# Patient Record
Sex: MALE | Race: BLACK OR AFRICAN AMERICAN | HISPANIC OR LATINO | Employment: OTHER | URBAN - METROPOLITAN AREA
[De-identification: names, ages, dates, MRNs, and addresses within clinical notes are randomized per-mention and may not be internally consistent; named-entity substitution may affect disease eponyms.]

---

## 2017-01-04 ENCOUNTER — ALLSCRIPTS OFFICE VISIT (OUTPATIENT)
Dept: OTHER | Facility: OTHER | Age: 57
End: 2017-01-04

## 2017-01-18 ENCOUNTER — ALLSCRIPTS OFFICE VISIT (OUTPATIENT)
Dept: OTHER | Facility: OTHER | Age: 57
End: 2017-01-18

## 2017-01-18 DIAGNOSIS — E11.9 TYPE 2 DIABETES MELLITUS WITHOUT COMPLICATIONS (HCC): ICD-10-CM

## 2017-01-23 ENCOUNTER — TRANSCRIBE ORDERS (OUTPATIENT)
Dept: ADMINISTRATIVE | Facility: HOSPITAL | Age: 57
End: 2017-01-23

## 2017-01-23 DIAGNOSIS — E11.69 TYPE 2 DIABETES MELLITUS WITH OTHER SPECIFIED COMPLICATION (HCC): Primary | ICD-10-CM

## 2017-02-15 ENCOUNTER — ANESTHESIA EVENT (OUTPATIENT)
Dept: GASTROENTEROLOGY | Facility: HOSPITAL | Age: 57
End: 2017-02-15
Payer: COMMERCIAL

## 2017-02-15 ENCOUNTER — HOSPITAL ENCOUNTER (OUTPATIENT)
Dept: NUCLEAR MEDICINE | Facility: HOSPITAL | Age: 57
Discharge: HOME | End: 2017-02-15
Attending: INTERNAL MEDICINE
Payer: COMMERCIAL

## 2017-02-15 DIAGNOSIS — E11.69 TYPE 2 DIABETES MELLITUS WITH OTHER SPECIFIED COMPLICATION (HCC): ICD-10-CM

## 2017-02-15 PROCEDURE — 78264 GASTRIC EMPTYING IMG STUDY: CPT

## 2017-02-15 PROCEDURE — A9541 TC99M SULFUR COLLOID: HCPCS

## 2017-02-16 ENCOUNTER — ANESTHESIA (OUTPATIENT)
Dept: GASTROENTEROLOGY | Facility: HOSPITAL | Age: 57
End: 2017-02-16
Payer: COMMERCIAL

## 2017-02-16 ENCOUNTER — HOSPITAL ENCOUNTER (OUTPATIENT)
Facility: HOSPITAL | Age: 57
Setting detail: OUTPATIENT SURGERY
Discharge: HOME | End: 2017-02-16
Attending: INTERNAL MEDICINE | Admitting: INTERNAL MEDICINE
Payer: COMMERCIAL

## 2017-02-16 ENCOUNTER — GENERIC CONVERSION - ENCOUNTER (OUTPATIENT)
Dept: OTHER | Facility: OTHER | Age: 57
End: 2017-02-16

## 2017-02-16 VITALS
SYSTOLIC BLOOD PRESSURE: 132 MMHG | TEMPERATURE: 97.1 F | RESPIRATION RATE: 18 BRPM | DIASTOLIC BLOOD PRESSURE: 62 MMHG | HEART RATE: 48 BPM | HEIGHT: 66 IN | OXYGEN SATURATION: 98 % | WEIGHT: 144 LBS | BODY MASS INDEX: 23.14 KG/M2

## 2017-02-16 DIAGNOSIS — E11.9 TYPE 2 DIABETES MELLITUS WITHOUT COMPLICATIONS (HCC): ICD-10-CM

## 2017-02-16 DIAGNOSIS — R11.2 NAUSEA WITH VOMITING: ICD-10-CM

## 2017-02-16 LAB — GLUCOSE SERPL-MCNC: 186 MG/DL (ref 65–140)

## 2017-02-16 PROCEDURE — 88305 TISSUE EXAM BY PATHOLOGIST: CPT | Performed by: INTERNAL MEDICINE

## 2017-02-16 PROCEDURE — 88313 SPECIAL STAINS GROUP 2: CPT | Performed by: INTERNAL MEDICINE

## 2017-02-16 PROCEDURE — 88341 IMHCHEM/IMCYTCHM EA ADD ANTB: CPT | Performed by: INTERNAL MEDICINE

## 2017-02-16 PROCEDURE — 82948 REAGENT STRIP/BLOOD GLUCOSE: CPT

## 2017-02-16 PROCEDURE — 88342 IMHCHEM/IMCYTCHM 1ST ANTB: CPT | Performed by: INTERNAL MEDICINE

## 2017-02-16 RX ORDER — SODIUM CHLORIDE 9 MG/ML
125 INJECTION, SOLUTION INTRAVENOUS CONTINUOUS
Status: DISCONTINUED | OUTPATIENT
Start: 2017-02-16 | End: 2017-02-16 | Stop reason: HOSPADM

## 2017-02-16 RX ORDER — ARIPIPRAZOLE 2 MG/1
2 TABLET ORAL DAILY
Status: ON HOLD | COMMUNITY
End: 2017-06-02 | Stop reason: ALTCHOICE

## 2017-02-16 RX ORDER — METOCLOPRAMIDE 5 MG/1
5 TABLET ORAL AS NEEDED
COMMUNITY
End: 2018-12-19

## 2017-02-16 RX ORDER — PROPOFOL 10 MG/ML
INJECTION, EMULSION INTRAVENOUS AS NEEDED
Status: DISCONTINUED | OUTPATIENT
Start: 2017-02-16 | End: 2017-02-16 | Stop reason: SURG

## 2017-02-16 RX ORDER — PARICALCITOL 1 UG/1
2 CAPSULE, LIQUID FILLED ORAL DAILY
COMMUNITY
End: 2020-01-01 | Stop reason: ALTCHOICE

## 2017-02-16 RX ORDER — FERROUS SULFATE 325(65) MG
325 TABLET ORAL
COMMUNITY
End: 2018-12-19

## 2017-02-16 RX ORDER — DOXAZOSIN MESYLATE 1 MG/1
4 TABLET ORAL 2 TIMES DAILY
COMMUNITY
End: 2020-01-01 | Stop reason: HOSPADM

## 2017-02-16 RX ORDER — AMLODIPINE BESYLATE 10 MG/1
10 TABLET ORAL DAILY
COMMUNITY
End: 2019-12-24 | Stop reason: SDUPTHER

## 2017-02-16 RX ORDER — FUROSEMIDE 40 MG/1
TABLET ORAL DAILY
COMMUNITY
End: 2020-01-01 | Stop reason: HOSPADM

## 2017-02-16 RX ORDER — METOPROLOL SUCCINATE 25 MG/1
25 TABLET, EXTENDED RELEASE ORAL DAILY
COMMUNITY
End: 2019-10-28 | Stop reason: SDUPTHER

## 2017-02-16 RX ORDER — LANSOPRAZOLE 30 MG/1
30 CAPSULE, DELAYED RELEASE ORAL DAILY
Status: ON HOLD | COMMUNITY
End: 2017-06-02

## 2017-02-16 RX ORDER — CHOLECALCIFEROL (VITAMIN D3) 25 MCG
CAPSULE ORAL
COMMUNITY
End: 2018-12-19

## 2017-02-16 RX ORDER — BUPROPION HYDROCHLORIDE 100 MG/1
100 TABLET, EXTENDED RELEASE ORAL 2 TIMES DAILY
Status: ON HOLD | COMMUNITY
End: 2017-06-02 | Stop reason: SDUPTHER

## 2017-02-16 RX ADMIN — LIDOCAINE HYDROCHLORIDE 60 MG: 20 INJECTION, SOLUTION INTRAVENOUS at 12:54

## 2017-02-16 RX ADMIN — PROPOFOL 120 MG: 10 INJECTION, EMULSION INTRAVENOUS at 12:58

## 2017-02-16 RX ADMIN — PROPOFOL 50 MG: 10 INJECTION, EMULSION INTRAVENOUS at 13:02

## 2017-02-16 RX ADMIN — PROPOFOL 30 MG: 10 INJECTION, EMULSION INTRAVENOUS at 13:03

## 2017-02-16 RX ADMIN — SODIUM CHLORIDE 125 ML/HR: 0.9 INJECTION, SOLUTION INTRAVENOUS at 12:40

## 2017-02-16 RX ADMIN — PROPOFOL 40 MG: 10 INJECTION, EMULSION INTRAVENOUS at 13:05

## 2017-02-27 ENCOUNTER — GENERIC CONVERSION - ENCOUNTER (OUTPATIENT)
Dept: OTHER | Facility: OTHER | Age: 57
End: 2017-02-27

## 2017-02-28 ENCOUNTER — TRANSCRIBE ORDERS (OUTPATIENT)
Dept: ADMINISTRATIVE | Facility: HOSPITAL | Age: 57
End: 2017-02-28

## 2017-02-28 ENCOUNTER — APPOINTMENT (OUTPATIENT)
Dept: LAB | Facility: HOSPITAL | Age: 57
End: 2017-02-28
Payer: COMMERCIAL

## 2017-02-28 DIAGNOSIS — E11.8 UNCONTROLLED TYPE 2 DIABETES MELLITUS WITH COMPLICATION, UNSPECIFIED LONG TERM INSULIN USE STATUS: ICD-10-CM

## 2017-02-28 DIAGNOSIS — Z79.4 ENCOUNTER FOR LONG-TERM (CURRENT) USE OF INSULIN (HCC): ICD-10-CM

## 2017-02-28 DIAGNOSIS — I12.9 PARENCHYMAL RENAL HYPERTENSION, STAGE 1-4 OR UNSPECIFIED CHRONIC KIDNEY DISEASE: ICD-10-CM

## 2017-02-28 DIAGNOSIS — E11.65 UNCONTROLLED TYPE 2 DIABETES MELLITUS WITH COMPLICATION, UNSPECIFIED LONG TERM INSULIN USE STATUS: ICD-10-CM

## 2017-02-28 DIAGNOSIS — E78.00 PURE HYPERCHOLESTEROLEMIA: ICD-10-CM

## 2017-02-28 DIAGNOSIS — E78.49 FAMILIAL COMBINED HYPERLIPIDEMIA: ICD-10-CM

## 2017-02-28 DIAGNOSIS — E21.1 SECONDARY HYPERPARATHYROIDISM, NON-RENAL (HCC): ICD-10-CM

## 2017-02-28 DIAGNOSIS — N18.6 TYPE 2 DIABETES MELLITUS WITH ESRD (END-STAGE RENAL DISEASE) (HCC): Primary | ICD-10-CM

## 2017-02-28 DIAGNOSIS — D63.1 ANEMIA OF CHRONIC RENAL FAILURE, UNSPECIFIED STAGE: ICD-10-CM

## 2017-02-28 DIAGNOSIS — Z86.79 PERSONAL HISTORY OF UNSPECIFIED CIRCULATORY DISEASE: ICD-10-CM

## 2017-02-28 DIAGNOSIS — N18.9 ANEMIA OF CHRONIC RENAL FAILURE, UNSPECIFIED STAGE: ICD-10-CM

## 2017-02-28 DIAGNOSIS — N18.4 CHRONIC KIDNEY DISEASE, STAGE IV (SEVERE) (HCC): ICD-10-CM

## 2017-02-28 DIAGNOSIS — E11.21 DIABETIC GLOMERULOPATHY (HCC): ICD-10-CM

## 2017-02-28 DIAGNOSIS — E87.5 HYPERPOTASSEMIA: ICD-10-CM

## 2017-02-28 DIAGNOSIS — N18.6 TYPE 2 DIABETES MELLITUS WITH ESRD (END-STAGE RENAL DISEASE) (HCC): ICD-10-CM

## 2017-02-28 DIAGNOSIS — E21.1 HYPERPARATHYROIDISM DUE TO 1,25(0H)2D3 (HCC): ICD-10-CM

## 2017-02-28 DIAGNOSIS — E11.22 TYPE 2 DIABETES MELLITUS WITH ESRD (END-STAGE RENAL DISEASE) (HCC): Primary | ICD-10-CM

## 2017-02-28 DIAGNOSIS — E55.9 UNSPECIFIED VITAMIN D DEFICIENCY: ICD-10-CM

## 2017-02-28 DIAGNOSIS — E13.43 DIABETIC AUTONOMIC NEUROPATHY ASSOCIATED WITH OTHER SPECIFIED DIABETES MELLITUS (HCC): ICD-10-CM

## 2017-02-28 DIAGNOSIS — E11.22 TYPE 2 DIABETES MELLITUS WITH ESRD (END-STAGE RENAL DISEASE) (HCC): ICD-10-CM

## 2017-02-28 DIAGNOSIS — D50.9 IRON DEFICIENCY ANEMIA, UNSPECIFIED: ICD-10-CM

## 2017-02-28 DIAGNOSIS — I10 ESSENTIAL HYPERTENSION, MALIGNANT: ICD-10-CM

## 2017-02-28 LAB
ALBUMIN SERPL BCP-MCNC: 3.2 G/DL (ref 3.5–5)
ALP SERPL-CCNC: 179 U/L (ref 46–116)
ALT SERPL W P-5'-P-CCNC: 18 U/L (ref 12–78)
ANION GAP SERPL CALCULATED.3IONS-SCNC: 8 MMOL/L (ref 4–13)
AST SERPL W P-5'-P-CCNC: 13 U/L (ref 5–45)
BASOPHILS # BLD AUTO: 0.04 THOUSANDS/ΜL (ref 0–0.1)
BASOPHILS NFR BLD AUTO: 1 % (ref 0–1)
BILIRUB SERPL-MCNC: 0.38 MG/DL (ref 0.2–1)
BUN SERPL-MCNC: 41 MG/DL (ref 5–25)
CALCIUM SERPL-MCNC: 8.4 MG/DL (ref 8.3–10.1)
CHLORIDE SERPL-SCNC: 104 MMOL/L (ref 100–108)
CO2 SERPL-SCNC: 25 MMOL/L (ref 21–32)
CREAT SERPL-MCNC: 3.23 MG/DL (ref 0.6–1.3)
EOSINOPHIL # BLD AUTO: 0.21 THOUSAND/ΜL (ref 0–0.61)
EOSINOPHIL NFR BLD AUTO: 3 % (ref 0–6)
ERYTHROCYTE [DISTWIDTH] IN BLOOD BY AUTOMATED COUNT: 12.4 % (ref 11.6–15.1)
EST. AVERAGE GLUCOSE BLD GHB EST-MCNC: 169 MG/DL
FERRITIN SERPL-MCNC: 84 NG/ML (ref 8–388)
GFR SERPL CREATININE-BSD FRML MDRD: 20.1 ML/MIN/1.73SQ M
GLUCOSE SERPL-MCNC: 240 MG/DL (ref 65–140)
HBA1C MFR BLD: 7.5 % (ref 4.2–6.3)
HCT VFR BLD AUTO: 38.4 % (ref 36.5–49.3)
HGB BLD-MCNC: 12.8 G/DL (ref 12–17)
IRON SERPL-MCNC: 48 UG/DL (ref 65–175)
LYMPHOCYTES # BLD AUTO: 1.11 THOUSANDS/ΜL (ref 0.6–4.47)
LYMPHOCYTES NFR BLD AUTO: 16 % (ref 14–44)
MCH RBC QN AUTO: 29.8 PG (ref 26.8–34.3)
MCHC RBC AUTO-ENTMCNC: 33.3 G/DL (ref 31.4–37.4)
MCV RBC AUTO: 90 FL (ref 82–98)
MONOCYTES # BLD AUTO: 0.69 THOUSAND/ΜL (ref 0.17–1.22)
MONOCYTES NFR BLD AUTO: 10 % (ref 4–12)
NEUTROPHILS # BLD AUTO: 4.9 THOUSANDS/ΜL (ref 1.85–7.62)
NEUTS SEG NFR BLD AUTO: 70 % (ref 43–75)
PHOSPHATE SERPL-MCNC: 3.1 MG/DL (ref 2.7–4.5)
PLATELET # BLD AUTO: 194 THOUSANDS/UL (ref 149–390)
PMV BLD AUTO: 12.1 FL (ref 8.9–12.7)
POTASSIUM SERPL-SCNC: 5.3 MMOL/L (ref 3.5–5.3)
PROT SERPL-MCNC: 6.7 G/DL (ref 6.4–8.2)
PTH-INTACT SERPL-MCNC: 234.9 PG/ML (ref 14–72)
RBC # BLD AUTO: 4.29 MILLION/UL (ref 3.88–5.62)
SODIUM SERPL-SCNC: 137 MMOL/L (ref 136–145)
TSH SERPL DL<=0.05 MIU/L-ACNC: 0.68 UIU/ML (ref 0.36–3.74)
WBC # BLD AUTO: 6.95 THOUSAND/UL (ref 4.31–10.16)

## 2017-02-28 PROCEDURE — 85025 COMPLETE CBC W/AUTO DIFF WBC: CPT

## 2017-02-28 PROCEDURE — 82728 ASSAY OF FERRITIN: CPT

## 2017-02-28 PROCEDURE — 84443 ASSAY THYROID STIM HORMONE: CPT

## 2017-02-28 PROCEDURE — 80053 COMPREHEN METABOLIC PANEL: CPT

## 2017-02-28 PROCEDURE — 83540 ASSAY OF IRON: CPT

## 2017-02-28 PROCEDURE — 36415 COLL VENOUS BLD VENIPUNCTURE: CPT

## 2017-02-28 PROCEDURE — 83970 ASSAY OF PARATHORMONE: CPT

## 2017-02-28 PROCEDURE — 84100 ASSAY OF PHOSPHORUS: CPT

## 2017-02-28 PROCEDURE — 83036 HEMOGLOBIN GLYCOSYLATED A1C: CPT

## 2017-03-01 ENCOUNTER — GENERIC CONVERSION - ENCOUNTER (OUTPATIENT)
Dept: OTHER | Facility: OTHER | Age: 57
End: 2017-03-01

## 2017-03-22 ENCOUNTER — ALLSCRIPTS OFFICE VISIT (OUTPATIENT)
Dept: OTHER | Facility: OTHER | Age: 57
End: 2017-03-22

## 2017-03-27 ENCOUNTER — GENERIC CONVERSION - ENCOUNTER (OUTPATIENT)
Dept: OTHER | Facility: OTHER | Age: 57
End: 2017-03-27

## 2017-05-10 ENCOUNTER — ALLSCRIPTS OFFICE VISIT (OUTPATIENT)
Dept: OTHER | Facility: OTHER | Age: 57
End: 2017-05-10

## 2017-06-01 ENCOUNTER — ANESTHESIA EVENT (OUTPATIENT)
Dept: GASTROENTEROLOGY | Facility: HOSPITAL | Age: 57
End: 2017-06-01
Payer: COMMERCIAL

## 2017-06-01 RX ORDER — SODIUM CHLORIDE 9 MG/ML
125 INJECTION, SOLUTION INTRAVENOUS CONTINUOUS
Status: CANCELLED | OUTPATIENT
Start: 2017-06-01

## 2017-06-02 ENCOUNTER — ANESTHESIA (OUTPATIENT)
Dept: GASTROENTEROLOGY | Facility: HOSPITAL | Age: 57
End: 2017-06-02
Payer: COMMERCIAL

## 2017-06-02 ENCOUNTER — GENERIC CONVERSION - ENCOUNTER (OUTPATIENT)
Dept: OTHER | Facility: OTHER | Age: 57
End: 2017-06-02

## 2017-06-02 ENCOUNTER — HOSPITAL ENCOUNTER (OUTPATIENT)
Facility: HOSPITAL | Age: 57
Setting detail: OUTPATIENT SURGERY
Discharge: HOME | End: 2017-06-02
Attending: INTERNAL MEDICINE | Admitting: INTERNAL MEDICINE
Payer: COMMERCIAL

## 2017-06-02 VITALS
HEART RATE: 47 BPM | BODY MASS INDEX: 22.6 KG/M2 | DIASTOLIC BLOOD PRESSURE: 73 MMHG | OXYGEN SATURATION: 94 % | WEIGHT: 144 LBS | TEMPERATURE: 96 F | HEIGHT: 67 IN | RESPIRATION RATE: 24 BRPM | SYSTOLIC BLOOD PRESSURE: 131 MMHG

## 2017-06-02 DIAGNOSIS — K27.9 PEPTIC ULCER WITHOUT HEMORRHAGE OR PERFORATION: ICD-10-CM

## 2017-06-02 LAB — GLUCOSE SERPL-MCNC: 216 MG/DL (ref 65–140)

## 2017-06-02 PROCEDURE — 88305 TISSUE EXAM BY PATHOLOGIST: CPT | Performed by: INTERNAL MEDICINE

## 2017-06-02 PROCEDURE — 82948 REAGENT STRIP/BLOOD GLUCOSE: CPT

## 2017-06-02 RX ORDER — SODIUM CHLORIDE 9 MG/ML
125 INJECTION, SOLUTION INTRAVENOUS CONTINUOUS
Status: DISCONTINUED | OUTPATIENT
Start: 2017-06-02 | End: 2017-06-02 | Stop reason: HOSPADM

## 2017-06-02 RX ORDER — PROPOFOL 10 MG/ML
INJECTION, EMULSION INTRAVENOUS AS NEEDED
Status: DISCONTINUED | OUTPATIENT
Start: 2017-06-02 | End: 2017-06-02 | Stop reason: SURG

## 2017-06-02 RX ORDER — LANSOPRAZOLE 30 MG/1
30 CAPSULE, DELAYED RELEASE ORAL 2 TIMES DAILY
Qty: 60 CAPSULE | Refills: 4 | Status: SHIPPED | OUTPATIENT
Start: 2017-06-02 | End: 2018-06-06 | Stop reason: SDUPTHER

## 2017-06-02 RX ADMIN — PROPOFOL 50 MG: 10 INJECTION, EMULSION INTRAVENOUS at 09:36

## 2017-06-02 RX ADMIN — SODIUM CHLORIDE 125 ML/HR: 0.9 INJECTION, SOLUTION INTRAVENOUS at 08:59

## 2017-06-02 RX ADMIN — PROPOFOL 150 MG: 10 INJECTION, EMULSION INTRAVENOUS at 09:32

## 2017-06-07 ENCOUNTER — GENERIC CONVERSION - ENCOUNTER (OUTPATIENT)
Dept: OTHER | Facility: OTHER | Age: 57
End: 2017-06-07

## 2017-06-27 ENCOUNTER — TRANSCRIBE ORDERS (OUTPATIENT)
Dept: ADMINISTRATIVE | Facility: HOSPITAL | Age: 57
End: 2017-06-27

## 2017-06-27 ENCOUNTER — APPOINTMENT (OUTPATIENT)
Dept: LAB | Facility: HOSPITAL | Age: 57
End: 2017-06-27
Payer: COMMERCIAL

## 2017-06-27 DIAGNOSIS — E55.9 UNSPECIFIED VITAMIN D DEFICIENCY: ICD-10-CM

## 2017-06-27 DIAGNOSIS — N18.4 CHRONIC KIDNEY DISEASE, STAGE IV (SEVERE) (HCC): Primary | ICD-10-CM

## 2017-06-27 DIAGNOSIS — E87.5 HYPERPOTASSEMIA: ICD-10-CM

## 2017-06-27 DIAGNOSIS — N18.9 ANEMIA OF CHRONIC RENAL FAILURE, UNSPECIFIED STAGE: ICD-10-CM

## 2017-06-27 DIAGNOSIS — N18.6 TYPE 2 DIABETES MELLITUS WITH ESRD (END-STAGE RENAL DISEASE) (HCC): Primary | ICD-10-CM

## 2017-06-27 DIAGNOSIS — E21.1 HYPERPARATHYROIDISM DUE TO 1,25(0H)2D3 (HCC): ICD-10-CM

## 2017-06-27 DIAGNOSIS — I10 ESSENTIAL HYPERTENSION, MALIGNANT: ICD-10-CM

## 2017-06-27 DIAGNOSIS — D50.9 IRON DEFICIENCY ANEMIA, UNSPECIFIED: ICD-10-CM

## 2017-06-27 DIAGNOSIS — Z86.79 PERSONAL HISTORY OF CARDIOVASCULAR DISORDER: ICD-10-CM

## 2017-06-27 DIAGNOSIS — E78.49 FAMILIAL COMBINED HYPERLIPIDEMIA: ICD-10-CM

## 2017-06-27 DIAGNOSIS — E78.00 PURE HYPERCHOLESTEROLEMIA: ICD-10-CM

## 2017-06-27 DIAGNOSIS — I12.9 PARENCHYMAL RENAL HYPERTENSION, STAGE 1-4 OR UNSPECIFIED CHRONIC KIDNEY DISEASE: ICD-10-CM

## 2017-06-27 DIAGNOSIS — E11.8 UNCONTROLLED TYPE 2 DIABETES MELLITUS WITH COMPLICATION, UNSPECIFIED LONG TERM INSULIN USE STATUS: ICD-10-CM

## 2017-06-27 DIAGNOSIS — E11.65 UNCONTROLLED TYPE 2 DIABETES MELLITUS WITH COMPLICATION, UNSPECIFIED LONG TERM INSULIN USE STATUS: ICD-10-CM

## 2017-06-27 DIAGNOSIS — E13.43 DIABETIC AUTONOMIC NEUROPATHY ASSOCIATED WITH OTHER SPECIFIED DIABETES MELLITUS (HCC): ICD-10-CM

## 2017-06-27 DIAGNOSIS — E11.21 DIABETIC GLOMERULOPATHY (HCC): ICD-10-CM

## 2017-06-27 DIAGNOSIS — E11.22 TYPE 2 DIABETES MELLITUS WITH ESRD (END-STAGE RENAL DISEASE) (HCC): ICD-10-CM

## 2017-06-27 DIAGNOSIS — N18.4 CHRONIC KIDNEY DISEASE, STAGE IV (SEVERE) (HCC): ICD-10-CM

## 2017-06-27 DIAGNOSIS — D63.1 ANEMIA OF CHRONIC RENAL FAILURE, UNSPECIFIED STAGE: ICD-10-CM

## 2017-06-27 DIAGNOSIS — N18.6 TYPE 2 DIABETES MELLITUS WITH ESRD (END-STAGE RENAL DISEASE) (HCC): ICD-10-CM

## 2017-06-27 DIAGNOSIS — E11.22 TYPE 2 DIABETES MELLITUS WITH ESRD (END-STAGE RENAL DISEASE) (HCC): Primary | ICD-10-CM

## 2017-06-27 LAB
ALBUMIN SERPL BCP-MCNC: 3.1 G/DL (ref 3.5–5)
ALP SERPL-CCNC: 160 U/L (ref 46–116)
ALT SERPL W P-5'-P-CCNC: 16 U/L (ref 12–78)
ANION GAP SERPL CALCULATED.3IONS-SCNC: 10 MMOL/L (ref 4–13)
AST SERPL W P-5'-P-CCNC: 13 U/L (ref 5–45)
BASOPHILS # BLD AUTO: 0.07 THOUSANDS/ΜL (ref 0–0.1)
BASOPHILS NFR BLD AUTO: 1 % (ref 0–1)
BILIRUB SERPL-MCNC: 0.38 MG/DL (ref 0.2–1)
BUN SERPL-MCNC: 36 MG/DL (ref 5–25)
CALCIUM SERPL-MCNC: 8.9 MG/DL (ref 8.3–10.1)
CHLORIDE SERPL-SCNC: 105 MMOL/L (ref 100–108)
CO2 SERPL-SCNC: 24 MMOL/L (ref 21–32)
CREAT SERPL-MCNC: 3.08 MG/DL (ref 0.6–1.3)
CREAT UR-MCNC: 191 MG/DL
EOSINOPHIL # BLD AUTO: 0.2 THOUSAND/ΜL (ref 0–0.61)
EOSINOPHIL NFR BLD AUTO: 3 % (ref 0–6)
ERYTHROCYTE [DISTWIDTH] IN BLOOD BY AUTOMATED COUNT: 12.7 % (ref 11.6–15.1)
EST. AVERAGE GLUCOSE BLD GHB EST-MCNC: 189 MG/DL
FERRITIN SERPL-MCNC: 154 NG/ML (ref 8–388)
GFR SERPL CREATININE-BSD FRML MDRD: 21.3 ML/MIN/1.73SQ M
GLUCOSE SERPL-MCNC: 193 MG/DL (ref 65–140)
HBA1C MFR BLD: 8.2 % (ref 4.2–6.3)
HCT VFR BLD AUTO: 39.7 % (ref 36.5–49.3)
HGB BLD-MCNC: 14 G/DL (ref 12–17)
IRON SATN MFR SERPL: 25 %
IRON SERPL-MCNC: 71 UG/DL (ref 65–175)
LYMPHOCYTES # BLD AUTO: 1.5 THOUSANDS/ΜL (ref 0.6–4.47)
LYMPHOCYTES NFR BLD AUTO: 24 % (ref 14–44)
MAGNESIUM SERPL-MCNC: 1.8 MG/DL (ref 1.6–2.6)
MCH RBC QN AUTO: 30.8 PG (ref 26.8–34.3)
MCHC RBC AUTO-ENTMCNC: 35.3 G/DL (ref 31.4–37.4)
MCV RBC AUTO: 87 FL (ref 82–98)
MICROALBUMIN UR-MCNC: 3250 MG/L (ref 0–20)
MICROALBUMIN/CREAT 24H UR: 1702 MG/G CREATININE (ref 0–30)
MONOCYTES # BLD AUTO: 0.54 THOUSAND/ΜL (ref 0.17–1.22)
MONOCYTES NFR BLD AUTO: 9 % (ref 4–12)
NEUTROPHILS # BLD AUTO: 3.94 THOUSANDS/ΜL (ref 1.85–7.62)
NEUTS SEG NFR BLD AUTO: 63 % (ref 43–75)
NRBC BLD AUTO-RTO: 0 /100 WBCS
PHOSPHATE SERPL-MCNC: 3.6 MG/DL (ref 2.7–4.5)
PLATELET # BLD AUTO: 190 THOUSANDS/UL (ref 149–390)
PMV BLD AUTO: 12.2 FL (ref 8.9–12.7)
POTASSIUM SERPL-SCNC: 4.9 MMOL/L (ref 3.5–5.3)
PROT SERPL-MCNC: 6.7 G/DL (ref 6.4–8.2)
PTH-INTACT SERPL-MCNC: 164 PG/ML (ref 14–72)
RBC # BLD AUTO: 4.55 MILLION/UL (ref 3.88–5.62)
SODIUM SERPL-SCNC: 139 MMOL/L (ref 136–145)
TIBC SERPL-MCNC: 286 UG/DL (ref 250–450)
WBC # BLD AUTO: 6.25 THOUSAND/UL (ref 4.31–10.16)

## 2017-06-27 PROCEDURE — 83036 HEMOGLOBIN GLYCOSYLATED A1C: CPT

## 2017-06-27 PROCEDURE — 83735 ASSAY OF MAGNESIUM: CPT

## 2017-06-27 PROCEDURE — 82570 ASSAY OF URINE CREATININE: CPT | Performed by: INTERNAL MEDICINE

## 2017-06-27 PROCEDURE — 85025 COMPLETE CBC W/AUTO DIFF WBC: CPT

## 2017-06-27 PROCEDURE — 83970 ASSAY OF PARATHORMONE: CPT

## 2017-06-27 PROCEDURE — 80053 COMPREHEN METABOLIC PANEL: CPT

## 2017-06-27 PROCEDURE — 84100 ASSAY OF PHOSPHORUS: CPT

## 2017-06-27 PROCEDURE — 36415 COLL VENOUS BLD VENIPUNCTURE: CPT

## 2017-06-27 PROCEDURE — 82728 ASSAY OF FERRITIN: CPT

## 2017-06-27 PROCEDURE — 83550 IRON BINDING TEST: CPT

## 2017-06-27 PROCEDURE — 83540 ASSAY OF IRON: CPT

## 2017-06-27 PROCEDURE — 82043 UR ALBUMIN QUANTITATIVE: CPT | Performed by: INTERNAL MEDICINE

## 2017-07-03 ENCOUNTER — GENERIC CONVERSION - ENCOUNTER (OUTPATIENT)
Dept: OTHER | Facility: OTHER | Age: 57
End: 2017-07-03

## 2017-07-13 ENCOUNTER — GENERIC CONVERSION - ENCOUNTER (OUTPATIENT)
Dept: OTHER | Facility: OTHER | Age: 57
End: 2017-07-13

## 2017-08-09 ENCOUNTER — ALLSCRIPTS OFFICE VISIT (OUTPATIENT)
Dept: OTHER | Facility: OTHER | Age: 57
End: 2017-08-09

## 2017-08-11 ENCOUNTER — GENERIC CONVERSION - ENCOUNTER (OUTPATIENT)
Dept: OTHER | Facility: OTHER | Age: 57
End: 2017-08-11

## 2017-09-27 ENCOUNTER — GENERIC CONVERSION - ENCOUNTER (OUTPATIENT)
Dept: OTHER | Facility: OTHER | Age: 57
End: 2017-09-27

## 2017-10-05 ENCOUNTER — GENERIC CONVERSION - ENCOUNTER (OUTPATIENT)
Dept: OTHER | Facility: OTHER | Age: 57
End: 2017-10-05

## 2017-12-06 ENCOUNTER — GENERIC CONVERSION - ENCOUNTER (OUTPATIENT)
Dept: FAMILY MEDICINE CLINIC | Facility: CLINIC | Age: 57
End: 2017-12-06

## 2018-01-09 NOTE — MISCELLANEOUS
Provider Comments  Provider Comments:   Pt didn't show to his appointment        Signatures   Electronically signed by : RICHELLE Hernandez ; Jul 21 2016  4:00PM EST                       (Author)

## 2018-01-10 NOTE — RESULT NOTES
Discussion/Summary   Esophagus biopsies just showed inflammation, nothing worrisome, recommend continued acid blocking medication     Verified Results  (1) TISSUE EXAM 02Jun2017 09:36AM Rachael Wright     Test Name Result Flag Reference   LAB AP CASE REPORT (Report)     Surgical Pathology Report             Case: [id number]                   Authorizing Provider: Nely Jarrell DO    Collected:      06/02/2017 0431        Ordering Location:   [facility]    Received:      06/02/2017 [facility]                              Pathologist:      Alex Lewis MD                              Specimen:  Esophagus, Bx ge junction/esophagitis   LAB AP FINAL DIAGNOSIS (Report)     A  Esophagus, GE junction, biopsy:        - Focal acute esophagitis with rare neutrophils in the squamous   epithelium         - Fewer than 2 eosinophils per high power field are identified         - No intestinal metaplasia, dysplasia or malignancy is   identified  Interpretation performed at , [address]   Electronically signed by Alex Lewis MD on 6/6/2017 at 1:42 PM   LAB AP SURGICAL ADDITIONAL INFORMATION (Report)     These tests were developed and their performance characteristics   determined by [facility] or [employer]  They may not be cleared or approved by the [place]  Food and   Drug Administration  The FDA has determined that such clearance or   approval is not necessary  These tests are used for clinical purposes  They should not be regarded as investigational or for research  This   laboratory has been approved by CLIA 88, designated as a high-complexity   laboratory and is qualified to perform these tests  LAB AP GROSS DESCRIPTION (Report)     A  The specimen is received in formalin, labeled with the patient's name   and hospital number, and is designated GE junction biopsy   The specimen   consists of 2 tan white soft tissue fragment measuring 0 2 and 0 3 cm in   greatest dimension  Entire submitted  One cassette  Note: The estimated total fixation time based upon information provided by   the submitting clinician and standard processing schedule is 17 00 hours      AEK

## 2018-01-11 NOTE — RESULT NOTES
Verified Results  (1) COMPREHENSIVE METABOLIC PANEL [id number] 44:67EI Amy Mess     Test Name Result Flag Reference   GLUCOSE,RANDM 64 mg/dL L    If the patient is fasting, the ADA then defines impaired fasting glucose as > 100 mg/dL and diabetes as > or equal to 123 mg/dL  SODIUM 142 mmol/L  136-145   POTASSIUM 4 6 mmol/L  3 5-5 3   CHLORIDE 106 mmol/L  100-108   CARBON DIOXIDE 28 mmol/L  21-32   ANION GAP (CALC) 8 mmol/L  4-13   BLOOD UREA NITROGEN 36 mg/dL H 5-25   CREATININE 2 83 mg/dL H 0 60-1 30   Standardized to IDMS reference method   CALCIUM 8 5 mg/dL  8 3-10 1   BILI, TOTAL 0 22 mg/dL  0 20-1 00   ALK PHOSPHATAS 128 U/L H    ALT (SGPT) 18 U/L  12-78   AST(SGOT) 18 U/L  5-45   ALBUMIN 3 1 g/dL L 3 5-5 0   TOTAL PROTEIN 6 8 g/dL  6 4-8 2   eGFR Non-African American 23 4 ml/min/1 73sq [facility] Disease Education Program recommendations are as follows:  GFR calculation is accurate only with a steady state creatinine  Chronic Kidney disease less than 60 ml/min/1 73 sq  meters  Kidney failure less than 15 ml/min/1 73 sq  meters  Discussion/Summary   let pt know his BW shows his creatinine (kidney function) is slightly better than last time, his kidneys are back to HIS baseline, still slow like it has been over the last year, but not worse       Signatures   Electronically signed by : RICHELLE Hall ; Nov 29 2016  1:59PM EST                       (Author)

## 2018-01-12 VITALS
RESPIRATION RATE: 18 BRPM | DIASTOLIC BLOOD PRESSURE: 68 MMHG | SYSTOLIC BLOOD PRESSURE: 130 MMHG | TEMPERATURE: 96 F | OXYGEN SATURATION: 98 % | HEART RATE: 61 BPM | WEIGHT: 141 LBS | BODY MASS INDEX: 23.49 KG/M2 | HEIGHT: 65 IN

## 2018-01-12 NOTE — RESULT NOTES
Message   repeat egd in 8 weeks     Verified Results  (1) TISSUE EXAM [id number] 01:03PM Christi Leiva     Test Name Result Flag Reference   LAB AP CASE REPORT (Report)     Surgical Pathology Report             Case: [id number]                   Authorizing Provider: Valentino Husain DO    Collected:      02/16/2017 1303        Ordering Location:   [facility]    Received:      02/16/2017 [facility]                              Pathologist:      John Rocha MD                              Specimens:  A) - Stomach, Bx gastric antrum ulcer                                 B) - Esophagogastric junction, Bx GE junction   LAB AP FINAL DIAGNOSIS (Report)     A  Stomach, antrum, ulcer, biopsy:    - Chronic inactive antral gastritis with intestinal metaplasia (goblet   cells present and confirmed with Alcian blue/PAS     stain     - Immunostain for H  pylori (with appropriate positive control) is   negative  - No dysplasia or neoplasia (pankeratin AE1/AE3 immunostain)   identified  B  Gastroesophageal junction, 35 cm, biopsy:    - Squamous mucosa with intramucosal eosinophils and basal cell   hyperplasia, consistent with reflux esophagitis  - No dysplasia or neoplasia identified  Electronically signed by John Rocha MD on 2/20/2017 at 4:40 PM   LAB AP SURGICAL ADDITIONAL INFORMATION (Report)     These tests were developed and their performance characteristics   determined by [facility] or [employer]  They may not be cleared or approved by the [place]  Food and   Drug Administration  The FDA has determined that such clearance or   approval is not necessary  These tests are used for clinical purposes  They should not be regarded as investigational or for research  This   laboratory has been approved by CLIA 88, designated as a high-complexity   laboratory and is qualified to perform these tests     LAB AP JAYLEN DESCRIPTION (Report)     A  The specimen is received in formalin, labeled with the patient's name   and hospital number, and is designated biopsy gastric antrum ulcer  The   specimen consists of 3 tan red soft tissue fragments each measuring 0 3   cm  Entirely submitted  One cassette  B  The specimen is received in formalin, labeled with the patient's name   and hospital number, and is designated biopsy GE junction at 35 cm  The   specimen consists of a single tan soft tissue fragment measuring 0 3 cm  Entirely submitted  One cassette  Note: The estimated total formalin fixation time based upon information   provided by the submitting clinician and the standard processing schedule   is 15 5 hours      MAC

## 2018-01-13 VITALS
HEART RATE: 92 BPM | BODY MASS INDEX: 21.9 KG/M2 | DIASTOLIC BLOOD PRESSURE: 80 MMHG | HEIGHT: 66 IN | OXYGEN SATURATION: 97 % | TEMPERATURE: 95.1 F | SYSTOLIC BLOOD PRESSURE: 116 MMHG | WEIGHT: 136.25 LBS | RESPIRATION RATE: 18 BRPM

## 2018-01-13 VITALS
SYSTOLIC BLOOD PRESSURE: 128 MMHG | OXYGEN SATURATION: 97 % | HEART RATE: 61 BPM | DIASTOLIC BLOOD PRESSURE: 72 MMHG | BODY MASS INDEX: 23.32 KG/M2 | RESPIRATION RATE: 18 BRPM | HEIGHT: 65 IN | TEMPERATURE: 95.7 F | WEIGHT: 140 LBS

## 2018-01-14 VITALS
BODY MASS INDEX: 23.95 KG/M2 | SYSTOLIC BLOOD PRESSURE: 124 MMHG | DIASTOLIC BLOOD PRESSURE: 60 MMHG | TEMPERATURE: 95.6 F | HEIGHT: 66 IN | HEART RATE: 47 BPM | WEIGHT: 149 LBS | RESPIRATION RATE: 18 BRPM | OXYGEN SATURATION: 99 %

## 2018-01-14 VITALS
HEART RATE: 50 BPM | DIASTOLIC BLOOD PRESSURE: 70 MMHG | BODY MASS INDEX: 23.16 KG/M2 | OXYGEN SATURATION: 99 % | SYSTOLIC BLOOD PRESSURE: 120 MMHG | TEMPERATURE: 96 F | HEIGHT: 66 IN | WEIGHT: 144.13 LBS

## 2018-01-16 NOTE — RESULT NOTES
Discussion/Summary   pls let pt know his kidney level is stable, however make sure he has follow up with Nephrology, his BG has increased his [address] is now 8, make sure he follows with endo  Rest of BW is OK     Verified Results  (1) CBC/PLT/DIFF [id number] 05:47PM Tia Sheets     Test Name Result Flag Reference   WBC COUNT 6 25 Thousand/uL  4 31-10 16   RBC COUNT 4 55 Million/uL  3 88-5 62   HEMOGLOBIN 14 0 g/dL  12 0-17 0   HEMATOCRIT 39 7 %  36 5-49 3   MCV 87 fL  82-98   MCH 30 8 pg  26 8-34 3   MCHC 35 3 g/dL  31 4-37 4   RDW 12 7 %  11 6-15 1   MPV 12 2 fL  8 9-12 7   PLATELET COUNT 281 Thousands/uL  149-390   nRBC AUTOMATED 0 /100 WBCs     NEUTROPHILS RELATIVE PERCENT 63 %  43-75   LYMPHOCYTES RELATIVE PERCENT 24 %  14-44   MONOCYTES RELATIVE PERCENT 9 %  4-12   EOSINOPHILS RELATIVE PERCENT 3 %  0-6   BASOPHILS RELATIVE PERCENT 1 %  0-1   NEUTROPHILS ABSOLUTE COUNT 3 94 Thousands/? ??L  1 85-7 62   LYMPHOCYTES ABSOLUTE COUNT 1 50 Thousands/? ??L  0 60-4 47   MONOCYTES ABSOLUTE COUNT 0 54 Thousand/? ??L  0 17-1 22   EOSINOPHILS ABSOLUTE COUNT 0 20 Thousand/? ??L  0 00-0 61   BASOPHILS ABSOLUTE COUNT 0 07 Thousands/? ??L  0 00-0 10   This bloodwork is non-fasting  Please drink two glasses of water morning of  bloodwork  (1) HEMOGLOBIN A1C [id number] 05:47PM Tia Sheets     Test Name Result Flag Reference   HEMOGLOBIN A1C 8 2 % H 4 2-6 3   EST  AVG  GLUCOSE 189 mg/dl       (1) COMPREHENSIVE METABOLIC PANEL [id number] 55:57GQ Tia Sheets     Test Name Result Flag Reference   GLUCOSE,RANDM 193 mg/dL H    If the patient is fasting, the ADA then defines impaired fasting glucose as > 100 mg/dL and diabetes as > or equal to 123 mg/dL     SODIUM 139 mmol/L  136-145   POTASSIUM 4 9 mmol/L  3 5-5 3   CHLORIDE 105 mmol/L  100-108   CARBON DIOXIDE 24 mmol/L  21-32   ANION GAP (CALC) 10 mmol/L  4-13   BLOOD UREA NITROGEN 36 mg/dL H 5-25   CREATININE 3 08 mg/dL H 0 60-1 30   Standardized to IDMS reference method   CALCIUM 8 9 mg/dL  8 3-10 1   BILI, TOTAL 0 38 mg/dL  0 20-1 00   ALK PHOSPHATAS 160 U/L H    ALT (SGPT) 16 U/L  12-78   AST(SGOT) 13 U/L  5-45   ALBUMIN 3 1 g/dL L 3 5-5 0   TOTAL PROTEIN 6 7 g/dL  6 4-8 2   eGFR Non-African American 21 3 ml/min/1 73sq m     [facility] Disease Education Program recommendations are as follows:  GFR calculation is accurate only with a steady state creatinine  Chronic Kidney disease less than 60 ml/min/1 73 sq  meters  Kidney failure less than 15 ml/min/1 73 sq  meters         Signatures   Electronically signed by : RICHELLE Naqvi ; Jul  3 2017 11:54AM EST                       (Author)

## 2018-01-17 NOTE — RESULT NOTES
Verified Results  NM GASTRIC EMPTYING [id number] 07:54AM Vasquez Chester     Test Name Result Flag Reference   NM GASTRIC EMPTYING (Report)     GASTRIC EMPTYING STUDY     INDICATION: Abdominal pain , nausea, vomiting, diabetes     COMPARISON: None available     TECHNIQUE:  The study was performed following the oral administration of 0 97 mCi Tc-99m sulfur colloid combined with scrambled eggs, as part of a standard meal  Following the meal, one minute anterior and posterior images were obtained immediately and   at 0 25 hours, 0 5 hour, 1 hour, 1 5 hour, 2 hour, 3 hour and 4 hour intervals from the time of ingestion  Geometric mean analyses were then performed  As of March 1, 2016, this gastric emptying protocol has been modified and updated  The gastric    emptying times and the normal values reported below reflect the change in protocol  FINDINGS:     Gastric emptying at 0 5 hours = 12 (N < 30%)    Gastric emptying at 1 hour = 24 % (N = 10 - 70%)   Gastric emptying at 2 hours = 40 % (N = > 40%)   Gastric emptying at 3 hours = 72 % (N = > 70%)   Gastric emptying at 4 hours = 96 % (N = >90%)     Linear T-1/2 = 126 minutes         IMPRESSION:     Normal rate of gastric emptying         Workstation performed: [id number]     Signed by:   Shannan Burrell MD   2/15/17

## 2018-01-18 NOTE — PROGRESS NOTES
Assessment    1  Encounter for preventive health examination (V70 0) (Z00 00)   2  Cerebral infarction, unspecified (434 91) (I63 9)   3  Type 2 diabetes mellitus with diabetic chronic kidney disease (250 40,585 9) (E11 22)   4  Stage 4 chronic kidney disease (585 4) (N18 4)    Plan  Depression screening    · *VB-Depression Screening; Status:Complete;   Done: 85BRM1038 09:16AM  Diabetes mellitus    · Lovastatin 20 MG Oral Tablet; Take one tablet daily  Esophageal reflux    · Lansoprazole 30 MG Oral Capsule Delayed Release; TAKE 1 CAPSULE EVERY  MORNING DAILY  Type 2 diabetes mellitus with renal manifestations, controlled    · Furosemide 40 MG Oral Tablet; TAKE 1 TABLET DAILY  Unlinked    · AmLODIPine Besylate 10 MG Oral Tablet; 1 tablet at bedtime    Discussion/Summary  Impression: Subsequent Annual Wellness Visit  Cardiovascular screening and counseling: the risks and benefits of screening were discussed, screening is current and screening not indicated  Diabetes screening and counseling: the risks and benefits of screening were discussed and screening not indicated  Colorectal cancer screening and counseling: the risks and benefits of screening were discussed and screening not indicated  Prostate cancer screening and counseling: screening not indicated  Glaucoma screening and counseling: screening is current  HIV screening and counseling: the risks and benefits of screening were discussed and the patient declines screening  Patient Discussion: follow-up visit needed in one year  Possible side effects of new medications were reviewed with the patient/guardian today  The treatment plan was reviewed with the patient/guardian  The patient/guardian understands and agrees with the treatment plan      History of Present Illness  HPI: 48 yo  male with well controlled DM, stage IV chronic kidney disease, s/p CVA, here today for annual wellness visit  No new complains     Welcome to [facility] and Wellness Visits: The patient is being seen for the subsequent annual wellness visit  Medicare Screening and Risk Factors   Hospitalizations: no previous hospitalizations and he has been hospitalized 0 times  Medicare Screening Tests Risk Questions   Abdominal aortic aneurysm risk assessment: none indicated  Osteoporosis risk assessment: none indicated  HIV risk assessment: none indicated  Drug and Alcohol Use: The patient has never smoked cigarettes  The patient reports never drinking alcohol  He has previously used illicit drugs  He reports using marijuana  Diet and Physical Activity: Current diet includes well balanced meals, low fat food choices, low carbohydrate food choices, low salt food choices, 1 servings of fruit per day, 1 servings of vegetables per day, 1 servings of meat per day, 2 servings of whole grains per day, 2 servings of simple carbohydrates per day, 1 servings of dairy products per day, 2 cups of coffee per day, 0 cups of tea per day and 1 cans of regular soda per day  He exercises 2 times per week  Exercise: walking 1 hours per week  Mood Disorder and Cognitive Impairment Screening:   Depression screening  negative for symptoms  He denies feeling down, depressed, or hopeless over the past two weeks  He denies feeling little interest or pleasure in doing things over the past two weeks  Cognitive impairment screening: denies difficulty learning/retaining new information, denies difficulty handling complex tasks, denies difficulty with reasoning, denies difficulty with spatial ability and orientation, denies difficulty with language and denies difficulty with behavior  Functional Ability/Level of Safety: Hearing is normal bilaterally, normal in the right ear, normal in the left ear and a hearing aid is not used  The patient is currently able to do activities of daily living with limitations, able to participate in social activities with limitations and unable to drive  Activities of daily living details: transportation help needed, needs help shopping, meal preparation help needed, needs help doing housework, needs help doing laundry, needs help managing medications and needs help managing money, but does not need help using the phone  Fall risk factors: The patient fell 0 times in the past 12 months  Home safety risk factors:  no unfamiliar surroundings, no loose rugs, no poor household lighting, no uneven floors, no household clutter, grab bars in the bathroom and handrails on the stairs  Advance Directives: Advance directives: durable power of  for health care directives, but no living will and no advance directives  Co-Managers and Medical Equipment/Suppliers: See Patient Care Team      Patient Care Team    Care Team Member Role Specialty Office Number   [id number] Sosa  Cardiology [phone]   Phyllis Mejia MD Specialist General Surgery [phone]   Halima SOLORIO  Family Medicine [phone]   [facility] Specialist Gastroenterology Adult [phone]     Review of Systems    Over the past 2 weeks, how often have you been bothered by the following problems? 1 ) Little interest or pleasure in doing things? Not at all    2 ) Feeling down, depressed or hopeless? Not at all    3 ) Trouble falling asleep or sleeping too much? Not at all    4 ) Feeling tired or having little energy? Not at all    5 ) Poor appetite or overeating? Not at all    6 ) Feeling bad about yourself, or that you are a failure, or have let yourself or your family down? Not at all    7 ) Trouble concentrating on things, such as reading a newspaper or watching television? Not at all    8 ) Moving or speaking so slowly that other people could have noticed, or the opposite, moving or speaking faster than usual? Not at all  How difficult have these problems made it for you to do your work, take care of things at home, or get along with people? Not at all     Score 0 Active Problems    1  Abdominal discomfort (789 00) (R10 9)   2  Ambulatory dysfunction (719 7) (R26 2)   3  Anxiety (300 00) (F41 9)   4  Cerebral infarction, unspecified (434 91) (I63 9)   5  Constipation (564 00) (K59 00)   6  Decrease in appetite (783 0) (R63 0)   7  Depression (311) (F32 9)   8  Depression screening (V79 0) (Z13 89)   9  Diabetes mellitus (250 00) (E11 9)   10  Difficulty walking (719 7) (R26 2)   11  Esophageal reflux (530 81) (K21 9)   12  Gait disturbance (781 2) (R26 9)   13  Gastroparesis (536 3) (K31 84)   14  Inguinal hernia (550 90) (K40 90)   15  Insomnia (780 52) (G47 00)   16  Nausea with vomiting (787 01) (R11 2)   17  Need for 23-polyvalent pneumococcal polysaccharide vaccine (V03 82) (Z23)   18  Need for influenza vaccination (V04 81) (Z23)   19  Need for pneumococcal vaccination (V03 82) (Z23)   20  PUD (peptic ulcer disease) (533 90) (K27 9)   21  Restless legs syndrome (333 94) (G25 81)   22  S/P right inguinal hernia repair (V45 89) (Z98 890,Z87 19)   23  Stage 4 chronic kidney disease (585 4) (N18 4)   24  Staggering gait (781 2) (R26 0)   25  Subclinical hyperthyroidism (242 90) (E05 90)   26  Type 2 diabetes mellitus with diabetic chronic kidney disease (250 40,585 9) (E11 22)   27  Type 2 diabetes mellitus with renal manifestations, controlled (250 40) (E11 29)   28   Viral gastroenteritis (008 8) (A08 4)    Past Medical History    · History of Accelerated essential hypertension (401 0) (I10)   · History of Acute contact otitis externa of left ear (380 22) (H60 532)   · History of Acute nonsuppurative otitis media of left ear (381 00) (H65 192)   · History of Chronic kidney disease, stage IV (severe) (585 4) (N18 4)   · History of Coronary Artery Disease (V12 59)   · History of Cough (786 2) (R05)   · History of Diabetic eye exam (V72 0,250 00) (E11 9,Z01 00)   · History of Encounter for diabetic foot exam (250 00) (E11 9)   · History of Failure To Gain Weight (783 41) · History of concussion (V15 52) (M96 624)   · History of hyperkalemia (V12 29) (Z86 39)   · History of hyperlipidemia (V12 29) (Z86 39)   · History of secondary hyperparathyroidism (V12 29) (Z86 39)   · History of Laceration (879 8)   · History of Late effects of cerebrovascular disease (438 9) (I69 90)   · History of Limb pain (729 5) (M79 609)   · History of Low Back Pain Unchanged When Going From Sitting To Standing   · History of Overflow incontinence (788 38) (N39 490)   · History of Type 2 diabetes mellitus (250 00) (E11 9)   · History of Visit for suture removal (V58 32) (Z48 02)    Surgical History    · History of Hernia Repair    Family History  Mother    · Family history of diabetes mellitus (V18 0) (Z83 3)   · Denied: Family history of substance abuse   · No family history of mental disorder  Father    · Denied: Family history of substance abuse   · No family history of mental disorder  Family History    · Family history of Cancer   · Family history of Essential Hypertension   · Family history of Hyperlipidemia    Social History    · Advance directive declined by patient (V49 89) (Z78 9)   · Being A Social Drinker   · Caffeine Use   · Marijuana   · Smokes once in a while for pain  Described as 1x per week  · Never a smoker   · No preference on Mandaen beliefs   · Social history reviewed, unchanged    Current Meds   1  AmLODIPine Besylate 10 MG Oral Tablet; 1 tablet at bedtime; Therapy: 38BFM5601 to Recorded   2  ARIPiprazole 10 MG Oral Tablet; TAKE 1 TABLET AT BEDTIME; Therapy: 75Pcm4976 to (Evaluate:27Jun2017)  Requested for: 65Dkd9004; Last   Rx:24Oou1788 Ordered   3  Aspirin EC 81 MG Oral Tablet Delayed Release; TAKE ONE TABLET BY MOUTH ONCE   DAILY; Therapy: 35YLY9343 to (Daniel Purcell)  Requested for: 04EMO0942; Last   Rx:77Grn6040 Ordered   4  Doxazosin Mesylate 1 MG Oral Tablet; TAKE TABLET Twice daily; Therapy: 65BHL8947 to (Evaluate:05Apr2016) Recorded   5   Ferrous Sulfate 325 (65 Fe) MG Oral Tablet; TAKE 1 TABLET BY MOUTH TWICE A DAY   AT 8AM & 5PM (SUPPLEMENT); Therapy: 08OMH6359 to (Evaluate:01Apr2017)  Requested for: 91Bcg8877; Last   Rx:37Htm2739 Ordered   6  Furosemide 40 MG Oral Tablet; TAKE 1 TABLET DAILY; Therapy: (Levonne Mehta) to Recorded   7  Lansoprazole 30 MG Oral Capsule Delayed Release; TAKE 1 CAPSULE EVERY   MORNING DAILY; Therapy: 25TQD5107 to (Evaluate:23Gox9194)  Requested for: 67WXP0106; Last   Rx:03Mar2017 Ordered   8  Lovastatin 20 MG Oral Tablet; Take one tablet daily; Therapy: 55LMA6611 to (Evaluate:28Apr2017)  Requested for: 15Yya8861; Last   Rx:60Uif9508 Ordered   9  Metoclopramide HCl - 5 MG Oral Tablet; TAKE 1 TABLET BY MOUTH EVERY 6 HOURS; Therapy: 07PAE8868 to (Evaluate:18Jan2017)  Requested for: 14Eyh3127; Last   Rx:03Lba7262 Ordered   10  Metoprolol Succinate ER 25 MG Oral Tablet Extended Release 24 Hour; as needed; Therapy: 79MOS6866 to Recorded   11  OxyCODONE HCl - 15 MG Oral Tablet; TAKE 1 TABLET Every 8 hours; Therapy: 48QWE8494 to (Evaluate:07Apr2017); Last Rx:10Mar2017 Ordered   12  Polyethylene Glycol 3350 Oral Powder; Mix 1/2 capful in 8 oz of water or juice once a day    as needed for constipation; Therapy: 38OIR5817 to (Last Rx:06Jan2017)  Requested for: 04PKB1154 Ordered   13  Sure Comfort Pen Needles 32G X 4 MM Miscellaneous; use four times a day; Therapy: 38HQT0433 to (Evaluate:16Mla3350)  Requested for: 95Kll9641; Last    Rx:02Hhk4403 Ordered   14  Underpads Extra Large Miscellaneous; change twice daily; Therapy: 97PNF0407 to (Last Rx:18Nov2015)  Requested for: 17KAJ9516 Ordered   15  Ventolin  (90 Base) MCG/ACT Inhalation Aerosol Solution; as directed; Therapy: 91JOR2661 to Recorded   16  Vitamin D3 2000 UNIT Oral Capsule; 1 capsule every other day; Therapy: (Recorded:58Plr4899) to Recorded   17  Zolpidem Tartrate 10 MG Oral Tablet; take 1 tablet by mouth at bedtime if needed;     Therapy: 85NVE7444 to (Evaluate:09Ahj9568)  Requested for: 81RJM9568; Last    Rx:22Mar2017 Ordered    Allergies    1  No Known Drug Allergies    Immunizations   1    Influenza  04-Jan-2017    PCV  08-Oct-2015    PPSV  04-Jan-2017     Vitals  Signs    Temperature: 96 F, Tympanic  Heart Rate: 61  Respiration: 18  Systolic: 773  Diastolic: 68  Height: 5 ft 5 in  Weight: 141 lb   BMI Calculated: 23 46  BSA Calculated: 1 71  O2 Saturation: 98    Physical Exam    Constitutional   General appearance: No acute distress, well appearing and well nourished  Ears, Nose, Mouth, and Throat   Otoscopic examination: Tympanic membranes translucent with normal light reflex  Canals patent without erythema  Hearing: Normal     Oropharynx: Normal with no erythema, edema, exudate or lesions  Pulmonary   Auscultation of lungs: Clear to auscultation  Cardiovascular   Auscultation of heart: Normal rate and rhythm, normal S1 and S2, no murmurs  Abdomen   Abdomen: Non-tender, no masses  Liver and spleen: No hepatomegaly or splenomegaly  Lymphatic   Palpation of lymph nodes in neck: No lymphadenopathy  Palpation of lymph nodes in axillae: No lymphadenopathy  Musculoskeletal   Gait and station: Abnormal     Inspection/palpation of digits and nails: Normal without clubbing or cyanosis  Inspection/palpation of joints, bones, and muscles: Abnormal     Range of motion: Abnormal     Stability: Abnormal     Skin   Skin and subcutaneous tissue: Normal without rashes or lesions  Palpation of skin and subcutaneous tissue: Normal turgor  Diabetic Foot Exam: The toes were normal  The sensory exam showed normal vibratory sensation at the level of the toes and normal position sense at the level of the toes  The toes were normal  The sensory exam showed normal vibratory sensation at the level of the toes and normal position sense at the level of the toes     Monofilament Testing: diminished tactile sensation with monofilament testing throughout both feet  Vascular: Pulses: 1+ in the posterior tibialis and 1+ in the dorsalis pedis  Pulses: 1+ in the posterior tibialis and 1+ in the dorsalis pedis  Psychiatric   Judgment and insight: Normal     Orientation to person, place and time: Normal        Results/Data  *VB-Depression Screening 49HRA1776 09:16AM Evelyn Dy     Test Name Result Flag Reference   Depression Scale Result      Depression Screen - Negative For Symptoms       Health Management  History of Diabetic eye exam   *VB - Eye Exam; every 1 year; Last 28IMV8267; Next Due: 01ERQ7189;  Active    Signatures   Electronically signed by : RICHELLE Kinney ; May 10 2017 12:39PM EST                       (Author)

## 2018-01-22 ENCOUNTER — APPOINTMENT (OUTPATIENT)
Dept: LAB | Facility: HOSPITAL | Age: 58
End: 2018-01-22
Payer: COMMERCIAL

## 2018-01-22 ENCOUNTER — ALLSCRIPTS OFFICE VISIT (OUTPATIENT)
Dept: OTHER | Facility: OTHER | Age: 58
End: 2018-01-22

## 2018-01-22 DIAGNOSIS — R30.0 DYSURIA: ICD-10-CM

## 2018-01-22 LAB
BACTERIA UR QL AUTO: ABNORMAL /HPF
BILIRUB UR QL STRIP: ABNORMAL
BILIRUB UR QL STRIP: NORMAL
CLARITY UR: CLEAR
CLARITY UR: NORMAL
COLOR UR: CLEAR
COLOR UR: YELLOW
GLUCOSE (HISTORICAL): NORMAL
GLUCOSE UR STRIP-MCNC: NEGATIVE MG/DL
HGB UR QL STRIP.AUTO: NEGATIVE
HGB UR QL STRIP.AUTO: NORMAL
HYALINE CASTS #/AREA URNS LPF: ABNORMAL /LPF
KETONES UR STRIP-MCNC: NEGATIVE MG/DL
KETONES UR STRIP-MCNC: NORMAL MG/DL
LEUKOCYTE ESTERASE UR QL STRIP: NEGATIVE
LEUKOCYTE ESTERASE UR QL STRIP: NORMAL
NITRITE UR QL STRIP: NEGATIVE
NITRITE UR QL STRIP: NORMAL
NON-SQ EPI CELLS URNS QL MICRO: ABNORMAL /HPF
PH UR STRIP.AUTO: 5.5 [PH] (ref 4.5–8)
PH UR STRIP.AUTO: 6 [PH]
PROT UR STRIP-MCNC: 200 MG/DL
PROT UR STRIP-MCNC: ABNORMAL MG/DL
RBC #/AREA URNS AUTO: ABNORMAL /HPF
SP GR UR STRIP.AUTO: 1.02
SP GR UR STRIP.AUTO: 1.02 (ref 1–1.03)
UROBILINOGEN UR QL STRIP.AUTO: 0.2
UROBILINOGEN UR QL STRIP.AUTO: 0.2 E.U./DL
WBC #/AREA URNS AUTO: ABNORMAL /HPF

## 2018-01-22 PROCEDURE — 81001 URINALYSIS AUTO W/SCOPE: CPT

## 2018-01-29 ENCOUNTER — TRANSCRIBE ORDERS (OUTPATIENT)
Dept: ADMINISTRATIVE | Facility: HOSPITAL | Age: 58
End: 2018-01-29

## 2018-01-29 ENCOUNTER — LAB (OUTPATIENT)
Dept: LAB | Facility: HOSPITAL | Age: 58
End: 2018-01-29
Payer: COMMERCIAL

## 2018-01-29 DIAGNOSIS — N18.9 ANEMIA OF CHRONIC RENAL FAILURE, UNSPECIFIED CKD STAGE: ICD-10-CM

## 2018-01-29 DIAGNOSIS — I10 ESSENTIAL HYPERTENSION, MALIGNANT: ICD-10-CM

## 2018-01-29 DIAGNOSIS — I12.9 PARENCHYMAL RENAL HYPERTENSION, STAGE 1 THROUGH STAGE 4 OR UNSPECIFIED CHRONIC KIDNEY DISEASE: ICD-10-CM

## 2018-01-29 DIAGNOSIS — E78.49 FAMILIAL COMBINED HYPERLIPIDEMIA: ICD-10-CM

## 2018-01-29 DIAGNOSIS — D63.1 ANEMIA OF CHRONIC RENAL FAILURE, UNSPECIFIED CKD STAGE: ICD-10-CM

## 2018-01-29 DIAGNOSIS — E13.43 DIABETIC AUTONOMIC NEUROPATHY ASSOCIATED WITH OTHER SPECIFIED DIABETES MELLITUS (HCC): ICD-10-CM

## 2018-01-29 DIAGNOSIS — E87.5 HYPERPOTASSEMIA: ICD-10-CM

## 2018-01-29 DIAGNOSIS — N18.6 TYPE 2 DIABETES MELLITUS WITH ESRD (END-STAGE RENAL DISEASE) (HCC): ICD-10-CM

## 2018-01-29 DIAGNOSIS — E11.22 TYPE 2 DIABETES MELLITUS WITH ESRD (END-STAGE RENAL DISEASE) (HCC): ICD-10-CM

## 2018-01-29 DIAGNOSIS — Z79.4 ENCOUNTER FOR LONG-TERM (CURRENT) USE OF INSULIN (HCC): ICD-10-CM

## 2018-01-29 DIAGNOSIS — E78.00 PURE HYPERCHOLESTEROLEMIA: ICD-10-CM

## 2018-01-29 DIAGNOSIS — E11.22 TYPE 2 DIABETES MELLITUS WITH ESRD (END-STAGE RENAL DISEASE) (HCC): Primary | ICD-10-CM

## 2018-01-29 DIAGNOSIS — N18.4 CHRONIC KIDNEY DISEASE, STAGE IV (SEVERE) (HCC): ICD-10-CM

## 2018-01-29 DIAGNOSIS — Z79.899 HIGH RISK MEDICATION USE: ICD-10-CM

## 2018-01-29 DIAGNOSIS — E21.1 HYPERPARATHYROIDISM DUE TO 1,25(0H)2D3 (HCC): ICD-10-CM

## 2018-01-29 DIAGNOSIS — Z86.79 PERSONAL HISTORY OF CARDIOVASCULAR DISORDER: ICD-10-CM

## 2018-01-29 DIAGNOSIS — E55.9 VITAMIN D DEFICIENCY DISEASE: ICD-10-CM

## 2018-01-29 DIAGNOSIS — E11.21 DIABETIC GLOMERULOPATHY (HCC): ICD-10-CM

## 2018-01-29 DIAGNOSIS — E21.1 SECONDARY HYPERPARATHYROIDISM, NON-RENAL (HCC): ICD-10-CM

## 2018-01-29 DIAGNOSIS — N18.6 TYPE 2 DIABETES MELLITUS WITH ESRD (END-STAGE RENAL DISEASE) (HCC): Primary | ICD-10-CM

## 2018-01-29 DIAGNOSIS — D50.9 NORMOCYTIC HYPOCHROMIC ANEMIA: ICD-10-CM

## 2018-01-29 DIAGNOSIS — N18.4 CHRONIC KIDNEY DISEASE, STAGE IV (SEVERE) (HCC): Primary | ICD-10-CM

## 2018-01-29 LAB
ALBUMIN SERPL BCP-MCNC: 3 G/DL (ref 3.5–5)
ALP SERPL-CCNC: 132 U/L (ref 46–116)
ALT SERPL W P-5'-P-CCNC: 13 U/L (ref 12–78)
ANION GAP SERPL CALCULATED.3IONS-SCNC: 5 MMOL/L (ref 4–13)
AST SERPL W P-5'-P-CCNC: 15 U/L (ref 5–45)
BASOPHILS # BLD AUTO: 0.06 THOUSANDS/ΜL (ref 0–0.1)
BASOPHILS NFR BLD AUTO: 1 % (ref 0–1)
BILIRUB SERPL-MCNC: 0.5 MG/DL (ref 0.2–1)
BUN SERPL-MCNC: 39 MG/DL (ref 5–25)
CALCIUM SERPL-MCNC: 8.4 MG/DL (ref 8.3–10.1)
CHLORIDE SERPL-SCNC: 107 MMOL/L (ref 100–108)
CHOLEST SERPL-MCNC: 151 MG/DL (ref 50–200)
CO2 SERPL-SCNC: 26 MMOL/L (ref 21–32)
CREAT SERPL-MCNC: 3.79 MG/DL (ref 0.6–1.3)
CREAT UR-MCNC: 133 MG/DL
EOSINOPHIL # BLD AUTO: 0.22 THOUSAND/ΜL (ref 0–0.61)
EOSINOPHIL NFR BLD AUTO: 3 % (ref 0–6)
ERYTHROCYTE [DISTWIDTH] IN BLOOD BY AUTOMATED COUNT: 12.7 % (ref 11.6–15.1)
EST. AVERAGE GLUCOSE BLD GHB EST-MCNC: 197 MG/DL
GFR SERPL CREATININE-BSD FRML MDRD: 17 ML/MIN/1.73SQ M
GLUCOSE P FAST SERPL-MCNC: 231 MG/DL (ref 65–99)
HBA1C MFR BLD: 8.5 % (ref 4.2–6.3)
HCT VFR BLD AUTO: 36.4 % (ref 36.5–49.3)
HDLC SERPL-MCNC: 52 MG/DL (ref 40–60)
HGB BLD-MCNC: 11.9 G/DL (ref 12–17)
LDLC SERPL CALC-MCNC: 79 MG/DL (ref 0–100)
LYMPHOCYTES # BLD AUTO: 1.33 THOUSANDS/ΜL (ref 0.6–4.47)
LYMPHOCYTES NFR BLD AUTO: 21 % (ref 14–44)
MAGNESIUM SERPL-MCNC: 1.7 MG/DL (ref 1.6–2.6)
MCH RBC QN AUTO: 29.5 PG (ref 26.8–34.3)
MCHC RBC AUTO-ENTMCNC: 32.7 G/DL (ref 31.4–37.4)
MCV RBC AUTO: 90 FL (ref 82–98)
MONOCYTES # BLD AUTO: 0.82 THOUSAND/ΜL (ref 0.17–1.22)
MONOCYTES NFR BLD AUTO: 13 % (ref 4–12)
NEUTROPHILS # BLD AUTO: 4.02 THOUSANDS/ΜL (ref 1.85–7.62)
NEUTS SEG NFR BLD AUTO: 62 % (ref 43–75)
NRBC BLD AUTO-RTO: 0 /100 WBCS
PHOSPHATE SERPL-MCNC: 3.2 MG/DL (ref 2.7–4.5)
PLATELET # BLD AUTO: 237 THOUSANDS/UL (ref 149–390)
PMV BLD AUTO: 11.8 FL (ref 8.9–12.7)
POTASSIUM SERPL-SCNC: 5.7 MMOL/L (ref 3.5–5.3)
PROT SERPL-MCNC: 6.7 G/DL (ref 6.4–8.2)
PROT UR-MCNC: 248 MG/DL
PROT/CREAT UR: 1.86 MG/G{CREAT} (ref 0–0.1)
PTH-INTACT SERPL-MCNC: 256.4 PG/ML (ref 14–72)
RBC # BLD AUTO: 4.03 MILLION/UL (ref 3.88–5.62)
SODIUM SERPL-SCNC: 138 MMOL/L (ref 136–145)
TRIGL SERPL-MCNC: 101 MG/DL
WBC # BLD AUTO: 6.45 THOUSAND/UL (ref 4.31–10.16)

## 2018-01-29 PROCEDURE — 82570 ASSAY OF URINE CREATININE: CPT | Performed by: INTERNAL MEDICINE

## 2018-01-29 PROCEDURE — 84100 ASSAY OF PHOSPHORUS: CPT

## 2018-01-29 PROCEDURE — 84156 ASSAY OF PROTEIN URINE: CPT | Performed by: INTERNAL MEDICINE

## 2018-01-29 PROCEDURE — 83036 HEMOGLOBIN GLYCOSYLATED A1C: CPT

## 2018-01-29 PROCEDURE — 85025 COMPLETE CBC W/AUTO DIFF WBC: CPT

## 2018-01-29 PROCEDURE — 36415 COLL VENOUS BLD VENIPUNCTURE: CPT

## 2018-01-29 PROCEDURE — 80061 LIPID PANEL: CPT

## 2018-01-29 PROCEDURE — 80053 COMPREHEN METABOLIC PANEL: CPT

## 2018-01-29 PROCEDURE — 83735 ASSAY OF MAGNESIUM: CPT

## 2018-01-29 PROCEDURE — 83970 ASSAY OF PARATHORMONE: CPT

## 2018-01-30 ENCOUNTER — TELEPHONE (OUTPATIENT)
Dept: FAMILY MEDICINE CLINIC | Facility: CLINIC | Age: 58
End: 2018-01-30

## 2018-02-05 ENCOUNTER — APPOINTMENT (OUTPATIENT)
Dept: LAB | Facility: HOSPITAL | Age: 58
End: 2018-02-05
Payer: COMMERCIAL

## 2018-02-05 DIAGNOSIS — Z86.79 PERSONAL HISTORY OF CARDIOVASCULAR DISORDER: ICD-10-CM

## 2018-02-05 DIAGNOSIS — E87.5 HYPERPOTASSEMIA: ICD-10-CM

## 2018-02-05 DIAGNOSIS — N18.4 CHRONIC KIDNEY DISEASE, STAGE IV (SEVERE) (HCC): ICD-10-CM

## 2018-02-05 DIAGNOSIS — E21.1 HYPERPARATHYROIDISM DUE TO 1,25(0H)2D3 (HCC): ICD-10-CM

## 2018-02-05 DIAGNOSIS — I12.9 PARENCHYMAL RENAL HYPERTENSION, STAGE 1 THROUGH STAGE 4 OR UNSPECIFIED CHRONIC KIDNEY DISEASE: ICD-10-CM

## 2018-02-05 DIAGNOSIS — D63.1 ANEMIA OF CHRONIC RENAL FAILURE, UNSPECIFIED CKD STAGE: ICD-10-CM

## 2018-02-05 DIAGNOSIS — E11.21 DIABETIC GLOMERULOPATHY (HCC): ICD-10-CM

## 2018-02-05 DIAGNOSIS — E13.43 DIABETIC AUTONOMIC NEUROPATHY ASSOCIATED WITH OTHER SPECIFIED DIABETES MELLITUS (HCC): ICD-10-CM

## 2018-02-05 DIAGNOSIS — E78.49 FAMILIAL COMBINED HYPERLIPIDEMIA: ICD-10-CM

## 2018-02-05 DIAGNOSIS — E55.9 VITAMIN D DEFICIENCY DISEASE: ICD-10-CM

## 2018-02-05 DIAGNOSIS — D50.9 NORMOCYTIC HYPOCHROMIC ANEMIA: ICD-10-CM

## 2018-02-05 DIAGNOSIS — N18.9 ANEMIA OF CHRONIC RENAL FAILURE, UNSPECIFIED CKD STAGE: ICD-10-CM

## 2018-02-05 LAB
ANION GAP SERPL CALCULATED.3IONS-SCNC: 9 MMOL/L (ref 4–13)
BUN SERPL-MCNC: 34 MG/DL (ref 5–25)
CALCIUM SERPL-MCNC: 8.3 MG/DL (ref 8.3–10.1)
CHLORIDE SERPL-SCNC: 106 MMOL/L (ref 100–108)
CO2 SERPL-SCNC: 24 MMOL/L (ref 21–32)
CREAT SERPL-MCNC: 3.46 MG/DL (ref 0.6–1.3)
GFR SERPL CREATININE-BSD FRML MDRD: 19 ML/MIN/1.73SQ M
GLUCOSE P FAST SERPL-MCNC: 181 MG/DL (ref 65–99)
POTASSIUM SERPL-SCNC: 5.1 MMOL/L (ref 3.5–5.3)
SODIUM SERPL-SCNC: 139 MMOL/L (ref 136–145)

## 2018-02-05 PROCEDURE — 36415 COLL VENOUS BLD VENIPUNCTURE: CPT

## 2018-02-05 PROCEDURE — 80048 BASIC METABOLIC PNL TOTAL CA: CPT

## 2018-02-28 ENCOUNTER — TELEPHONE (OUTPATIENT)
Dept: FAMILY MEDICINE CLINIC | Facility: CLINIC | Age: 58
End: 2018-02-28

## 2018-02-28 NOTE — TELEPHONE ENCOUNTER
Marcus Moses informed and she will discuss with him and then call back if he would like an apt to discuss further

## 2018-02-28 NOTE — TELEPHONE ENCOUNTER
Please inform [place] fungus and STD have nothing to do with each other  STD is only transmitted by close sexual contact, not by touching someone  Same goes for fungus  It is not easily transmitted  What type of fungus, what type of close contact does he have with his brother  What type of symptoms does he have, in order to fully discuss this and treat properly he to come in and be evaluated for this   BW without proper discussion is not useful

## 2018-03-26 DIAGNOSIS — F32.A DEPRESSION, UNSPECIFIED DEPRESSION TYPE: Primary | ICD-10-CM

## 2018-03-26 RX ORDER — BUPROPION HYDROCHLORIDE 100 MG/1
100 TABLET ORAL 2 TIMES DAILY
Qty: 180 TABLET | Refills: 1 | Status: SHIPPED | OUTPATIENT
Start: 2018-03-26 | End: 2018-12-11 | Stop reason: SDUPTHER

## 2018-05-29 ENCOUNTER — TRANSCRIBE ORDERS (OUTPATIENT)
Dept: ADMINISTRATIVE | Facility: HOSPITAL | Age: 58
End: 2018-05-29

## 2018-05-29 ENCOUNTER — APPOINTMENT (OUTPATIENT)
Dept: LAB | Facility: HOSPITAL | Age: 58
End: 2018-05-29
Payer: COMMERCIAL

## 2018-05-29 DIAGNOSIS — I10 ESSENTIAL HYPERTENSION, MALIGNANT: ICD-10-CM

## 2018-05-29 DIAGNOSIS — E78.00 PURE HYPERCHOLESTEROLEMIA: ICD-10-CM

## 2018-05-29 DIAGNOSIS — E11.22 TYPE 2 DIABETES MELLITUS WITH ESRD (END-STAGE RENAL DISEASE) (HCC): ICD-10-CM

## 2018-05-29 DIAGNOSIS — Z79.4 ENCOUNTER FOR LONG-TERM (CURRENT) USE OF INSULIN (HCC): ICD-10-CM

## 2018-05-29 DIAGNOSIS — N18.6 TYPE 2 DIABETES MELLITUS WITH ESRD (END-STAGE RENAL DISEASE) (HCC): Primary | ICD-10-CM

## 2018-05-29 DIAGNOSIS — N18.6 TYPE 2 DIABETES MELLITUS WITH ESRD (END-STAGE RENAL DISEASE) (HCC): ICD-10-CM

## 2018-05-29 DIAGNOSIS — Z79.899 NEED FOR PROPHYLACTIC CHEMOTHERAPY: ICD-10-CM

## 2018-05-29 DIAGNOSIS — E11.22 TYPE 2 DIABETES MELLITUS WITH ESRD (END-STAGE RENAL DISEASE) (HCC): Primary | ICD-10-CM

## 2018-05-29 LAB
25(OH)D3 SERPL-MCNC: 9.2 NG/ML (ref 30–100)
ALBUMIN SERPL BCP-MCNC: 3.2 G/DL (ref 3.5–5)
ALP SERPL-CCNC: 162 U/L (ref 46–116)
ALT SERPL W P-5'-P-CCNC: 15 U/L (ref 12–78)
ANION GAP SERPL CALCULATED.3IONS-SCNC: 8 MMOL/L (ref 4–13)
AST SERPL W P-5'-P-CCNC: 16 U/L (ref 5–45)
BILIRUB SERPL-MCNC: 0.45 MG/DL (ref 0.2–1)
BUN SERPL-MCNC: 37 MG/DL (ref 5–25)
CALCIUM SERPL-MCNC: 8.8 MG/DL (ref 8.3–10.1)
CHLORIDE SERPL-SCNC: 108 MMOL/L (ref 100–108)
CO2 SERPL-SCNC: 23 MMOL/L (ref 21–32)
CREAT SERPL-MCNC: 3.3 MG/DL (ref 0.6–1.3)
EST. AVERAGE GLUCOSE BLD GHB EST-MCNC: 177 MG/DL
GFR SERPL CREATININE-BSD FRML MDRD: 20 ML/MIN/1.73SQ M
GLUCOSE SERPL-MCNC: 194 MG/DL (ref 65–140)
HBA1C MFR BLD: 7.8 % (ref 4.2–6.3)
POTASSIUM SERPL-SCNC: 5.3 MMOL/L (ref 3.5–5.3)
PROT SERPL-MCNC: 6.7 G/DL (ref 6.4–8.2)
PTH-INTACT SERPL-MCNC: 229.2 PG/ML (ref 18.4–80.1)
SODIUM SERPL-SCNC: 139 MMOL/L (ref 136–145)

## 2018-05-29 PROCEDURE — 83970 ASSAY OF PARATHORMONE: CPT

## 2018-05-29 PROCEDURE — 80053 COMPREHEN METABOLIC PANEL: CPT

## 2018-05-29 PROCEDURE — 36415 COLL VENOUS BLD VENIPUNCTURE: CPT

## 2018-05-29 PROCEDURE — 82306 VITAMIN D 25 HYDROXY: CPT

## 2018-05-29 PROCEDURE — 83036 HEMOGLOBIN GLYCOSYLATED A1C: CPT

## 2018-06-06 DIAGNOSIS — K21.9 GASTROESOPHAGEAL REFLUX DISEASE WITHOUT ESOPHAGITIS: Primary | ICD-10-CM

## 2018-06-06 RX ORDER — LANSOPRAZOLE 30 MG/1
30 CAPSULE, DELAYED RELEASE ORAL 2 TIMES DAILY
Qty: 60 CAPSULE | Refills: 5 | Status: ON HOLD | OUTPATIENT
Start: 2018-06-06 | End: 2018-12-18 | Stop reason: SDUPTHER

## 2018-06-18 DIAGNOSIS — I63.9 CEREBRAL INFARCTION, UNSPECIFIED MECHANISM (HCC): ICD-10-CM

## 2018-06-18 DIAGNOSIS — F32.A DEPRESSION, UNSPECIFIED DEPRESSION TYPE: Primary | ICD-10-CM

## 2018-06-18 RX ORDER — ARIPIPRAZOLE 10 MG/1
1 TABLET ORAL
COMMUNITY
Start: 2011-04-29 | End: 2018-06-18 | Stop reason: SDUPTHER

## 2018-06-19 RX ORDER — ARIPIPRAZOLE 10 MG/1
10 TABLET ORAL DAILY
Qty: 90 TABLET | Refills: 0 | Status: SHIPPED | OUTPATIENT
Start: 2018-06-19 | End: 2018-09-17 | Stop reason: SDUPTHER

## 2018-09-14 ENCOUNTER — TELEPHONE (OUTPATIENT)
Dept: FAMILY MEDICINE CLINIC | Facility: CLINIC | Age: 58
End: 2018-09-14

## 2018-09-17 DIAGNOSIS — F32.A DEPRESSION, UNSPECIFIED DEPRESSION TYPE: ICD-10-CM

## 2018-09-18 RX ORDER — ARIPIPRAZOLE 10 MG/1
TABLET ORAL
Qty: 90 TABLET | Refills: 0 | Status: SHIPPED | OUTPATIENT
Start: 2018-09-18 | End: 2018-12-18 | Stop reason: SDUPTHER

## 2018-09-28 ENCOUNTER — OFFICE VISIT (OUTPATIENT)
Dept: FAMILY MEDICINE CLINIC | Facility: CLINIC | Age: 58
End: 2018-09-28
Payer: COMMERCIAL

## 2018-09-28 ENCOUNTER — CLINICAL SUPPORT (OUTPATIENT)
Dept: FAMILY MEDICINE CLINIC | Facility: CLINIC | Age: 58
End: 2018-09-28
Payer: COMMERCIAL

## 2018-09-28 VITALS
SYSTOLIC BLOOD PRESSURE: 144 MMHG | HEART RATE: 84 BPM | BODY MASS INDEX: 21.86 KG/M2 | WEIGHT: 136 LBS | HEIGHT: 66 IN | OXYGEN SATURATION: 97 % | RESPIRATION RATE: 18 BRPM | DIASTOLIC BLOOD PRESSURE: 80 MMHG | TEMPERATURE: 96 F

## 2018-09-28 DIAGNOSIS — K31.84 GASTROPARESIS: ICD-10-CM

## 2018-09-28 DIAGNOSIS — R26.2 AMBULATORY DYSFUNCTION: ICD-10-CM

## 2018-09-28 DIAGNOSIS — I10 ESSENTIAL HYPERTENSION: ICD-10-CM

## 2018-09-28 DIAGNOSIS — Z79.4 TYPE 2 DIABETES MELLITUS WITH CHRONIC KIDNEY DISEASE, WITH LONG-TERM CURRENT USE OF INSULIN, UNSPECIFIED CKD STAGE (HCC): ICD-10-CM

## 2018-09-28 DIAGNOSIS — E11.9 DIABETIC EYE EXAM (HCC): Primary | ICD-10-CM

## 2018-09-28 DIAGNOSIS — K59.01 SLOW TRANSIT CONSTIPATION: ICD-10-CM

## 2018-09-28 DIAGNOSIS — Z79.891 OPIATE ANALGESIC CONTRACT EXISTS: ICD-10-CM

## 2018-09-28 DIAGNOSIS — Z23 NEED FOR INFLUENZA VACCINATION: ICD-10-CM

## 2018-09-28 DIAGNOSIS — M54.50 CHRONIC BILATERAL LOW BACK PAIN WITHOUT SCIATICA: Primary | ICD-10-CM

## 2018-09-28 DIAGNOSIS — Z01.00 DIABETIC EYE EXAM (HCC): Primary | ICD-10-CM

## 2018-09-28 DIAGNOSIS — F11.90 CHRONIC NARCOTIC USE: ICD-10-CM

## 2018-09-28 DIAGNOSIS — E11.22 TYPE 2 DIABETES MELLITUS WITH CHRONIC KIDNEY DISEASE, WITH LONG-TERM CURRENT USE OF INSULIN, UNSPECIFIED CKD STAGE (HCC): ICD-10-CM

## 2018-09-28 DIAGNOSIS — Z12.11 SCREENING FOR COLON CANCER: ICD-10-CM

## 2018-09-28 DIAGNOSIS — N18.9 CHRONIC KIDNEY DISEASE, UNSPECIFIED CKD STAGE: ICD-10-CM

## 2018-09-28 DIAGNOSIS — G89.29 CHRONIC BILATERAL LOW BACK PAIN WITHOUT SCIATICA: Primary | ICD-10-CM

## 2018-09-28 LAB
LEFT EYE DIABETIC RETINOPATHY: NORMAL
LEFT EYE IMAGE QUALITY: NORMAL
RIGHT EYE DIABETIC RETINOPATHY: NORMAL
RIGHT EYE IMAGE QUALITY: NORMAL
SEVERITY (EYE EXAM): NORMAL

## 2018-09-28 PROCEDURE — 1036F TOBACCO NON-USER: CPT | Performed by: FAMILY MEDICINE

## 2018-09-28 PROCEDURE — 99214 OFFICE O/P EST MOD 30 MIN: CPT | Performed by: FAMILY MEDICINE

## 2018-09-28 PROCEDURE — 92250 FUNDUS PHOTOGRAPHY W/I&R: CPT

## 2018-09-28 PROCEDURE — 90682 RIV4 VACC RECOMBINANT DNA IM: CPT

## 2018-09-28 PROCEDURE — 3072F LOW RISK FOR RETINOPATHY: CPT | Performed by: FAMILY MEDICINE

## 2018-09-28 PROCEDURE — 3008F BODY MASS INDEX DOCD: CPT | Performed by: FAMILY MEDICINE

## 2018-09-28 PROCEDURE — 90471 IMMUNIZATION ADMIN: CPT

## 2018-09-28 RX ORDER — OXYCODONE HYDROCHLORIDE 10 MG/1
10 TABLET ORAL 3 TIMES DAILY PRN
Qty: 90 TABLET | Refills: 0 | Status: SHIPPED | OUTPATIENT
Start: 2018-09-28 | End: 2018-12-19

## 2018-09-28 RX ORDER — OXYCODONE HYDROCHLORIDE AND ACETAMINOPHEN 5; 325 MG/1; MG/1
TABLET ORAL
COMMUNITY
End: 2018-12-19

## 2018-09-28 NOTE — PROGRESS NOTES
Assessment/Plan:    Chronic narcotic use  PMED reviewed prior to witting script  UDS done today  Narcotic contract reviewed and dicussed with patient          Problem List Items Addressed This Visit        Digestive    Gastroparesis       Endocrine    Diabetes mellitus ([facility] [address] )       Cardiovascular and Mediastinum    Essential hypertension       Genitourinary    CKD (chronic kidney disease)       Other    Ambulatory dysfunction    Constipation    Chronic narcotic use     PMED reviewed prior to witting script  UDS done today  Narcotic contract reviewed and dicussed with patient          Relevant Orders    Toxicology screen, urine    Opiate analgesic contract exists      Other Visit Diagnoses     Screening for colon cancer        Relevant Orders    Ambulatory referral to Gastroenterology    Need for influenza vaccination        Relevant Orders    influenza vaccine, [id number], quadrivalent, recombinant, PF, 0 5 mL, for patients 18 yr+ (FLUBLOK) (Completed)            Subjective:      Patient ID: Reny Drew is a 56 y o  male  55 yo  male with Diabetes Mellitus, followed by Endocrine  CKD and HTN followed by Nephrology  Chronic LBP for which he takes occasional Oxycodone, last refilled in January of 2018, here today for follow up  Patient requesting paperwork be filled out for [employer] so he can get gift cards  He would like the flu vaccine as well  He complains of occasional nausea and vomiting about 1 to 2 times a month, usually when he has constipation  Diabetes   He presents for his follow-up diabetic visit  He has type 2 diabetes mellitus  No MedicAlert identification noted  His disease course has been improving  Hypoglycemia symptoms include mood changes  There are no diabetic associated symptoms  There are no hypoglycemic complications  Diabetic complications include a CVA, nephropathy and peripheral neuropathy   (Gastroparesis) Risk factors for coronary artery disease include dyslipidemia, hypertension, male sex and sedentary lifestyle  Current diabetic treatment includes oral agent (dual therapy) and intensive insulin program  He is compliant with treatment most of the time  His weight is stable  He is following a generally healthy diet  Meal planning includes avoidance of concentrated sweets and carbohydrate counting  He has had a previous visit with a dietitian  He rarely participates in exercise  His home blood glucose trend is decreasing rapidly  He sees a podiatrist Eye exam is current  The following portions of the patient's history were reviewed and updated as appropriate:   He  has a past medical history of Anxiety; CAD (coronary artery disease); Chronic kidney disease, stage IV (severe) ([facility] ); Concussion; Depression; Diabetes mellitus ([facility] ); Failure to gain weight in adult; Gastroparesis; GERD (gastroesophageal reflux disease); Hyperkalemia; Hyperlipidemia; Hypertension; Insomnia; Late effects of cerebrovascular disease; Overflow incontinence; Renal disorder; Secondary hyperparathyroidism ([place] [address] ); and Stroke ([place] [address] )  He   Patient Active Problem List    Diagnosis Date Noted    Chronic narcotic use 09/28/2018    Opiate analgesic contract exists 09/28/2018    Essential hypertension 09/17/2018    Ambulatory dysfunction 02/22/2016    Anxiety 11/05/2014    CKD (chronic kidney disease) 11/05/2014    Cerebral infarction ([place] [address] ) 10/09/2014    Diabetes mellitus ([place] [address] ) 07/24/2014    Difficulty walking 04/03/2014    Gait disturbance 11/27/2013    Gastroparesis 04/10/2013    Constipation 01/23/2013     He  has a past surgical history that includes Tympanostomy tube placement; Hernia repair; pr esophagogastroduodenoscopy transoral diagnostic (N/A, 2/16/2017); Colonoscopy; and pr esophagogastroduodenoscopy transoral diagnostic (N/A, 6/2/2017)  His family history includes Cancer in his family; Diabetes in his mother; Hyperlipidemia in his family; Hypertension in his family;  No Known Problems in his father  He  reports that he has never smoked  He has never used smokeless tobacco  He reports that he uses drugs, including Marijuana  He reports that he does not drink alcohol  Current Outpatient Prescriptions   Medication Sig Dispense Refill    amLODIPine (NORVASC) 10 mg tablet Take 10 mg by mouth daily      ARIPiprazole (ABILIFY) 10 mg tablet TAKE 1 TABLET BY MOUTH EVERY DAY 90 tablet 0    aspirin 81 MG tablet Take 1 tablet (81 mg total) by mouth daily 90 tablet 0    buPROPion (WELLBUTRIN) 100 mg tablet Take 1 tablet (100 mg total) by mouth 2 (two) times a day 180 tablet 1    Cholecalciferol (VITAMIN D-3) 1000 UNITS CAPS Take by mouth      dicyclomine (BENTYL) 20 mg tablet Take 1 tablet by mouth every 6 (six) hours 20 tablet 0    doxazosin (CARDURA) 1 mg tablet Take 1 mg by mouth daily at bedtime      ferrous sulfate 325 (65 Fe) mg tablet Take 325 mg by mouth daily with breakfast      furosemide (LASIX) 40 mg tablet Take by mouth daily      Insulin Glargine (TOUJEO SOLOSTAR) 300 UNIT/ML SOPN Inject under the skin      lansoprazole (PREVACID) 30 mg capsule Take 1 capsule (30 mg total) by mouth 2 (two) times a day 60 capsule 5    Liraglutide (VICTOZA) 18 MG/3ML SOPN Inject under the skin      lubiprostone (AMITIZA) 24 mcg capsule Take 24 mcg by mouth 2 (two) times a day with meals      metoclopramide (REGLAN) 5 mg tablet Take 5 mg by mouth as needed        metoprolol succinate (TOPROL-XL) 25 mg 24 hr tablet Take 25 mg by mouth daily      oxyCODONE-acetaminophen (PERCOCET) 5-325 mg per tablet oxycodone-acetaminophen 5 mg-325 mg tablet      paricalcitol (ZEMPLAR) 1 mcg capsule Take 1 mcg by mouth daily      promethazine (PHENERGAN) 12 5 MG tablet Take 1 tablet by mouth every 6 (six) hours as needed for nausea or vomiting 20 tablet 0     No current facility-administered medications for this visit        Current Outpatient Prescriptions on File Prior to Visit   Medication Sig  amLODIPine (NORVASC) 10 mg tablet Take 10 mg by mouth daily    ARIPiprazole (ABILIFY) 10 mg tablet TAKE 1 TABLET BY MOUTH EVERY DAY    aspirin 81 MG tablet Take 1 tablet (81 mg total) by mouth daily    buPROPion (WELLBUTRIN) 100 mg tablet Take 1 tablet (100 mg total) by mouth 2 (two) times a day    Cholecalciferol (VITAMIN D-3) 1000 UNITS CAPS Take by mouth    dicyclomine (BENTYL) 20 mg tablet Take 1 tablet by mouth every 6 (six) hours    doxazosin (CARDURA) 1 mg tablet Take 1 mg by mouth daily at bedtime    ferrous sulfate 325 (65 Fe) mg tablet Take 325 mg by mouth daily with breakfast    furosemide (LASIX) 40 mg tablet Take by mouth daily    Insulin Glargine (TOUJEO SOLOSTAR) 300 UNIT/ML SOPN Inject under the skin    lansoprazole (PREVACID) 30 mg capsule Take 1 capsule (30 mg total) by mouth 2 (two) times a day    Liraglutide (VICTOZA) 18 MG/3ML SOPN Inject under the skin    lubiprostone (AMITIZA) 24 mcg capsule Take 24 mcg by mouth 2 (two) times a day with meals    metoclopramide (REGLAN) 5 mg tablet Take 5 mg by mouth as needed      metoprolol succinate (TOPROL-XL) 25 mg 24 hr tablet Take 25 mg by mouth daily    paricalcitol (ZEMPLAR) 1 mcg capsule Take 1 mcg by mouth daily    promethazine (PHENERGAN) 12 5 MG tablet Take 1 tablet by mouth every 6 (six) hours as needed for nausea or vomiting     No current facility-administered medications on file prior to visit       Review of Systems   Gastrointestinal: Positive for abdominal distention, constipation, nausea and vomiting  Musculoskeletal: Positive for back pain  All other systems reviewed and are negative  Objective:      /80 (BP Location: Right arm, Patient Position: Sitting, Cuff Size: Standard)   Pulse 84   Temp (!) 96 °F (35 6 °C) (Tympanic)   Resp 18   Ht 5' 6" (1 676 m)   Wt 61 7 kg (136 lb)   SpO2 97%   BMI 21 95 kg/m²          Physical Exam   Constitutional: He is oriented to person, place, and time   He appears well-developed  HENT:   Head: Normocephalic  Right Ear: External ear normal    Left Ear: External ear normal    Nose: Nose normal    Mouth/Throat: Oropharynx is clear and moist    Eyes: Pupils are equal, round, and reactive to light  Conjunctivae and EOM are normal    Neck: Normal range of motion  Neck supple  No thyromegaly present  Cardiovascular: Normal rate, regular rhythm and normal heart sounds  Pulmonary/Chest: Effort normal and breath sounds normal    Abdominal: Soft  There is no tenderness  There is no rebound and no guarding  Musculoskeletal: Normal range of motion  Neurological: He is alert and oriented to person, place, and time  Skin: Skin is dry  Psychiatric: He has a normal mood and affect  Nursing note and vitals reviewed

## 2018-09-28 NOTE — ASSESSMENT & PLAN NOTE
PMED reviewed prior to witting script  UDS done today  Narcotic contract reviewed and dicussed with patient

## 2018-10-01 ENCOUNTER — TELEPHONE (OUTPATIENT)
Dept: FAMILY MEDICINE CLINIC | Facility: CLINIC | Age: 58
End: 2018-10-01

## 2018-10-01 NOTE — TELEPHONE ENCOUNTER
[facility] stated the Urine Tox Screening -Split and broke in transit  The Pt MUST REPEAT THE URINE Toxicology Screen- please inform patient

## 2018-10-02 ENCOUNTER — TELEPHONE (OUTPATIENT)
Dept: FAMILY MEDICINE CLINIC | Facility: CLINIC | Age: 58
End: 2018-10-02

## 2018-10-03 ENCOUNTER — TELEPHONE (OUTPATIENT)
Dept: FAMILY MEDICINE CLINIC | Facility: CLINIC | Age: 58
End: 2018-10-03

## 2018-10-10 ENCOUNTER — TELEPHONE (OUTPATIENT)
Dept: FAMILY MEDICINE CLINIC | Facility: CLINIC | Age: 58
End: 2018-10-10

## 2018-12-07 ENCOUNTER — APPOINTMENT (OUTPATIENT)
Dept: LAB | Facility: HOSPITAL | Age: 58
End: 2018-12-07
Payer: COMMERCIAL

## 2018-12-07 ENCOUNTER — TRANSCRIBE ORDERS (OUTPATIENT)
Dept: ADMINISTRATIVE | Facility: HOSPITAL | Age: 58
End: 2018-12-07

## 2018-12-07 ENCOUNTER — CLINICAL SUPPORT (OUTPATIENT)
Dept: FAMILY MEDICINE CLINIC | Facility: CLINIC | Age: 58
End: 2018-12-07

## 2018-12-07 DIAGNOSIS — E78.49 OTHER HYPERLIPIDEMIA: ICD-10-CM

## 2018-12-07 DIAGNOSIS — N25.81 SECONDARY HYPERPARATHYROIDISM OF RENAL ORIGIN (HCC): ICD-10-CM

## 2018-12-07 DIAGNOSIS — D50.9 IRON DEFICIENCY ANEMIA, UNSPECIFIED IRON DEFICIENCY ANEMIA TYPE: ICD-10-CM

## 2018-12-07 DIAGNOSIS — D63.1 ANEMIA IN CHRONIC KIDNEY DISEASE, UNSPECIFIED CKD STAGE: ICD-10-CM

## 2018-12-07 DIAGNOSIS — I69.30 CHRONIC CEREBROVASCULAR ACCIDENT: ICD-10-CM

## 2018-12-07 DIAGNOSIS — E87.5 HYPERPOTASSEMIA: ICD-10-CM

## 2018-12-07 DIAGNOSIS — N18.9 ANEMIA IN CHRONIC KIDNEY DISEASE, UNSPECIFIED CKD STAGE: ICD-10-CM

## 2018-12-07 DIAGNOSIS — N18.4 CHRONIC KIDNEY DISEASE, STAGE IV (SEVERE) (HCC): ICD-10-CM

## 2018-12-07 DIAGNOSIS — N18.4 CHRONIC KIDNEY DISEASE, STAGE IV (SEVERE) (HCC): Primary | ICD-10-CM

## 2018-12-07 DIAGNOSIS — E11.649 UNCONTROLLED TYPE 2 DIABETES MELLITUS WITH HYPOGLYCEMIA, UNSPECIFIED HYPOGLYCEMIA COMA STATUS (HCC): ICD-10-CM

## 2018-12-07 DIAGNOSIS — IMO0002 TYPE II DIABETES MELLITUS WITH RENAL MANIFESTATIONS, UNCONTROLLED: ICD-10-CM

## 2018-12-07 DIAGNOSIS — N18.4 BENIGN HYPERTENSION WITH CKD (CHRONIC KIDNEY DISEASE) STAGE IV (HCC): ICD-10-CM

## 2018-12-07 DIAGNOSIS — F11.90 CHRONIC NARCOTIC USE: Primary | ICD-10-CM

## 2018-12-07 DIAGNOSIS — E55.9 VITAMIN D DEFICIENCY, UNSPECIFIED: ICD-10-CM

## 2018-12-07 DIAGNOSIS — E11.43 DIABETIC GASTROPARESIS (HCC): ICD-10-CM

## 2018-12-07 DIAGNOSIS — K31.84 DIABETIC GASTROPARESIS (HCC): ICD-10-CM

## 2018-12-07 DIAGNOSIS — I12.9 BENIGN HYPERTENSION WITH CKD (CHRONIC KIDNEY DISEASE) STAGE IV (HCC): ICD-10-CM

## 2018-12-07 LAB
ALBUMIN SERPL BCP-MCNC: 3.5 G/DL (ref 3.5–5)
ALP SERPL-CCNC: 138 U/L (ref 46–116)
ALT SERPL W P-5'-P-CCNC: 12 U/L (ref 12–78)
ANION GAP SERPL CALCULATED.3IONS-SCNC: 8 MMOL/L (ref 4–13)
AST SERPL W P-5'-P-CCNC: 15 U/L (ref 5–45)
BASOPHILS # BLD AUTO: 0.07 THOUSANDS/ΜL (ref 0–0.1)
BASOPHILS NFR BLD AUTO: 1 % (ref 0–1)
BILIRUB SERPL-MCNC: 0.61 MG/DL (ref 0.2–1)
BUN SERPL-MCNC: 36 MG/DL (ref 5–25)
CALCIUM SERPL-MCNC: 9.3 MG/DL (ref 8.3–10.1)
CHLORIDE SERPL-SCNC: 105 MMOL/L (ref 100–108)
CO2 SERPL-SCNC: 28 MMOL/L (ref 21–32)
CREAT SERPL-MCNC: 3.95 MG/DL (ref 0.6–1.3)
EOSINOPHIL # BLD AUTO: 0.17 THOUSAND/ΜL (ref 0–0.61)
EOSINOPHIL NFR BLD AUTO: 3 % (ref 0–6)
ERYTHROCYTE [DISTWIDTH] IN BLOOD BY AUTOMATED COUNT: 12 % (ref 11.6–15.1)
GFR SERPL CREATININE-BSD FRML MDRD: 16 ML/MIN/1.73SQ M
GLUCOSE SERPL-MCNC: 199 MG/DL (ref 65–140)
HCT VFR BLD AUTO: 41.9 % (ref 36.5–49.3)
HGB BLD-MCNC: 13.6 G/DL (ref 12–17)
IMM GRANULOCYTES # BLD AUTO: 0.02 THOUSAND/UL (ref 0–0.2)
IMM GRANULOCYTES NFR BLD AUTO: 0 % (ref 0–2)
LYMPHOCYTES # BLD AUTO: 1.39 THOUSANDS/ΜL (ref 0.6–4.47)
LYMPHOCYTES NFR BLD AUTO: 24 % (ref 14–44)
MCH RBC QN AUTO: 30 PG (ref 26.8–34.3)
MCHC RBC AUTO-ENTMCNC: 32.5 G/DL (ref 31.4–37.4)
MCV RBC AUTO: 93 FL (ref 82–98)
MONOCYTES # BLD AUTO: 0.48 THOUSAND/ΜL (ref 0.17–1.22)
MONOCYTES NFR BLD AUTO: 8 % (ref 4–12)
NEUTROPHILS # BLD AUTO: 3.76 THOUSANDS/ΜL (ref 1.85–7.62)
NEUTS SEG NFR BLD AUTO: 64 % (ref 43–75)
NRBC BLD AUTO-RTO: 0 /100 WBCS
PHOSPHATE SERPL-MCNC: 3.6 MG/DL (ref 2.7–4.5)
PLATELET # BLD AUTO: 202 THOUSANDS/UL (ref 149–390)
PMV BLD AUTO: 12.5 FL (ref 8.9–12.7)
POTASSIUM SERPL-SCNC: 5.3 MMOL/L (ref 3.5–5.3)
PROT SERPL-MCNC: 7.2 G/DL (ref 6.4–8.2)
PTH-INTACT SERPL-MCNC: 234.1 PG/ML (ref 18.4–80.1)
RBC # BLD AUTO: 4.53 MILLION/UL (ref 3.88–5.62)
SODIUM SERPL-SCNC: 141 MMOL/L (ref 136–145)
URATE SERPL-MCNC: 6.4 MG/DL (ref 4.2–8)
WBC # BLD AUTO: 5.89 THOUSAND/UL (ref 4.31–10.16)

## 2018-12-07 PROCEDURE — 85025 COMPLETE CBC W/AUTO DIFF WBC: CPT

## 2018-12-07 PROCEDURE — 80053 COMPREHEN METABOLIC PANEL: CPT

## 2018-12-07 PROCEDURE — 83970 ASSAY OF PARATHORMONE: CPT

## 2018-12-07 PROCEDURE — 36415 COLL VENOUS BLD VENIPUNCTURE: CPT

## 2018-12-07 PROCEDURE — 84100 ASSAY OF PHOSPHORUS: CPT

## 2018-12-07 PROCEDURE — 80307 DRUG TEST PRSMV CHEM ANLYZR: CPT | Performed by: FAMILY MEDICINE

## 2018-12-07 PROCEDURE — 84550 ASSAY OF BLOOD/URIC ACID: CPT

## 2018-12-07 NOTE — PROGRESS NOTES
Pt here today for a repeat UDS since the sample he previously did 09/28/18 container broke during transit   SR

## 2018-12-11 DIAGNOSIS — F32.A DEPRESSION, UNSPECIFIED DEPRESSION TYPE: ICD-10-CM

## 2018-12-11 RX ORDER — BUPROPION HYDROCHLORIDE 100 MG/1
100 TABLET ORAL 2 TIMES DAILY
Qty: 180 TABLET | Refills: 0 | Status: SHIPPED | OUTPATIENT
Start: 2018-12-11 | End: 2019-10-28 | Stop reason: SDUPTHER

## 2018-12-13 LAB
AMPHETAMINES UR QL SCN: NEGATIVE NG/ML
BARBITURATES UR QL SCN: NEGATIVE NG/ML
BENZODIAZ UR QL SCN: NEGATIVE NG/ML
BZE UR QL: NEGATIVE NG/ML
CANNABINOIDS UR QL SCN: POSITIVE
METHADONE UR QL SCN: NEGATIVE NG/ML
OPIATES UR QL: NEGATIVE
PCP UR QL: NEGATIVE NG/ML
PROPOXYPH UR QL: NEGATIVE NG/ML

## 2018-12-14 DIAGNOSIS — Z91.14 VIOLATION OF CONTROLLED SUBSTANCE AGREEMENT: Primary | ICD-10-CM

## 2018-12-14 NOTE — PROGRESS NOTES
Pls let pt know that his UDS shows there is Marijuana in his system, this is a violation of the narcotic contract   We will therefor not be able to continue to prescribe narcotic medications for him

## 2018-12-14 NOTE — PROGRESS NOTES
Patient's UDS was positive for Cannabinoid    This is a violation of the narcotic contract and we will therefor no longer prescribe him any controlled substances

## 2018-12-17 ENCOUNTER — OFFICE VISIT (OUTPATIENT)
Dept: FAMILY MEDICINE CLINIC | Facility: CLINIC | Age: 58
End: 2018-12-17
Payer: COMMERCIAL

## 2018-12-17 VITALS
HEART RATE: 78 BPM | WEIGHT: 132.56 LBS | TEMPERATURE: 95 F | RESPIRATION RATE: 18 BRPM | HEIGHT: 66 IN | SYSTOLIC BLOOD PRESSURE: 160 MMHG | OXYGEN SATURATION: 95 % | BODY MASS INDEX: 21.3 KG/M2 | DIASTOLIC BLOOD PRESSURE: 82 MMHG

## 2018-12-17 DIAGNOSIS — Z11.59 ENCOUNTER FOR HEPATITIS C SCREENING TEST FOR LOW RISK PATIENT: Primary | ICD-10-CM

## 2018-12-17 DIAGNOSIS — Z91.14 VIOLATION OF CONTROLLED SUBSTANCE AGREEMENT: ICD-10-CM

## 2018-12-17 DIAGNOSIS — Z23 NEED FOR INFLUENZA VACCINATION: ICD-10-CM

## 2018-12-17 DIAGNOSIS — I63.9 CEREBRAL INFARCTION, UNSPECIFIED MECHANISM (HCC): ICD-10-CM

## 2018-12-17 PROCEDURE — 3008F BODY MASS INDEX DOCD: CPT | Performed by: FAMILY MEDICINE

## 2018-12-17 PROCEDURE — G0439 PPPS, SUBSEQ VISIT: HCPCS | Performed by: FAMILY MEDICINE

## 2018-12-17 PROCEDURE — 90471 IMMUNIZATION ADMIN: CPT

## 2018-12-17 PROCEDURE — 90682 RIV4 VACC RECOMBINANT DNA IM: CPT

## 2018-12-17 NOTE — PROGRESS NOTES
Assessment and Plan:  Problem List Items Addressed This Visit        Nervous and Auditory    Cerebral infarction [facility])    Relevant Orders    Wheelchair    Ambulatory referral to Physical Therapy       Other    Violation of controlled substance agreement     I have discussed at length today with both patient and his significant other [facility], that his last UDS was positive for Canabinoids  I explained that at this time Marijuana is an illegal substance and is only considered legal if the patient has a medical marijuana card and is receiving the marijuana from a medical dispensary, which is not the patient's case  [facility] stated that patient often runs out of pain medication before time, as he often takes more than prescribed and that is why he needs something else to help him with the pain so she gets him the [address]  [facility] states I should have known he was taking [address] as that is common knowledge  I discussed with both [facility], as well as the patient that this is an illegal substance, furthermore he should not be taking more Oxycodone than prescribed  [facility] states she would like a copy of the narcotic contract and that if it is not clearly stated in the contract that he can not smoke marijuana and take oxycodone I must continue to prescribe it for him  I have explained again that weather or not it is explicitly written in the contract, Rodgers Leventhal does not have a medical marijuana card at this time, so it is illegal for him to have, furthermore taking more Oxycodone than prescribed is also contra indicated and detrimental to his health   Given all the above reasons I no longer fill comfortable prescribing narcotics for Rodgers Leventhal   As I explained to both العراقي Leventhal and [facility], this is a violation not only of the narcotic policy but also of the patient physician relationship, as he does not feel comfortable letting me know that he is smoking marijuana regularly, until he was confronted with the UDS results and he is taking more Oxycodone than prescribed  I have suggested he see pain management but both patient and David Ortega refuse  I will write a letter to support his endeavor of getting a medical marijuana card as requested by both patient and David Ortega              Other Visit Diagnoses     Encounter for hepatitis C screening test for low risk patient    -  Primary    Relevant Orders    Hepatitis C antibody    Need for influenza vaccination        Relevant Orders    PREFERRED: influenza vaccine, 2005-0431, quadrivalent, recombinant, PF, 0 5 mL, for patients 18 yr+ (FLUBLOK) (Completed)        Health Maintenance Due   Topic Date Due    Hepatitis C Screening  1960    Medicare Annual Wellness Visit (AWV)  1960    DTaP,Tdap,and Td Vaccines (1 - Tdap) 09/30/1983    URINE MICROALBUMIN  06/27/2018         HPI:  Patient Active Problem List   Diagnosis    Ambulatory dysfunction    Anxiety    Cerebral infarction ([facility]    CKD (chronic kidney disease)    Constipation    Diabetes mellitus ([facility]    Difficulty walking    Essential hypertension    Gait disturbance    Gastroparesis    Chronic narcotic use    Violation of controlled substance agreement    Low back pain     Past Medical History:   Diagnosis Date    Anxiety     CAD (coronary artery disease)     last assessed 4/10/13    Chronic kidney disease, stage IV (severe) ([facility] )     Concussion     last assessed 9/3/14    Depression     Diabetes mellitus ([facility] )     type 2    Failure to gain weight in adult     last assessed 2/24/14    Gastroparesis     GERD (gastroesophageal reflux disease)     Hyperkalemia     last assessed 11/9/15    Hyperlipidemia     Hypertension     accelerated essential last assessed 10/7/16    Insomnia     Late effects of cerebrovascular disease     Overflow incontinence     last assessed 7/24/14    Renal disorder     Secondary hyperparathyroidism ([facility] )     Stroke [facility])      Past Surgical History:   Procedure Laterality Date    COLONOSCOPY      HERNIA REPAIR      MS ESOPHAGOGASTRODUODENOSCOPY TRANSORAL DIAGNOSTIC N/A 2/16/2017    Procedure: ESOPHAGOGASTRODUODENOSCOPY (EGD) with biopsy;  Surgeon: Nellie Rivero DO;  Location: [facility]; Service: Gastroenterology    MS ESOPHAGOGASTRODUODENOSCOPY TRANSORAL DIAGNOSTIC N/A 6/2/2017    Procedure: ESOPHAGOGASTRODUODENOSCOPY (EGD) with bx;  Surgeon: Nellie Rivero DO;  Location: [facility];   Service: Gastroenterology    TYMPANOSTOMY TUBE PLACEMENT       Family History   Problem Relation Age of Onset    Cancer Family         pt and friend denies this hx    Hypertension Family         essential    Hyperlipidemia Family     Diabetes Mother     No Known Problems Father      History   Smoking Status    Never Smoker   Smokeless Tobacco    Never Used     History   Alcohol Use No     Comment: social      History   Drug Use    Types: Marijuana     Comment: smokes once keven while for pain 1 xper  week         Current Outpatient Prescriptions   Medication Sig Dispense Refill    amLODIPine (NORVASC) 10 mg tablet Take 10 mg by mouth daily      ARIPiprazole (ABILIFY) 10 mg tablet Take 1 tablet (10 mg total) by mouth daily 90 tablet 0    aspirin 81 MG tablet Take 1 tablet (81 mg total) by mouth daily 90 tablet 0    buPROPion (WELLBUTRIN) 100 mg tablet Take 1 tablet (100 mg total) by mouth 2 (two) times a day 180 tablet 0    Cholecalciferol (VITAMIN D-3) 1000 UNITS CAPS Take by mouth      dicyclomine (BENTYL) 20 mg tablet Take 1 tablet by mouth every 6 (six) hours 20 tablet 0    doxazosin (CARDURA) 1 mg tablet Take 1 mg by mouth daily at bedtime      ferrous sulfate 325 (65 Fe) mg tablet Take 325 mg by mouth daily with breakfast      furosemide (LASIX) 40 mg tablet Take by mouth daily      Insulin Glargine (TOUJEO SOLOSTAR) 300 UNIT/ML SOPN Inject under the skin      lansoprazole (PREVACID) 30 mg capsule Take 1 capsule (30 mg total) by mouth 2 (two) times a day 60 capsule 5    Liraglutide (VICTOZA) 18 MG/3ML SOPN Inject under the skin      lubiprostone (AMITIZA) 24 mcg capsule Take 24 mcg by mouth 2 (two) times a day with meals      metoclopramide (REGLAN) 5 mg tablet Take 5 mg by mouth as needed        metoprolol succinate (TOPROL-XL) 25 mg 24 hr tablet Take 25 mg by mouth daily      oxyCODONE (ROXICODONE) 10 MG TABS Take 1 tablet (10 mg total) by mouth 3 (three) times a day as needed for moderate pain Max Daily Amount: 30 mg 90 tablet 0    oxyCODONE-acetaminophen (PERCOCET) 5-325 mg per tablet oxycodone-acetaminophen 5 mg-325 mg tablet      paricalcitol (ZEMPLAR) 1 mcg capsule Take 1 mcg by mouth daily      promethazine (PHENERGAN) 12 5 MG tablet Take 1 tablet by mouth every 6 (six) hours as needed for nausea or vomiting 20 tablet 0     No current facility-administered medications for this visit  No Known Allergies  Immunization History   Administered Date(s) Administered    Influenza TIV (IM) 01/04/2017    Influenza, recombinant, quadrivalent,injectable, preservative free 09/28/2018, 12/17/2018    Pneumococcal Conjugate 13-Valent 10/08/2015    Pneumococcal Polysaccharide PPV23 01/04/2017       Patient Care Team:  Harini Cooper MD as PCP - General  Brigitte Galloway, DO Verle Epley, MD Germaine Pluck, MD Marcie Plunk,     Medicare Screening Tests and Risk Assessments:  Rodgers Leventhal is here for his Subsequent Wellness visit  Last Medicare Wellness visit information reviewed, patient interviewed, no change since last AWV  Health Risk Assessment:  Patient rates overall health as fair  Patient feels that their physical health rating is Same  Eyesight was rated as Same  Hearing was rated as Same  Patient feels that their emotional and mental health rating is Same  Pain experienced by patient in the last 7 days has been Some  Patient's pain rating has been 6/10  Patient states that he has experienced weight loss or gain in last 6 months       Emotional/Mental Health:  Patient has been feeling nervous/anxious  PHQ-9 Depression Screening:    Frequency of the following problems over the past two weeks:      1  Little interest or pleasure in doing things: 0 - not at all      2  Feeling down, depressed, or hopeless: 0 - not at all  PHQ-2 Score: 0          Broken Bones/Falls: Fall Risk Assessment:    In the past year, patient has experienced: No history of falling in past year          Bladder/Bowel:  Patient has not leaked urine accidently in the last six months  Patient reports no loss of bowel control  Immunizations:  Patient has not had a flu vaccination within the last year  Patient has received a pneumonia shot  Patient has not received a shingles shot  Patient has not received tetanus/diphtheria shot  Home Safety:  Patient has trouble with stairs inside or outside of their home  Patient currently reports that there are no safety hazards present in home, working smoke alarms, working carbon monoxide detectors  Preventative Screenings:   prostate cancer screen performed, 4/17/2017  colon cancer screen completed, 9/15/2016  no cholesterol screen completed, glaucoma eye exam completed, 12/12/2018      Nutrition:  Current diet: Regular, No Added Salt, Low Saturated Fat and Limited junk food with servings of the following:    Medications:  Patient is currently taking over-the-counter supplements  List of OTC medications includes: vitamins  Patient is not able to manage medications  Lifestyle Choices:  Patient reports no tobacco use  Patient has not smoked or used tobacco in the past   Patient reports no alcohol use  Patient does not drive a vehicle  Patient wears seat belt  Current level of exercise of physical activity described by patient as: none          Activities of Daily Living:  Unable to get out of bed by his or her self, unable to dress self, unable to make own meals, unable to do own shopping, unable to bathe self, unable to do laundry/housekeeping, unable to manage own money and other related tasks    Previous Hospitalizations:  No hospitalization or ED visit in past 12 months        Advanced Directives:  Patient has not decided on power of   Patient has not completed advanced directive  Preventative Screening/Counseling:      Cardiovascular:      General: Patient Declines      Counseling: Improve Exercise Tolerance          Diabetes:      Counseling: Healthy Diet, Healthy Weight and Improve Physical Activity      Due for labs: Blood Glucose          Colorectal Cancer:      General: Risks and Benefits Discussed and Screening Current      Counseling: high fiber diet          Prostate Cancer:      General: Risks and Benefits Discussed and Patient Declines          Osteoporosis:      General: Screening Not Indicated          Glaucoma:      General: Risks and Benefits Discussed      Referrals: Ophthalmology          HIV:      General: Risks and Benefits Discussed          Hepatitis C:      General: Risks and Benefits Discussed      Counseling: has received general HCV counseling        Advanced Directives:   Patient has no living will for healthcare, does not have durable POA for healthcare, patient does not have an advanced directive  Information on ACP and/or AD provided  5 wishes given  Immunizations:      Influenza: Influenza UTD This Year      Other Preventative Counseling (Non-Medicare):  Alcohol Use, Fall Prevention, Increase physical activity and Nutrition Counseling          No exam data present    Physical Exam:  Review of Systems   Constitutional: Positive for appetite change and fatigue  Gastrointestinal: Negative for bowel incontinence  Musculoskeletal: Positive for arthralgias and back pain  Psychiatric/Behavioral: The patient is nervous/anxious  All other systems reviewed and are negative        Vitals:    12/17/18 1006   BP: 160/82   BP Location: Right arm   Patient Position: Sitting   Cuff Size: Standard   Pulse: 78   Resp: 18   Temp: (!) 95 °F (35 °C)   TempSrc: Tympanic   SpO2: 95%   Weight: 60 1 kg (132 lb 9 oz)   Height: 5' 6" (1 676 m)   Body mass index is 21 4 kg/m²  Physical Exam   Constitutional: He is oriented to person, place, and time  HENT:   Head: Normocephalic and atraumatic  Right Ear: External ear normal    Left Ear: External ear normal    Nose: Nose normal    Mouth/Throat: Oropharynx is clear and moist    Eyes: Pupils are equal, round, and reactive to light  Conjunctivae and EOM are normal    Neck: Normal range of motion  Cardiovascular: Normal rate, regular rhythm and normal heart sounds  Pulmonary/Chest: Effort normal and breath sounds normal    Abdominal: Soft  Bowel sounds are normal    Musculoskeletal: He exhibits tenderness and deformity  Neurological: He is alert and oriented to person, place, and time  Skin: Skin is warm  Psychiatric: His behavior is normal  Judgment and thought content normal  His affect is blunt  His speech is delayed  Cognition and memory are impaired  He is inattentive  Nursing note and vitals reviewed

## 2018-12-18 DIAGNOSIS — I63.9 CEREBRAL INFARCTION, UNSPECIFIED MECHANISM (HCC): ICD-10-CM

## 2018-12-18 DIAGNOSIS — K21.9 GASTROESOPHAGEAL REFLUX DISEASE WITHOUT ESOPHAGITIS: ICD-10-CM

## 2018-12-18 DIAGNOSIS — F32.A DEPRESSION, UNSPECIFIED DEPRESSION TYPE: ICD-10-CM

## 2018-12-18 RX ORDER — ARIPIPRAZOLE 10 MG/1
10 TABLET ORAL DAILY
Qty: 90 TABLET | Refills: 0 | Status: SHIPPED | OUTPATIENT
Start: 2018-12-18 | End: 2018-12-19

## 2018-12-18 NOTE — ASSESSMENT & PLAN NOTE
I have discussed at length today with both patient and his significant other Shania Winkler, that his last UDS was positive for Canabinoids  I explained that at this time Marijuana is an illegal substance and is only considered legal if the patient has a medical marijuana card and is receiving the marijuana from a medical dispensary, which is not the patient's case  Shania Winkler stated that patient often runs out of pain medication before time, as he often takes more than prescribed and that is why he needs something else to help him with the pain so she gets him the [address]  Shania Winkler states I should have known he was taking [address] as that is common knowledge  I discussed with both Shania Winkler, as well as the patient that this is an illegal substance, furthermore he should not be taking more Oxycodone than prescribed  Shania Winkler states she would like a copy of the narcotic contract and that if it is not clearly stated in the contract that he can not smoke marijuana and take oxycodone I must continue to prescribe it for him  I have explained again that weather or not it is explicitly written in the contract, Laurine Romberg does not have a medical marijuana card at this time, so it is illegal for him to have, furthermore taking more Oxycodone than prescribed is also contra indicated and detrimental to his health   Given all the above reasons I no longer fill comfortable prescribing narcotics for Laurine Romberg  As I explained to both Laurine Romberg and Shania Winkler, this is a violation not only of the narcotic policy but also of the patient physician relationship, as he does not feel comfortable letting me know that he is smoking marijuana regularly, until he was confronted with the UDS results and he is taking more Oxycodone than prescribed  I have suggested he see pain management but both patient and Shania Winkler refuse  I will write a letter to support his endeavor of getting a medical marijuana card as requested by both patient and Shania Winkler

## 2018-12-19 ENCOUNTER — HOSPITAL ENCOUNTER (INPATIENT)
Facility: HOSPITAL | Age: 58
LOS: 2 days | Discharge: HOME | DRG: 199 | End: 2018-12-23
Attending: SURGERY | Admitting: SURGERY
Payer: COMMERCIAL

## 2018-12-19 ENCOUNTER — HOSPITAL ENCOUNTER (EMERGENCY)
Facility: HOSPITAL | Age: 58
DRG: 199 | End: 2018-12-19
Attending: EMERGENCY MEDICINE | Admitting: EMERGENCY MEDICINE
Payer: COMMERCIAL

## 2018-12-19 ENCOUNTER — APPOINTMENT (EMERGENCY)
Dept: CT IMAGING | Facility: HOSPITAL | Age: 58
DRG: 199 | End: 2018-12-19
Payer: COMMERCIAL

## 2018-12-19 VITALS
RESPIRATION RATE: 16 BRPM | BODY MASS INDEX: 21.63 KG/M2 | HEART RATE: 54 BPM | OXYGEN SATURATION: 96 % | TEMPERATURE: 98.2 F | WEIGHT: 134 LBS | DIASTOLIC BLOOD PRESSURE: 102 MMHG | SYSTOLIC BLOOD PRESSURE: 217 MMHG

## 2018-12-19 DIAGNOSIS — S22.42XA CLOSED FRACTURE OF MULTIPLE RIBS OF LEFT SIDE, INITIAL ENCOUNTER: ICD-10-CM

## 2018-12-19 DIAGNOSIS — N18.9 CHRONIC KIDNEY DISEASE, UNSPECIFIED CKD STAGE: Primary | ICD-10-CM

## 2018-12-19 DIAGNOSIS — S00.01XA ABRASION OF SCALP, INITIAL ENCOUNTER: ICD-10-CM
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
## 2018-12-19 DIAGNOSIS — N17.9 ACUTE KIDNEY INJURY (HCC): ICD-10-CM

## 2018-12-19 PROBLEM — S22.49XA RIB FRACTURES: Status: ACTIVE | Noted: 2018-12-19

## 2018-12-19 LAB
ANION GAP SERPL CALCULATED.3IONS-SCNC: 8 MMOL/L (ref 4–13)
APTT PPP: 30 SECONDS (ref 26–38)
BASOPHILS # BLD AUTO: 0.08 THOUSANDS/ΜL (ref 0–0.1)
BASOPHILS NFR BLD AUTO: 1 % (ref 0–1)
BUN SERPL-MCNC: 41 MG/DL (ref 5–25)
CALCIUM SERPL-MCNC: 9.1 MG/DL (ref 8.3–10.1)
CHLORIDE SERPL-SCNC: 99 MMOL/L (ref 100–108)
CO2 SERPL-SCNC: 32 MMOL/L (ref 21–32)
CREAT SERPL-MCNC: 4.48 MG/DL (ref 0.6–1.3)
EOSINOPHIL # BLD AUTO: 0.08 THOUSAND/ΜL (ref 0–0.61)
EOSINOPHIL NFR BLD AUTO: 1 % (ref 0–6)
ERYTHROCYTE [DISTWIDTH] IN BLOOD BY AUTOMATED COUNT: 11.7 % (ref 11.6–15.1)
GFR SERPL CREATININE-BSD FRML MDRD: 14 ML/MIN/1.73SQ M
GLUCOSE SERPL-MCNC: 287 MG/DL (ref 65–140)
GLUCOSE SERPL-MCNC: 329 MG/DL (ref 65–140)
GLUCOSE SERPL-MCNC: 359 MG/DL (ref 65–140)
HCT VFR BLD AUTO: 39.2 % (ref 36.5–49.3)
HGB BLD-MCNC: 13 G/DL (ref 12–17)
IMM GRANULOCYTES # BLD AUTO: 0.1 THOUSAND/UL (ref 0–0.2)
IMM GRANULOCYTES NFR BLD AUTO: 1 % (ref 0–2)
INR PPP: 1.06 (ref 0.86–1.17)
LYMPHOCYTES # BLD AUTO: 1.03 THOUSANDS/ΜL (ref 0.6–4.47)
LYMPHOCYTES NFR BLD AUTO: 7 % (ref 14–44)
MCH RBC QN AUTO: 30.6 PG (ref 26.8–34.3)
MCHC RBC AUTO-ENTMCNC: 33.2 G/DL (ref 31.4–37.4)
MCV RBC AUTO: 92 FL (ref 82–98)
MONOCYTES # BLD AUTO: 1.05 THOUSAND/ΜL (ref 0.17–1.22)
MONOCYTES NFR BLD AUTO: 7 % (ref 4–12)
NEUTROPHILS # BLD AUTO: 13.44 THOUSANDS/ΜL (ref 1.85–7.62)
NEUTS SEG NFR BLD AUTO: 83 % (ref 43–75)
NRBC BLD AUTO-RTO: 0 /100 WBCS
PLATELET # BLD AUTO: 198 THOUSANDS/UL (ref 149–390)
PMV BLD AUTO: 12.1 FL (ref 8.9–12.7)
POTASSIUM SERPL-SCNC: 5.1 MMOL/L (ref 3.5–5.3)
PROTHROMBIN TIME: 13.9 SECONDS (ref 11.8–14.2)
RBC # BLD AUTO: 4.25 MILLION/UL (ref 3.88–5.62)
SODIUM SERPL-SCNC: 139 MMOL/L (ref 136–145)
WBC # BLD AUTO: 15.78 THOUSAND/UL (ref 4.31–10.16)

## 2018-12-19 PROCEDURE — 99285 EMERGENCY DEPT VISIT HI MDM: CPT

## 2018-12-19 PROCEDURE — 85610 PROTHROMBIN TIME: CPT | Performed by: EMERGENCY MEDICINE

## 2018-12-19 PROCEDURE — 85730 THROMBOPLASTIN TIME PARTIAL: CPT | Performed by: EMERGENCY MEDICINE

## 2018-12-19 PROCEDURE — 80048 BASIC METABOLIC PNL TOTAL CA: CPT | Performed by: EMERGENCY MEDICINE

## 2018-12-19 PROCEDURE — 94760 N-INVAS EAR/PLS OXIMETRY 1: CPT

## 2018-12-19 PROCEDURE — 71250 CT THORAX DX C-: CPT

## 2018-12-19 PROCEDURE — 99219 PR INITIAL OBSERVATION CARE/DAY 50 MINUTES: CPT | Performed by: SURGERY

## 2018-12-19 PROCEDURE — 82948 REAGENT STRIP/BLOOD GLUCOSE: CPT

## 2018-12-19 PROCEDURE — 72125 CT NECK SPINE W/O DYE: CPT

## 2018-12-19 PROCEDURE — 85025 COMPLETE CBC W/AUTO DIFF WBC: CPT | Performed by: EMERGENCY MEDICINE

## 2018-12-19 PROCEDURE — 96372 THER/PROPH/DIAG INJ SC/IM: CPT

## 2018-12-19 PROCEDURE — 96374 THER/PROPH/DIAG INJ IV PUSH: CPT

## 2018-12-19 PROCEDURE — 36415 COLL VENOUS BLD VENIPUNCTURE: CPT | Performed by: EMERGENCY MEDICINE

## 2018-12-19 RX ORDER — ALPRAZOLAM 0.5 MG/1
0.5 TABLET ORAL DAILY
Status: DISCONTINUED | OUTPATIENT
Start: 2018-12-19 | End: 2018-12-23 | Stop reason: HOSPADM

## 2018-12-19 RX ORDER — LOVASTATIN 20 MG/1
20 TABLET ORAL DAILY
COMMUNITY
End: 2021-01-01 | Stop reason: ALTCHOICE

## 2018-12-19 RX ORDER — METOPROLOL SUCCINATE 25 MG/1
25 TABLET, EXTENDED RELEASE ORAL DAILY
Status: DISCONTINUED | OUTPATIENT
Start: 2018-12-19 | End: 2018-12-23 | Stop reason: HOSPADM

## 2018-12-19 RX ORDER — INSULIN GLARGINE 100 [IU]/ML
24 INJECTION, SOLUTION SUBCUTANEOUS
Status: DISCONTINUED | OUTPATIENT
Start: 2018-12-19 | End: 2018-12-21

## 2018-12-19 RX ORDER — ARIPIPRAZOLE 2 MG/1
4 TABLET ORAL DAILY
Status: DISCONTINUED | OUTPATIENT
Start: 2018-12-19 | End: 2018-12-23 | Stop reason: HOSPADM

## 2018-12-19 RX ORDER — TRAZODONE HYDROCHLORIDE 100 MG/1
100 TABLET ORAL
Status: DISCONTINUED | OUTPATIENT
Start: 2018-12-19 | End: 2018-12-23 | Stop reason: HOSPADM

## 2018-12-19 RX ORDER — FENTANYL CITRATE 50 UG/ML
50 INJECTION, SOLUTION INTRAMUSCULAR; INTRAVENOUS ONCE
Status: COMPLETED | OUTPATIENT
Start: 2018-12-19 | End: 2018-12-19

## 2018-12-19 RX ORDER — METOCLOPRAMIDE HYDROCHLORIDE 5 MG/ML
10 INJECTION INTRAMUSCULAR; INTRAVENOUS EVERY 6 HOURS PRN
Status: DISCONTINUED | OUTPATIENT
Start: 2018-12-19 | End: 2018-12-23 | Stop reason: HOSPADM

## 2018-12-19 RX ORDER — SODIUM POLYSTYRENE SULFONATE 15 G/60ML
15 SUSPENSION ORAL; RECTAL AS NEEDED
COMMUNITY
End: 2020-01-01 | Stop reason: HOSPADM

## 2018-12-19 RX ORDER — FENTANYL CITRATE 50 UG/ML
50 INJECTION, SOLUTION INTRAMUSCULAR; INTRAVENOUS EVERY 4 HOURS PRN
Status: DISCONTINUED | OUTPATIENT
Start: 2018-12-19 | End: 2018-12-23 | Stop reason: HOSPADM

## 2018-12-19 RX ORDER — METHOCARBAMOL 750 MG/1
750 TABLET, FILM COATED ORAL EVERY 6 HOURS PRN
Status: DISCONTINUED | OUTPATIENT
Start: 2018-12-19 | End: 2018-12-23 | Stop reason: HOSPADM

## 2018-12-19 RX ORDER — DOCUSATE SODIUM 100 MG/1
100 CAPSULE, LIQUID FILLED ORAL 2 TIMES DAILY
Status: DISCONTINUED | OUTPATIENT
Start: 2018-12-19 | End: 2018-12-23 | Stop reason: HOSPADM

## 2018-12-19 RX ORDER — SENNOSIDES 8.6 MG
1 TABLET ORAL DAILY
Status: DISCONTINUED | OUTPATIENT
Start: 2018-12-19 | End: 2018-12-23 | Stop reason: HOSPADM

## 2018-12-19 RX ORDER — BUPROPION HYDROCHLORIDE 100 MG/1
100 TABLET ORAL 2 TIMES DAILY
Status: DISCONTINUED | OUTPATIENT
Start: 2018-12-19 | End: 2018-12-23 | Stop reason: HOSPADM

## 2018-12-19 RX ORDER — LANSOPRAZOLE 30 MG/1
30 CAPSULE, DELAYED RELEASE ORAL 2 TIMES DAILY
Qty: 60 CAPSULE | Refills: 0 | Status: SHIPPED | OUTPATIENT
Start: 2018-12-19 | End: 2019-01-03

## 2018-12-19 RX ORDER — FUROSEMIDE 40 MG/1
40 TABLET ORAL DAILY
Status: DISCONTINUED | OUTPATIENT
Start: 2018-12-19 | End: 2018-12-21

## 2018-12-19 RX ORDER — OXYCODONE HYDROCHLORIDE 10 MG/1
10 TABLET ORAL EVERY 4 HOURS PRN
Status: DISCONTINUED | OUTPATIENT
Start: 2018-12-19 | End: 2018-12-23 | Stop reason: HOSPADM

## 2018-12-19 RX ORDER — ACETAMINOPHEN 325 MG/1
975 TABLET ORAL EVERY 8 HOURS SCHEDULED
Status: DISCONTINUED | OUTPATIENT
Start: 2018-12-19 | End: 2018-12-23 | Stop reason: HOSPADM

## 2018-12-19 RX ORDER — TRAZODONE HYDROCHLORIDE 100 MG/1
100 TABLET ORAL
COMMUNITY
End: 2021-01-01 | Stop reason: ALTCHOICE

## 2018-12-19 RX ORDER — HYDRALAZINE HYDROCHLORIDE 20 MG/ML
5 INJECTION INTRAMUSCULAR; INTRAVENOUS EVERY 6 HOURS PRN
Status: DISCONTINUED | OUTPATIENT
Start: 2018-12-19 | End: 2018-12-23 | Stop reason: HOSPADM

## 2018-12-19 RX ORDER — PANTOPRAZOLE SODIUM 40 MG/1
40 TABLET, DELAYED RELEASE ORAL
Status: DISCONTINUED | OUTPATIENT
Start: 2018-12-20 | End: 2018-12-23 | Stop reason: HOSPADM

## 2018-12-19 RX ORDER — LIDOCAINE 50 MG/G
1 PATCH TOPICAL DAILY
Status: DISCONTINUED | OUTPATIENT
Start: 2018-12-19 | End: 2018-12-23 | Stop reason: HOSPADM

## 2018-12-19 RX ORDER — ASPIRIN 81 MG/1
81 TABLET, CHEWABLE ORAL DAILY
Status: DISCONTINUED | OUTPATIENT
Start: 2018-12-19 | End: 2018-12-23 | Stop reason: HOSPADM

## 2018-12-19 RX ORDER — PIOGLITAZONEHYDROCHLORIDE 15 MG/1
15 TABLET ORAL DAILY
Status: DISCONTINUED | OUTPATIENT
Start: 2018-12-19 | End: 2018-12-23 | Stop reason: HOSPADM

## 2018-12-19 RX ORDER — OXYCODONE HYDROCHLORIDE 5 MG/1
5 TABLET ORAL EVERY 4 HOURS PRN
Status: DISCONTINUED | OUTPATIENT
Start: 2018-12-19 | End: 2018-12-23 | Stop reason: HOSPADM

## 2018-12-19 RX ORDER — DICYCLOMINE HCL 20 MG
20 TABLET ORAL EVERY 6 HOURS
Status: DISCONTINUED | OUTPATIENT
Start: 2018-12-19 | End: 2018-12-23 | Stop reason: HOSPADM

## 2018-12-19 RX ORDER — ONDANSETRON 2 MG/ML
4 INJECTION INTRAMUSCULAR; INTRAVENOUS EVERY 6 HOURS PRN
Status: DISCONTINUED | OUTPATIENT
Start: 2018-12-19 | End: 2018-12-19

## 2018-12-19 RX ORDER — SODIUM CHLORIDE 9 MG/ML
100 INJECTION, SOLUTION INTRAVENOUS CONTINUOUS
Status: DISCONTINUED | OUTPATIENT
Start: 2018-12-19 | End: 2018-12-23 | Stop reason: HOSPADM

## 2018-12-19 RX ORDER — PIOGLITAZONEHYDROCHLORIDE 15 MG/1
30 TABLET ORAL DAILY
COMMUNITY
End: 2020-01-01 | Stop reason: HOSPADM

## 2018-12-19 RX ORDER — AMLODIPINE BESYLATE 10 MG/1
10 TABLET ORAL DAILY
Status: DISCONTINUED | OUTPATIENT
Start: 2018-12-19 | End: 2018-12-21

## 2018-12-19 RX ORDER — ALPRAZOLAM 0.5 MG/1
0.5 TABLET ORAL DAILY
COMMUNITY
End: 2020-01-01 | Stop reason: ALTCHOICE

## 2018-12-19 RX ORDER — DOXAZOSIN 2 MG/1
2 TABLET ORAL 2 TIMES DAILY
Status: DISCONTINUED | OUTPATIENT
Start: 2018-12-19 | End: 2018-12-21

## 2018-12-19 RX ORDER — ARIPIPRAZOLE 2 MG/1
4 TABLET ORAL DAILY
COMMUNITY
End: 2019-10-28 | Stop reason: SDUPTHER

## 2018-12-19 RX ADMIN — INSULIN GLARGINE 24 UNITS: 100 INJECTION, SOLUTION SUBCUTANEOUS at 21:48

## 2018-12-19 RX ADMIN — MORPHINE SULFATE 2 MG: 2 INJECTION, SOLUTION INTRAMUSCULAR; INTRAVENOUS at 12:41

## 2018-12-19 RX ADMIN — DICYCLOMINE HYDROCHLORIDE 20 MG: 20 TABLET ORAL at 21:47

## 2018-12-19 RX ADMIN — OXYCODONE HYDROCHLORIDE 5 MG: 5 TABLET ORAL at 16:49

## 2018-12-19 RX ADMIN — OXYCODONE HYDROCHLORIDE 10 MG: 10 TABLET ORAL at 21:47

## 2018-12-19 RX ADMIN — METHOCARBAMOL TABLETS 750 MG: 750 TABLET, COATED ORAL at 16:49

## 2018-12-19 RX ADMIN — DOXAZOSIN 2 MG: 2 TABLET ORAL at 20:14

## 2018-12-19 RX ADMIN — ACETAMINOPHEN 975 MG: 325 TABLET ORAL at 21:47

## 2018-12-19 RX ADMIN — FENTANYL CITRATE 50 MCG: 50 INJECTION, SOLUTION INTRAMUSCULAR; INTRAVENOUS at 11:01

## 2018-12-19 RX ADMIN — DICYCLOMINE HYDROCHLORIDE 20 MG: 20 TABLET ORAL at 16:48

## 2018-12-19 RX ADMIN — BUPROPION HYDROCHLORIDE 100 MG: 100 TABLET, FILM COATED ORAL at 22:08

## 2018-12-19 RX ADMIN — INSULIN LISPRO 3 UNITS: 100 INJECTION, SOLUTION INTRAVENOUS; SUBCUTANEOUS at 19:07

## 2018-12-19 RX ADMIN — TRAZODONE HYDROCHLORIDE 100 MG: 100 TABLET ORAL at 21:48

## 2018-12-19 RX ADMIN — HYDRALAZINE HYDROCHLORIDE 5 MG: 20 INJECTION INTRAMUSCULAR; INTRAVENOUS at 16:48

## 2018-12-19 RX ADMIN — ONDANSETRON 4 MG: 2 INJECTION INTRAMUSCULAR; INTRAVENOUS at 16:48

## 2018-12-19 RX ADMIN — METOCLOPRAMIDE 10 MG: 5 INJECTION, SOLUTION INTRAMUSCULAR; INTRAVENOUS at 20:14

## 2018-12-19 RX ADMIN — FENTANYL CITRATE 50 MCG: 50 INJECTION, SOLUTION INTRAMUSCULAR; INTRAVENOUS at 18:08

## 2018-12-19 RX ADMIN — LIDOCAINE 1 PATCH: 50 PATCH CUTANEOUS at 16:49

## 2018-12-19 RX ADMIN — SODIUM CHLORIDE 75 ML/HR: 0.9 INJECTION, SOLUTION INTRAVENOUS at 19:31

## 2018-12-19 RX ADMIN — DOCUSATE SODIUM 100 MG: 100 CAPSULE, LIQUID FILLED ORAL at 20:14

## 2018-12-19 NOTE — EMTALA/ACUTE CARE TRANSFER
[facility]  [address] [address]  Dept: [phone]      EMTALA TRANSFER CONSENT    NAME Amy Valentino                                         1960                              MRN [id number]    I have been informed of my rights regarding examination, treatment, and transfer   by Dr Day Hall MD    Benefits: Specialized equipment and/or services available at the receiving facility (Include comment)________________________    Risks: Potential for delay in receiving treatment, Increased discomfort during transfer, Loss of IV, Possible worsening of condition or death during transfer      Consent for Transfer:  I acknowledge that my medical condition has been evaluated and explained to me by the emergency department physician or other qualified medical person and/or my attending physician, who has recommended that I be transferred to the service of  Accepting Physician: Dr Spence Roles at [address] [place] [address] Name, [address] : [facility] ED  The above potential benefits of such transfer, the potential risks associated with such transfer, and the probable risks of not being transferred have been explained to me, and I fully understand them  The doctor has explained that, in my case, the benefits of transfer outweigh the risks  I agree to be transferred  I authorize the performance of emergency medical procedures and treatments upon me in both transit and upon arrival at the receiving facility  Additionally, I authorize the release of any and all medical records to the receiving facility and request they be transported with me, if possible  I understand that the safest mode of transportation during a medical emergency is an ambulance and that the Hospital advocates the use of this mode of transport   Risks of traveling to the receiving facility by car, including absence of medical control, life sustaining equipment, such as oxygen, and medical personnel has been explained to me and I fully understand them  (CHRISTINA CORRECT BOX BELOW)  [  ]  I consent to the stated transfer and to be transported by ambulance/helicopter  [  ]  I consent to the stated transfer, but refuse transportation by ambulance and accept full responsibility for my transportation by car  I understand the risks of non-ambulance transfers and I exonerate the Hospital and its staff from any deterioration in my condition that results from this refusal     X___________________________________________    DATE  18  TIME________  Signature of patient or legally responsible individual signing on patient behalf           RELATIONSHIP TO PATIENT_________________________          Provider Allie [address]                                         1960                              MRN [id number]    A medical screening exam was performed on the above named patient  Based on the examination:    Condition Necessitating Transfer The primary encounter diagnosis was Fall down stairs, initial encounter  Diagnoses of Closed fracture of multiple ribs of left side, initial encounter and Abrasion of scalp, initial encounter were also pertinent to this visit      Patient Condition: The patient has been stabilized such that within reasonable medical probability, no material deterioration of the patient condition or the condition of the unborn child(sim) is likely to result from the transfer    Reason for Transfer: Level of Care needed not available at this facility    Transfer Requirements: [facility] ED   · Space available and qualified personnel available for treatment as acknowledged by [facility]  · Agreed to accept transfer and to provide appropriate medical treatment as acknowledged by       Dr Nicole Blankenship  · Appropriate medical records of the examination and treatment of the patient are provided at the time of transfer   [address] _______  · Transfer will be performed by qualified personnel from [facility]  and appropriate transfer equipment as required, including the use of necessary and appropriate life support measures  Provider Certification: I have examined the patient and explained the following risks and benefits of being transferred/refusing transfer to the patient/family:  General risk, such as traffic hazards, adverse weather conditions, rough terrain or turbulence, possible failure of equipment (including vehicle or aircraft), or consequences of actions of persons outside the control of the transport personnel, The possibility of a transport vehicle being unavailable, Unanticipated needs of medical equipment and personnel during transport, Risk of worsening condition      Based on these reasonable risks and benefits to the patient and/or the unborn child(sim), and based upon the information available at the time of the patients examination, I certify that the medical benefits reasonably to be expected from the provision of appropriate medical treatments at another medical facility outweigh the increasing risks, if any, to the individuals medical condition, and in the case of labor to the unborn child, from effecting the transfer      X____________________________________________ DATE 12/19/18        TIME_______      ORIGINAL - SEND TO MEDICAL RECORDS   COPY - SEND WITH PATIENT DURING TRANSFER

## 2018-12-19 NOTE — RESPIRATORY THERAPY NOTE
RT Protocol Note  Jennifer Cowart 66 y o  male MRN: [id number]  Unit/Bed#: [facility] 027-60 Encounter: [id number]    Assessment    Active Problems:    CKD (chronic kidney disease)    Diabetes mellitus ([address] [place] [address] )    Essential hypertension    Rib fractures      Home Pulmonary Medications:  none       Past Medical History:   Diagnosis Date    Anxiety     CAD (coronary artery disease)     last assessed 4/10/13    Chronic kidney disease, stage IV (severe) ([place] [address] )     Concussion     last assessed 9/3/14    Depression     Diabetes mellitus ([place] [address] )     type 2    Failure to gain weight in adult     last assessed 2/24/14    Gastroparesis     GERD (gastroesophageal reflux disease)     Hyperkalemia     last assessed 11/9/15    Hyperlipidemia     Hypertension     accelerated essential last assessed 10/7/16    Insomnia     Late effects of cerebrovascular disease     Overflow incontinence     last assessed 7/24/14    Renal disorder     Secondary hyperparathyroidism ([place] [address] )     Stroke [facility])      Social History     Social History    Marital status: Single     Spouse name: N/A    Number of children: N/A    Years of education: N/A     Social History Main Topics    Smoking status: Never Smoker    Smokeless tobacco: Never Used    Alcohol use No      Comment: social    Drug use: Yes     Types: Marijuana      Comment: smokes once keven while for pain 1 xper  week    Sexual activity: Not Asked     Other Topics Concern    None     Social History Narrative    Advance directive declined by pt    Caffeine use    No preference or Anabaptism beliefs    Social hx reviewed, unchanged       Subjective         Objective    Physical Exam:   Assessment Type: (P) Assess only  General Appearance: (P) Awake, Alert  Respiratory Pattern: (P) Normal  Chest Assessment: (P) Chest expansion symmetrical  Bilateral Breath Sounds: (P) Clear, Diminished  R Breath Sounds: (P) Clear, Diminished  L Breath Sounds: (P) Clear, Diminished  Cough: (P) None    Vitals:  Blood pressure (!) 198/98, pulse 60, temperature 98 3 °F (36 8 °C), temperature source Oral, resp  rate 16, SpO2 96 %  Imaging and other studies: I have personally reviewed pertinent reports  Plan       Airway Clearance Plan: (P) Incentive Spirometer     Resp Comments: (P) Pt evaluated per airway clearance protocol after being admitted with rib fractures  Pt is currently performing his IS adequately but does need encouragement  Will continue with IS under airway clearance

## 2018-12-19 NOTE — TRAUMA DOCUMENTATION
Patients' significant other gets very upset about our asking patient about how he fell, how many steps he fell down

## 2018-12-19 NOTE — H&P
H&P- Luz Marina Lau 1960, 66 y o  male MRN: [id number]    Unit/Bed#: ED 22 Encounter: [id number]    Primary Care Provider: Reji Bella MD   Date and time admitted to hospital: 12/19/2018  1:54 PM        Rib fractures   Assessment & Plan    - left 3-6 rib fractures  - pain management  - IS       Essential hypertension   Assessment & Plan    - resume home meds     Diabetes mellitus ([facility] [address] )   Assessment & Plan    - begin SSI     CKD (chronic kidney disease)   Assessment & Plan    - trend creatinine           Assessment/Plan   Trauma Alert: Other transfer  Model of Arrival: Ambulance  Trauma Team: [address]  Consultants: None    Trauma Active Problems: See above    Trauma Plan: See above    Chief Complaint: Rib pain    History of Present Illness   HPI:  Luz Marina Lau is a 66 y o  male who presents with left sided rib pain after falling down the stairs  Patient lost his balance while trying to sit onto his chair at the top of the steps and fell down about 12 stairs  Denies LOC  Denies SOB  Slight nausea  Mechanism:Fall    Review of Systems   Constitutional: Negative  HENT: Negative  Eyes: Negative  Respiratory: Negative  Cardiovascular: Negative  Gastrointestinal: Positive for nausea  Endocrine: Negative  Genitourinary: Negative  Musculoskeletal:        Rib pain   Skin: Negative  Allergic/Immunologic: Negative  Neurological: Negative  Hematological: Negative  Psychiatric/Behavioral: Negative          Historical Information       Past Medical History:   Diagnosis Date    Anxiety     CAD (coronary artery disease)     last assessed 4/10/13    Chronic kidney disease, stage IV (severe) ([facility] [address] )     Concussion     last assessed 9/3/14    Depression     Diabetes mellitus ([facility] )     type 2    Failure to gain weight in adult     last assessed 2/24/14    Gastroparesis     GERD (gastroesophageal reflux disease)     Hyperkalemia     last assessed 11/9/15    Hyperlipidemia     Hypertension     accelerated essential last assessed 10/7/16    Insomnia     Late effects of cerebrovascular disease     Overflow incontinence     last assessed 7/24/14    Renal disorder     Secondary hyperparathyroidism ([facility] [address] )     Stroke [facility])      Past Surgical History:   Procedure Laterality Date    COLONOSCOPY      HERNIA REPAIR      UT ESOPHAGOGASTRODUODENOSCOPY TRANSORAL DIAGNOSTIC N/A 2/16/2017    Procedure: ESOPHAGOGASTRODUODENOSCOPY (EGD) with biopsy;  Surgeon: Sammy Harper DO;  Location: [facility]; Service: Gastroenterology    UT ESOPHAGOGASTRODUODENOSCOPY TRANSORAL DIAGNOSTIC N/A 6/2/2017    Procedure: ESOPHAGOGASTRODUODENOSCOPY (EGD) with bx;  Surgeon: Sammy Harper DO;  Location: [facility];   Service: Gastroenterology    TYMPANOSTOMY TUBE PLACEMENT       Social History   History   Alcohol Use No     Comment: social     History   Drug Use    Types: Marijuana     Comment: smokes once keven while for pain 1 xper  week     History   Smoking Status    Never Smoker   Smokeless Tobacco    Never Used     Immunization History   Administered Date(s) Administered    Influenza TIV (IM) 01/04/2017    Influenza, recombinant, quadrivalent,injectable, preservative free 09/28/2018, 12/17/2018    Pneumococcal Conjugate 13-Valent 10/08/2015    Pneumococcal Polysaccharide PPV23 01/04/2017     Last Tetanus: unknown  Family History: Non-contributory        Meds/Allergies   all current active meds have been reviewed    No Known Allergies      PHYSICAL EXAM      Objective   Vitals:   First set: Temperature: 98 3 °F (36 8 °C) (12/19/18 1359)  Pulse: 62 (12/19/18 1359)  Respirations: 16 (12/19/18 1359)  Blood Pressure: (!) 222/96 (12/19/18 1359)    Primary Survey:   (A) Airway: intact  (B) Breathing: present breath sounds bilaterally  (C) Circulation: Pulses:   carotid  2/4, pedal  2/4, radial  2/4 and femoral  2/4  (D) Disabliity:  GCS Total:  15, Eye Opening:   Spontaneous = 4, Motor Response: Obeys commands = 6 and Verbal Response:  Oriented = 5  (E) Expose:  Completed    Secondary Survey: (Click on Physical Exam tab above)  Physical Exam   Constitutional: He is oriented to person, place, and time  He appears well-developed and well-nourished  HENT:   Head: Normocephalic and atraumatic  Right Ear: External ear normal    Left Ear: External ear normal    Eyes: Pupils are equal, round, and reactive to light  Conjunctivae are normal    Neck: Normal range of motion  No tracheal deviation present  Cardiovascular: Normal rate  Pulmonary/Chest: Effort normal and breath sounds normal  No respiratory distress  He has no wheezes  He has no rales  Abdominal: Soft  Bowel sounds are normal  He exhibits no distension  There is no tenderness  There is no rebound  Musculoskeletal: He exhibits no edema or deformity  Tender left ribs   Neurological: He is alert and oriented to person, place, and time  No cranial nerve deficit  Skin: Skin is warm and dry  Invasive Devices     Peripheral Intravenous Line            Peripheral IV 12/19/18 Right Forearm less than 1 day                Lab Results:   BMP/CMP:   Lab Results   Component Value Date    SODIUM 139 12/19/2018    K 5 1 12/19/2018    CL 99 (L) 12/19/2018    CO2 32 12/19/2018    BUN 41 (H) 12/19/2018    CREATININE 4 48 (H) 12/19/2018    CALCIUM 9 1 12/19/2018    EGFR 14 12/19/2018    and CBC:   Lab Results   Component Value Date    WBC 15 78 (H) 12/19/2018    HGB 13 0 12/19/2018    HCT 39 2 12/19/2018    MCV 92 12/19/2018     12/19/2018    MCH 30 6 12/19/2018    MCHC 33 2 12/19/2018    RDW 11 7 12/19/2018    MPV 12 1 12/19/2018    NRBC 0 12/19/2018     Imaging/EKG Studies: Results: I have personally reviewed pertinent reports          Code Status: Level 1 - Full Code  Advance Directive and Living Will:      Power of :    POLST:

## 2018-12-19 NOTE — ED PROVIDER NOTES
History  Chief Complaint   Patient presents with    Fall     pt was coming down stairs on a stair chair and missed seat when attempting to sit down; pt then fell down stairs about 10-15 stairs on his backside; pt c/p left sided rib pain and pain when inspiration; pt denies any LOC pt denies any n/v/d      65 yo male debilitated by previous CVA with left sided weakness, ambulatory with cane and walker, uses lift chair to get up and down stairs at home, accidentally slipped out of the chair as he was going down  He basically slid down on his back and buttocks  He must have some head impact on the stair edges because there is abrasion on the back of his scalp  His family member was ahead of him as he was coming, so that as she got to the bottom, that's when she heard him coming and witnessed the slide   He had no LOC  His complaint is left sided "rib pain" and a Ccollar was placed by paramedics for possible neck pain  History provided by:  Patient  Fall   Mechanism of injury: fall    Injury location:  Torso  Arrived directly from scene: yes    Fall:     Fall occurred:  Down stairs    Height of fall:  Slid down on back and buttocks, down flight after slipping out of the lift chair  Prior to arrival data:     Patient ambulatory at scene: no      Blood loss:  None    Responsiveness at scene:  Alert    Loss of consciousness: no    Associated symptoms: chest pain and neck pain    Associated symptoms: no abdominal pain, no headaches, no nausea and no vomiting        Prior to Admission Medications   Prescriptions Last Dose Informant Patient Reported? Taking?    Insulin Glargine (TOUJEO SOLOSTAR) 300 UNIT/ML SOPN   Yes No   Sig: Inject under the skin   Liraglutide (VICTOZA) 18 MG/3ML SOPN   Yes No   Sig: Inject under the skin   amLODIPine (NORVASC) 10 mg tablet   Yes No   Sig: Take 10 mg by mouth daily   aspirin 81 MG tablet   No No   Sig: Take 1 tablet (81 mg total) by mouth daily   buPROPion (WELLBUTRIN) 100 mg tablet   No No   Sig: Take 1 tablet (100 mg total) by mouth 2 (two) times a day   dicyclomine (BENTYL) 20 mg tablet   No No   Sig: Take 1 tablet by mouth every 6 (six) hours   Patient taking differently: Take 20 mg by mouth daily as needed     doxazosin (CARDURA) 1 mg tablet   Yes No   Sig: Take 2 mg by mouth 2 (two) times a day Take 1 1/2 tablet by mouth twice a day    furosemide (LASIX) 40 mg tablet   Yes No   Sig: Take by mouth daily   lansoprazole (PREVACID) 30 mg capsule   No No   Sig: Take 1 capsule (30 mg total) by mouth 2 (two) times a day   lubiprostone (AMITIZA) 24 mcg capsule   Yes No   Sig: Take 24 mcg by mouth 2 (two) times a day with meals   metoprolol succinate (TOPROL-XL) 25 mg 24 hr tablet   Yes No   Sig: Take 25 mg by mouth daily   paricalcitol (ZEMPLAR) 1 mcg capsule   Yes No   Sig: Take 2 mcg by mouth daily        Facility-Administered Medications: None       Past Medical History:   Diagnosis Date    Anxiety     CAD (coronary artery disease)     last assessed 4/10/13    Chronic kidney disease, stage IV (severe) (HCC)     Concussion     last assessed 9/3/14    Depression     Diabetes mellitus ([facility] [address] )     type 2    Failure to gain weight in adult     last assessed 2/24/14    Gastroparesis     GERD (gastroesophageal reflux disease)     Hyperkalemia     last assessed 11/9/15    Hyperlipidemia     Hypertension     accelerated essential last assessed 10/7/16    Insomnia     Late effects of cerebrovascular disease     Overflow incontinence     last assessed 7/24/14    Renal disorder     Secondary hyperparathyroidism ([facility] [address] )     Stroke [facility])        Past Surgical History:   Procedure Laterality Date    COLONOSCOPY      HERNIA REPAIR      IN ESOPHAGOGASTRODUODENOSCOPY TRANSORAL DIAGNOSTIC N/A 2/16/2017    Procedure: ESOPHAGOGASTRODUODENOSCOPY (EGD) with biopsy;  Surgeon: Raffaele Wu DO;  Location: [facility];   Service: Gastroenterology    IN ESOPHAGOGASTRODUODENOSCOPY TRANSORAL DIAGNOSTIC N/A 6/2/2017    Procedure: ESOPHAGOGASTRODUODENOSCOPY (EGD) with bx;  Surgeon: [facility] ;  Location: [facility]; Service: Gastroenterology    TYMPANOSTOMY TUBE PLACEMENT         Family History   Problem Relation Age of Onset    Cancer Family         pt and friend denies this hx    Hypertension Family         essential    Hyperlipidemia Family     Diabetes Mother     No Known Problems Father      I have reviewed and agree with the history as documented  Social History   Substance Use Topics    Smoking status: Never Smoker    Smokeless tobacco: Never Used    Alcohol use No      Comment: social        Review of Systems   Constitutional: Negative for appetite change, chills and fever  HENT: Negative for sore throat  Respiratory: Negative for cough, shortness of breath and wheezing  Cardiovascular: Positive for chest pain  Negative for palpitations  Gastrointestinal: Negative for abdominal pain, diarrhea, nausea and vomiting  Genitourinary: Negative for dysuria and hematuria  Musculoskeletal: Positive for neck pain  Skin: Negative for rash  Neurological: Negative for dizziness, weakness and headaches  Psychiatric/Behavioral: Negative for suicidal ideas  All other systems reviewed and are negative  Physical Exam  Physical Exam   Constitutional: He appears well-developed and well-nourished  Alert, no distress   HENT:   Head: Normocephalic and atraumatic  Head is without Lilly's sign, without abrasion, without contusion, without laceration, without right periorbital erythema and without left periorbital erythema  Right Ear: Tympanic membrane and external ear normal  No lacerations  Left Ear: Tympanic membrane and external ear normal  No lacerations  Nose: Nose normal  No nose lacerations, nasal deformity, septal deviation or nasal septal hematoma  No epistaxis  Mouth/Throat: Oropharynx is clear and moist  No lacerations     Eyes: Pupils are equal, round, and reactive to light  Conjunctivae and EOM are normal  Right eye exhibits normal extraocular motion  Left eye exhibits normal extraocular motion  Neck: Trachea normal, normal range of motion, full passive range of motion without pain and phonation normal  Spinous process tenderness (posterior, somewhat distracted by chest pain, Collar immobilization maintained, transitoned to ED collar) present  Normal range of motion present  Cardiovascular: Normal rate, regular rhythm, normal heart sounds, intact distal pulses and normal pulses  Pulmonary/Chest: Effort normal  No respiratory distress  He exhibits tenderness (left lateral chest wall)  He exhibits no bony tenderness, no laceration, no crepitus, no deformity and no retraction  Abdominal: Soft  Normal appearance  There is no tenderness  Musculoskeletal:        Right shoulder: He exhibits normal range of motion, no tenderness, no swelling, no crepitus, no deformity and no pain  Left shoulder: He exhibits normal range of motion, no tenderness, no crepitus, no deformity and no pain  Right elbow: He exhibits normal range of motion, no swelling and no deformity  Left elbow: He exhibits normal range of motion, no swelling and no deformity  Right hip: He exhibits normal range of motion, no tenderness, no crepitus and no deformity  Left hip: He exhibits normal range of motion, no tenderness, no crepitus and no deformity  Right knee: He exhibits normal range of motion, no swelling, no ecchymosis and no deformity  Left knee: He exhibits normal range of motion, no swelling, no effusion, no ecchymosis and no deformity  Right ankle: He exhibits normal range of motion, no swelling, no ecchymosis and no deformity  Left ankle: He exhibits normal range of motion, no swelling, no ecchymosis and no deformity  Cervical back: He exhibits tenderness  He exhibits no pain          Thoracic back: He exhibits normal range of motion, no tenderness, no bony tenderness, no pain and no spasm  Lumbar back: He exhibits normal range of motion, no tenderness, no bony tenderness and no pain  Neurological: He is alert  He has normal strength and normal reflexes  No cranial nerve deficit or sensory deficit  Gait normal  GCS eye subscore is 4  GCS verbal subscore is 5  GCS motor subscore is 6  Chronic LUE weakness, apraxia,    Skin: Skin is warm, dry and intact  Psychiatric: He has a normal mood and affect  His speech is normal and behavior is normal  Judgment and thought content normal  Cognition and memory are normal    Nursing note and vitals reviewed        Vital Signs  ED Triage Vitals [12/19/18 1026]   Temperature Pulse Respirations Blood Pressure SpO2   98 2 °F (36 8 °C) (!) 52 18 (!) 229/98 96 %      Temp Source Heart Rate Source Patient Position - Orthostatic VS BP Location FiO2 (%)   Oral Monitor Lying Right arm --      Pain Score       5           Vitals:    12/19/18 1026 12/19/18 1238   BP: (!) 229/98 (!) 217/102   Pulse: (!) 52 (!) 54   Patient Position - Orthostatic VS: Lying Lying       Visual Acuity      ED Medications  Medications   fentanyl citrate (PF) 100 MCG/2ML 50 mcg (50 mcg Intramuscular Given 12/19/18 1101)   morphine injection 2 mg (2 mg Intravenous Given 12/19/18 1241)       Diagnostic Studies  Results Reviewed     Procedure Component Value Units Date/Time    Protime-INR [[id number]]  (Normal) Collected:  12/19/18 1235    Lab Status:  Final result Specimen:  Blood from Arm, Right Updated:  12/19/18 1253     Protime 13 9 seconds      INR 1 06    APTT [[id number]]  (Normal) Collected:  12/19/18 1235    Lab Status:  Final result Specimen:  Blood from Arm, Right Updated:  12/19/18 1253     PTT 30 seconds     Basic metabolic panel [[id number]]  (Abnormal) Collected:  12/19/18 1227    Lab Status:  Final result Specimen:  Blood from Arm, Right Updated:  12/19/18 1243     Sodium 139 mmol/L Potassium 5 1 mmol/L      Chloride 99 (L) mmol/L      CO2 32 mmol/L      ANION GAP 8 mmol/L      BUN 41 (H) mg/dL      Creatinine 4 48 (H) mg/dL      Glucose 287 (H) mg/dL      Calcium 9 1 mg/dL      eGFR 14 ml/min/1 73sq m     Narrative:         National Kidney Disease Education Program recommendations are as follows:  GFR calculation is accurate only with a steady state creatinine  Chronic Kidney disease less than 60 ml/min/1 73 sq  meters  Kidney failure less than 15 ml/min/1 73 sq  meters  CBC and differential [[id number]]  (Abnormal) Collected:  12/19/18 1227    Lab Status:  Final result Specimen:  Blood from Arm, Right Updated:  12/19/18 1234     WBC 15 78 (H) Thousand/uL      RBC 4 25 Million/uL      Hemoglobin 13 0 g/dL      Hematocrit 39 2 %      MCV 92 fL      MCH 30 6 pg      MCHC 33 2 g/dL      RDW 11 7 %      MPV 12 1 fL      Platelets 238 Thousands/uL      nRBC 0 /100 WBCs      Neutrophils Relative 83 (H) %      Immat GRANS % 1 %      Lymphocytes Relative 7 (L) %      Monocytes Relative 7 %      Eosinophils Relative 1 %      Basophils Relative 1 %      Neutrophils Absolute 13 44 (H) Thousands/µL      Immature Grans Absolute 0 10 Thousand/uL      Lymphocytes Absolute 1 03 Thousands/µL      Monocytes Absolute 1 05 Thousand/µL      Eosinophils Absolute 0 08 Thousand/µL      Basophils Absolute 0 08 Thousands/µL                  CT recon only thoracic spine   Final Result by Kingsley Falk MD (12/19 4741)      No fracture or traumatic subluxation of the thoracic spine  Please see separate CT of the chest report for chest wall findings  Workstation performed: [id number]         CT chest without contrast   Final Result by Kingsley Falk MD (12/19 1140)      Mildly displaced left 3rd through 6th rib fractures  Small amount of adjacent pleural catheter and fluid seen adjacent to the posterior fracture sites  Small amount of soft tissue emphysema as well                 Workstation performed: [id number]         CT cervical spine without contrast   Final Result by Olive Paul MD (12/19 1138)      No cervical spine fracture or traumatic malalignment  Workstation performed: [id number]                    Procedures  Procedures       Phone Contacts  ED Phone Contact    ED Course  ED Course as of Dec 19 1419   Wed Dec 19, 2018   1159 Reviewed result with multiple rib fractures, localized air and pleural fluid, no bennie PTX  This warrants consult to trauma for observation  Cspine cleared by imaging, so that I am comfortable removing Ccollar at this time CT chest without contrast   1223 D/W Dr Bismark Carter, agrees with trauma transfer                                MDM  CritCare Time    Disposition  Final diagnoses:   Fall down stairs, initial encounter   Closed fracture of multiple ribs of left side, initial encounter   Abrasion of scalp, initial encounter     Time reflects when diagnosis was documented in both MDM as applicable and the Disposition within this note     Time User Action Codes Description Comment    12/19/2018 12:24 PM JORJE Osuna/Morris 10 down stairs, initial encounter     12/19/2018 12:25 PM Thao, [address] Closed fracture of multiple ribs of left side, initial encounter     12/19/2018 12:25 PM Shawn Abarca L Add [S00 [id number]] Abrasion of scalp, initial encounter       ED Disposition     ED Disposition Condition Comment    Transfer to Another [address] [place] [address] should be transferred out to [facility]        MD Documentation      Most Recent Value   Patient Condition  The patient has been stabilized such that within reasonable medical probability, no material deterioration of the patient condition or the condition of the unborn child(sim) is likely to result from the transfer   Reason for Transfer  Level of Care needed not available at this facility   Benefits of Transfer  Specialized equipment and/or services available at the receiving facility (Include comment)________________________   Risks of Transfer  Potential for delay in receiving treatment, Increased discomfort during transfer, Loss of IV, Possible worsening of condition or death during transfer   Accepting Physician  Dr Bianca Haywood Name, [address]  ED    (Name & Tel number)  [facility]   Transported by (Company and Unit #)  [employer]   Sending MD Dr Merline Root   Provider Certification  General risk, such as traffic hazards, adverse weather conditions, rough terrain or turbulence, possible failure of equipment (including vehicle or aircraft), or consequences of actions of persons outside the control of the transport personnel, The possibility of a transport vehicle being unavailable, Unanticipated needs of medical equipment and personnel during transport, Risk of worsening condition      RN Documentation      [address] Name, [address] [facility] ED    (Name & Tel number)  [address]   Transported by (Company and Unit #)  [email or web address]      Follow-up Information    None         Discharge Medication List as of 12/19/2018  1:28 PM      CONTINUE these medications which have NOT CHANGED    Details   amLODIPine (NORVASC) 10 mg tablet Take 10 mg by mouth daily, Until Discontinued, Historical Med      aspirin 81 MG tablet Take 1 tablet (81 mg total) by mouth daily, Starting Tue 6/19/2018, Normal      buPROPion (WELLBUTRIN) 100 mg tablet Take 1 tablet (100 mg total) by mouth 2 (two) times a day, Starting Tue 12/11/2018, Normal      dicyclomine (BENTYL) 20 mg tablet Take 1 tablet by mouth every 6 (six) hours, Starting 10/22/2016, Until Discontinued, Print      doxazosin (CARDURA) 1 mg tablet Take 1 mg by mouth daily at bedtime, Until Discontinued, Historical Med      furosemide (LASIX) 40 mg tablet Take by mouth daily, Until Discontinued, Historical Med      Insulin Glargine (TOUJEO SOLOSTAR) 300 UNIT/ML SOPN Inject under the skin, Until Discontinued, Historical Med      lansoprazole (PREVACID) 30 mg capsule Take 1 capsule (30 mg total) by mouth 2 (two) times a day, Starting Wed 6/6/2018, Normal      Liraglutide (VICTOZA) 18 MG/3ML SOPN Inject under the skin, Until Discontinued, Historical Med      lubiprostone (AMITIZA) 24 mcg capsule Take 24 mcg by mouth 2 (two) times a day with meals, Until Discontinued, Historical Med      metoprolol succinate (TOPROL-XL) 25 mg 24 hr tablet Take 25 mg by mouth daily, Until Discontinued, Historical Med      paricalcitol (ZEMPLAR) 1 mcg capsule Take 1 mcg by mouth daily, Until Discontinued, Historical Med      ARIPiprazole (ABILIFY) 10 mg tablet Take 1 tablet (10 mg total) by mouth daily, Starting Tue 12/18/2018, Normal      Cholecalciferol (VITAMIN D-3) 1000 UNITS CAPS Take by mouth, Until Discontinued, Historical Med      ferrous sulfate 325 (65 Fe) mg tablet Take 325 mg by mouth daily with breakfast, Until Discontinued, Historical Med      metoclopramide (REGLAN) 5 mg tablet Take 5 mg by mouth as needed  , Until Discontinued, Historical Med      oxyCODONE (ROXICODONE) 10 MG TABS Take 1 tablet (10 mg total) by mouth 3 (three) times a day as needed for moderate pain Max Daily Amount: 30 mg, Starting Fri 9/28/2018, Normal      oxyCODONE-acetaminophen (PERCOCET) 5-325 mg per tablet oxycodone-acetaminophen 5 mg-325 mg tablet, Historical Med      promethazine (PHENERGAN) 12 5 MG tablet Take 1 tablet by mouth every 6 (six) hours as needed for nausea or vomiting, Starting 10/22/2016, Until Discontinued, Print           No discharge procedures on file      ED Provider  Electronically Signed by           Tatyana Tran MD  12/19/18 5734

## 2018-12-19 NOTE — PLAN OF CARE
DISCHARGE PLANNING     Discharge to home or other facility with appropriate resources Progressing        INFECTION - ADULT     Absence or prevention of progression during hospitalization Progressing        Knowledge Deficit     Patient/family/caregiver demonstrates understanding of disease process, treatment plan, medications, and discharge instructions Progressing        MUSCULOSKELETAL - ADULT     Maintain or return mobility to safest level of function Progressing        Nutrition/Hydration-ADULT     Nutrient/Hydration intake appropriate for improving, restoring or maintaining nutritional needs Progressing        PAIN - ADULT     Verbalizes/displays adequate comfort level or baseline comfort level Progressing        Potential for Falls     Patient will remain free of falls Progressing        Prexisting or High Potential for Compromised Skin Integrity     Skin integrity is maintained or improved Progressing        SAFETY ADULT     Maintain or return to baseline ADL function Progressing     Maintain or return mobility status to optimal level Progressing     Patient will remain free of falls Progressing

## 2018-12-20 ENCOUNTER — APPOINTMENT (OUTPATIENT)
Dept: RADIOLOGY | Facility: HOSPITAL | Age: 58
DRG: 199 | End: 2018-12-20
Payer: COMMERCIAL

## 2018-12-20 DIAGNOSIS — Z71.89 COMPLEX CARE COORDINATION: Primary | ICD-10-CM

## 2018-12-20 DIAGNOSIS — Z78.9 NEED FOR FOLLOW-UP BY SOCIAL WORKER: ICD-10-CM

## 2018-12-20 LAB
ANION GAP SERPL CALCULATED.3IONS-SCNC: 4 MMOL/L (ref 4–13)
BASOPHILS # BLD AUTO: 0.02 THOUSANDS/ΜL (ref 0–0.1)
BASOPHILS NFR BLD AUTO: 0 % (ref 0–1)
BUN SERPL-MCNC: 45 MG/DL (ref 5–25)
CALCIUM SERPL-MCNC: 8.4 MG/DL (ref 8.3–10.1)
CHLORIDE SERPL-SCNC: 102 MMOL/L (ref 100–108)
CO2 SERPL-SCNC: 31 MMOL/L (ref 21–32)
CREAT SERPL-MCNC: 4.64 MG/DL (ref 0.6–1.3)
EOSINOPHIL # BLD AUTO: 0.03 THOUSAND/ΜL (ref 0–0.61)
EOSINOPHIL NFR BLD AUTO: 0 % (ref 0–6)
ERYTHROCYTE [DISTWIDTH] IN BLOOD BY AUTOMATED COUNT: 11.8 % (ref 11.6–15.1)
GFR SERPL CREATININE-BSD FRML MDRD: 13 ML/MIN/1.73SQ M
GLUCOSE SERPL-MCNC: 142 MG/DL (ref 65–140)
GLUCOSE SERPL-MCNC: 145 MG/DL (ref 65–140)
GLUCOSE SERPL-MCNC: 194 MG/DL (ref 65–140)
GLUCOSE SERPL-MCNC: 201 MG/DL (ref 65–140)
GLUCOSE SERPL-MCNC: 93 MG/DL (ref 65–140)
HCT VFR BLD AUTO: 33.4 % (ref 36.5–49.3)
HGB BLD-MCNC: 11.2 G/DL (ref 12–17)
IMM GRANULOCYTES # BLD AUTO: 0.02 THOUSAND/UL (ref 0–0.2)
IMM GRANULOCYTES NFR BLD AUTO: 0 % (ref 0–2)
LYMPHOCYTES # BLD AUTO: 1.22 THOUSANDS/ΜL (ref 0.6–4.47)
LYMPHOCYTES NFR BLD AUTO: 16 % (ref 14–44)
MCH RBC QN AUTO: 30.2 PG (ref 26.8–34.3)
MCHC RBC AUTO-ENTMCNC: 33.5 G/DL (ref 31.4–37.4)
MCV RBC AUTO: 90 FL (ref 82–98)
MONOCYTES # BLD AUTO: 1.04 THOUSAND/ΜL (ref 0.17–1.22)
MONOCYTES NFR BLD AUTO: 14 % (ref 4–12)
NEUTROPHILS # BLD AUTO: 5.11 THOUSANDS/ΜL (ref 1.85–7.62)
NEUTS SEG NFR BLD AUTO: 70 % (ref 43–75)
NRBC BLD AUTO-RTO: 0 /100 WBCS
PLATELET # BLD AUTO: 152 THOUSANDS/UL (ref 149–390)
PMV BLD AUTO: 12.8 FL (ref 8.9–12.7)
POTASSIUM SERPL-SCNC: 4.2 MMOL/L (ref 3.5–5.3)
RBC # BLD AUTO: 3.71 MILLION/UL (ref 3.88–5.62)
SODIUM SERPL-SCNC: 137 MMOL/L (ref 136–145)
WBC # BLD AUTO: 7.44 THOUSAND/UL (ref 4.31–10.16)

## 2018-12-20 PROCEDURE — 71045 X-RAY EXAM CHEST 1 VIEW: CPT

## 2018-12-20 PROCEDURE — 82948 REAGENT STRIP/BLOOD GLUCOSE: CPT

## 2018-12-20 PROCEDURE — 99232 SBSQ HOSP IP/OBS MODERATE 35: CPT | Performed by: SURGERY

## 2018-12-20 PROCEDURE — G8979 MOBILITY GOAL STATUS: HCPCS

## 2018-12-20 PROCEDURE — G8978 MOBILITY CURRENT STATUS: HCPCS

## 2018-12-20 PROCEDURE — 97163 PT EVAL HIGH COMPLEX 45 MIN: CPT

## 2018-12-20 PROCEDURE — 80048 BASIC METABOLIC PNL TOTAL CA: CPT | Performed by: PHYSICIAN ASSISTANT

## 2018-12-20 PROCEDURE — 85025 COMPLETE CBC W/AUTO DIFF WBC: CPT | Performed by: PHYSICIAN ASSISTANT

## 2018-12-20 RX ORDER — HEPARIN SODIUM 5000 [USP'U]/ML
5000 INJECTION, SOLUTION INTRAVENOUS; SUBCUTANEOUS EVERY 8 HOURS SCHEDULED
Status: DISCONTINUED | OUTPATIENT
Start: 2018-12-21 | End: 2018-12-23 | Stop reason: HOSPADM

## 2018-12-20 RX ADMIN — DOXAZOSIN 2 MG: 2 TABLET ORAL at 08:17

## 2018-12-20 RX ADMIN — DOCUSATE SODIUM 100 MG: 100 CAPSULE, LIQUID FILLED ORAL at 17:35

## 2018-12-20 RX ADMIN — LIDOCAINE 1 PATCH: 50 PATCH CUTANEOUS at 08:18

## 2018-12-20 RX ADMIN — OXYCODONE HYDROCHLORIDE 10 MG: 10 TABLET ORAL at 05:19

## 2018-12-20 RX ADMIN — DICYCLOMINE HYDROCHLORIDE 20 MG: 20 TABLET ORAL at 15:17

## 2018-12-20 RX ADMIN — ENOXAPARIN SODIUM 30 MG: 30 INJECTION SUBCUTANEOUS at 08:17

## 2018-12-20 RX ADMIN — ASPIRIN 81 MG 81 MG: 81 TABLET ORAL at 08:17

## 2018-12-20 RX ADMIN — TRAZODONE HYDROCHLORIDE 100 MG: 100 TABLET ORAL at 22:04

## 2018-12-20 RX ADMIN — PANTOPRAZOLE SODIUM 40 MG: 40 TABLET, DELAYED RELEASE ORAL at 05:20

## 2018-12-20 RX ADMIN — SODIUM CHLORIDE 75 ML/HR: 0.9 INJECTION, SOLUTION INTRAVENOUS at 13:02

## 2018-12-20 RX ADMIN — INSULIN LISPRO 1 UNITS: 100 INJECTION, SOLUTION INTRAVENOUS; SUBCUTANEOUS at 11:32

## 2018-12-20 RX ADMIN — ARIPIPRAZOLE 4 MG: 2 TABLET ORAL at 08:20

## 2018-12-20 RX ADMIN — DOXAZOSIN 2 MG: 2 TABLET ORAL at 17:35

## 2018-12-20 RX ADMIN — ACETAMINOPHEN 975 MG: 325 TABLET ORAL at 05:19

## 2018-12-20 RX ADMIN — METOPROLOL SUCCINATE 25 MG: 25 TABLET, EXTENDED RELEASE ORAL at 08:17

## 2018-12-20 RX ADMIN — BUPROPION HYDROCHLORIDE 100 MG: 100 TABLET, FILM COATED ORAL at 17:36

## 2018-12-20 RX ADMIN — ACETAMINOPHEN 975 MG: 325 TABLET ORAL at 22:04

## 2018-12-20 RX ADMIN — PIOGLITAZONE 15 MG: 15 TABLET ORAL at 08:19

## 2018-12-20 RX ADMIN — ALPRAZOLAM 0.5 MG: 0.5 TABLET ORAL at 08:17

## 2018-12-20 RX ADMIN — DICYCLOMINE HYDROCHLORIDE 20 MG: 20 TABLET ORAL at 22:04

## 2018-12-20 RX ADMIN — DICYCLOMINE HYDROCHLORIDE 20 MG: 20 TABLET ORAL at 11:31

## 2018-12-20 RX ADMIN — SENNOSIDES 8.6 MG: 8.6 TABLET, FILM COATED ORAL at 08:17

## 2018-12-20 RX ADMIN — ACETAMINOPHEN 975 MG: 325 TABLET ORAL at 15:17

## 2018-12-20 RX ADMIN — INSULIN GLARGINE 24 UNITS: 100 INJECTION, SOLUTION SUBCUTANEOUS at 22:04

## 2018-12-20 RX ADMIN — OXYCODONE HYDROCHLORIDE 10 MG: 10 TABLET ORAL at 19:54

## 2018-12-20 RX ADMIN — BUPROPION HYDROCHLORIDE 100 MG: 100 TABLET, FILM COATED ORAL at 08:18

## 2018-12-20 RX ADMIN — OXYCODONE HYDROCHLORIDE 10 MG: 10 TABLET ORAL at 11:29

## 2018-12-20 RX ADMIN — DICYCLOMINE HYDROCHLORIDE 20 MG: 20 TABLET ORAL at 04:44

## 2018-12-20 RX ADMIN — FUROSEMIDE 40 MG: 40 TABLET ORAL at 08:17

## 2018-12-20 RX ADMIN — AMLODIPINE BESYLATE 10 MG: 10 TABLET ORAL at 08:17

## 2018-12-20 RX ADMIN — METHOCARBAMOL TABLETS 750 MG: 750 TABLET, COATED ORAL at 15:17

## 2018-12-20 RX ADMIN — DOCUSATE SODIUM 100 MG: 100 CAPSULE, LIQUID FILLED ORAL at 08:17

## 2018-12-20 NOTE — PHYSICAL THERAPY NOTE
Physical Therapy Evaluation    Patient's Name:Lai Carlson    Today's Date:12/20/18    Patient Active Problem List   Diagnosis    Ambulatory dysfunction    Anxiety    [facility] infarction ([facility] )    CKD (chronic kidney disease)    Constipation    Diabetes mellitus ([facility] )    Difficulty walking    Essential hypertension    Gait disturbance    Gastroparesis    Chronic narcotic use    Violation of controlled substance agreement    Low back pain    Rib fractures       Past Medical History:   Diagnosis Date    Anxiety     CAD (coronary artery disease)     last assessed 4/10/13    Chronic kidney disease, stage IV (severe) ([facility] )     Concussion     last assessed 9/3/14    Depression     Diabetes mellitus ([place] [address] )     type 2    Failure to gain weight in adult     last assessed 2/24/14    Gastroparesis     GERD (gastroesophageal reflux disease)     Hyperkalemia     last assessed 11/9/15    Hyperlipidemia     Hypertension     accelerated essential last assessed 10/7/16    Insomnia     Late effects of cerebrovascular disease     Overflow incontinence     last assessed 7/24/14    Renal disorder     Secondary hyperparathyroidism ([place] [address] )     Stroke [facility])        Past Surgical History:   Procedure Laterality Date    COLONOSCOPY      HERNIA REPAIR      MS ESOPHAGOGASTRODUODENOSCOPY TRANSORAL DIAGNOSTIC N/A 2/16/2017    Procedure: ESOPHAGOGASTRODUODENOSCOPY (EGD) with biopsy;  Surgeon: Miracle Briscoe DO;  Location: [facility]; Service: Gastroenterology    MS ESOPHAGOGASTRODUODENOSCOPY TRANSORAL DIAGNOSTIC N/A 6/2/2017    Procedure: ESOPHAGOGASTRODUODENOSCOPY (EGD) with bx;  Surgeon: Miracle Briscoe DO;  Location: [facility];   Service: Gastroenterology    TYMPANOSTOMY TUBE PLACEMENT          12/20/18 1110   Pain Assessment   Pain Assessment 0-10   Pain Score 7   Pain Location Rib cage   Pain Orientation Left   Hospital Pain Intervention(s) Ambulation/increased activity   Response to Interventions tolerated   Home Living   Type of [address] [facility] Multi[facility]  (4-5 ARNULFO w rail)   Prior Function   Level of [place] Independent with ADLs and functional mobility   Lives With [employer] Help From Family   Restrictions/Precautions   Other Precautions Chair Alarm; Bed Alarm;Multiple lines; Fall Risk;Pain  (L hemiplegia)   General   Family/Caregiver Present No   Cognition   Arousal/Participation Alert   Orientation Level Oriented to person;Oriented to place;Oriented to situation   Following Commands Follows one step commands with increased time or repetition   RUE Assessment   RUE Assessment (funct reach and grasp)   LUE Assessment   LUE Assessment (L hemiparesis flexed posturing)   RLE Assessment   RLE Assessment X   Strength RLE   RLE Overall Strength 4-/5   LLE Assessment   LLE Assessment (L hemiparesis weakness)   Coordination   Movements are Fluid and Coordinated 0   Coordination and Movement Description inconsistent marquez   Bed Mobility   Supine to Sit 4  Minimal assistance   Additional items Increased time required;Verbal cues   Transfers   Sit to Stand 4  Minimal assistance   Additional items Increased time required;Verbal cues   Stand to Sit 4  Minimal assistance   Additional items Increased time required;Verbal cues   Stand pivot 4  Minimal assistance   Additional items Increased time required;Verbal cues  (Quad cane)   Ambulation/Elevation   Gait pattern Improper Weight shift;L Hemiparesis   Gait Assistance 4  Minimal assist   Additional items Verbal cues; Tactile cues   Assistive Device Rolling walker   Distance 40 ft   Balance   Dynamic Sitting Fair  (forward reach)   Static Standing Poor +  (RW)   Endurance Deficit   Endurance Deficit Yes   Endurance Deficit Description LE instability   Activity Tolerance   Activity Tolerance Patient limited by fatigue   Nurse Made Aware yes   Assessment   Prognosis Good   Problem List Decreased strength;Decreased range of motion;Decreased endurance;Decreased mobility; Impaired balance;Decreased coordination;Decreased cognition; Impaired judgement;Decreased safety awareness; Impaired tone;Pain   Assessment Pt is a 66 y o  male admitted to [facility] on 12/19/2018 s/p fall w/ L 3-6 Rib fractures; Pt exhibits significant impairments with weakness, decreased ROM, impaired balance, decreased endurance, impaired coordination, gait deviations, pain, decreased activity tolerance, decreased functional mobility tolerance, decreased safety awareness, impaired judgement and fall risk; these impact independence with mobility, ADLs, and IADLs; requires min assist w transf and amb using quad cane - gait pattern step to + foot drop on L + LOB on turn stabilized by PT, needs safe mob instructions for pacing; + falls risk objective measures on the Barthel Index also reveal limitations;  therapy prognosis is impacted by relevant co morbidities as noted in evaluation; clinical presentation is currently unstable/unpredictable - pt is on cont pulse oximetry, presents w pain, has complex hx  And phys impairments as noted- a regression from  baseline; PTA, pt was Independent with mobility lives in multilevel w ARNULFO, reports fam is always with him and available to assist skilled PT is indicated to to optimize functional independence and discharge planning; pending functional progress, PT recommendation at discharge is Home PT  and home with assist for mobility pt has a quad cane   Goals   Patient Goals go home   STG Expiration Date 12/30/18   Short Term Goal #1 1  Independent with Bed Mobility Rolling Right and Left     2  Modified Independent with Bed Mobility Supine-Sit     3  Modified Independent with Transfer Bed-Chair     4  Increase Dynamic Sitting Balance at least 1 Grade for improved stability with functional reach activities     5  Increase Dynamic Standing Balance at least 1 Grade for improved ease with Activities of Daily Living     6   Increase Lower Extremity Strength at least 1 Grade for improved ease mobility tasks     7  Modified Independent with  Ambulation 150 feet using QC to facilitate home and community mobility 8  Modified Independent with Ascending/Descending 14 steps to facilitate home and community accessibility  Plan   Treatment/Interventions Functional transfer training;LE strengthening/ROM; Elevations; Therapeutic exercise; Endurance training;Cognitive reorientation;Patient/family training;Equipment eval/education; Bed mobility;Gait training   PT Frequency (3-6x/wk)   Recommendation   Recommendation Post acute IP rehab   Equipment Recommended (pt has a quad cane)   Barthel Index   Feeding 5   Bathing 0   Grooming Score 0   Dressing Score 5   Bladder Score 10   Bowels Score 10   Toilet Use Score 5   Transfers (Bed/Chair) Score 10   Mobility (Level Surface) Score 0   Stairs Score 0   Barthel Index Score 45

## 2018-12-20 NOTE — UTILIZATION REVIEW
Initial Clinical Review    Admission: Date/Time/Statement: Observation 12/19 @ 1537    12/19 Transfer from [facility] ED    Orders Placed This Encounter   Procedures    Place in Observation     Standing Status:   Standing     Number of Occurrences:   1     Order Specific Question:   Admitting Physician     Answer:   Mari Castellanos [[id number]]     Order Specific Question:   Level of Care     Answer:   Med Surg [16]       ED: Date/Time/Mode of Arrival:   ED Arrival Information     Expected Arrival Acuity Means of Arrival Escorted By Service Admission Type    12/19/2018 12/19/2018 13:54 [facility] [place]) Trauma Urgent    Arrival Complaint    -          Chief Complaint:   Chief Complaint   Patient presents with    Fall       History of Illness: 65 yo male debilitated by previous CVA with left sided weakness, ambulatory with cane and walker, uses lift chair to get up and down stairs at home, accidentally slipped out of the chair as he was going down        ED Vital Signs:   ED Triage Vitals   Temperature Pulse Respirations Blood Pressure SpO2   12/19/18 1359 12/19/18 1359 12/19/18 1359 12/19/18 1359 12/19/18 1359   98 3 °F (36 8 °C) 62 16 (!) 222/96 97 %      Temp Source Heart Rate Source Patient Position - Orthostatic VS BP Location FiO2 (%)   12/19/18 1359 12/19/18 1359 12/19/18 1359 12/19/18 1626 --   Oral Monitor Lying Right arm       Pain Score       12/19/18 1359       8        Wt Readings from Last 1 Encounters:   12/19/18 60 8 kg (134 lb)       Vital Signs (abnormal):   12/19/18 1626  --  71  16   198/98  96 %  None (Room air)  Lying   12/19/18 1600  --  68  16   214/99  96 %  --  --   12/19/18 1530  --  65  16   198/98  96 %  --  --   12/19/18 1505  --  64  18   214/99  100 %  --  Lying   12/19/18 1450  --  62  18   195/90  100 %         Abnormal Labs:    Bun/Cr = 41/ 4 48  Wbc = 15 78  12/19 H/H = 13 0/39 0  12/20 H/H = 11 2/ 33 4  Glucose = 329, 359    Diagnostic Test Results: CT Cervical Spine - No cervical spine fracture or traumatic malalignment    CT Chest - Mildly displaced left 3rd through 6th rib fractures  Small amount of adjacent pleural catheter and fluid seen adjacent to the posterior fracture sites  Small amount of soft tissue emphysema as well  ED Treatment:   Medication Administration from 12/19/2018 1339 to 12/19/2018 1729       Date/Time Order Dose Route Action     12/19/2018 1648 ondansetron (ZOFRAN) injection 4 mg 4 mg Intravenous Given     12/19/2018 1649 lidocaine (LIDODERM) 5 % patch 1 patch 1 patch Topical Medication Applied     12/19/2018 1649 methocarbamol (ROBAXIN) tablet 750 mg 750 mg Oral Given     12/19/2018 1649 oxyCODONE (ROXICODONE) IR tablet 5 mg 5 mg Oral Given     12/19/2018 1648 dicyclomine (BENTYL) tablet 20 mg 20 mg Oral Given     12/19/2018 1648 hydrALAZINE (APRESOLINE) injection 5 mg 5 mg Intravenous Given          Past Medical/Surgical History:    Active Ambulatory Problems     Diagnosis Date Noted    Ambulatory dysfunction 02/22/2016    Anxiety 11/05/2014    C[facility] ) 10/09/2014    CKD (chronic kidney disease) 11/05/2014    Constipation 01/23/2013    Diabetes mellitus ([facility] ) 07/24/2014    Difficulty walking 04/03/2014    Essential hypertension 09/17/2018    Gait disturbance 11/27/2013    Gastroparesis 04/10/2013    Chronic narcotic use 09/28/2018    Violation of controlled substance agreement 09/28/2018    Low back pain 01/22/2018     Resolved Ambulatory Problems     Diagnosis Date Noted    No Resolved Ambulatory Problems     Past Medical History:   Diagnosis Date    Anxiety     CAD (coronary artery disease)     [facility]     Concussion     Depression     [facility]     Failure to gain weight in adult     Gastroparesis     GERD (gastroesophageal reflux disease)     Hyperkalemia     Hyperlipidemia     Hypertension     Insomnia     Late effects of cerebrovascular disease     Overflow incontinence     Renal disorder     Secondary [facility]par[facility]roidism ([facility] )     Stroke [facility])        Admitting Diagnosis: Trauma [[id number] [id number]]    Age/Sex: 66 y o  male    Assessment/Plan:   59y Male, transfer from [facility] ED with S/P Falling down the stairs  Patient lost his balance while trying to sit onto his chair at the top of the steps and fell down about 12 stairs  Denies LOC  Pt is being for S/P Fall/ L 3-6th fracture and associated small JUVENTINO/ CKD/ Hypertension  Rib fracture protocol  Pain control  Pulmonary toileting  DVT   Resume home meds  Trend creatinine      Admission Orders:  Scheduled Meds:   Current Facility-Administered Medications:  acetaminophen 975 mg Oral Q8H [address]    ALPRAZolam 0 5 mg Oral Daily    amLODIPine 10 mg Oral Daily    ARIPiprazole 4 mg Oral Daily    aspirin 81 mg Oral Daily    buPROPion 100 mg Oral BID    dicyclomine 20 mg Oral Q6H    docusate sodium 100 mg Oral BID    doxazosin 2 mg Oral BID    enoxaparin 30 mg Subcutaneous Daily    furosemide 40 mg Oral Daily    insulin glargine 24 Units Subcutaneous HS    insulin lispro 1-5 Units Subcutaneous TID AC    lidocaine 1 patch Topical Daily    metoprolol succinate 25 mg Oral Daily    pantoprazole 40 mg Oral Early Morning    pioglitazone 15 mg Oral Daily    senna 1 tablet Oral Daily    traZODone 100 mg Oral HS      Continuous Infusions:   sodium chloride 75 mL/hr Last Rate: 75 mL/hr (12/19/18 1931)     PRN Meds: fentanyl citrate (PF)    hydrALAZINE    methocarbamol    metoclopramide    oxyCODONE [po x2

## 2018-12-20 NOTE — PROGRESS NOTES
Progress Note/ Tertiary Trauma Survey -   Tasneem Guiod 1960, 66 y o  male MRN: [id number]    Unit/Bed#: [facility] 912-01 Encounter: [id number]    Primary Care Provider: Adelita Goldberg, MD   Date and time admitted to hospital: 12/19/2018  1:54 PM        Essential hypertension   Assessment & Plan    - resume home meds     Diabetes mellitus ([address] )   Assessment & Plan    - SSI     CKD (chronic kidney disease)   Assessment & Plan    - trend creatinine  - Baseline appears to be around 3 3-3 6  - NS @ 75mL/hr     * Rib fractures   Assessment & Plan    - left 3-6 rib fractures  - multimodal analgesia  - aggressive IS  - PT/OT       - F/u CXR 12/20    Mechanism of Injury: Fall    Transfer from: n/a  Outside Films Received: not applicable  Tertiary Exam Due on: Today    Subjective:  Patient states that left-sided rib pain is 7/10  Denies any new onset pain  Denies any difficulty breathing or shortness of breath  States he is able to take deep breaths without much difficulty or pain  Vitals: Blood pressure 120/75, pulse 102, temperature 98 8 °F (37 1 °C), resp  rate 16, height 5' 7" (1 702 m), weight 60 8 kg (134 lb), SpO2 95 %  ,Body mass index is 20 99 kg/m²  CT / RADIOGRAPHS: ALL RESULTS MUST BE CONFIRMED BY FACULTY OR PRINTED REPORT    Ct Chest Without Contrast    Result Date: 12/19/2018  Impression: Mildly displaced left 3rd through 6th rib fractures  Small amount of adjacent pleural catheter and fluid seen adjacent to the posterior fracture sites  Small amount of soft tissue emphysema as well  Workstation performed: [id number]     Ct Cervical Spine Without Contrast    Result Date: 12/19/2018  Impression: No cervical spine fracture or traumatic malalignment  Workstation performed: [id number]     Ct Recon Only Thoracic Spine    Result Date: 12/19/2018  Impression: No fracture or traumatic subluxation of the thoracic spine  Please see separate CT of the chest report for chest wall findings   Workstation performed: [id number]     Consultants - List Service/ Faculty and Date: none    Active medications:           Current Facility-Administered Medications:     acetaminophen (TYLENOL) tablet 975 mg, 975 mg, Oral, Q8H [address], 975 mg at 12/20/18 0519    ALPRAZolam (XANAX) tablet 0 5 mg, 0 5 mg, Oral, Daily    amLODIPine (NORVASC) tablet 10 mg, 10 mg, Oral, Daily    ARIPiprazole (ABILIFY) tablet 4 mg, 4 mg, Oral, Daily    aspirin chewable tablet 81 mg, 81 mg, Oral, Daily    buPROPion (WELLBUTRIN) tablet 100 mg, 100 mg, Oral, BID, 100 mg at 12/19/18 2208    dicyclomine (BENTYL) tablet 20 mg, 20 mg, Oral, Q6H, 20 mg at 12/20/18 0444    docusate sodium (COLACE) capsule 100 mg, 100 mg, Oral, BID, 100 mg at 12/19/18 2014    doxazosin (CARDURA) tablet 2 mg, 2 mg, Oral, BID, 2 mg at 12/19/18 2014    enoxaparin (LOVENOX) subcutaneous injection 30 mg, 30 mg, Subcutaneous, Daily    fentanyl citrate (PF) 100 MCG/2ML 50 mcg, 50 mcg, Intravenous, Q4H PRN, 50 mcg at 12/19/18 1808    furosemide (LASIX) tablet 40 mg, 40 mg, Oral, Daily    hydrALAZINE (APRESOLINE) injection 5 mg, 5 mg, Intravenous, Q6H PRN, 5 mg at 12/19/18 1648    insulin glargine (LANTUS) subcutaneous injection 24 Units 0 24 mL, 24 Units, Subcutaneous, HS, 24 Units at 12/19/18 2148    insulin lispro (HumaLOG) 100 units/mL subcutaneous injection 1-5 Units, 1-5 Units, Subcutaneous, TID AC, 3 Units at 12/19/18 1907 **AND** Fingerstick Glucose (POCT), , , TID AC    lidocaine (LIDODERM) 5 % patch 1 patch, 1 patch, Topical, Daily, 1 patch at 12/19/18 1649    methocarbamol (ROBAXIN) tablet 750 mg, 750 mg, Oral, Q6H PRN, 750 mg at 12/19/18 1649    metoclopramide (REGLAN) injection 10 mg, 10 mg, Intravenous, Q6H PRN, 10 mg at 12/19/18 2014    metoprolol succinate (TOPROL-XL) 24 hr tablet 25 mg, 25 mg, Oral, Daily    oxyCODONE (ROXICODONE) immediate release tablet 10 mg, 10 mg, Oral, Q4H PRN, 10 mg at 12/20/18 0519    oxyCODONE (ROXICODONE) IR tablet 5 mg, 5 mg, Oral, Q4H PRN, 5 mg at 12/19/18 1649    pantoprazole (PROTONIX) EC tablet 40 mg, 40 mg, Oral, Early Morning, 40 mg at 12/20/18 0520    pioglitazone (ACTOS) tablet 15 mg, 15 mg, Oral, Daily    senna (SENOKOT) tablet 8 6 mg, 1 tablet, Oral, Daily    sodium chloride 0 9 % infusion, 75 mL/hr, Intravenous, Continuous, 75 mL/hr at 12/19/18 1931    traZODone (DESYREL) tablet 100 mg, 100 mg, Oral, HS, 100 mg at 12/19/18 2148      Intake/Output Summary (Last 24 hours) at 12/20/18 4997  Last data filed at 12/20/18 6939   Gross per 24 hour   Intake              360 ml   Output                0 ml   Net              360 ml       Invasive Devices     Peripheral Intravenous Line            Peripheral IV 12/19/18 Right Forearm less than 1 day                CAGE-AID Questionnaire:    Was the patient able to participate in the CAGE-AID screening questions on admission? Yes    Is the patient 65 years or older: No    1  GCS:  GCS Total:  15, Eye Opening:   Spontaneous = 4, Motor Response: Obeys commands = 6 and Verbal Response:  Oriented = 5  2  Head:   Normocephalic atraumatic  PERRL  EOM intact  3  Neck:   C-spine cleared clinically  No C-spine tenderness to palpation or active range of motion  4  Chest:   No chest wall tenderness  Mild tenderness palpated over left upper ribs  Heart regular rate and rhythm  No murmurs, rubs, or gallops  Breath sounds clear to auscultation bilaterally  No wheezes, rhonchi, rales  IS around 800  5  Abdomen/Pelvis:   Soft, and nontender, nondistended  No masses, guarding, rebound  6  Back (log roll with spinal immobilization unless cleared radiographically):   No bony tenderness or step-offs  7  Extremities:   Lacs, abrasions, swelling, ecchymosis:  None noted  Tenderness, pain with motor, instability:  None noted    8  Peripheral Nerves:  Gross sensation intact in distal extremities    Do NOT use the following abbreviations: DTO, gr, David, MS, MSO4, MgSO4, Nitro, QD, QID, QOD, u, , ?, ?g or trailing zeros  Always use a zero before a decimal     Labs:   CBC:   Lab Results   Component Value Date    WBC 7 44 12/20/2018    HGB 11 2 (L) 12/20/2018    HCT 33 4 (L) 12/20/2018    MCV 90 12/20/2018     12/20/2018    MCH 30 2 12/20/2018    MCHC 33 5 12/20/2018    RDW 11 8 12/20/2018    MPV 12 8 (H) 12/20/2018    NRBC 0 12/20/2018     CMP:   Lab Results   Component Value Date     12/20/2018    CO2 31 12/20/2018    BUN 45 (H) 12/20/2018    CREATININE 4 64 (H) 12/20/2018    CALCIUM 8 4 12/20/2018    EGFR 13 12/20/2018     Phosphorus: No results found for: PHOS  Magnesium: No results found for: MG  Urinalysis: No results found for: COLORU, CLARITYU, SPECGRAV, PHUR, LEUKOCYTESUR, NITRITE, PROTEINUA, GLUCOSEU, KETONESU, BILIRUBINUR, BLOODU  Ionized Calcium: No results found for: CAION  Coagulation:   Lab Results   Component Value Date    INR 1 06 12/19/2018     Troponin: No results found for: TROPONINI  ABG: No results found for: PHART, DMP9KQQ, PO2ART, MOB0RTP, H3YTNOMT, BEART, SOURCE    NPP rounds completed with Vallorie Arnt Bonnye Gosselin, PA-C

## 2018-12-20 NOTE — PLAN OF CARE
Problem: PHYSICAL THERAPY ADULT  Goal: Performs mobility at highest level of function for planned discharge setting  See evaluation for individualized goals  Treatment/Interventions: Functional transfer training, LE strengthening/ROM, Elevations, Therapeutic exercise, Endurance training, Cognitive reorientation, Patient/family training, Equipment eval/education, Bed mobility, Gait training  Equipment Recommended:  (pt has a quad cane)       See flowsheet documentation for full assessment, interventions and recommendations  Prognosis: Good  Problem List: Decreased strength, Decreased range of motion, Decreased endurance, Decreased mobility, Impaired balance, Decreased coordination, Decreased cognition, Impaired judgement, Decreased safety awareness, Impaired tone, Pain  Assessment: Pt is a 66 y o  male admitted to [facility] on 12/19/2018 s/p fall w/ L 3-6 Rib fractures;   Pt exhibits significant impairments with weakness, decreased ROM, impaired balance, decreased endurance, impaired coordination, gait deviations, pain, decreased activity tolerance, decreased functional mobility tolerance, decreased safety awareness, impaired judgement and fall risk; these impact independence with mobility, ADLs, and IADLs; requires min assist w transf and amb using quad cane - gait pattern step to + foot drop on L + LOB on turn stabilized by PT, needs safe mob instructions for pacing; + falls risk objective measures on the Barthel Index also reveal limitations;  therapy prognosis is impacted by relevant co morbidities as noted in evaluation; clinical presentation is currently unstable/unpredictable - pt is on cont pulse oximetry, presents w pain, has complex hx  And phys impairments as noted- a regression from  baseline; PTA, pt was Independent with mobility lives in multilevel w ARNULFO, reports fam is always with him and available to assist skilled PT is indicated to to optimize functional independence and discharge planning; pending functional progress, PT recommendation at discharge is Home PT  and home with assist for mobility pt has a quad cane        Recommendation: Post acute IP rehab          See flowsheet documentation for full assessment

## 2018-12-21 PROBLEM — N17.9 ACUTE ON CHRONIC KIDNEY FAILURE (HCC): Status: ACTIVE | Noted: 2018-12-21

## 2018-12-21 PROBLEM — N18.9 ACUTE ON CHRONIC KIDNEY FAILURE (HCC): Status: ACTIVE | Noted: 2018-12-21

## 2018-12-21 LAB
ANION GAP SERPL CALCULATED.3IONS-SCNC: 8 MMOL/L (ref 4–13)
BACTERIA UR QL AUTO: ABNORMAL /HPF
BASOPHILS # BLD AUTO: 0.03 THOUSANDS/ΜL (ref 0–0.1)
BASOPHILS NFR BLD AUTO: 0 % (ref 0–1)
BILIRUB UR QL STRIP: NEGATIVE
BUN SERPL-MCNC: 46 MG/DL (ref 5–25)
CALCIUM SERPL-MCNC: 8.2 MG/DL (ref 8.3–10.1)
CHLORIDE SERPL-SCNC: 101 MMOL/L (ref 100–108)
CLARITY UR: CLEAR
CO2 SERPL-SCNC: 28 MMOL/L (ref 21–32)
COLOR UR: YELLOW
CREAT SERPL-MCNC: 5.78 MG/DL (ref 0.6–1.3)
EOSINOPHIL # BLD AUTO: 0.07 THOUSAND/ΜL (ref 0–0.61)
EOSINOPHIL NFR BLD AUTO: 1 % (ref 0–6)
ERYTHROCYTE [DISTWIDTH] IN BLOOD BY AUTOMATED COUNT: 11.9 % (ref 11.6–15.1)
GFR SERPL CREATININE-BSD FRML MDRD: 10 ML/MIN/1.73SQ M
GLUCOSE SERPL-MCNC: 179 MG/DL (ref 65–140)
GLUCOSE SERPL-MCNC: 47 MG/DL (ref 65–140)
GLUCOSE SERPL-MCNC: 49 MG/DL (ref 65–140)
GLUCOSE SERPL-MCNC: 51 MG/DL (ref 65–140)
GLUCOSE SERPL-MCNC: 55 MG/DL (ref 65–140)
GLUCOSE SERPL-MCNC: 56 MG/DL (ref 65–140)
GLUCOSE SERPL-MCNC: 62 MG/DL (ref 65–140)
GLUCOSE SERPL-MCNC: 68 MG/DL (ref 65–140)
GLUCOSE SERPL-MCNC: 77 MG/DL (ref 65–140)
GLUCOSE SERPL-MCNC: 80 MG/DL (ref 65–140)
GLUCOSE UR STRIP-MCNC: ABNORMAL MG/DL
HCT VFR BLD AUTO: 30.7 % (ref 36.5–49.3)
HGB BLD-MCNC: 10 G/DL (ref 12–17)
HGB UR QL STRIP.AUTO: NEGATIVE
HYALINE CASTS #/AREA URNS LPF: ABNORMAL /LPF
IMM GRANULOCYTES # BLD AUTO: 0.03 THOUSAND/UL (ref 0–0.2)
IMM GRANULOCYTES NFR BLD AUTO: 0 % (ref 0–2)
KETONES UR STRIP-MCNC: NEGATIVE MG/DL
LEUKOCYTE ESTERASE UR QL STRIP: NEGATIVE
LYMPHOCYTES # BLD AUTO: 0.55 THOUSANDS/ΜL (ref 0.6–4.47)
LYMPHOCYTES NFR BLD AUTO: 7 % (ref 14–44)
MCH RBC QN AUTO: 30.3 PG (ref 26.8–34.3)
MCHC RBC AUTO-ENTMCNC: 32.6 G/DL (ref 31.4–37.4)
MCV RBC AUTO: 93 FL (ref 82–98)
MONOCYTES # BLD AUTO: 0.88 THOUSAND/ΜL (ref 0.17–1.22)
MONOCYTES NFR BLD AUTO: 11 % (ref 4–12)
NEUTROPHILS # BLD AUTO: 6.48 THOUSANDS/ΜL (ref 1.85–7.62)
NEUTS SEG NFR BLD AUTO: 81 % (ref 43–75)
NITRITE UR QL STRIP: NEGATIVE
NON-SQ EPI CELLS URNS QL MICRO: ABNORMAL /HPF
NRBC BLD AUTO-RTO: 0 /100 WBCS
PH UR STRIP.AUTO: 5 [PH] (ref 4.5–8)
PLATELET # BLD AUTO: 138 THOUSANDS/UL (ref 149–390)
PMV BLD AUTO: 12.6 FL (ref 8.9–12.7)
POTASSIUM SERPL-SCNC: 4.1 MMOL/L (ref 3.5–5.3)
PROT UR STRIP-MCNC: ABNORMAL MG/DL
RBC # BLD AUTO: 3.3 MILLION/UL (ref 3.88–5.62)
RBC #/AREA URNS AUTO: ABNORMAL /HPF
SODIUM SERPL-SCNC: 137 MMOL/L (ref 136–145)
SP GR UR STRIP.AUTO: 1.02 (ref 1–1.03)
UROBILINOGEN UR QL STRIP.AUTO: 0.2 E.U./DL
WBC # BLD AUTO: 8.04 THOUSAND/UL (ref 4.31–10.16)
WBC #/AREA URNS AUTO: ABNORMAL /HPF

## 2018-12-21 PROCEDURE — G8988 SELF CARE GOAL STATUS: HCPCS

## 2018-12-21 PROCEDURE — 99232 SBSQ HOSP IP/OBS MODERATE 35: CPT | Performed by: SURGERY

## 2018-12-21 PROCEDURE — 97167 OT EVAL HIGH COMPLEX 60 MIN: CPT

## 2018-12-21 PROCEDURE — 99222 1ST HOSP IP/OBS MODERATE 55: CPT | Performed by: INTERNAL MEDICINE

## 2018-12-21 PROCEDURE — 82948 REAGENT STRIP/BLOOD GLUCOSE: CPT

## 2018-12-21 PROCEDURE — 81001 URINALYSIS AUTO W/SCOPE: CPT | Performed by: INTERNAL MEDICINE

## 2018-12-21 PROCEDURE — 85025 COMPLETE CBC W/AUTO DIFF WBC: CPT | Performed by: PHYSICIAN ASSISTANT

## 2018-12-21 PROCEDURE — 80048 BASIC METABOLIC PNL TOTAL CA: CPT | Performed by: PHYSICIAN ASSISTANT

## 2018-12-21 PROCEDURE — G8987 SELF CARE CURRENT STATUS: HCPCS

## 2018-12-21 PROCEDURE — 97116 GAIT TRAINING THERAPY: CPT

## 2018-12-21 PROCEDURE — 97530 THERAPEUTIC ACTIVITIES: CPT

## 2018-12-21 RX ORDER — DOXAZOSIN MESYLATE 1 MG/1
1 TABLET ORAL 2 TIMES DAILY
Status: DISCONTINUED | OUTPATIENT
Start: 2018-12-21 | End: 2018-12-23 | Stop reason: HOSPADM

## 2018-12-21 RX ORDER — DEXTROSE MONOHYDRATE 25 G/50ML
INJECTION, SOLUTION INTRAVENOUS
Status: COMPLETED
Start: 2018-12-21 | End: 2018-12-21

## 2018-12-21 RX ORDER — AMLODIPINE BESYLATE 5 MG/1
5 TABLET ORAL DAILY
Status: DISCONTINUED | OUTPATIENT
Start: 2018-12-22 | End: 2018-12-23 | Stop reason: HOSPADM

## 2018-12-21 RX ORDER — INSULIN GLARGINE 100 [IU]/ML
12 INJECTION, SOLUTION SUBCUTANEOUS
Status: DISCONTINUED | OUTPATIENT
Start: 2018-12-21 | End: 2018-12-23

## 2018-12-21 RX ORDER — AMLODIPINE BESYLATE 10 MG/1
10 TABLET ORAL DAILY
Status: DISCONTINUED | OUTPATIENT
Start: 2018-12-22 | End: 2018-12-21

## 2018-12-21 RX ORDER — DOXAZOSIN 2 MG/1
2 TABLET ORAL 2 TIMES DAILY
Status: DISCONTINUED | OUTPATIENT
Start: 2018-12-21 | End: 2018-12-21

## 2018-12-21 RX ORDER — MAGNESIUM HYDROXIDE/ALUMINUM HYDROXICE/SIMETHICONE 120; 1200; 1200 MG/30ML; MG/30ML; MG/30ML
30 SUSPENSION ORAL EVERY 4 HOURS PRN
Status: DISCONTINUED | OUTPATIENT
Start: 2018-12-21 | End: 2018-12-23 | Stop reason: HOSPADM

## 2018-12-21 RX ADMIN — HEPARIN SODIUM 5000 UNITS: 5000 INJECTION INTRAVENOUS; SUBCUTANEOUS at 05:04

## 2018-12-21 RX ADMIN — DICYCLOMINE HYDROCHLORIDE 20 MG: 20 TABLET ORAL at 15:41

## 2018-12-21 RX ADMIN — ALUMINUM HYDROXIDE, MAGNESIUM HYDROXIDE, AND SIMETHICONE 30 ML: 200; 200; 20 SUSPENSION ORAL at 17:56

## 2018-12-21 RX ADMIN — ASPIRIN 81 MG 81 MG: 81 TABLET ORAL at 08:58

## 2018-12-21 RX ADMIN — DOCUSATE SODIUM 100 MG: 100 CAPSULE, LIQUID FILLED ORAL at 17:52

## 2018-12-21 RX ADMIN — BUPROPION HYDROCHLORIDE 100 MG: 100 TABLET, FILM COATED ORAL at 08:59

## 2018-12-21 RX ADMIN — ARIPIPRAZOLE 4 MG: 2 TABLET ORAL at 08:59

## 2018-12-21 RX ADMIN — LIDOCAINE 1 PATCH: 50 PATCH CUTANEOUS at 09:00

## 2018-12-21 RX ADMIN — DOXAZOSIN 1 MG: 1 TABLET ORAL at 17:53

## 2018-12-21 RX ADMIN — OXYCODONE HYDROCHLORIDE 10 MG: 10 TABLET ORAL at 05:04

## 2018-12-21 RX ADMIN — DOCUSATE SODIUM 100 MG: 100 CAPSULE, LIQUID FILLED ORAL at 08:58

## 2018-12-21 RX ADMIN — ACETAMINOPHEN 975 MG: 325 TABLET ORAL at 05:04

## 2018-12-21 RX ADMIN — PANTOPRAZOLE SODIUM 40 MG: 40 TABLET, DELAYED RELEASE ORAL at 05:04

## 2018-12-21 RX ADMIN — SODIUM CHLORIDE 75 ML/HR: 0.9 INJECTION, SOLUTION INTRAVENOUS at 05:04

## 2018-12-21 RX ADMIN — METHOCARBAMOL TABLETS 750 MG: 750 TABLET, COATED ORAL at 15:40

## 2018-12-21 RX ADMIN — HEPARIN SODIUM 5000 UNITS: 5000 INJECTION INTRAVENOUS; SUBCUTANEOUS at 21:52

## 2018-12-21 RX ADMIN — ACETAMINOPHEN 975 MG: 325 TABLET ORAL at 21:51

## 2018-12-21 RX ADMIN — LIDOCAINE HYDROCHLORIDE 15 ML: 20 SOLUTION ORAL; TOPICAL at 19:48

## 2018-12-21 RX ADMIN — DICYCLOMINE HYDROCHLORIDE 20 MG: 20 TABLET ORAL at 21:52

## 2018-12-21 RX ADMIN — SENNOSIDES 8.6 MG: 8.6 TABLET, FILM COATED ORAL at 08:58

## 2018-12-21 RX ADMIN — OXYCODONE HYDROCHLORIDE 10 MG: 10 TABLET ORAL at 15:40

## 2018-12-21 RX ADMIN — DEXTROSE MONOHYDRATE 50 ML: 25 INJECTION, SOLUTION INTRAVENOUS at 11:58

## 2018-12-21 RX ADMIN — SODIUM CHLORIDE 100 ML/HR: 0.9 INJECTION, SOLUTION INTRAVENOUS at 15:43

## 2018-12-21 RX ADMIN — DICYCLOMINE HYDROCHLORIDE 20 MG: 20 TABLET ORAL at 05:04

## 2018-12-21 RX ADMIN — ALPRAZOLAM 0.5 MG: 0.5 TABLET ORAL at 08:58

## 2018-12-21 RX ADMIN — PIOGLITAZONE 15 MG: 15 TABLET ORAL at 08:59

## 2018-12-21 RX ADMIN — HEPARIN SODIUM 5000 UNITS: 5000 INJECTION INTRAVENOUS; SUBCUTANEOUS at 13:11

## 2018-12-21 RX ADMIN — ACETAMINOPHEN 975 MG: 325 TABLET ORAL at 13:13

## 2018-12-21 RX ADMIN — DICYCLOMINE HYDROCHLORIDE 20 MG: 20 TABLET ORAL at 08:59

## 2018-12-21 RX ADMIN — BUPROPION HYDROCHLORIDE 100 MG: 100 TABLET, FILM COATED ORAL at 17:52

## 2018-12-21 RX ADMIN — TRAZODONE HYDROCHLORIDE 100 MG: 100 TABLET ORAL at 21:51

## 2018-12-21 NOTE — CONSULTS
Inpatient Consultation - Nephrology   Render Ki 66 y o  male MRN: [id number]  Unit/Bed#: [facility] 912-01 Encounter: [id number]    ASSESSMENT and PLAN:    1 ) Acute kidney injury  - POA  - admission creatinine 4 48, creatinine continues to trend up and is now up to 5 78  -no improvement with isotonic volume resuscitation suggesting that this is not consistent with prerenal azotemia  -check a urinalysis  -check a renal ultrasound  -check a postvoid residual  -patient reports no recent NSAIDs  -no recent contrast studies  -blood pressure initially was greater than 046 systolic on admission and is now running on the lower side at 261 systolic  -1 concerned about this rapid drop in the blood pressure may have led to a degree of normotensive ATN  -hold furosemide  -continue normal saline at this time to help maintain renal perfusion  -I have called his primary nephrologist from [facility] awaiting a call back  -there is no urgent indication for dialysis at this time but will need to closely monitor his renal function  -check a serum protein electrophoresis and free light chain assay  -avoid NSAIDs  -reduce the parameters of the blood pressure medications  -would aim to keep his systolic blood pressure greater than 130 mmHg  -reduced Cardura to 1 mg b i d   -if his renal function continues to worsen we may need to consider starting him on hemodialysis I have discussed this with him he seems to be not open to this idea  -monitor input and output    2 ) Chronic kidney disease stage IV  -outpatient nephrologist: Dr Saúl Gaviria [facility] kidney specialist)  -I have placed a call out to his primary nephrologist, awaiting a call back  -baseline creatinine from what I can ascertain so far is between 3 3 -3 7 mg/dL, will try to get confirmation from his primary nephrologist  -he has no dialysis access at this time  He reports that he has had discussions with his nephrologist but said that there was no indication for dialysis    I do not think he has a accurate historian     3 ) Blood pressure/volume status  -not eating  -blood pressure is now running on the lower side  -came in with a systolic blood pressure greater than 200  -I suspect a degree of noncompliance  -reduce the Cardura to 1 mg every 12 hours  -adjust the hold parameters on the blood pressure medications  -would aim to keep the systolic blood pressure greater than 130  -continue normal saline at this time to help maintain his blood pressure and renal perfusion    4 ) Anemia  -likely in the setting of acute chronic kidney disease  -check serum protein electrophoresis  -check iron studies    5 ) Mineral bone disorder-chronic kidney disease  -check a PTH and phosphorus    6 ) Electrolytes  -potassium was mildly elevated but now it is currently normal    SUMMARY OF RECOMMENDATIONS:    · Check a renal ultrasound  · Check a urinalysis  · Check a free light chain assay and serum protein electrophoresis  · Reduce the Cardura to 1 mg every 12 hours  · Adjustment of the hold parameters for the antihypertensive medications  · Aim to keep systolic blood pressure greater than 130  · Can continue normal saline at this time  · Discontinue furosemide  · Check a postvoid residual  · Check iron and ferritin    HISTORY OF PRESENT ILLNESS:  Requesting Physician: Skinny Araya MD  Reason for Consult:  Acute kidney injury    Sumeet Rehman is a 66 y o  male who was admitted to [facility] on December 19 after presenting with a fall with rib fractures  A renal consultation is requested today for assistance in the management of acute kidney injury with underlying chronic kidney disease  Patient has underlying chronic kidney disease stage 4 presumed to be secondary to a combination of hypertension and diabetes  He follows with [facility] kidney specialist in regards to his renal function  I have placed a call out to them awaiting a call back    Presented with a creatinine that was higher than his usual baseline  His baseline appears to be in the mid threes  Creatinine on admission was about 4 9 and continues to climb up prompting prompting a nephrology consultation  He has a poor appetite and he reports no nausea no vomiting no diarrhea no metallic taste on his tongue  He is currently maintained on normal saline at this time  Blood pressure on admission was greater than 586 systolic and appear to have had a rapid dropped to 120 and is currently 258 systolic  He reports no urinary symptoms  He reports no recent NSAID  There have been no recent contrast studies  PAST MEDICAL HISTORY:  Past Medical History:   Diagnosis Date    Anxiety     CAD (coronary artery disease)     last assessed 4/10/13    Chronic kidney disease, stage IV (severe) [facility] [address] )     Concussion     last assessed 9/3/14    Depression     Diabetes mellitus ([facility] )     type 2    Failure to gain weight in adult     last assessed 2/24/14    Gastroparesis     GERD (gastroesophageal reflux disease)     Hyperkalemia     last assessed 11/9/15    Hyperlipidemia     Hypertension     accelerated essential last assessed 10/7/16    Insomnia     Late effects of cerebrovascular disease     Overflow incontinence     last assessed 7/24/14    Renal disorder     Secondary hyperparathyroidism ([facility] )     Stroke [facility])        PAST SURGICAL HISTORY:  Past Surgical History:   Procedure Laterality Date    COLONOSCOPY      HERNIA REPAIR      AR ESOPHAGOGASTRODUODENOSCOPY TRANSORAL DIAGNOSTIC N/A 2/16/2017    Procedure: ESOPHAGOGASTRODUODENOSCOPY (EGD) with biopsy;  Surgeon: Segun Stover DO;  Location: [facility]; Service: Gastroenterology    AR ESOPHAGOGASTRODUODENOSCOPY TRANSORAL DIAGNOSTIC N/A 6/2/2017    Procedure: ESOPHAGOGASTRODUODENOSCOPY (EGD) with bx;  Surgeon: Segun Stover DO;  Location: [facility];   Service: Gastroenterology    TYMPANOSTOMY TUBE PLACEMENT         ALLERGIES:  No Known Allergies    SOCIAL HISTORY:  History   Alcohol Use No     Comment: social     History   Drug Use    Types: Marijuana     Comment: smokes once keven while for pain 1 xper  week     History   Smoking Status    Never Smoker   Smokeless Tobacco    Never Used       FAMILY HISTORY:  Family History   Problem Relation Age of Onset    Cancer Family         pt and friend denies this hx    Hypertension Family         essential    Hyperlipidemia Family     Diabetes Mother     No Known Problems Father        MEDICATIONS:    Current Facility-Administered Medications:     acetaminophen (TYLENOL) tablet 975 mg, 975 mg, Oral, Q8H [address], COLL[place] White-C, 975 mg at 12/21/18 0504    ALPRAZolam (XANAX) tablet 0 5 mg, 0 5 mg, Oral, Daily, Clista Hailstone, PA-C, 0 5 mg at 12/21/18 0858    amLODIPine (NORVASC) tablet 10 mg, 10 mg, Oral, Daily, Clista Hailstone, PA-C, 10 mg at 12/20/18 0817    ARIPiprazole (ABILIFY) tablet 4 mg, 4 mg, Oral, Daily, Clista Hailstone, PA-C, 4 mg at 12/21/18 7557    aspirin chewable tablet 81 mg, 81 mg, Oral, Daily, Clista Hailstone, PA-C, 81 mg at 12/21/18 0858    buPROPion (WELLBUTRIN) tablet 100 mg, 100 mg, Oral, BID, Clista Hailscortneye, PA-C, 100 mg at 12/21/18 0859    dicyclomine (BENTYL) tablet 20 mg, 20 mg, Oral, Q6H, Lindsey Kim PA-C, 20 mg at 12/21/18 0859    docusate sodium (COLACE) capsule 100 mg, 100 mg, Oral, BID, Clista Hailstone, PA-C, 100 mg at 12/21/18 0858    doxazosin (CARDURA) tablet 2 mg, 2 mg, Oral, BID, Clista David, PA-C, 2 mg at 12/20/18 1735    fentanyl citrate (PF) 100 MCG/2ML 50 mcg, 50 mcg, Intravenous, Q4H PRN, Dorethia Killings Usman, DO, 50 mcg at 12/19/18 1808    furosemide (LASIX) tablet 40 mg, 40 mg, Oral, Daily, Lindsey Kim PA-C, 40 mg at 12/20/18 0817    heparin (porcine) subcutaneous injection 5,000 Units, 5,000 Units, Subcutaneous, Q8H [address], Иван Martin MD, 5,000 Units at 12/21/18 0504    hydrALAZINE (APRESOLINE) injection 5 mg, 5 mg, Intravenous, Q6H PRN, Emma Hernandez PA-C, 5 mg at 12/19/18 2788    insulin glargine (LANTUS) subcutaneous injection 24 Units 0 24 mL, 24 Units, Subcutaneous, HS, Dorrene Newark, DO, 24 Units at 12/20/18 2204    insulin lispro (HumaLOG) 100 units/mL subcutaneous injection 1-5 Units, 1-5 Units, Subcutaneous, TID AC, 1 Units at 12/20/18 1132 **AND** Fingerstick Glucose (POCT), , , TID AC, Lindsey Kim PA-C    lidocaine (LIDODERM) 5 % patch 1 patch, 1 patch, Topical, Daily, Master Rg PA-C, 1 patch at 12/21/18 0900    methocarbamol (ROBAXIN) tablet 750 mg, 750 mg, Oral, Q6H PRN, Master Rg PA-C, 750 mg at 12/20/18 1517    metoclopramide (REGLAN) injection 10 mg, 10 mg, Intravenous, Q6H PRN, Guillerene Soha, DO, 10 mg at 12/19/18 2014    metoprolol succinate (TOPROL-XL) 24 hr tablet 25 mg, 25 mg, Oral, Daily, Lindsey Kim PA-C, 25 mg at 12/20/18 8339    oxyCODONE (ROXICODONE) immediate release tablet 10 mg, 10 mg, Oral, Q4H PRN, Master Rg PA-C, 10 mg at 12/21/18 0504    oxyCODONE (ROXICODONE) IR tablet 5 mg, 5 mg, Oral, Q4H PRN, Master Rg PA-C, 5 mg at 12/19/18 1649    pantoprazole (PROTONIX) EC tablet 40 mg, 40 mg, Oral, Early Morning, Lindsey Kim PA-C, 40 mg at 12/21/18 0504    pioglitazone (ACTOS) tablet 15 mg, 15 mg, Oral, Daily, Master Rg PA-C, 15 mg at 12/21/18 0859    senna (SENOKOT) tablet 8 6 mg, 1 tablet, Oral, Daily, Lindsey Kim PA-C, 8 6 mg at 12/21/18 0858    sodium chloride 0 9 % infusion, 100 mL/hr, Intravenous, Continuous, [place] Lopes PA-C, Last Rate: 100 mL/hr at 12/21/18 0857, 100 mL/hr at 12/21/18 0857    traZODone (DESYREL) tablet 100 mg, 100 mg, Oral, HS, Master Rg PA-C, 100 mg at 12/20/18 2200    REVIEW OF SYSTEMS:  Constitutional:  Poor appetite  HENT: Negative for postnasal drip  Eyes: Negative for visual disturbance  Respiratory: Negative for cough, shortness of breath and wheezing  Cardiovascular: Negative for chest pain, palpitations and leg swelling  Gastrointestinal: Negative for abdominal pain, constipation, diarrhea, nausea and vomiting  Genitourinary: No dysuria, hematuria  Endocrine: Negative for polyuria  Musculoskeletal: Negative for arthralgias, back pain and joint swelling  Skin: Negative for rash  Neurological: Negative for focal weakness, headaches, dizziness  Hematological: Negative for easy bruising or bleeding  Psychiatric/Behavioral: Negative for confusion and sleep disturbance  All the systems were reviewed and were negative except as documented on the HPI  PHYSICAL EXAM:  Current Weight: Weight - Scale: 60 8 kg (134 lb)  First Weight: Weight - Scale: 60 8 kg (134 lb)  Vitals:    12/21/18 0039 12/21/18 0100 12/21/18 0325 12/21/18 0700   BP: 110/65  102/55 108/74   BP Location:   Right arm Right arm   Pulse: 59 59 56 70   Resp:   16 18   Temp: 98 6 °F (37 °C)  97 6 °F (36 4 °C) 97 7 °F (36 5 °C)   TempSrc:   Oral Oral   SpO2: 95% 94% 92% 94%   Weight:       Height:           Intake/Output Summary (Last 24 hours) at 12/21/18 1056  Last data filed at 12/21/18 0900   Gross per 24 hour   Intake          3333 75 ml   Output              150 ml   Net          3183 75 ml     Physical Exam   Constitutional: He is oriented to person, place, and time  He appears well-developed and well-nourished  No distress  HENT:   Head: Normocephalic and atraumatic  Eyes: Pupils are equal, round, and reactive to light  No scleral icterus  Neck: Normal range of motion  Neck supple  Cardiovascular: Normal rate, regular rhythm and normal heart sounds  Exam reveals no gallop and no friction rub  No murmur heard  Pulmonary/Chest: Effort normal and breath sounds normal  No respiratory distress  He has no wheezes  He has no rales  He exhibits no tenderness  Abdominal: Soft  Bowel sounds are normal  He exhibits no distension  There is no tenderness  There is no rebound  Musculoskeletal: Normal range of motion  He exhibits no edema  Neurological: He is alert and oriented to person, place, and time  Skin: No rash noted   He is not diaphoretic  Psychiatric: He has a normal mood and affect  Nursing note and vitals reviewed          Invasive Devices:      Lab Results:     Results from last 7 days  Lab Units 12/21/18  0455 12/20/18  0452 12/19/18  1227   WBC Thousand/uL 8 04 7 44 15 78*   HEMOGLOBIN g/dL 10 0* 11 2* 13 0   HEMATOCRIT % 30 7* 33 4* 39 2   PLATELETS Thousands/uL 138* 152 198   POTASSIUM mmol/L 4 1 4 2 5 1   CHLORIDE mmol/L 101 102 99*   CO2 mmol/L 28 31 32   BUN mg/dL 46* 45* 41*   CREATININE mg/dL 5 78* 4 64* 4 48*   CALCIUM mg/dL 8 2* 8 4 9 1

## 2018-12-21 NOTE — PLAN OF CARE
Problem: OCCUPATIONAL THERAPY ADULT  Goal: Performs self-care activities at highest level of function for planned discharge setting  See evaluation for individualized goals  Treatment Interventions: ADL retraining, Functional transfer training, Endurance training, Cognitive reorientation, Patient/family training, Equipment evaluation/education, Compensatory technique education, Energy conservation, Activityengagement          See flowsheet documentation for full assessment, interventions and recommendations  Limitation: Decreased ADL status, Decreased Safe judgement during ADL, Decreased cognition, Decreased endurance, Decreased self-care trans, Decreased high-level ADLs  Prognosis: Good  Assessment: 67 YO Male SEEN FOR INITIAL OCCUPATIONAL THERAPY EVALUATION FOLLOWING ADMISSION TO [facility] S/P FALL RESULTING IN L SIDED 3-6 RIB FX  PT REPORTS +HEADSTRIKE WITH -LOC  PROBLEMS LIST INCLUDES ANXIETY, CKD, DM, HTN, AND H/O CVA WITH RESIDUAL L-HEMIPARESIS  PT IS FROM HOME WITH FAMILY WHERE HE REPORTS HAVING ASSIST FROM FAMILY WITH ADLS/IADLS/DRIVING AS NEEDED PTA  PT CURRENTLY REQUIRES OVERALL MIN-MOD A WITH ADLS, MIN A WITH TRANSFERS AND MIN-MOD A WITH FUNCTIONAL MOBILITY WITH USE OF QUAD CANE  PT IS LIMITED 2' PAIN, FATIGUE, IMPAIRED BALANCE, FALL RISK , OVERALL WEAKNESS/DECONDITIONING , INACCESSIBLE HOME ENVIRONMENT and OVERALL LIMITED ACTIVITY TOLERANCE  PT EDUCATED ON DEEP BREATHING TECHNIQUES T/O ACTIVITY, SLOWING OF PACE, ENERGY CONSERVATION TECHNIQUES FOR CARRY OVER UPON D/C, INCREASED FAMILY SUPPORT and CONTINUE PARTICIPATION IN SELF-CARE/MOBILITY WITH STAFF Stoney W Jatinder Benson   PT REPORTS HIS WISH IS TO RETURN DIRECTLY HOME UPON D/C AND STATES THAT SOMEONE IS HOME WITH HIM AT ALL TIMES AND ABLE TO ASSIST AS NEEDED   FROM AN OCCUPATIONAL THERAPY PERSPECTIVE, CURRENTLY RECOMMEND HOME OT + INCREASED FAMILY SUPPORT PENDING PROGRESS VS CONSIDER INPT REHAB IF FAMILY IS UNABLE TO PROVIDE CURRENT LEVEL OF ASSIST  WILL CONT TO FOLLOW TO ADDRESS THE BELOW DESCRIBED GOALS  OT Discharge Recommendation:  (SEE ABOVE )  OT - OK to Discharge:  Yes

## 2018-12-21 NOTE — PHYSICAL THERAPY NOTE
Physical Therapy Progress Note     12/21/18 7650   Pain Assessment   Pain Assessment 0-10   Pain Score 8   Pain Type Acute pain   Pain Location Rib cage   Pain Orientation Left   Hospital Pain Intervention(s) Repositioned; Ambulation/increased activity; Emotional support   Response to Interventions Tolerated  Restrictions/Precautions   Other Precautions Chair Alarm; Bed Alarm; Fall Risk;Pain;Multiple lines   Subjective   Subjective The pt  states that he has a lot of pain with mobility  He notes that he typically ambulates with improved balance and marquez, but that he is limited due to his pain  Transfers   Sit to Stand 4  Minimal assistance   Additional items Assist x 1; Increased time required;Verbal cues   Stand to Sit 4  Minimal assistance   Additional items Assist x 1; Increased time required   Ambulation/Elevation   Gait pattern Excessively slow; Step to; Inconsistent marquez; Improper Weight shift;L Hemiparesis   Gait Assistance 4  Minimal assist   Additional items Assist x 1;Verbal cues   Assistive Device Large base quad cane   Distance 35 feet x 2  Balance   Static Sitting Fair +   Static Standing Poor +   Ambulatory Poor +   Activity Tolerance   Activity Tolerance Patient limited by pain; Patient limited by fatigue;Patient tolerated treatment well   Nurse Made Aware [place]  Assessment   Prognosis Good   Problem List Decreased strength;Decreased range of motion;Decreased endurance;Decreased mobility; Impaired balance;Decreased coordination;Decreased cognition; Impaired judgement;Decreased safety awareness; Impaired tone;Pain   Assessment The pt  was able to progress his ambulatory distance, but he requires frequent instruction for pacing  He repeatedly attempted to ambulate at a hastened pace with increased pain and decreased balance  He remains unable to ambulate household distances, and he required a standing rest in-between trials   He did require physical intervention in order to maintain his balance especially with turns  If the pt  continues to progress and has assistance for all mobility he may be able to return home, but currently recommend inpatient rehab in order to facilitate his return to independence  Goals   Patient Goals To go back home  STG Expiration Date 12/30/18   Short Term Goal #1 1  Independent with Bed Mobility Rolling Right and Left 2  Modified Independent with Bed Mobility Supine-Sit 3  Modified Independent with Transfer Bed-Chair 4  Increase Dynamic Sitting Balance at least 1 Grade for improved stability with functional reach activities 5  Increase Dynamic Standing Balance at least 1 Grade for improved ease with Activities of Daily Living 6  Increase Lower Extremity Strength at least 1 Grade for improved ease mobility tasks 7  Modified Independent with Ambulation 150 feet using QC to facilitate home and community mobility 8  Modified Independent with Ascending/Descending 14 steps to facilitate home and community accessibility  by Mag Lundberg PT at 12/20/18 1110   Treatment Day 1   Plan   Treatment/Interventions Functional transfer training;LE strengthening/ROM; Therapeutic exercise; Endurance training;Patient/family training;Bed mobility;Gait training;Elevations   Progress Progressing toward goals   PT Frequency (3-6x a week )   Recommendation   Recommendation Post acute IP rehab     Briana Michaels, PTA

## 2018-12-21 NOTE — RESTORATIVE TECHNICIAN NOTE
Restorative Specialist Mobility Note       Activity: Ambulate in room ([facility])     Assistive Device: None     Ambulation Response:  Tolerated fairly well  Repositioned: Supine           Range of Motion: Active, All extremities

## 2018-12-21 NOTE — OCCUPATIONAL THERAPY NOTE
[address]  Evaluation     Patient Name: Debbie Porter  [id number] Date: 12/21/2018  Problem List  Patient Active Problem List   Diagnosis    Ambulatory dysfunction    Anxiety    Cerebral infarction ([facility] )    Constipation    Diabetes mellitus ([facility] )    Difficulty walking    Essential hypertension    Gait disturbance    Gastroparesis    Chronic narcotic use    Violation of controlled substance agreement    Low back pain    Rib fractures    Acute on chronic kidney failure [facility])     Past Medical History  Past Medical History:   Diagnosis Date    Anxiety     CAD (coronary artery disease)     last assessed 4/10/13    Chronic kidney disease, stage IV (severe) ([facility] )     Concussion     last assessed 9/3/14    Depression     Diabetes mellitus ([place] [address] )     type 2    Failure to gain weight in adult     last assessed 2/24/14    Gastroparesis     GERD (gastroesophageal reflux disease)     Hyperkalemia     last assessed 11/9/15    Hyperlipidemia     Hypertension     accelerated essential last assessed 10/7/16    Insomnia     Late effects of cerebrovascular disease     Overflow incontinence     last assessed 7/24/14    Renal disorder     Secondary hyperparathyroidism ([place] [address] )     Stroke [facility])      Past Surgical History  Past Surgical History:   Procedure Laterality Date    COLONOSCOPY      HERNIA REPAIR      TX ESOPHAGOGASTRODUODENOSCOPY TRANSORAL DIAGNOSTIC N/A 2/16/2017    Procedure: ESOPHAGOGASTRODUODENOSCOPY (EGD) with biopsy;  Surgeon: Raffaele Wu DO;  Location: [facility]; Service: Gastroenterology    TX ESOPHAGOGASTRODUODENOSCOPY TRANSORAL DIAGNOSTIC N/A 6/2/2017    Procedure: ESOPHAGOGASTRODUODENOSCOPY (EGD) with bx;  Surgeon: Raffaele Wu DO;  Location: [facility];   Service: Gastroenterology    TYMPANOSTOMY TUBE PLACEMENT           12/21/18 0830   Note Type   Note type Eval/Treat   Restrictions/Precautions   Weight Bearing Precautions Per Order No   Other Precautions Chair Alarm; Bed Alarm; Fall Risk;Pain;Multiple lines   Pain Assessment   Pain Assessment 0-10   Pain Score 8   Pain Type Acute pain   Pain Location Rib cage   Pain Orientation Left   Patient's Stated Pain Goal No pain   Hospital Pain Intervention(s) Repositioned; Ambulation/increased activity; Distraction; Emotional support   Response to Interventions INCREASED WITH ACTIVITY    Home Living   Type of [facility] Multi-level;Stairs to enter with rails  (6 [address])   Bathroom Shower/Tub Tub/shower unit   Bathroom Toilet Standard   Bathroom Accessibility Accessible   Home Equipment Quad cane;Stair glide   Additional Comments PT REPORTS USE OF QUAD CANE PTA  + USE OF STAIR GLIDE TO 2ND FLOOR    Prior Function   Level of [place] Needs assistance with ADLs and functional mobility; Needs assistance with IADLs   Lives With Family   Receives Help From Family   ADL Assistance Needs assistance   IADLs Needs assistance   Falls in the last 6 months 1 to 4   Vocational On disability   Lifestyle   Autonomy PT REPORTS HAVING ASSIST FOR ADLS/IADLS AS NEEDED FROM FAMILY    Reciprocal Relationships LIVES WITH SUPPORITVE FAMILY INCLUDING ADULT CHILDREN AND "CHILDREN'S MOTHER"  PT RPEORTS SOMEONE IS HOME WITH HIM AT ALL TIMES  Service to Others ON DISABILITY    Intrinsic Gratification ENJOYS WATCHING TV    ADL   Eating Assistance 5  Supervision/Setup   Grooming Assistance 4  Minimal Assistance   UB Bathing Assistance 4  Minimal Assistance   LB Bathing Assistance 3  Moderate Assistance   [address] 4  Minimal [address] 3  Moderate [address]  3  Moderate Assistance   Functional Assistance 3  Moderate Assistance   Bed Mobility   Supine to Sit 4  Minimal assistance   Additional items Assist x 1; Increased time required;Verbal cues;LE management   Sit to Supine Unable to assess  (PT LEFT INBEDSIDE CHAIR WITH ALARM ON )   Transfers   Sit to Stand 4  Minimal assistance Additional items Assist x 1; Increased time required;Verbal cues   Stand to Sit 4  Minimal assistance   Additional items Assist x 1; Increased time required;Verbal cues   Functional Mobility   Functional Mobility 3  Moderate assistance   Additional Comments OVERALL MIN A FOR FUNCTIONAL MOBILITY WITH PERIODS OF MOD A REQUIRED 2' LOB- SEE PT NOTE FOR FURTHER DETAILS  Additional items (QUAD CANE )   Balance   Static Sitting Fair +   Static Standing Poor +   Ambulatory Poor   Activity Tolerance   Activity Tolerance Patient limited by fatigue;Patient limited by pain   Nurse Made Aware APPROPRIATE TO SEE    RUE Assessment   RUE Assessment WFL   LUE Assessment   LUE Assessment X  (RESIDUAL HEMIPARESIS 2' PREVIOUS CVA IN 2010 PER PT )   Hand Function   Gross Motor Coordination Impaired  (L)   Fine Motor Coordination Impaired  (L)   Cognition   Overall Cognitive Status WFL   Arousal/Participation Alert; Cooperative   Attention Attends with cues to redirect   Orientation Level Oriented to person;Oriented to place;Oriented to situation   Memory Decreased recall of precautions   Following Commands Follows one step commands without difficulty   Comments PT IS PLEASANT AND COOPERATIVE  SLOW TO PROCESS/RESPOND AT TIMES  PT REPORTS + HEADSTRIKE, -LOC  LIMITED INSIGHT INTO DEFICITS  RECOMMEND ALARM ON PT FOR SAFETY    Assessment   Limitation Decreased ADL status; Decreased Safe judgement during ADL;Decreased cognition;Decreased endurance;Decreased self-care trans;Decreased high-level ADLs   Prognosis Good   Assessment 65 YO Male SEEN FOR INITIAL OCCUPATIONAL THERAPY EVALUATION FOLLOWING ADMISSION TO [facility] S/P FALL RESULTING IN L SIDED 3-6 RIB FX  PT REPORTS +HEADSTRIKE WITH -LOC  PROBLEMS LIST INCLUDES ANXIETY, CKD, DM, HTN, AND H/O CVA WITH RESIDUAL L-HEMIPARESIS  PT IS FROM HOME WITH FAMILY WHERE HE REPORTS HAVING ASSIST FROM FAMILY WITH ADLS/IADLS/DRIVING AS NEEDED PTA   PT CURRENTLY REQUIRES OVERALL MIN-MOD A WITH ADLS, MIN A WITH TRANSFERS AND MIN-MOD A WITH FUNCTIONAL MOBILITY WITH USE OF QUAD CANE  PT IS LIMITED 2' PAIN, FATIGUE, IMPAIRED BALANCE, FALL RISK , OVERALL WEAKNESS/DECONDITIONING , INACCESSIBLE HOME ENVIRONMENT and OVERALL LIMITED ACTIVITY TOLERANCE  PT EDUCATED ON DEEP BREATHING TECHNIQUES T/O ACTIVITY, SLOWING OF PACE, ENERGY CONSERVATION TECHNIQUES FOR CARRY OVER UPON D/C, INCREASED FAMILY SUPPORT and CONTINUE PARTICIPATION IN SELF-CARE/MOBILITY WITH STAFF [address]    PT REPORTS HIS WISH IS TO RETURN DIRECTLY HOME UPON D/C AND STATES THAT SOMEONE IS HOME WITH HIM AT ALL TIMES AND ABLE TO ASSIST AS NEEDED  FROM AN OCCUPATIONAL THERAPY PERSPECTIVE, CURRENTLY RECOMMEND HOME OT + INCREASED FAMILY SUPPORT PENDING PROGRESS VS CONSIDER INPT REHAB IF FAMILY IS UNABLE TO PROVIDE CURRENT LEVEL OF ASSIST  WILL CONT TO FOLLOW TO ADDRESS THE BELOW DESCRIBED GOALS  Goals   Patient Goals TO RETURN HOME    LTG Time Frame 7-10   Long Term Goal #1 SEE BELOW    Plan   Treatment Interventions ADL retraining;Functional transfer training; Endurance training;Cognitive reorientation;Patient/family training;Equipment evaluation/education; Compensatory technique education; Energy conservation; Activityengagement   Goal Expiration Date 12/31/18   OT Frequency 3-5x/wk   Recommendation   OT Discharge Recommendation (SEE ABOVE )   OT - OK to Discharge Yes   Barthel Index   Feeding 5   Bathing 0   Grooming Score 0   Dressing Score 5   Bladder Score 10   Bowels Score 10   Toilet Use Score 5   Transfers (Bed/Chair) Score 10   Mobility (Level Surface) Score 0   Stairs Score 0   Barthel Index Score 45   Modified Pedro Scale   Modified Pedro Scale 4     OCCUPATIONAL THERAPY GOALS TO BE MET WITHIN 7-10 DAYS:    -Pt will increase bed mobility to MOD I to participate in functional activities with G tolerance and balance    -Pt will improve functional mobility and transfers to MOD I on/off all surfaces w/ G balance/safety including toileting   -Pt will increase independence in UB ADLS to S LEVEL with G balance sitting upright in chair   -Pt will increase independence in LB ADLS to MIN A with G balance sitting upright in chair   -Pt will improve activity tolerance to G for 20 min txment sessions w/ G carry over of learned energy conservation techniques   -Pt will demonstrate G carryover of learned safety techniques and proper body mechanics in functional and leisure activities with use of DME   -Pt will complete additional cognitive assessment with 100% attention to task in order to assist with safe d/c plan         Documentation completed by Araceli Maurer, MOT, OTR/L

## 2018-12-21 NOTE — PLAN OF CARE
Problem: PHYSICAL THERAPY ADULT  Goal: Performs mobility at highest level of function for planned discharge setting  See evaluation for individualized goals  Treatment/Interventions: Functional transfer training, LE strengthening/ROM, Elevations, Therapeutic exercise, Endurance training, Cognitive reorientation, Patient/family training, Equipment eval/education, Bed mobility, Gait training  Equipment Recommended:  (pt has a quad cane)       See flowsheet documentation for full assessment, interventions and recommendations  Outcome: Progressing  Prognosis: Good  Problem List: Decreased strength, Decreased range of motion, Decreased endurance, Decreased mobility, Impaired balance, Decreased coordination, Decreased cognition, Impaired judgement, Decreased safety awareness, Impaired tone, Pain  Assessment: The pt  was able to progress his ambulatory distance, but he requires frequent instruction for pacing  He repeatedly attempted to ambulate at a hastened pace with increased pain and decreased balance  He remains unable to ambulate household distances, and he required a standing rest in-between trials  He did require physical intervention in order to maintain his balance especially with turns  If the pt  continues to progress and has assistance for all mobility he may be able to return home, but currently recommend inpatient rehab in order to facilitate his return to independence  Recommendation: Post acute IP rehab          See flowsheet documentation for full assessment

## 2018-12-21 NOTE — PLAN OF CARE
DISCHARGE PLANNING     Discharge to home or other facility with appropriate resources Progressing        DISCHARGE PLANNING - CARE MANAGEMENT     Discharge to post-acute care or home with appropriate resources Progressing        INFECTION - ADULT     Absence or prevention of progression during hospitalization Progressing        Knowledge Deficit     Patient/family/caregiver demonstrates understanding of disease process, treatment plan, medications, and discharge instructions Progressing        MUSCULOSKELETAL - ADULT     Maintain or return mobility to safest level of function Progressing        Nutrition/Hydration-ADULT     Nutrient/Hydration intake appropriate for improving, restoring or maintaining nutritional needs Progressing        PAIN - ADULT     Verbalizes/displays adequate comfort level or baseline comfort level Progressing        Potential for Falls     Patient will remain free of falls Progressing        Prexisting or High Potential for Compromised Skin Integrity     Skin integrity is maintained or improved Progressing        SAFETY ADULT     Maintain or return to baseline ADL function Progressing     Maintain or return mobility status to optimal level Progressing     Patient will remain free of falls Progressing

## 2018-12-21 NOTE — PROGRESS NOTES
Spoke with Trauma regarding pt's low BS  Pt had BS of 51 this am  Pt was given orange juice, BS came up to 62  Pt did not eat much breakfast  Encouraged pt to drink more juice and cee silveira  Rechecked pts BS with a result of 55  No new orders, will recheck at lunchtime and encourage pt to eat  Awaiting nephrology consult for increased Creatinine level  Pt resting comfortably with no complaints

## 2018-12-21 NOTE — ASSESSMENT & PLAN NOTE
- trend creatinine, today 5 7  - Consult nephrology, input appreciated  - Baseline appears to be around 3 3-3 6  - NS @ 100mL/hr

## 2018-12-21 NOTE — SOCIAL WORK
CM introduced self to pt and explained role  Pt lives with his 2 kids Michelle Nye and PARMINDER and their mom Priscilla Garcia [phone] who is his emergency contact; no POA  Pt lives in a 2  with 6 [address] and 12 steps up to bedroom and full bathroom with grab bars  PTA pt was independent with ADL's and ambulation  Pt states he have a walker and uses it on an as needed basis; no other DME available  PCP is Dr Valente White and preferred pharmacy is [facility] on [facility]  Pt denies hx of etoh/drug abuse or tx and no mental health dx  Pt says he is unemployed and do not drive at this time because his license is suspended  Pt admits having family support and states Priscilla Garcia will provide transportation when d/c  No hx VNA/HHC provided  CM reviewed d/c planning process including the following: identifying help at home, patient preference for d/c planning needs, Discharge Lounge, [facility] Meds to Bed program, availability of treatment team to discuss questions or concerns patient and/or family may have regarding understanding medications and recognizing signs and symptoms once discharged  CM also encouraged patient to follow up with all recommended appointments after discharge  Patient advised of importance for patient and family to participate in managing patients medical well being        Rex Boxer,  [phone]

## 2018-12-21 NOTE — PROGRESS NOTES
Progress Note - Debby Luna 1960, 66 y o  male MRN: [id number]    [facility] Encounter: [id number]    Primary Care Provider: Nils Tapia MD   Date and time admitted to hospital: 12/19/2018  1:54 PM        Acute on chronic kidney failure ([facility] )   Assessment & Plan    - trend creatinine, today 5 7  - Consult nephrology, input appreciated  - Baseline appears to be around 3 3-3 6  - NS @ 100mL/hr     Essential hypertension   Assessment & Plan    - resume home meds     Diabetes mellitus ([facility] )   Assessment & Plan    - SSI     * Rib fractures   Assessment & Plan    - left 3-6 rib fractures  - multimodal analgesia  - aggressive IS  - PT/OT             Subjective/Objective   Chief Complaint: "Can I go home today"    Subjective:  Patient states he is feeling improved today from yesterday  States that left-sided rib pain is now 5/10 as opposed to 7/10 yesterday  Denies any new onset pain  Denies any shortness of breath or difficulty breathing      Objective:     Meds/Allergies   Prescriptions Prior to Admission   Medication Sig Dispense Refill Last Dose    ALPRAZolam (XANAX) 0 5 mg tablet Take 0 5 mg by mouth daily       amLODIPine (NORVASC) 10 mg tablet Take 10 mg by mouth daily   6/1/2017 at Unknown time    ARIPiprazole (ABILIFY) 2 mg tablet Take 4 mg by mouth daily       buPROPion (WELLBUTRIN) 100 mg tablet Take 1 tablet (100 mg total) by mouth 2 (two) times a day 180 tablet 0     dicyclomine (BENTYL) 20 mg tablet Take 1 tablet by mouth every 6 (six) hours (Patient taking differently: Take 20 mg by mouth daily as needed  ) 20 tablet 0 Past Month at Unknown time    doxazosin (CARDURA) 1 mg tablet Take 2 mg by mouth 2 (two) times a day Take 1 1/2 tablet by mouth twice a day    6/1/2017 at Unknown time    furosemide (LASIX) 40 mg tablet Take by mouth daily   6/1/2017 at Unknown time    Insulin Glargine (TOUJEO SOLOSTAR) 300 UNIT/ML SOPN Inject 24 Units under the skin daily at bedtime 6/1/2017 at Unknown time    Liraglutide (VICTOZA) 18 MG/3ML SOPN Inject 1 8 mg under the skin daily at bedtime as needed     6/1/2017 at Unknown time    lovastatin (MEVACOR) 20 mg tablet Take 20 mg by mouth daily       lubiprostone (AMITIZA) 24 mcg capsule Take 24 mcg by mouth 2 (two) times a day with meals   6/1/2017 at Unknown time    metoprolol succinate (TOPROL-XL) 25 mg 24 hr tablet Take 25 mg by mouth daily   Past Week at Unknown time    paricalcitol (ZEMPLAR) 1 mcg capsule Take 2 mcg by mouth daily     6/1/2017 at Unknown time    pioglitazone (ACTOS) 15 mg tablet Take 15 mg by mouth daily       sodium polystyrene sulfonate (KAYEXALATE) 15 g/60 mL suspension Take 15 g by mouth as needed Take 120ml by mouth single dose         traZODone (DESYREL) 100 mg tablet Take 100 mg by mouth daily at bedtime          Vitals: Blood pressure 108/74, pulse 70, temperature 97 7 °F (36 5 °C), temperature source Oral, resp  rate 18, height 5' 7" (1 702 m), weight 60 8 kg (134 lb), SpO2 94 %  Body mass index is 20 99 kg/m²  SpO2: SpO2: 94 %, SpO2 Activity: SpO2 Activity: At Rest, SpO2 Device: O2 Device: None (Room air)    ABG: No results found for: PHART, FED0JYJ, PO2ART, ZRE8GLT, F4JLHECP, BEART, SOURCE      Intake/Output Summary (Last 24 hours) at 12/21/18 1103  Last data filed at 12/21/18 0900   Gross per 24 hour   Intake          3333 75 ml   Output              150 ml   Net          3183 75 ml       Invasive Devices     Peripheral Intravenous Line            Peripheral IV 12/20/18 Left Hand less than 1 day                Nutrition/GI Proph/Bowel Reg:  Colace and senna    Physical Exam:   GENERAL APPEARANCE:  61-year-old male lying down in bed, no acute distress  HEENT:  Normocephalic atraumatic  PERRL  EOM intact  CV:  Regular rate and rhythm  No murmurs, rubs, or gallops  Tenderness palpation over left ribs  LUNGS:  Clear to auscultation bilaterally  No wheezes, rhonchi, rales    ABD:  Soft, nontender, nondistended  No masses, guarding  EXT:  Strength 5/5 in right upper and lower extremity  Strength 1/5 in left upper and lower extremity  Consistent with patient's previous CVA  NEURO:  Gross sensation intact in distal extremities  SKIN:  Warm, dry    Lab Results:   Results: I have personally reviewed pertinent reports   , BMP/CMP:   Lab Results   Component Value Date    SODIUM 137 12/21/2018    K 4 1 12/21/2018     12/21/2018    CO2 28 12/21/2018    BUN 46 (H) 12/21/2018    CREATININE 5 78 (H) 12/21/2018    CALCIUM 8 2 (L) 12/21/2018    EGFR 10 12/21/2018   , CBC:   Lab Results   Component Value Date    WBC 8 04 12/21/2018    HGB 10 0 (L) 12/21/2018    HCT 30 7 (L) 12/21/2018    MCV 93 12/21/2018     (L) 12/21/2018    MCH 30 3 12/21/2018    MCHC 32 6 12/21/2018    RDW 11 9 12/21/2018    MPV 12 6 12/21/2018    NRBC 0 12/21/2018   , Coagulation: No results found for: PT, INR, APTT, Lactate: No results found for: LACTATE, Amylase: No results found for: AMYLASE, Lipase: No results found for: LIPASE, AST: No results found for: AST, ALT: No results found for: ALT, Urinalysis: No results found for: Carrie Martin, SPECGRAV, PHUR, LEUKOCYTESUR, NITRITE, PROTEINUA, GLUCOSEU, KETONESU, BILIRUBINUR, BLOODU, CK: No results found for: CKTOTAL, Troponin: No results found for: TROPONINI, EtOH: No results found for: ETOH, UDS: No components found for: RAPIDDRUGSCREEN, Pregnancy: No results found for: PREGTESTUR, ABG: No results found for: PHART, MVK5TLE, PO2ART, HZN1QNO, Y8UUCYYK, BEART, SOURCE and ISTAT: No components found for: VBG  Imaging/EKG Studies: Results: I have personally reviewed pertinent reports  Other Studies: none  VTE Prophylaxis:  Heparin subq    NPP rounds completed

## 2018-12-22 ENCOUNTER — APPOINTMENT (INPATIENT)
Dept: RADIOLOGY | Facility: HOSPITAL | Age: 58
DRG: 199 | End: 2018-12-22
Payer: COMMERCIAL

## 2018-12-22 LAB
ANION GAP SERPL CALCULATED.3IONS-SCNC: 8 MMOL/L (ref 4–13)
ATRIAL RATE: 86 BPM
BASOPHILS # BLD AUTO: 0.03 THOUSANDS/ΜL (ref 0–0.1)
BASOPHILS NFR BLD AUTO: 0 % (ref 0–1)
BUN SERPL-MCNC: 44 MG/DL (ref 5–25)
CALCIUM SERPL-MCNC: 8.1 MG/DL (ref 8.3–10.1)
CHLORIDE SERPL-SCNC: 102 MMOL/L (ref 100–108)
CO2 SERPL-SCNC: 27 MMOL/L (ref 21–32)
CREAT SERPL-MCNC: 5.13 MG/DL (ref 0.6–1.3)
EOSINOPHIL # BLD AUTO: 0.04 THOUSAND/ΜL (ref 0–0.61)
EOSINOPHIL NFR BLD AUTO: 1 % (ref 0–6)
ERYTHROCYTE [DISTWIDTH] IN BLOOD BY AUTOMATED COUNT: 11.8 % (ref 11.6–15.1)
FERRITIN SERPL-MCNC: 235 NG/ML (ref 8–388)
GFR SERPL CREATININE-BSD FRML MDRD: 12 ML/MIN/1.73SQ M
GLUCOSE SERPL-MCNC: 110 MG/DL (ref 65–140)
GLUCOSE SERPL-MCNC: 111 MG/DL (ref 65–140)
GLUCOSE SERPL-MCNC: 138 MG/DL (ref 65–140)
GLUCOSE SERPL-MCNC: 146 MG/DL (ref 65–140)
GLUCOSE SERPL-MCNC: 147 MG/DL (ref 65–140)
GLUCOSE SERPL-MCNC: 82 MG/DL (ref 65–140)
HCT VFR BLD AUTO: 32.5 % (ref 36.5–49.3)
HGB BLD-MCNC: 10.7 G/DL (ref 12–17)
IMM GRANULOCYTES # BLD AUTO: 0.02 THOUSAND/UL (ref 0–0.2)
IMM GRANULOCYTES NFR BLD AUTO: 0 % (ref 0–2)
IRON SATN MFR SERPL: 18 %
IRON SERPL-MCNC: 36 UG/DL (ref 65–175)
LYMPHOCYTES # BLD AUTO: 0.7 THOUSANDS/ΜL (ref 0.6–4.47)
LYMPHOCYTES NFR BLD AUTO: 10 % (ref 14–44)
MCH RBC QN AUTO: 30.1 PG (ref 26.8–34.3)
MCHC RBC AUTO-ENTMCNC: 32.9 G/DL (ref 31.4–37.4)
MCV RBC AUTO: 91 FL (ref 82–98)
MONOCYTES # BLD AUTO: 0.65 THOUSAND/ΜL (ref 0.17–1.22)
MONOCYTES NFR BLD AUTO: 9 % (ref 4–12)
NEUTROPHILS # BLD AUTO: 5.84 THOUSANDS/ΜL (ref 1.85–7.62)
NEUTS SEG NFR BLD AUTO: 80 % (ref 43–75)
NRBC BLD AUTO-RTO: 0 /100 WBCS
P AXIS: 37 DEGREES
PHOSPHATE SERPL-MCNC: 3.9 MG/DL (ref 2.7–4.5)
PLATELET # BLD AUTO: 154 THOUSANDS/UL (ref 149–390)
PMV BLD AUTO: 12.1 FL (ref 8.9–12.7)
POTASSIUM SERPL-SCNC: 4.7 MMOL/L (ref 3.5–5.3)
PR INTERVAL: 200 MS
PTH-INTACT SERPL-MCNC: 315.2 PG/ML (ref 18.4–80.1)
QRS AXIS: -8 DEGREES
QRSD INTERVAL: 78 MS
QT INTERVAL: 372 MS
QTC INTERVAL: 445 MS
RBC # BLD AUTO: 3.56 MILLION/UL (ref 3.88–5.62)
SODIUM SERPL-SCNC: 137 MMOL/L (ref 136–145)
T WAVE AXIS: 35 DEGREES
TIBC SERPL-MCNC: 200 UG/DL (ref 250–450)
TROPONIN I SERPL-MCNC: 0.03 NG/ML
VENTRICULAR RATE: 86 BPM
WBC # BLD AUTO: 7.28 THOUSAND/UL (ref 4.31–10.16)

## 2018-12-22 PROCEDURE — 83540 ASSAY OF IRON: CPT | Performed by: INTERNAL MEDICINE

## 2018-12-22 PROCEDURE — 97535 SELF CARE MNGMENT TRAINING: CPT

## 2018-12-22 PROCEDURE — 94760 N-INVAS EAR/PLS OXIMETRY 1: CPT

## 2018-12-22 PROCEDURE — 85025 COMPLETE CBC W/AUTO DIFF WBC: CPT | Performed by: PHYSICIAN ASSISTANT

## 2018-12-22 PROCEDURE — 76770 US EXAM ABDO BACK WALL COMP: CPT

## 2018-12-22 PROCEDURE — 83883 ASSAY NEPHELOMETRY NOT SPEC: CPT | Performed by: INTERNAL MEDICINE

## 2018-12-22 PROCEDURE — 84484 ASSAY OF TROPONIN QUANT: CPT | Performed by: EMERGENCY MEDICINE

## 2018-12-22 PROCEDURE — 83550 IRON BINDING TEST: CPT | Performed by: INTERNAL MEDICINE

## 2018-12-22 PROCEDURE — 99232 SBSQ HOSP IP/OBS MODERATE 35: CPT | Performed by: INTERNAL MEDICINE

## 2018-12-22 PROCEDURE — 83970 ASSAY OF PARATHORMONE: CPT | Performed by: INTERNAL MEDICINE

## 2018-12-22 PROCEDURE — 84165 PROTEIN E-PHORESIS SERUM: CPT | Performed by: PATHOLOGY

## 2018-12-22 PROCEDURE — 82948 REAGENT STRIP/BLOOD GLUCOSE: CPT

## 2018-12-22 PROCEDURE — 84100 ASSAY OF PHOSPHORUS: CPT | Performed by: INTERNAL MEDICINE

## 2018-12-22 PROCEDURE — 99232 SBSQ HOSP IP/OBS MODERATE 35: CPT | Performed by: NURSE PRACTITIONER

## 2018-12-22 PROCEDURE — 80048 BASIC METABOLIC PNL TOTAL CA: CPT | Performed by: PHYSICIAN ASSISTANT

## 2018-12-22 PROCEDURE — 93005 ELECTROCARDIOGRAM TRACING: CPT

## 2018-12-22 PROCEDURE — 84165 PROTEIN E-PHORESIS SERUM: CPT | Performed by: INTERNAL MEDICINE

## 2018-12-22 PROCEDURE — 82728 ASSAY OF FERRITIN: CPT | Performed by: INTERNAL MEDICINE

## 2018-12-22 RX ADMIN — OXYCODONE HYDROCHLORIDE 10 MG: 10 TABLET ORAL at 17:24

## 2018-12-22 RX ADMIN — ALPRAZOLAM 0.5 MG: 0.5 TABLET ORAL at 09:24

## 2018-12-22 RX ADMIN — DOXAZOSIN 1 MG: 1 TABLET ORAL at 17:23

## 2018-12-22 RX ADMIN — HEPARIN SODIUM 5000 UNITS: 5000 INJECTION INTRAVENOUS; SUBCUTANEOUS at 05:04

## 2018-12-22 RX ADMIN — LIDOCAINE 1 PATCH: 50 PATCH CUTANEOUS at 09:24

## 2018-12-22 RX ADMIN — ACETAMINOPHEN 975 MG: 325 TABLET ORAL at 13:01

## 2018-12-22 RX ADMIN — ACETAMINOPHEN 975 MG: 325 TABLET ORAL at 21:21

## 2018-12-22 RX ADMIN — DICYCLOMINE HYDROCHLORIDE 20 MG: 20 TABLET ORAL at 17:23

## 2018-12-22 RX ADMIN — HEPARIN SODIUM 5000 UNITS: 5000 INJECTION INTRAVENOUS; SUBCUTANEOUS at 13:01

## 2018-12-22 RX ADMIN — SENNOSIDES 8.6 MG: 8.6 TABLET, FILM COATED ORAL at 09:24

## 2018-12-22 RX ADMIN — PANTOPRAZOLE SODIUM 40 MG: 40 TABLET, DELAYED RELEASE ORAL at 09:24

## 2018-12-22 RX ADMIN — BUPROPION HYDROCHLORIDE 100 MG: 100 TABLET, FILM COATED ORAL at 09:26

## 2018-12-22 RX ADMIN — DOXAZOSIN 1 MG: 1 TABLET ORAL at 09:23

## 2018-12-22 RX ADMIN — METOPROLOL SUCCINATE 25 MG: 25 TABLET, EXTENDED RELEASE ORAL at 09:24

## 2018-12-22 RX ADMIN — ACETAMINOPHEN 975 MG: 325 TABLET ORAL at 07:39

## 2018-12-22 RX ADMIN — DICYCLOMINE HYDROCHLORIDE 20 MG: 20 TABLET ORAL at 21:22

## 2018-12-22 RX ADMIN — TRAZODONE HYDROCHLORIDE 100 MG: 100 TABLET ORAL at 21:21

## 2018-12-22 RX ADMIN — ASPIRIN 81 MG 81 MG: 81 TABLET ORAL at 09:24

## 2018-12-22 RX ADMIN — SODIUM CHLORIDE 100 ML/HR: 0.9 INJECTION, SOLUTION INTRAVENOUS at 21:22

## 2018-12-22 RX ADMIN — DOCUSATE SODIUM 100 MG: 100 CAPSULE, LIQUID FILLED ORAL at 09:24

## 2018-12-22 RX ADMIN — HEPARIN SODIUM 5000 UNITS: 5000 INJECTION INTRAVENOUS; SUBCUTANEOUS at 21:21

## 2018-12-22 RX ADMIN — INSULIN GLARGINE 12 UNITS: 100 INJECTION, SOLUTION SUBCUTANEOUS at 21:21

## 2018-12-22 RX ADMIN — ARIPIPRAZOLE 4 MG: 2 TABLET ORAL at 09:26

## 2018-12-22 RX ADMIN — OXYCODONE HYDROCHLORIDE 10 MG: 10 TABLET ORAL at 09:41

## 2018-12-22 RX ADMIN — PIOGLITAZONE 15 MG: 15 TABLET ORAL at 09:25

## 2018-12-22 RX ADMIN — AMLODIPINE BESYLATE 5 MG: 5 TABLET ORAL at 09:24

## 2018-12-22 RX ADMIN — DICYCLOMINE HYDROCHLORIDE 20 MG: 20 TABLET ORAL at 09:23

## 2018-12-22 RX ADMIN — DOCUSATE SODIUM 100 MG: 100 CAPSULE, LIQUID FILLED ORAL at 17:23

## 2018-12-22 RX ADMIN — METOCLOPRAMIDE 10 MG: 5 INJECTION, SOLUTION INTRAMUSCULAR; INTRAVENOUS at 05:04

## 2018-12-22 RX ADMIN — SODIUM CHLORIDE 100 ML/HR: 0.9 INJECTION, SOLUTION INTRAVENOUS at 09:36

## 2018-12-22 RX ADMIN — BUPROPION HYDROCHLORIDE 100 MG: 100 TABLET, FILM COATED ORAL at 17:23

## 2018-12-22 NOTE — PLAN OF CARE
Problem: OCCUPATIONAL THERAPY ADULT  Goal: Performs self-care activities at highest level of function for planned discharge setting  See evaluation for individualized goals  Treatment Interventions: ADL retraining, Functional transfer training, Endurance training, Cognitive reorientation, Patient/family training, Equipment evaluation/education, Compensatory technique education, Energy conservation, Activityengagement          See flowsheet documentation for full assessment, interventions and recommendations  Outcome: Progressing  Limitation: Decreased ADL status, Decreased Safe judgement during ADL, Decreased cognition, Decreased endurance, Decreased self-care trans, Decreased high-level ADLs  Prognosis: Good  Assessment: pt participated in am ot session and was seen focusing on d/c planning and  dme assessment  pt has flat affect, did not verbally answer ot during session instead his female visitor  was present and engaged in conversation  pt  will require a shower seat with backrest  pts visitor states he  will be using std toilet at home and has asst from an aide and family around the clock  states wash  and dress him  she has been showering  him  here without reported difficulty  she anticipates no further d/c needs  she reports pt is near baseline cogntiion / affect? pt remained in  bed while ot was present  was c/o recent ? chest pain  OT Discharge Recommendation:  (SEE ABOVE )  OT - OK to Discharge:  Yes  April A Storm, GONZALEZ

## 2018-12-22 NOTE — PROGRESS NOTES
Progress Note - Darrin Morales 1960, 66 y o  male MRN: [id number]    Unit/Bed#: [facility] 912-01 Encounter: [id number]    Primary Care Provider: Emmett Medrano MD   Date and time admitted to hospital: 12/19/2018  1:54 PM        Acute on chronic kidney failure ([facility] [address] )   Assessment & Plan    - trend creatinine, today 5 7  - Consult nephrology, input appreciated  - Baseline appears to be around 3 3-3 6  - NS @ 100mL/hr     Essential hypertension   Assessment & Plan    - resume home meds     Diabetes mellitus ([facility] [address] )   Assessment & Plan    - SSI     * Rib fractures   Assessment & Plan    - left 3-6 rib fractures  - multimodal analgesia  - aggressive IS  - PT/OT   - chest pain referred from ribs           Subjective: I would like to go home    Objective: comfortable    Meds/Allergies   Prescriptions Prior to Admission   Medication Sig Dispense Refill Last Dose    ALPRAZolam (XANAX) 0 5 mg tablet Take 0 5 mg by mouth daily       amLODIPine (NORVASC) 10 mg tablet Take 10 mg by mouth daily   6/1/2017 at Unknown time    ARIPiprazole (ABILIFY) 2 mg tablet Take 4 mg by mouth daily       buPROPion (WELLBUTRIN) 100 mg tablet Take 1 tablet (100 mg total) by mouth 2 (two) times a day 180 tablet 0     dicyclomine (BENTYL) 20 mg tablet Take 1 tablet by mouth every 6 (six) hours (Patient taking differently: Take 20 mg by mouth daily as needed  ) 20 tablet 0 Past Month at Unknown time    doxazosin (CARDURA) 1 mg tablet Take 2 mg by mouth 2 (two) times a day Take 1 1/2 tablet by mouth twice a day    6/1/2017 at Unknown time    furosemide (LASIX) 40 mg tablet Take by mouth daily   6/1/2017 at Unknown time    Insulin Glargine (TOUJEO SOLOSTAR) 300 UNIT/ML SOPN Inject 24 Units under the skin daily at bedtime     6/1/2017 at Unknown time    Liraglutide (VICTOZA) 18 MG/3ML SOPN Inject 1 8 mg under the skin daily at bedtime as needed     6/1/2017 at Unknown time    lovastatin (MEVACOR) 20 mg tablet Take 20 mg by mouth daily       lubiprostone (AMITIZA) 24 mcg capsule Take 24 mcg by mouth 2 (two) times a day with meals   6/1/2017 at Unknown time    metoprolol succinate (TOPROL-XL) 25 mg 24 hr tablet Take 25 mg by mouth daily   Past Week at Unknown time    paricalcitol (ZEMPLAR) 1 mcg capsule Take 2 mcg by mouth daily     6/1/2017 at Unknown time    pioglitazone (ACTOS) 15 mg tablet Take 15 mg by mouth daily       sodium polystyrene sulfonate (KAYEXALATE) 15 g/60 mL suspension Take 15 g by mouth as needed Take 120ml by mouth single dose         traZODone (DESYREL) 100 mg tablet Take 100 mg by mouth daily at bedtime          Vitals: Blood pressure 152/83, pulse 88, temperature 99 °F (37 2 °C), resp  rate 16, height 5' 7" (1 702 m), weight 60 8 kg (134 lb), SpO2 95 %  Body mass index is 20 99 kg/m²  ABG: No results found for: PHART, TZK2UEZ, PO2ART, EJG1YPZ, O5MYZKEZ, BEART, SOURCE      Intake/Output Summary (Last 24 hours) at 12/22/18 1124  Last data filed at 12/22/18 9495   Gross per 24 hour   Intake          1187 92 ml   Output             1187 ml   Net             0 92 ml       Invasive Devices     Peripheral Intravenous Line            Peripheral IV 12/20/18 Left Hand 1 day                Nutrition/GI Proph/Bowel Reg: diabetic, renal    Physical Exam:   GENERAL APPEARANCE: comfortable  HEENT: EOM's intact  CV: RRR, no complaint of chest pain  LUNGS: CTA bilaterally, no shortness of breath  ABD: soft  EXT: moves lower extremities and RUE  LUE is impaired at baseline  NEURO: alert and oriented  SKIN: warm and dry      Lab Results: Results for Helena Louie (MRN [id number]) as of 12/22/2018 11:22   Ref   Range 12/22/2018 05:01 12/22/2018 06:26   eGFR Latest Units: ml/min/1 73sq m 12    Sodium Latest Ref Range: 136 - 145 mmol/L 137    Potassium Latest Ref Range: 3 5 - 5 3 mmol/L 4 7    Chloride Latest Ref Range: 100 - 108 mmol/L 102    CO2 Latest Ref Range: 21 - 32 mmol/L 27    Anion Gap Latest Ref Range: 4 - 13 mmol/L 8    BUN Latest Ref Range: 5 - 25 mg/dL 44 (H)    Creatinine Latest Ref Range: 0 60 - 1 30 mg/dL 5 13 (H)    Glucose, Random Latest Ref Range: 65 - 140 mg/dL 82    Calcium Latest Ref Range: 8 3 - 10 1 mg/dL 8 1 (L)    Phosphorus Latest Ref Range: 2 7 - 4 5 mg/dL 3 9    Iron Latest Ref Range: 65 - 175 ug/dL 36 (L)    Ferritin Latest Ref Range: 8 - 388 ng/mL 235    Iron Saturation Latest Units: % 18    TIBC Latest Ref Range: 250 - 450 ug/dL 200 (L)    WBC Latest Ref Range: 4 31 - 10 16 Thousand/uL 7 28    Red Blood Cell Count Latest Ref Range: 3 88 - 5 62 Million/uL 3 56 (L)    Hemoglobin Latest Ref Range: 12 0 - 17 0 g/dL 10 7 (L)    HCT Latest Ref Range: 36 5 - 49 3 % 32 5 (L)    MCV Latest Ref Range: 82 - 98 fL 91    MCH Latest Ref Range: 26 8 - 34 3 pg 30 1    MCHC Latest Ref Range: 31 4 - 37 4 g/dL 32 9    RDW Latest Ref Range: 11 6 - 15 1 % 11 8    Platelet Count Latest Ref Range: 149 - 390 Thousands/uL 154    MPV Latest Ref Range: 8 9 - 12 7 fL 12 1    nRBC Latest Units: /100 WBCs 0    Neutrophils % Latest Ref Range: 43 - 75 % 80 (H)    Immat GRANS % Latest Ref Range: 0 - 2 % 0    Lymphocytes Relative Latest Ref Range: 14 - 44 % 10 (L)    Monocytes Relative Latest Ref Range: 4 - 12 % 9    Eosinophils Latest Ref Range: 0 - 6 % 1    Basophils Relative Latest Ref Range: 0 - 1 % 0    Immature Grans Absolute Latest Ref Range: 0 00 - 0 20 Thousand/uL 0 02    Absolute Neutrophils Latest Ref Range: 1 85 - 7 62 Thousands/µL 5 84    Lymphocytes Absolute Latest Ref Range: 0 60 - 4 47 Thousands/µL 0 70    Absolute Monocytes Latest Ref Range: 0 17 - 1 22 Thousand/µL 0 65    Absolute Eosinophils Latest Ref Range: 0 00 - 0 61 Thousand/µL 0 04    Basophils Absolute Latest Ref Range: 0 00 - 0 10 Thousands/µL 0 03    PARATHYROID HORMONE Latest Ref Range: 18 4 - 80 1 pg/mL 315 2 (H)    ECG 12-LEAD Unknown  Rpt     Imaging/EKG Studies: US retroperitoneal pending  Other Studies: none  VTE Prophylaxis: SCd's,       Nurse / Provider rounds completed with staff nurse, Debra, patient and significant other  Will follow up with nephrology suggestions and monitor labs  Dr Lazaro Ignacio discussed need to stay in hospital with patient and significant other and we will monitor labs

## 2018-12-22 NOTE — OCCUPATIONAL THERAPY NOTE
Occupational Therapy Treatment Note:       12/22/18 1015   Restrictions/Precautions   Other Precautions Chair Alarm   Assessment   Assessment pt participated in am ot session and was seen focusing on d/c planning and  dme assessment  pt has flat affect, did not verbally answer ot during session instead his female visitor  was present and engaged in conversation  pt  will require a shower seat with backrest  pts visitor states he  will be using std toilet at home and has asst from an aide and family around the clock  states wash  and dress him  she has been showering  him  here without reported difficulty  she anticipates no further d/c needs  she reports pt is near baseline cogntiion / affect? pt remained in  bed while ot was present  was c/o recent ? chest pain  Plan   Treatment Interventions ADL retraining;Functional transfer training; Activityengagement;Patient/family training; Endurance training;Equipment evaluation/education   Goal Expiration Date 12/31/18   Treatment Day 1   OT Frequency 3-5x/wk   Barthel Index   Feeding 5   Bathing 0   Grooming Score 0   Dressing Score 5   Bladder Score 10   Bowels Score 10   Toilet Use Score 5   Transfers (Bed/Chair) Score 10   Mobility (Level Surface) Score 0   Stairs Score 0   Barthel Index Score 45   Modified Coles Scale   Modified Coles Scale 4   April A LISA Gomez

## 2018-12-22 NOTE — PHYSICIAN ADVISOR
Current patient class: Observation  The patient is currently on Hospital Day: 3 at [address] [place] [address]      The patient was admitted to the hospital at N/A on N/A for the following diagnosis:  Trauma [[id number]]       There is documentation in the medical record of an expected length of stay of at least 2 midnights  The patient is therefore expected to satisfy the 2 midnight benchmark and given the 2 midnight presumption is appropriate for INPATIENT ADMISSION  Given this expectation of a satisfying stay, CMS instructs us that the patient is most often appropriate for inpatient admission under part A provided medical necessity is documented in the chart  After review of the relevant documentation, labs, vital signs and test results, the patient is appropriate for INPATIENT ADMISSION  Admission to the hospital as an inpatient is a complex decision making process which requires the practitioner to consider the patients presenting complaint, history and physical examination and all relevant testing  With this in mind, in this case, the patient was deemed appropriate for INPATIENT ADMISSION  After review of the documentation and testing available at the time of the admission I concur with this clinical determination of medical necessity  Rationale is as follows: The patient is a 66 yrs old Male who presented to the ED at 12/19/2018  1:54 PM with a chief complaint of Fall  The patient presented with left sided rib pain s/p fall  The patient was admitted with rib fracture  The patient was also noted to have TODD with a Cr that continued to increase with a Cr of 5 78  Nephrology was consulted and the Cr worsened despite IVF  This patient is appropriate for INPATIENT admission           The patients vitals on arrival were ED Triage Vitals   Temperature Pulse Respirations Blood Pressure SpO2   12/19/18 1359 12/19/18 1359 12/19/18 1359 12/19/18 1359 12/19/18 1359   98 3 °F (36 8 °C) 62 16 (!) 222/96 97 %      Temp Source Heart Rate Source Patient Position - Orthostatic VS BP Location FiO2 (%)   12/19/18 1359 12/19/18 1359 12/19/18 1359 12/19/18 1626 --   Oral Monitor Lying Right arm       Pain Score       12/19/18 1359       8           Past Medical History:   Diagnosis Date    Anxiety     CAD (coronary artery disease)     last assessed 4/10/13    [facility], [facility]     Concussion     last assessed 9/3/14    Depression     Diabetes mellitus ([facility] )     type 2    Failure to gain weight in adult     last assessed 2/24/14    Gastroparesis     GERD (gastroesophageal reflux disease)     Hyperkalemia     last assessed 11/9/15    Hyperlipidemia     Hypertension     accelerated essential last assessed 10/7/16    Insomnia     Late effects of cerebrovascular disease     Overflow incontinence     last assessed 7/24/14    Renal disorder     Secondary hyperparathyroidism ([place] [address] )     Stroke [facility])      Past Surgical History:   Procedure Laterality Date    COLONOSCOPY      HERNIA REPAIR      IA ESOPHAGOGASTRODUODENOSCOPY TRANSORAL DIAGNOSTIC N/A 2/16/2017    Procedure: ESOPHAGOGASTRODUODENOSCOPY (EGD) with biopsy;  Surgeon: Sierra Coronado DO;  Location: [facility]; Service: Gastroenterology    IA ESOPHAGOGASTRODUODENOSCOPY TRANSORAL DIAGNOSTIC N/A 6/2/2017    Procedure: ESOPHAGOGASTRODUODENOSCOPY (EGD) with bx;  Surgeon: Sierra Coronado DO;  Location: [facility];   Service: Gastroenterology    TYMPANOSTOMY TUBE PLACEMENT             Consults have been placed to:   IP CONSULT TO CASE MANAGEMENT  IP CONSULT TO NEPHROLOGY    Vitals:    12/21/18 0100 12/21/18 0325 12/21/18 0700 12/21/18 1545   BP:  102/55 108/74 155/80   BP Location:  Right arm Right arm Right arm   Pulse: 59 56 70 88   Resp:  16 18 19   Temp:  97 6 °F (36 4 °C) 97 7 °F (36 5 °C) 98 8 °F (37 1 °C)   TempSrc:  Oral Oral Oral   SpO2: 94% 92% 94% 94%   Weight:       Height:           Most recent labs:    Recent Labs      12/19/18   1235   12/21/18   0455   WBC   --    < >  8 04   HGB   --    < >  10 0*   HCT   --    < >  30 7*   PLT   --    < >  138*   K   --    < >  4 1   CALCIUM   --    < >  8 2*   BUN   --    < >  46*   CREATININE   --    < >  5 78*   INR  1 06   --    --     < > = values in this interval not displayed         Scheduled Meds:  Current Facility-Administered Medications:  acetaminophen 975 mg Oral Q8H [address] Lindsey Kim PA-C    ALPRAZolam 0 5 mg Oral Daily Lorenza Dumont PA-C    aluminum-magnesium hydroxide-simethicone 30 mL Oral Q4H PRN Noralee Balling, DO    [START ON 12/22/2018] amLODIPine 5 mg Oral Daily Fletcher Johnson MD    ARIPiprazole 4 mg Oral Daily COLLINS Hopkins-OJRJE    aspirin 81 mg Oral Daily Lorenza Dumont PA-JORJE    buPROPion 100 mg Oral BID COLLINS Hopkins-JORJE    dicyclomine 20 mg Oral Q6H Lindsey Kim PA-C    docusate sodium 100 mg Oral BID Lorenza Dumont PA-JORJE    doxazosin 1 mg Oral BID Fletcher Johnson MD    fentanyl citrate (PF) 50 mcg Intravenous Q4H PRN Noralee Balling, DO    heparin (porcine) 5,000 Units Subcutaneous [facility] Thierry Wayne MD    hydrALAZINE 5 mg Intravenous Q6H PRN Lorenza Dumont PA-C    insulin glargine 12 Units Subcutaneous HS Yonathan R Usman, DO    insulin lispro 1-5 Units Subcutaneous TID AC Lorenza Dmuont PA-C    lidocaine 1 patch Topical Daily Lorenza Dumont, PA-C    lidocaine viscous 15 mL Swish & Swallow TID PRN Noralee Balling, DO    methocarbamol 750 mg Oral Q6H PRN Lorenza Dumont PA-C    metoclopramide 10 mg Intravenous Q6H PRN Noralee Balling, DO    metoprolol succinate 25 mg Oral Daily Lorenza Dumont, PA-C    oxyCODONE 10 mg Oral Q4H PRN Lorenza Dumont, PA-C    oxyCODONE 5 mg Oral Q4H PRN Lorenza Dumont, PA-C    pantoprazole 40 mg Oral Early Morning Lorenza Dumont PA-C    pioglitazone 15 mg Oral Daily Lorenza Dumont PA-C    senna 1 tablet Oral Daily Lorenza Dumont PA-C    sodium chloride 100 mL/hr Intravenous Continuous Lara Ordonez PA-C Last Rate: 100 mL/hr (12/21/18 3173)   traZODone 100 mg Oral HS Khadra Fleming PA-C      Continuous Infusions:  sodium chloride 100 mL/hr Last Rate: 100 mL/hr (12/21/18 2743)     PRN Meds: aluminum-magnesium hydroxide-simethicone    fentanyl citrate (PF)    hydrALAZINE    lidocaine viscous    methocarbamol    metoclopramide    oxyCODONE    oxyCODONE    Surgical procedures (if appropriate):

## 2018-12-22 NOTE — ASSESSMENT & PLAN NOTE
- left 3-6 rib fractures  - multimodal analgesia  - aggressive IS  - PT/OT   - chest pain referred from ribs

## 2018-12-22 NOTE — PROGRESS NOTES
Progress Note - Nephrology   Med Londono 66 y o  male MRN: [id number]  Unit/Bed#: [facility] 780-59 Encounter: [id number]      Assessment / Plan:  1  TODD(POA) on CKD stage 5 - patient's serum creatinine is improving on IV fluids  May have had a degree of normotensive ATN as well   -serum creatinine down to 5 1 from peak 5 8  -continue IV fluids as patient having poor oral intake  -urinalysis shows positive glucose, 2+ protein, 5-10 hyaline casts  -renal ultrasound pending  -he is urinating well   -continue holding Lasix  -avoid nonsteroidals  -patient does not want hemodialysis  -follow-up SPEP, free light chain  -on monitor BMP    2  CKD stage 5  - baseline serum creatinine 3 23-3 7, follows with Dr Brigette Mac kidney specialist   He does not want dialysis if needed  3  Anemia of chronic kidney disease-follow-up SPEP, Iron sat low 18, TIBC low at 200, iron 36 low, ferritin okay  Patient would likely benefit from Venofer as well as Epogen outpatient  Hemoglobin stable in 10s  4  Secondary hyperparathyroidism of renal origin-PTH in the 300, phos normal   -will hold off on binders for now    5  Hypertension-would avoid aggressive lowering of blood pressure  Allow systolic blood pressure in the 140 to 1 50s for renal perfusion   -continue amlodipine 5 mg p o  Daily, Cardura 1 mg p o  B i d , hydralazine p r n , metoprolol 25 mg p o  Daily with hold parameters    Would be okay for discharge from renal perspective tomorrow if serum creatinine continues to downtrend  Should continue follow-up with Dr Hannah Blandon of [place] [facility]  Subjective:   He has decreased appetite  He denies any chest pain or shortness breath  No other complaints  Family updated at bedside  Objective:     Vitals: Blood pressure 152/83, pulse 88, temperature 99 °F (37 2 °C), resp  rate 16, height 5' 7" (1 702 m), weight 60 8 kg (134 lb), SpO2 95 %  ,Body mass index is 20 99 kg/m²  Temp (24hrs), Av 9 °F (37 2 °C), Min:98 8 °F (37 1 °C), Max:99 °F (37 2 °C)      Weight (last 2 days)     None            Intake/Output Summary (Last 24 hours) at 12/22/18 1215  Last data filed at 12/22/18 0936   Gross per 24 hour   Intake          1187 92 ml   Output             1187 ml   Net             0 92 ml     I/O last 24 hours: In: 1307 9 [P O :340; I V :967 9]  Out: 1687 [Urine:1687]        Physical Exam:   Physical Exam   Constitutional: No distress  Thin, cachectic   HENT:   Head: Normocephalic and atraumatic  Mouth/Throat: No oropharyngeal exudate  Eyes: Right eye exhibits no discharge  Left eye exhibits no discharge  No scleral icterus  Neck: Neck supple  Cardiovascular: Normal rate, regular rhythm and normal heart sounds  Pulmonary/Chest: Effort normal and breath sounds normal  He has no wheezes  He has no rales  Abdominal: Soft  Bowel sounds are normal  He exhibits no distension  There is no tenderness  Musculoskeletal: Normal range of motion  He exhibits no edema  Neurological: He is alert  awake   Skin: Skin is warm and dry  No rash noted  He is not diaphoretic  Psychiatric:   Flat affect   Vitals reviewed        Invasive Devices     Peripheral Intravenous Line            Peripheral IV 12/20/18 Left Hand 1 day                Medications:    Scheduled Meds:  Current Facility-Administered Medications:  acetaminophen 975 mg Oral Q8H [facility] Lindsey Kim PA-C    ALPRAZolam 0 5 mg Oral Daily Lindsey Kim PA-C    aluminum-magnesium hydroxide-simethicone 30 mL Oral Q4H PRN [place] Stalling, DO    amLODIPine 5 mg Oral Daily Jannie Durant MD    ARIPiprazole 4 mg Oral Daily Ana Maria Lemos PA-C    aspirin 81 mg Oral Daily Ana Maria Lemos PA-C    buPROPion 100 mg Oral BID Ana Maria SWETA Lemos    dicyclomine 20 mg Oral Q6H Lindsey Kim PA-C    docusate sodium 100 mg Oral BID Ana Maria SWETA Lemos    doxazosin 1 mg Oral BID Jannie Durant MD    fentanyl citrate (PF) 50 mcg Intravenous Q4H PRN [place] Stalling, DO    heparin (porcine) 5,000 Units Subcutaneous Q8H [address] Nadia Corbett MD    hydrALAZINE 5 mg Intravenous Q6H PRN Epifanio Dbuon, PA-C    insulin glargine 12 Units Subcutaneous HS Yonathan R Usman, DO    insulin lispro 1-5 Units Subcutaneous TID AC Epifanio Dubon, PA-C    lidocaine 1 patch Topical Daily Epifanio Dubon, PA-C    lidocaine viscous 15 mL Swish & Swallow TID PRN Ladena Messing, DO    methocarbamol 750 mg Oral Q6H PRN Epifanio Links, PA-C    metoclopramide 10 mg Intravenous Q6H PRN Ladena Messing, DO    metoprolol succinate 25 mg Oral Daily Epifanio Links, PA-C    oxyCODONE 10 mg Oral Q4H PRN Epifanio Links, PA-C    oxyCODONE 5 mg Oral Q4H PRN Epifanio Links, PA-C    pantoprazole 40 mg Oral Early Morning Epifanio Dubon, PA-C    pioglitazone 15 mg Oral Daily Epifanio Ling, PA-C    senna 1 tablet Oral Daily Epifanio Dubon, PA-C    sodium chloride 100 mL/hr Intravenous Continuous COLLINS Krause-JORJE Last Rate: 100 mL/hr (12/22/18 0936)   traZODone 100 mg Oral HS Epifanio Dubon, PA-JORJE        PRN Meds: aluminum-magnesium hydroxide-simethicone    fentanyl citrate (PF)    hydrALAZINE    lidocaine viscous    methocarbamol    metoclopramide    oxyCODONE    oxyCODONE    Continuous Infusions:  sodium chloride 100 mL/hr Last Rate: 100 mL/hr (12/22/18 0936)           LAB RESULTS:        Results from last 7 days  Lab Units 12/22/18  0501 12/21/18  0455 12/20/18  0452 12/19/18  1227   WBC Thousand/uL 7 28 8 04 7 44 15 78*   HEMOGLOBIN g/dL 10 7* 10 0* 11 2* 13 0   HEMATOCRIT % 32 5* 30 7* 33 4* 39 2   PLATELETS Thousands/uL 154 138* 152 198   NEUTROS PCT % 80* 81* 70 83*   LYMPHS PCT % 10* 7* 16 7*   MONOS PCT % 9 11 14* 7   EOS PCT % 1 1 0 1   POTASSIUM mmol/L 4 7 4 1 4 2 5 1   CHLORIDE mmol/L 102 101 102 99*   CO2 mmol/L 27 28 31 32   BUN mg/dL 44* 46* 45* 41*   CREATININE mg/dL 5 13* 5 78* 4 64* 4 48*   CALCIUM mg/dL 8 1* 8 2* 8 4 9 1   PHOSPHORUS mg/dL 3 9  --   --   --        CUTURES:  Lab Results   Component Value Date    URINECX No Growth <1000 cfu/mL 10/22/2016 Portions of the record may have been created with voice recognition software  Occasional wrong word or "sound a like" substitutions may have occurred due to the inherent limitations of voice recognition software  Read the chart carefully and recognize, using context, where substitutions have occurred  If you have any questions, please contact the dictating provider

## 2018-12-22 NOTE — SOCIAL WORK
CM met with pt and his friend  Pt's friend was requesting a shower chair  CM explained that on the weekend there are no shower seats in house and that there was no guarantee the insurance would cover  CM suggested it may be more cost-effective to obtain one through [employer] or [employer] at a cheaper price   Pt's friend, as well as patient, understand some of the pt's labs are outside their normal limits but they don't want to pursue dialysis and would prefer to d/c home to follow up with their Nephrologist

## 2018-12-22 NOTE — PROGRESS NOTES
Spoke with Roddy Nascimenot from trauma regarding patient's blood sugar  Originally cut Lantus dose in half, but will hold for the night d/t BS only being 80  Will check BS at midnight and 0200  Will continue to monitor patient for symptoms

## 2018-12-22 NOTE — PROGRESS NOTES
Pagekaty Trauma informed him patient C/O chest pain states " he feels like he's about to have a hear attack"  Vitals obtain

## 2018-12-23 VITALS
DIASTOLIC BLOOD PRESSURE: 77 MMHG | HEIGHT: 67 IN | OXYGEN SATURATION: 92 % | TEMPERATURE: 98.6 F | HEART RATE: 69 BPM | WEIGHT: 134 LBS | RESPIRATION RATE: 18 BRPM | BODY MASS INDEX: 21.03 KG/M2 | SYSTOLIC BLOOD PRESSURE: 136 MMHG

## 2018-12-23 LAB
ANION GAP SERPL CALCULATED.3IONS-SCNC: 4 MMOL/L (ref 4–13)
BUN SERPL-MCNC: 44 MG/DL (ref 5–25)
CALCIUM SERPL-MCNC: 8 MG/DL (ref 8.3–10.1)
CHLORIDE SERPL-SCNC: 107 MMOL/L (ref 100–108)
CO2 SERPL-SCNC: 25 MMOL/L (ref 21–32)
CREAT SERPL-MCNC: 4.51 MG/DL (ref 0.6–1.3)
GFR SERPL CREATININE-BSD FRML MDRD: 14 ML/MIN/1.73SQ M
GLUCOSE SERPL-MCNC: 48 MG/DL (ref 65–140)
GLUCOSE SERPL-MCNC: 55 MG/DL (ref 65–140)
GLUCOSE SERPL-MCNC: 66 MG/DL (ref 65–140)
GLUCOSE SERPL-MCNC: 89 MG/DL (ref 65–140)
POTASSIUM SERPL-SCNC: 4.8 MMOL/L (ref 3.5–5.3)
SODIUM SERPL-SCNC: 136 MMOL/L (ref 136–145)

## 2018-12-23 PROCEDURE — 82948 REAGENT STRIP/BLOOD GLUCOSE: CPT

## 2018-12-23 PROCEDURE — 94760 N-INVAS EAR/PLS OXIMETRY 1: CPT

## 2018-12-23 PROCEDURE — 99238 HOSP IP/OBS DSCHRG MGMT 30/<: CPT | Performed by: SURGERY

## 2018-12-23 PROCEDURE — 80048 BASIC METABOLIC PNL TOTAL CA: CPT | Performed by: INTERNAL MEDICINE

## 2018-12-23 PROCEDURE — 99232 SBSQ HOSP IP/OBS MODERATE 35: CPT | Performed by: INTERNAL MEDICINE

## 2018-12-23 RX ORDER — PANTOPRAZOLE SODIUM 40 MG/1
40 TABLET, DELAYED RELEASE ORAL
Qty: 20 TABLET | Refills: 0 | Status: SHIPPED | OUTPATIENT
Start: 2018-12-24 | End: 2019-01-03 | Stop reason: SDUPTHER

## 2018-12-23 RX ORDER — METHOCARBAMOL 750 MG/1
750 TABLET, FILM COATED ORAL EVERY 6 HOURS PRN
Qty: 30 TABLET | Refills: 0 | Status: SHIPPED | OUTPATIENT
Start: 2018-12-23 | End: 2019-01-03 | Stop reason: SDUPTHER

## 2018-12-23 RX ORDER — OXYCODONE HYDROCHLORIDE 5 MG/1
5 TABLET ORAL EVERY 4 HOURS PRN
Qty: 30 TABLET | Refills: 0 | Status: SHIPPED | OUTPATIENT
Start: 2018-12-23 | End: 2019-01-03 | Stop reason: SDUPTHER

## 2018-12-23 RX ORDER — INSULIN GLARGINE 100 [IU]/ML
10 INJECTION, SOLUTION SUBCUTANEOUS
Status: DISCONTINUED | OUTPATIENT
Start: 2018-12-23 | End: 2018-12-23 | Stop reason: HOSPADM

## 2018-12-23 RX ORDER — ACETAMINOPHEN 325 MG/1
975 TABLET ORAL EVERY 8 HOURS
Qty: 30 TABLET | Refills: 0 | Status: SHIPPED | OUTPATIENT
Start: 2018-12-23 | End: 2020-01-01 | Stop reason: HOSPADM

## 2018-12-23 RX ORDER — ASPIRIN 81 MG/1
81 TABLET, CHEWABLE ORAL DAILY
Qty: 30 TABLET | Refills: 0 | Status: SHIPPED | OUTPATIENT
Start: 2018-12-24 | End: 2021-02-11 | Stop reason: HOSPADM

## 2018-12-23 RX ADMIN — SENNOSIDES 8.6 MG: 8.6 TABLET, FILM COATED ORAL at 08:26

## 2018-12-23 RX ADMIN — BUPROPION HYDROCHLORIDE 100 MG: 100 TABLET, FILM COATED ORAL at 08:27

## 2018-12-23 RX ADMIN — DOCUSATE SODIUM 100 MG: 100 CAPSULE, LIQUID FILLED ORAL at 08:25

## 2018-12-23 RX ADMIN — DICYCLOMINE HYDROCHLORIDE 20 MG: 20 TABLET ORAL at 04:00

## 2018-12-23 RX ADMIN — PANTOPRAZOLE SODIUM 40 MG: 40 TABLET, DELAYED RELEASE ORAL at 05:03

## 2018-12-23 RX ADMIN — DICYCLOMINE HYDROCHLORIDE 20 MG: 20 TABLET ORAL at 09:19

## 2018-12-23 RX ADMIN — METOPROLOL SUCCINATE 25 MG: 25 TABLET, EXTENDED RELEASE ORAL at 08:25

## 2018-12-23 RX ADMIN — DOXAZOSIN 1 MG: 1 TABLET ORAL at 08:26

## 2018-12-23 RX ADMIN — LIDOCAINE 1 PATCH: 50 PATCH CUTANEOUS at 08:26

## 2018-12-23 RX ADMIN — ACETAMINOPHEN 975 MG: 325 TABLET ORAL at 05:02

## 2018-12-23 RX ADMIN — HEPARIN SODIUM 5000 UNITS: 5000 INJECTION INTRAVENOUS; SUBCUTANEOUS at 13:07

## 2018-12-23 RX ADMIN — AMLODIPINE BESYLATE 5 MG: 5 TABLET ORAL at 08:26

## 2018-12-23 RX ADMIN — ASPIRIN 81 MG 81 MG: 81 TABLET ORAL at 08:26

## 2018-12-23 RX ADMIN — ALPRAZOLAM 0.5 MG: 0.5 TABLET ORAL at 08:26

## 2018-12-23 RX ADMIN — ARIPIPRAZOLE 4 MG: 2 TABLET ORAL at 08:27

## 2018-12-23 RX ADMIN — ACETAMINOPHEN 975 MG: 325 TABLET ORAL at 13:07

## 2018-12-23 RX ADMIN — HEPARIN SODIUM 5000 UNITS: 5000 INJECTION INTRAVENOUS; SUBCUTANEOUS at 05:03

## 2018-12-23 RX ADMIN — PIOGLITAZONE 15 MG: 15 TABLET ORAL at 08:27

## 2018-12-23 NOTE — PROGRESS NOTES
Notified CHERELLE Guillen of patients 12 noon blood sugar of 66  Patient asymptomatic currently  No new orders received at this time

## 2018-12-23 NOTE — PROGRESS NOTES
Progress Note - Nephrology   Render Ki 66 y o  male MRN: [id number]  Unit/Bed#: [facility] 321-49 Encounter: [id number]      Assessment / Plan:  1  TODD(POA) on CKD stage 5 - patient's serum creatinine is improving on IV fluids  May have had a degree of normotensive ATN as well   -serum creatinine down to 4 5 from peak 5 8  -now off IVF  -urinalysis shows positive glucose, 2+ protein, 5-10 hyaline casts  -renal ultrasound normal  -he is urinating well   -continue holding Lasix due to poor oral intake  -avoid nonsteroidals  -patient does not want hemodialysis  -follow-up SPEP, free light chain  -monitor BMP     2  CKD stage 5  - baseline serum creatinine 3 23-3 7, follows with Dr Leeann Lees kidney specialist   He does not want dialysis if needed  As serum creatinine has significantly down trended, okay for discharge from renal perspective with close follow-up with [place] [facility]      3  Anemia of chronic kidney disease-follow-up SPEP, Iron sat low 18, TIBC low at 200, iron 36 low, ferritin okay  Patient would likely benefit from Venofer as well as Epogen outpatient  Hemoglobin stable in 10s      4  Secondary hyperparathyroidism of renal origin-PTH in the 300, phos normal   -will hold off on binders for now     5  Hypertension-would avoid aggressive lowering of blood pressure  Allow systolic blood pressure in the 140 to 150s range for renal perfusion   -continue amlodipine 5 mg p o  Daily, Cardura 1 mg p o  B i d , hydralazine p r n , metoprolol 25 mg p o  Daily with hold parameters     Would be okay for discharge from renal perspective as serum creatinine continues to downtrend  Hold lasix and only should restart for weight gain > 5lbs/edema/shortness of breath  Should continue follow-up with Dr Saúl Gaviria of [place] [facility]          Subjective:   He has no complaints  He asks if he can go home        Objective:     Vitals: Blood pressure 136/77, pulse 69, temperature 98 6 °F (37 °C), temperature source Oral, resp  rate 18, height 5' 7" (1 702 m), weight 60 8 kg (134 lb), SpO2 92 %  ,Body mass index is 20 99 kg/m²  Temp (24hrs), Av 9 °F (37 2 °C), Min:98 6 °F (37 °C), Max:99 1 °F (37 3 °C)      Weight (last 2 days)     None            Intake/Output Summary (Last 24 hours) at 18 1157  Last data filed at 18 0801   Gross per 24 hour   Intake              220 ml   Output              950 ml   Net             -730 ml     I/O last 24 hours: In: 220 [P O :220]  Out: [address] [Urine:1475]        Physical Exam:   Physical Exam   Constitutional: No distress  Positive temporal wasting, chronically ill-appearing   HENT:   Head: Normocephalic and atraumatic  Mouth/Throat: No oropharyngeal exudate  Eyes: Right eye exhibits no discharge  Left eye exhibits no discharge  No scleral icterus  Neck: Neck supple  Cardiovascular: Normal rate, regular rhythm and normal heart sounds  Pulmonary/Chest: Effort normal and breath sounds normal  He has no wheezes  He has no rales  Abdominal: Soft  Bowel sounds are normal  He exhibits no distension  There is no tenderness  Musculoskeletal: Normal range of motion  He exhibits no edema  Neurological: He is alert  awake   Skin: Skin is warm and dry  No rash noted  He is not diaphoretic  Psychiatric:   Flat affect   Vitals reviewed        Invasive Devices     Peripheral Intravenous Line            Peripheral IV 18 Left Hand 2 days                Medications:    Scheduled Meds:  Current Facility-Administered Medications:  acetaminophen 975 mg Oral Q8H [facility] Lindsey Kim PA-C    ALPRAZolam 0 5 mg Oral Daily Lindsey Kim PA-C    aluminum-magnesium hydroxide-simethicone 30 mL Oral Q4H PRN Singh Chong DO    amLODIPine 5 mg Oral Daily Antoine Urbina MD    ARIPiprazole 4 mg Oral Daily Rollo Meth, SWETA    aspirin 81 mg Oral Daily Rollo Meth, SWETA    buPROPion 100 mg Oral BID Rollo Meth, PAALVARO    dicyclomine 20 mg Oral Q6H Rollo Meth, PA-JORJE    docusate sodium 100 mg Oral BID Carito Waggoner PA-C    doxazosin 1 mg Oral BID Adalgisa Bxo MD    fentanyl citrate (PF) 50 mcg Intravenous Q4H PRN Robin Sutton DO    heparin (porcine) 5,000 Units Subcutaneous [facility] Caswell Skiff, MD    hydrALAZINE 5 mg Intravenous Q6H PRN Carito Waggoner PA-C    insulin glargine 10 Units Subcutaneous HS Sharlotte Castleman, CRNP    insulin lispro 1-5 Units Subcutaneous TID AC Carito Waggoner PA-C    lidocaine 1 patch Topical Daily Carito Waggoner PA-C    lidocaine viscous 15 mL Swish & Swallow TID PRN Robin Sutton DO    methocarbamol 750 mg Oral Q6H PRN Carito Waggoner PA-C    metoclopramide 10 mg Intravenous Q6H PRN Robin Sutton DO    metoprolol succinate 25 mg Oral Daily Carito Waggoner PA-C    oxyCODONE 10 mg Oral Q4H PRN Carito Waggoner PA-C    oxyCODONE 5 mg Oral Q4H PRN Carito Waggoner PA-C    pantoprazole 40 mg Oral Early Morning Carito Waggoner PA-C    pioglitazone 15 mg Oral Daily Carito Waggoner PA-C    senna 1 tablet Oral Daily Carito Waggoner PA-C    sodium chloride 100 mL/hr Intravenous Continuous Iza Cleaning PA-C Last Rate: 100 mL/hr (12/22/18 2122)   traZODone 100 mg Oral HS Carito Waggoner PA-C        PRN Meds: aluminum-magnesium hydroxide-simethicone    fentanyl citrate (PF)    hydrALAZINE    lidocaine viscous    methocarbamol    metoclopramide    oxyCODONE    oxyCODONE    Continuous Infusions:  sodium chloride 100 mL/hr Last Rate: 100 mL/hr (12/22/18 2122)           LAB RESULTS:        Results from last 7 days  Lab Units 12/23/18  0452 12/22/18  0501 12/21/18  0455 12/20/18  0452 12/19/18  1227   WBC Thousand/uL  --  7 28 8 04 7 44 15 78*   HEMOGLOBIN g/dL  --  10 7* 10 0* 11 2* 13 0   HEMATOCRIT %  --  32 5* 30 7* 33 4* 39 2   PLATELETS Thousands/uL  --  154 138* 152 198   NEUTROS PCT %  --  80* 81* 70 83*   LYMPHS PCT %  --  10* 7* 16 7*   MONOS PCT %  --  9 11 14* 7   EOS PCT %  --  1 1 0 1   POTASSIUM mmol/L 4 8 4 7 4 1 4 2 5 1   CHLORIDE mmol/L 107 102 101 102 99*   CO2 mmol/L 25 27 28 31 32 BUN mg/dL 44* 44* 46* 45* 41*   CREATININE mg/dL 4 51* 5 13* 5 78* 4 64* 4 48*   CALCIUM mg/dL 8 0* 8 1* 8 2* 8 4 9 1   PHOSPHORUS mg/dL  --  3 9  --   --   --        CUTURES:  Lab Results   Component Value Date    URINECX No Growth <1000 cfu/mL 10/22/2016                 Portions of the record may have been created with voice recognition software  Occasional wrong word or "sound a like" substitutions may have occurred due to the inherent limitations of voice recognition software  Read the chart carefully and recognize, using context, where substitutions have occurred  If you have any questions, please contact the dictating provider

## 2018-12-23 NOTE — DISCHARGE SUMMARY
Discharge Summary - Marley Lam 66 y o  male MRN: [id number]    Unit/Bed#: [facility] 973-32 Encounter: [id number]    Admission Date:   Admission Orders     Ordered        12/21/18 1959  Inpatient Admission  Once         12/19/18 1539  Place in Observation  Once               Admitting Diagnosis: Trauma [T14 90XA]    HPI: per Candance Denis ryan, PA-c:  " Marley Lam is a 66 y o  male who presents with left sided rib pain after falling down the stairs  Patient lost his balance while trying to sit onto his chair at the top of the steps and fell down about 12 stairs  Denies LOC  Denies SOB  Slight nausea "    Procedures Performed: No orders of the defined types were placed in this encounter  Summary of Hospital Course: 65 y/o male admitted after a fall with rib fractures  Usually goes to [facility] and all phsyciians are there  Follows with Dr Sam Meckel for nephrology  Consulted nephro here, have been monitoring creat level  For details please refer to medical records  Patient's pain is controlled, discussed discharge instructions with significant other and patient  Significant Findings, Care, Treatment and Services Provided: Xr Chest Portable    Result Date: 12/22/2018  Impression: Fracture of the left 4th, 5th and 6th rib seen Mild left basilar density seen suggests atelectasis No pneumothorax seen Workstation performed: [id number]     Ct Chest Without Contrast    Result Date: 12/19/2018  Impression: Mildly displaced left 3rd through 6th rib fractures  Small amount of adjacent pleural catheter and fluid seen adjacent to the posterior fracture sites  Small amount of soft tissue emphysema as well  Workstation performed: [id number]     Ct Cervical Spine Without Contrast    Result Date: 12/19/2018  Impression: No cervical spine fracture or traumatic malalignment    Workstation performed: [id number]     Ct Recon Only Thoracic Spine    Result Date: 12/19/2018  Impression: No fracture or traumatic subluxation of the thoracic spine  Please see separate CT of the chest report for chest wall findings  Workstation performed: [id number]     Us Kidney And Bladder    Result Date: 12/23/2018  Impression: No hydronephrosis  Workstation performed: [id number]       Complications: elevated Creat    Discharge Diagnosis: S/P Fall  Rib fractures  Essential hypertension  Diabetes mellitus  CKD    Resolved Problems  Date Reviewed: 12/23/2018    None        Alert today and oriented  Lungs CTA bilateraly, no shortness of breath  2200 on IS and does it faithfully  RRR, no complaint of chest pain, just soreness on left side by rib fractures  Abdomen is soft, appetite fair, requires encouragement to eat  Neuro intact  Cleared by Nephrology for discharge    Condition at Discharge: stable         Discharge instructions/Information to patient and family:   See after visit summary for information provided to patient and family  Provisions for Follow-Up Care:  See after visit summary for information related to follow-up care and any pertinent home health orders  PCP: Guido Begum MD    Disposition: Home    Planned Readmission: No    Discharge Statement   I spent 30 minutes discharging the patient  This time was spent on the day of discharge  I had direct contact with the patient on the day of discharge  Additional documentation is required if more than 30 minutes were spent on discharge  Discharge Medications:  See after visit summary for reconciled discharge medications provided to patient and family

## 2018-12-23 NOTE — DISCHARGE INSTRUCTIONS
Acute Kidney Injury, Ambulatory Care   GENERAL INFORMATION:   Acute kidney injury  happens when your kidneys suddenly stop working correctly  Normally, the kidneys turn fluid, chemicals, and waste from your blood into urine  In acute kidney injury, your kidneys can no longer do this  In most cases, it is temporary, but it may become a chronic kidney condition  Common symptoms include the following:   · Decreased urination or dark-colored urine    · Swelling in your arms, legs, or feet     · Abdominal or low back pain    · Vomiting, diarrhea, or loss of appetite    · Fatigue     · Skin rash  Seek immediate care for the following symptoms:   · Heart beating faster than normal for you    · Sudden chest pain or trouble breathing    · Seizure  Treatment for acute kidney injury:  Treatment depends upon the cause of your acute kidney injury and how severe it is  Medicines may be given to increase blood flow to your kidneys and protect your kidneys  You may also need medicine to decrease inflammation in your kidneys  You may be given IV fluids to replenish fluids and help your heart pump blood  Dialysis may be needed to remove chemicals and waste from your blood when your kidneys cannot  Manage acute kidney injury:   · Manage other health conditions  Care for your diabetes, high blood pressure, or heart disease  These conditions increase your risk for acute kidney injury  · Talk to your healthcare provider before you take over-the-counter-medicine  NSAIDs, stomach medicine, or laxatives may harm your kidneys and increase your risk for acute kidney injury  Follow up with your healthcare provider as directed:  Write down your questions so you remember to ask them during your visits  CARE AGREEMENT:   You have the right to help plan your care  Learn about your health condition and how it may be treated  Discuss treatment options with your caregivers to decide what care you want to receive   You always have the right to refuse treatment  The above information is an  only  It is not intended as medical advice for individual conditions or treatments  Talk to your doctor, nurse or pharmacist before following any medical regimen to see if it is safe and effective for you  © 2014 [address] is for End User's use only and may not be sold, redistributed or otherwise used for commercial purposes  All illustrations and images included in CareNotes® are the copyrighted property of A D A M , Inc  or Luis Eduardo Eric    Incentive spirometer every hour while awake

## 2018-12-23 NOTE — PROGRESS NOTES
Pt fasting BS-48  Patient asymptomatic at present  Notified CHERELLE Guillen with trauma of same  Not instituting hypoglycemia protocol at present  Rechecked BS-55  Given orange juice and will recheck BS in half hour

## 2018-12-24 ENCOUNTER — TELEPHONE (OUTPATIENT)
Dept: FAMILY MEDICINE CLINIC | Facility: CLINIC | Age: 58
End: 2018-12-24

## 2018-12-24 NOTE — UTILIZATION REVIEW
Initial Clinical Review    Admission: Date/Time/Statement: Observation 12/19 and changed to Inpatient on 12/21 @ 1958  Pt requiring further stay for Treatment of Acute on Chronic Kidney Failure - Elevated creat  IVF  12/19 Transfer from [facility] [place] [address]  ED     Inpatient Admission     Standing Status:   Standing     Number of Occurrences:   1     Order Specific Question:   Admitting Physician     Answer:   Deonte Neville [[id number]]     Order Specific Question:   Level of Care     Answer:   Med Surg [16]     Order Specific Question:   Estimated length of stay     Answer:   More than 2 Midnights     Order Specific Question:   Certification     Answer:   I certify that inpatient services are medically necessary for this patient for a duration of greater than two midnights  See H&P and MD Progress Notes for additional information about the patient's course of treatment  ED: Date/Time/Mode of Arrival:   ED Arrival Information     Expected Arrival Acuity Means of Arrival Escorted By Service Admission Type    12/19/2018 12/19/2018 13:54 Immediate Ambulance [facility]) Trauma Urgent    Arrival Complaint    -          Chief Complaint:   Chief Complaint   Patient presents with    Fall       History of Illness: 67 yo male debilitated by previous CVA with left sided weakness, ambulatory with cane and walker, uses lift chair to get up and down stairs at home, accidentally slipped out of the chair as he was going down        ED Vital Signs:   ED Triage Vitals   Temperature Pulse Respirations Blood Pressure SpO2   12/19/18 1359 12/19/18 1359 12/19/18 1359 12/19/18 1359 12/19/18 1359   98 3 °F (36 8 °C) 62 16 (!) 222/96 97 %      Temp Source Heart Rate Source Patient Position - Orthostatic VS BP Location FiO2 (%)   12/19/18 1359 12/19/18 1359 12/19/18 1359 12/19/18 1626 --   Oral Monitor Lying Right arm       Pain Score       12/19/18 1359       8          Wt Readings from Last 1 Encounters: 12/19/18 60 8 kg (134 lb)       Vital Signs (abnormal):   12/19/18 1626  --  71  16   198/98  96 %  None (Room air)  Lying   12/19/18 1600  --  68  16   214/99  96 %  --  --   12/19/18 1530  --  65  16   198/98  96 %  --  --   12/19/18 1505  --  64  18   214/99  100 %  --  Lying   12/19/18 1450  --  62  18   195/90  100 %         Abnormal Labs:    Bun/Cr = 41/ 4 48  Wbc = 15 78  12/19 H/H = 13 0/39 0  12/20 H/H = 11 2/ 33 4  Glucose = 329, 359    Diagnostic Test Results: CT Cervical Spine - No cervical spine fracture or traumatic malalignment    CT Chest - Mildly displaced left 3rd through 6th rib fractures  Small amount of adjacent pleural catheter and fluid seen adjacent to the posterior fracture sites  Small amount of soft tissue emphysema as well  ED Treatment:   Medication Administration from 12/19/2018 1339 to 12/19/2018 1729       Date/Time Order Dose Route Action     12/19/2018 1648 ondansetron (ZOFRAN) injection 4 mg 4 mg Intravenous Given     12/19/2018 1649 lidocaine (LIDODERM) 5 % patch 1 patch 1 patch Topical Medication Applied     12/19/2018 1649 methocarbamol (ROBAXIN) tablet 750 mg 750 mg Oral Given     12/19/2018 1649 oxyCODONE (ROXICODONE) IR tablet 5 mg 5 mg Oral Given     12/19/2018 1648 dicyclomine (BENTYL) tablet 20 mg 20 mg Oral Given     12/19/2018 1648 hydrALAZINE (APRESOLINE) injection 5 mg 5 mg Intravenous Given          Past Medical/Surgical History:    Active Ambulatory Problems     Diagnosis Date Noted    Ambulatory dysfunction 02/22/2016    Anxiety 11/05/2014    Cerebral infarction ([address] ) 10/09/2014    Constipation 01/23/2013    Diabetes mellitus ([address] ) 07/24/2014    Difficulty walking 04/03/2014    Essential hypertension 09/17/2018    Gait disturbance 11/27/2013    Gastroparesis 04/10/2013    Chronic narcotic use 09/28/2018    Violation of controlled substance agreement 09/28/2018    Low back pain 01/22/2018     Resolved Ambulatory Problems     Diagnosis Date Noted    No Resolved Ambulatory Problems     Past Medical History:   Diagnosis Date    Anxiety     CAD (coronary artery disease)     Chronic kidney disease, stage IV (severe) (HCC)     Concussion     Depression     Diabetes mellitus (HCC)     Failure to gain weight in adult     Gastroparesis     GERD (gastroesophageal reflux disease)     Hyperkalemia     Hyperlipidemia     Hypertension     Insomnia     Late effects of cerebrovascular disease     Overflow incontinence     Renal disorder     Secondary hyperparathyroidism ([address] )     Stroke [facility])        Admitting Diagnosis: Trauma [T14 90XA]    Age/Sex: 66 y o  male    Assessment/Plan:   59y Male, transfer from [facility] ED with S/P Falling down the stairs  Patient lost his balance while trying to sit onto his chair at the top of the steps and fell down about 12 stairs  Denies LOC  Pt is being for S/P Fall/ L 3-6th fracture and associated small JUVENTINO/ CKD/ Hypertension  Rib fracture protocol  Pain control  Pulmonary toileting  DVT   Resume home meds  Trend creatinine      Admission Orders:  Scheduled Meds:   Current Facility-Administered Medications:  acetaminophen 975 mg Oral Q8H [address]    ALPRAZolam 0 5 mg Oral Daily    amLODIPine 10 mg Oral Daily    ARIPiprazole 4 mg Oral Daily    aspirin 81 mg Oral Daily    buPROPion 100 mg Oral BID    dicyclomine 20 mg Oral Q6H    docusate sodium 100 mg Oral BID    doxazosin 2 mg Oral BID    enoxaparin 30 mg Subcutaneous Daily    furosemide 40 mg Oral Daily    insulin glargine 24 Units Subcutaneous HS    insulin lispro 1-5 Units Subcutaneous TID AC    lidocaine 1 patch Topical Daily    metoprolol succinate 25 mg Oral Daily    pantoprazole 40 mg Oral Early Morning    pioglitazone 15 mg Oral Daily    senna 1 tablet Oral Daily    traZODone 100 mg Oral HS      Continuous Infusions:     PRN Meds: fentanyl citrate (PF)    hydrALAZINE    methocarbamol    metoclopramide    oxyCODONE po x2    -----------------------------------------------------------------------------------------------------------    12/21 Progress notes:  Acute on chronic kidney failure/ Rib fractures - Left 3-6th   Trend creatinine, today 5 7  Baseline appears to be around 3 3-3 6  Nephrology consult  NS @ 100 ml/hr  Multimodal analgesia  Aggressive IS  PT/OT     -------------------------------------------------------------------------------------------------    12/21 Nephrology cons:  Admission creatinine 4 48, creatinine continues to trend up and is now up to 5 78  no improvement with isotonic volume resuscitation suggesting that this is not consistent with prerenal azotemia  UA  Renal US  Postvoid residual  Pt reports no recent NSAIDs  Reduced Cardura to 1 mg bid  Monitor I&O  If his renal function continues to worsen we may need to consider starting him on hemodialysis I have discussed this with him he seems to be not open to this idea  Anemia - Check serum protein electrophoresis  Check iron studies    12/22  kidney and bladder - No hydronephrosis        12/21/18 0455    Hemoglobin 12 0 - 17 0 g/dL 10 0     Hematocrit 36 5 - 49 3 % 30 7       BUN 5 - 25 mg/dL 46     Creatinine 0 60 - 1 30 mg/dL 5 78     Glucose 65 - 140 mg/dL 56         sodium chloride 0 9 % infusion  Rate: 100 mL/hr Dose: 100 mL/hr  Freq: Continuous Route: IV  Start: 12/19/18 @ 1600    PRN Meds:  12/21 Robaxin po x1  12/21 Oxycodone po x2

## 2018-12-25 LAB
ALBUMIN SERPL ELPH-MCNC: 2.71 G/DL (ref 3.5–5)
ALBUMIN SERPL ELPH-MCNC: 54.2 % (ref 52–65)
ALPHA1 GLOB SERPL ELPH-MCNC: 0.37 G/DL (ref 0.1–0.4)
ALPHA1 GLOB SERPL ELPH-MCNC: 7.4 % (ref 2.5–5)
ALPHA2 GLOB SERPL ELPH-MCNC: 0.7 G/DL (ref 0.4–1.2)
ALPHA2 GLOB SERPL ELPH-MCNC: 13.9 % (ref 7–13)
BETA GLOB ABNORMAL SERPL ELPH-MCNC: 0.32 G/DL (ref 0.4–0.8)
BETA1 GLOB SERPL ELPH-MCNC: 6.4 % (ref 5–13)
BETA2 GLOB SERPL ELPH-MCNC: 5.3 % (ref 2–8)
BETA2+GAMMA GLOB SERPL ELPH-MCNC: 0.27 G/DL (ref 0.2–0.5)
GAMMA GLOB ABNORMAL SERPL ELPH-MCNC: 0.64 G/DL (ref 0.5–1.6)
GAMMA GLOB SERPL ELPH-MCNC: 12.8 % (ref 12–22)
IGG/ALB SER: 1.18 {RATIO} (ref 1.1–1.8)
PROT PATTERN SERPL ELPH-IMP: ABNORMAL
PROT SERPL-MCNC: 5 G/DL (ref 6.4–8.2)

## 2018-12-26 LAB
KAPPA LC FREE SER-MCNC: 90.3 MG/L (ref 3.3–19.4)
KAPPA LC FREE/LAMBDA FREE SER: 1.26 {RATIO} (ref 0.26–1.65)
LAMBDA LC FREE SERPL-MCNC: 71.4 MG/L (ref 5.7–26.3)

## 2018-12-26 NOTE — UTILIZATION REVIEW
Notification of Discharge  This is a Notification of Discharge from our facility [address]  Please be advised that this patient has been discharge from our facility  Below you will find the admission and discharge date and time including the patients disposition  PRESENTATION DATE: 12/19/2018  1:54 PM  IP ADMISSION DATE: 12/21/18 1959  DISCHARGE DATE: 12/23/2018  3:43 PM  DISPOSITION: [id number] Utilization Review Department  Phone: [phone]; Fax [phone]  ATTENTION: Please call with any questions or concerns to [phone]  and carefully listen to the prompts so that you are directed to the right person  Send all requests for admission clinical reviews, approved or denied determinations and any other requests to fax [phone]   All voicemails are confidential

## 2018-12-27 ENCOUNTER — PATIENT OUTREACH (OUTPATIENT)
Dept: FAMILY MEDICINE CLINIC | Facility: CLINIC | Age: 58
End: 2018-12-27

## 2018-12-27 ENCOUNTER — TELEPHONE (OUTPATIENT)
Dept: FAMILY MEDICINE CLINIC | Facility: CLINIC | Age: 58
End: 2018-12-27

## 2018-12-27 NOTE — TELEPHONE ENCOUNTER
I called Adelina Dickson and she states she got a clarification from PT Office  The referral was for [employer] evaluation  Now pt was recently discharge from [facility] for Rib fractures and desiree states the oxycodone 5 mg  that was prescribed doesn't provide any relief to pt, pt is in pain  Adelina Dickson states that she is working on get medical marijuana for pt,that Dr Devera Dancer is aware of it  That Dr Devera Dancer offered them to help with temporary supply of oxycodone 10 mg

## 2018-12-27 NOTE — TELEPHONE ENCOUNTER
From what I get from CF note is that he was referred to PT for history of stroke to improve function and decrease pain  Also I saw that he fell down the steps after so PT can help improve coordination/ambulation and prevent falls

## 2018-12-27 NOTE — TELEPHONE ENCOUNTER
I called Liu Sanderson again and explain that won't be possible to prescribe the oxycodone according to dr Carmel Nieto note we can wait until dr Ricardo Murcia get here for further revision

## 2018-12-27 NOTE — TELEPHONE ENCOUNTER
Cf pt    Pt spouse is calling questioning why is physical therapy calling her to set up an appt with the pt for Cerebral infarction    She has no idea what that is and nothing was spoken about that the last time they were here 12/17/18    She would like a call back as soon as possible

## 2019-01-02 ENCOUNTER — TELEPHONE (OUTPATIENT)
Dept: FAMILY MEDICINE CLINIC | Facility: CLINIC | Age: 59
End: 2019-01-02

## 2019-01-02 NOTE — TELEPHONE ENCOUNTER
As I explained at the visit, we will NO longer provide narcotics for patient given his UDS results     Can refer to pain management if they wish

## 2019-01-03 ENCOUNTER — PATIENT OUTREACH (OUTPATIENT)
Dept: FAMILY MEDICINE CLINIC | Facility: CLINIC | Age: 59
End: 2019-01-03

## 2019-01-03 ENCOUNTER — OFFICE VISIT (OUTPATIENT)
Dept: SURGERY | Facility: CLINIC | Age: 59
End: 2019-01-03
Payer: COMMERCIAL

## 2019-01-03 VITALS
BODY MASS INDEX: 20.44 KG/M2 | TEMPERATURE: 96.5 F | HEIGHT: 67 IN | WEIGHT: 130.2 LBS | DIASTOLIC BLOOD PRESSURE: 82 MMHG | SYSTOLIC BLOOD PRESSURE: 154 MMHG

## 2019-01-03 DIAGNOSIS — S22.31XD CLOSED FRACTURE OF ONE RIB OF RIGHT SIDE WITH ROUTINE HEALING, SUBSEQUENT ENCOUNTER: Primary | ICD-10-CM

## 2019-01-03 DIAGNOSIS — N18.9 CHRONIC KIDNEY DISEASE, UNSPECIFIED CKD STAGE: ICD-10-CM

## 2019-01-03 DIAGNOSIS — K31.84 GASTROPARESIS: ICD-10-CM

## 2019-01-03 DIAGNOSIS — M54.50 CHRONIC BILATERAL LOW BACK PAIN WITHOUT SCIATICA: Primary | ICD-10-CM

## 2019-01-03 DIAGNOSIS — G89.29 CHRONIC BILATERAL LOW BACK PAIN WITHOUT SCIATICA: Primary | ICD-10-CM

## 2019-01-03 DIAGNOSIS — S22.31XA CLOSED FRACTURE OF ONE RIB OF RIGHT SIDE, INITIAL ENCOUNTER: ICD-10-CM

## 2019-01-03 PROCEDURE — 99213 OFFICE O/P EST LOW 20 MIN: CPT | Performed by: PHYSICIAN ASSISTANT

## 2019-01-03 RX ORDER — METHOCARBAMOL 750 MG/1
750 TABLET, FILM COATED ORAL EVERY 6 HOURS PRN
Qty: 30 TABLET | Refills: 0 | Status: SHIPPED | OUTPATIENT
Start: 2019-01-03 | End: 2020-01-01 | Stop reason: HOSPADM

## 2019-01-03 RX ORDER — OXYCODONE HYDROCHLORIDE 5 MG/1
5 TABLET ORAL EVERY 8 HOURS PRN
Qty: 15 TABLET | Refills: 0 | Status: SHIPPED | OUTPATIENT
Start: 2019-01-03 | End: 2019-01-13

## 2019-01-03 RX ORDER — PANTOPRAZOLE SODIUM 40 MG/1
40 TABLET, DELAYED RELEASE ORAL
Qty: 90 TABLET | Refills: 1 | Status: SHIPPED | OUTPATIENT
Start: 2019-01-03 | End: 2019-06-03 | Stop reason: SDUPTHER

## 2019-01-03 NOTE — PROGRESS NOTES
Office Visit - [address] [facility] Trip MRN: [id number]  Encounter: [id number]    Assessment and Plan    Problem List Items Addressed This Visit     Rib fractures - Primary     -taper off narcotics  -continue p r n  Pain management  -no further chest x-ray  -continue to follow with PCP  -saturation 95%             Other Visit Diagnoses     Chronic kidney disease, unspecified CKD stage        Relevant Medications    methocarbamol (ROBAXIN) 750 mg tablet        Disposition:  Patient given another script for Robaxin for his rib fractures  Patient will be discharged from the trauma service  Patient follow-up with PCP  Chief Complaint:  Elliot Sandoval is a 66 y o  male who presents for Fall (f/u fall)    Subjective  Patient offers no complaints at this visit  Reports his pain continues to improve slightly  Reports he continues to see his primary care provider  Denies any cough or congestion  Denies any fevers, chills, sweats  Denies any new chest pain or shortness of breath      Past Medical History  Past Medical History:   Diagnosis Date    Anxiety     CAD (coronary artery disease)     last assessed 4/10/13    Chronic kidney disease, stage IV (severe) ([facility] [address] [facility]     Concussion     last assessed 9/3/14    Depression     Diabetes mellitus ([facility] [address] )     type 2    Failure to gain weight in adult     last assessed 2/24/14    Gastroparesis     GERD (gastroesophageal reflux disease)     Hyperkalemia     last assessed 11/9/15    Hyperlipidemia     Hypertension     accelerated essential last assessed 10/7/16    Insomnia     Late effects of cerebrovascular disease     Overflow incontinence     last assessed 7/24/14    Renal disorder     Secondary hyperparathyroidism ([facility] [address] )     Stroke [facility])        Past Surgical History  Past Surgical History:   Procedure Laterality Date    COLONOSCOPY      HERNIA REPAIR      NH ESOPHAGOGASTRODUODENOSCOPY TRANSORAL DIAGNOSTIC N/A 2/16/2017    Procedure: ESOPHAGOGASTRODUODENOSCOPY (EGD) with biopsy;  Surgeon: Annita Wu DO;  Location: [facility]; Service: Gastroenterology    [place] ESOPHAGOGASTRODUODENOSCOPY TRANSORAL DIAGNOSTIC N/A 6/2/2017    Procedure: ESOPHAGOGASTRODUODENOSCOPY (EGD) with bx;  Surgeon: Annita Wu DO;  Location: [facility];   Service: Gastroenterology    TYMPANOSTOMY TUBE PLACEMENT         Family History  Family History   Problem Relation Age of Onset    Cancer Family         pt and friend denies this hx    Hypertension Family         essential    Hyperlipidemia Family     Diabetes Mother     No Known Problems Father        Social History  Social History     Social History    Marital status: Single     Spouse name: N/A    Number of children: N/A    Years of education: N/A     Social History Main Topics    Smoking status: Never Smoker    Smokeless tobacco: Never Used    Alcohol use No      Comment: social    Drug use: Yes     Types: Marijuana      Comment: smokes once keven while for pain 1 xper  week    Sexual activity: Not Asked     Other Topics Concern    None     Social History Narrative    Advance directive declined by pt    Caffeine use    No preference or Sikh beliefs    Social hx reviewed, unchanged        Medications  Current Outpatient Prescriptions on File Prior to Visit   Medication Sig Dispense Refill    acetaminophen (TYLENOL) 325 mg tablet Take 3 tablets (975 mg total) by mouth every 8 (eight) hours 30 tablet 0    ALPRAZolam (XANAX) 0 5 mg tablet Take 0 5 mg by mouth daily      amLODIPine (NORVASC) 10 mg tablet Take 10 mg by mouth daily      ARIPiprazole (ABILIFY) 2 mg tablet Take 4 mg by mouth daily      aspirin 81 mg chewable tablet Chew 1 tablet (81 mg total) daily 30 tablet 0    buPROPion (WELLBUTRIN) 100 mg tablet Take 1 tablet (100 mg total) by mouth 2 (two) times a day 180 tablet 0    dicyclomine (BENTYL) 20 mg tablet Take 1 tablet by mouth every 6 (six) hours (Patient taking differently: Take 20 mg by mouth daily as needed  ) 20 tablet 0    doxazosin (CARDURA) 1 mg tablet Take 2 mg by mouth 2 (two) times a day Take 1 1/2 tablet by mouth twice a day       furosemide (LASIX) 40 mg tablet Take by mouth daily      Insulin Glargine (TOUJEO SOLOSTAR) 300 UNIT/ML SOPN Inject 24 Units under the skin daily at bedtime        Liraglutide (VICTOZA) 18 MG/3ML SOPN Inject 1 8 mg under the skin daily at bedtime as needed        lubiprostone (AMITIZA) 24 mcg capsule Take 24 mcg by mouth 2 (two) times a day with meals      metoprolol succinate (TOPROL-XL) 25 mg 24 hr tablet Take 25 mg by mouth daily      paricalcitol (ZEMPLAR) 1 mcg capsule Take 2 mcg by mouth daily        pioglitazone (ACTOS) 15 mg tablet Take 15 mg by mouth daily      sodium polystyrene sulfonate (KAYEXALATE) 15 g/60 mL suspension Take 15 g by mouth as needed Take 120ml by mouth single dose        traZODone (DESYREL) 100 mg tablet Take 100 mg by mouth daily at bedtime      lovastatin (MEVACOR) 20 mg tablet Take 20 mg by mouth daily       No current facility-administered medications on file prior to visit  Allergies  No Known Allergies    Review of Systems   Constitutional: Negative  HENT: Negative  Eyes: Negative  Respiratory: Negative for apnea, chest tightness, shortness of breath and wheezing  Cardiovascular: Negative  Gastrointestinal: Negative  Genitourinary: Negative  Musculoskeletal: Negative  Skin: Negative  Neurological: Negative  Hematological: Negative  Objective  Vitals:    01/03/19 1519   BP: 154/82   Temp: (!) 96 5 °F (35 8 °C)       Physical Exam   Constitutional: He is oriented to person, place, and time  He appears well-developed and well-nourished  No distress  HENT:   Head: Normocephalic and atraumatic  Eyes: Pupils are equal, round, and reactive to light  Conjunctivae and EOM are normal    Neck: Normal range of motion  Neck supple     Cardiovascular: Normal rate, regular rhythm, normal heart sounds and intact distal pulses  Pulmonary/Chest: Effort normal and breath sounds normal  No respiratory distress  He has no wheezes  He exhibits no tenderness  Saturation 95%   Abdominal: Soft  Bowel sounds are normal  He exhibits no distension  There is no tenderness  There is no rebound and no guarding  Musculoskeletal: Normal range of motion  Neurological: He is alert and oriented to person, place, and time  No cranial nerve deficit  Skin: Skin is warm and dry  No erythema  Vitals reviewed

## 2019-01-03 NOTE — PROGRESS NOTES
At patient's last visit, we; patient, his significant other Arronanabell Márquez and I, discussed that given his UDS was positive for Marijuana and he had not disclosed to me his Marijuana use prior to the UDS I will NO longer be able to prescribe Oxycodone for him  Shortly after the visit patient fell and broke 3 ribs  He was prescribed Oxycodone short term (10 days) to help with pain  Patient's significant other has called multiple times requesting further refills of Oxycodone    Given his new injury I will prescribe 15 tablets of Oxycodone 5 mg, to help with acute pain, but will NOT refill any further narcotics for the patient   I strongly suggested patient see Pain Management

## 2019-01-04 NOTE — ASSESSMENT & PLAN NOTE
-taper off narcotics  -continue p r n   Pain management  -no further chest x-ray  -continue to follow with PCP  -saturation 95%

## 2019-01-09 ENCOUNTER — TELEPHONE (OUTPATIENT)
Dept: FAMILY MEDICINE CLINIC | Facility: CLINIC | Age: 59
End: 2019-01-09

## 2019-01-16 ENCOUNTER — PATIENT OUTREACH (OUTPATIENT)
Dept: FAMILY MEDICINE CLINIC | Facility: CLINIC | Age: 59
End: 2019-01-16

## 2019-01-17 ENCOUNTER — OFFICE VISIT (OUTPATIENT)
Dept: FAMILY MEDICINE CLINIC | Facility: CLINIC | Age: 59
End: 2019-01-17

## 2019-01-17 ENCOUNTER — PATIENT OUTREACH (OUTPATIENT)
Dept: FAMILY MEDICINE CLINIC | Facility: CLINIC | Age: 59
End: 2019-01-17

## 2019-01-17 VITALS
TEMPERATURE: 95.5 F | OXYGEN SATURATION: 97 % | HEIGHT: 67 IN | BODY MASS INDEX: 18.91 KG/M2 | SYSTOLIC BLOOD PRESSURE: 146 MMHG | RESPIRATION RATE: 18 BRPM | WEIGHT: 120.5 LBS | HEART RATE: 68 BPM | DIASTOLIC BLOOD PRESSURE: 80 MMHG

## 2019-01-17 DIAGNOSIS — S22.31XD CLOSED FRACTURE OF ONE RIB OF RIGHT SIDE WITH ROUTINE HEALING, SUBSEQUENT ENCOUNTER: Primary | ICD-10-CM

## 2019-01-17 DIAGNOSIS — F11.90 CHRONIC NARCOTIC USE: ICD-10-CM

## 2019-01-17 PROBLEM — N18.4 CHRONIC KIDNEY DISEASE, STAGE IV (SEVERE) (HCC): Status: ACTIVE | Noted: 2019-01-17

## 2019-01-17 PROCEDURE — 99213 OFFICE O/P EST LOW 20 MIN: CPT | Performed by: FAMILY MEDICINE

## 2019-01-17 NOTE — PROGRESS NOTES
Assessment/Plan:    Chronic kidney disease, stage IV (severe) ([address] )  Follows with Nephrology Dr Rajwinder Donald   Patient's significant other states patient and his family are aware he will need HD soon  Chronic narcotic use  Patient is scheduled to see Dr Adal Fischer at [facility] for medical marijuana and pain management on 1/31/19     Rib fractures  Trauma note reviewed  Pain management appointment is scheduled          Problem List Items Addressed This Visit        Musculoskeletal and Integument    Rib fractures - Primary     Trauma note reviewed  Pain management appointment is scheduled             Other    Chronic narcotic use     Patient is scheduled to see Dr Adal Fischer at [facility] for medical marijuana and pain management on 1/31/19                  Subjective:      Patient ID: Debby Luna is a 66 y o  male  65 yo male with DM, CKD stage IV, anemia of chronic disease, gastroparesis, HTN, s/p CVA with resulting hemiparesia and gait dysfunction here for follow up of 4th, 5th and 6th L rib fracture s/p fall on 12/22/19  Patient was seen by trauma surgery after inpatient discharge  He states pain has been improving  He missed a step when he was getting on his chair stair and tumbled down a flight of steps  His main concern today is his lack of appetite  Patient's significant other Celeste Day, states he was told by Nephrologist Dr Rajwinder Donald that he will soon require HD given his increasingly worse renal function  Patient is very upset about this  The following portions of the patient's history were reviewed and updated as appropriate:   He  has a past medical history of Anxiety; CAD (coronary artery disease); Chronic kidney disease, stage IV (severe) ([address] ); Concussion; Depression; Diabetes mellitus ([address] ); Failure to gain weight in adult; Gastroparesis; GERD (gastroesophageal reflux disease); Hyperkalemia; Hyperlipidemia; Hypertension; Insomnia; Late effects of cerebrovascular disease;  Overflow incontinence; Renal disorder; Secondary hyperparathyroidism ([place] [address] ); and Stroke ([place] [address] )  He   Patient Active Problem List    Diagnosis Date Noted    Chronic kidney disease, stage IV (severe) ([place] [address] ) 01/17/2019    Acute on chronic kidney failure ([place] [address] ) 12/21/2018    Rib fractures 12/19/2018    Chronic narcotic use 09/28/2018    Violation of controlled substance agreement 09/28/2018    Essential hypertension 09/17/2018    Low back pain 01/22/2018    Ambulatory dysfunction 02/22/2016    Anxiety 11/05/2014    Cerebral infarction ([place] [address] ) 10/09/2014    Diabetes mellitus ([place] [address] ) 07/24/2014    Difficulty walking 04/03/2014    Gait disturbance 11/27/2013    Gastroparesis 04/10/2013    Constipation 01/23/2013     He  has a past surgical history that includes Tympanostomy tube placement; Hernia repair; pr esophagogastroduodenoscopy transoral diagnostic (N/A, 2/16/2017); Colonoscopy; and pr esophagogastroduodenoscopy transoral diagnostic (N/A, 6/2/2017)  His family history includes Cancer in his family; Diabetes in his mother; Hyperlipidemia in his family; Hypertension in his family; No Known Problems in his father  He  reports that he has never smoked  He has never used smokeless tobacco  He reports that he uses drugs, including Marijuana  He reports that he does not drink alcohol    Current Outpatient Prescriptions   Medication Sig Dispense Refill    acetaminophen (TYLENOL) 325 mg tablet Take 3 tablets (975 mg total) by mouth every 8 (eight) hours 30 tablet 0    ALPRAZolam (XANAX) 0 5 mg tablet Take 0 5 mg by mouth daily      amLODIPine (NORVASC) 10 mg tablet Take 10 mg by mouth daily      ARIPiprazole (ABILIFY) 2 mg tablet Take 4 mg by mouth daily      aspirin 81 mg chewable tablet Chew 1 tablet (81 mg total) daily 30 tablet 0    buPROPion (WELLBUTRIN) 100 mg tablet Take 1 tablet (100 mg total) by mouth 2 (two) times a day 180 tablet 0    dicyclomine (BENTYL) 20 mg tablet Take 1 tablet by mouth every 6 (six) hours (Patient taking differently: Take 20 mg by mouth daily as needed  ) 20 tablet 0    doxazosin (CARDURA) 1 mg tablet Take 2 mg by mouth 2 (two) times a day Take 1 1/2 tablet by mouth twice a day       furosemide (LASIX) 40 mg tablet Take by mouth daily      Insulin Glargine (TOUJEO SOLOSTAR) 300 UNIT/ML SOPN Inject 24 Units under the skin daily at bedtime        Liraglutide (VICTOZA) 18 MG/3ML SOPN Inject 1 8 mg under the skin daily at bedtime as needed        lovastatin (MEVACOR) 20 mg tablet Take 20 mg by mouth daily      lubiprostone (AMITIZA) 24 mcg capsule Take 24 mcg by mouth 2 (two) times a day with meals      methocarbamol (ROBAXIN) 750 mg tablet Take 1 tablet (750 mg total) by mouth every 6 (six) hours as needed for muscle spasms 30 tablet 0    metoprolol succinate (TOPROL-XL) 25 mg 24 hr tablet Take 25 mg by mouth daily      pantoprazole (PROTONIX) 40 mg tablet Take 1 tablet (40 mg total) by mouth daily in the early morning 90 tablet 1    paricalcitol (ZEMPLAR) 1 mcg capsule Take 2 mcg by mouth daily        pioglitazone (ACTOS) 15 mg tablet Take 15 mg by mouth daily      sodium polystyrene sulfonate (KAYEXALATE) 15 g/60 mL suspension Take 15 g by mouth as needed Take 120ml by mouth single dose        traZODone (DESYREL) 100 mg tablet Take 100 mg by mouth daily at bedtime       No current facility-administered medications for this visit        Current Outpatient Prescriptions on File Prior to Visit   Medication Sig    acetaminophen (TYLENOL) 325 mg tablet Take 3 tablets (975 mg total) by mouth every 8 (eight) hours    ALPRAZolam (XANAX) 0 5 mg tablet Take 0 5 mg by mouth daily    amLODIPine (NORVASC) 10 mg tablet Take 10 mg by mouth daily    ARIPiprazole (ABILIFY) 2 mg tablet Take 4 mg by mouth daily    aspirin 81 mg chewable tablet Chew 1 tablet (81 mg total) daily    buPROPion (WELLBUTRIN) 100 mg tablet Take 1 tablet (100 mg total) by mouth 2 (two) times a day    dicyclomine (BENTYL) 20 mg tablet Take 1 tablet by mouth every 6 (six) hours (Patient taking differently: Take 20 mg by mouth daily as needed  )    doxazosin (CARDURA) 1 mg tablet Take 2 mg by mouth 2 (two) times a day Take 1 1/2 tablet by mouth twice a day     furosemide (LASIX) 40 mg tablet Take by mouth daily    Insulin Glargine (TOUJEO SOLOSTAR) 300 UNIT/ML SOPN Inject 24 Units under the skin daily at bedtime      Liraglutide (VICTOZA) 18 MG/3ML SOPN Inject 1 8 mg under the skin daily at bedtime as needed      lovastatin (MEVACOR) 20 mg tablet Take 20 mg by mouth daily    lubiprostone (AMITIZA) 24 mcg capsule Take 24 mcg by mouth 2 (two) times a day with meals    methocarbamol (ROBAXIN) 750 mg tablet Take 1 tablet (750 mg total) by mouth every 6 (six) hours as needed for muscle spasms    metoprolol succinate (TOPROL-XL) 25 mg 24 hr tablet Take 25 mg by mouth daily    pantoprazole (PROTONIX) 40 mg tablet Take 1 tablet (40 mg total) by mouth daily in the early morning    paricalcitol (ZEMPLAR) 1 mcg capsule Take 2 mcg by mouth daily      pioglitazone (ACTOS) 15 mg tablet Take 15 mg by mouth daily    sodium polystyrene sulfonate (KAYEXALATE) 15 g/60 mL suspension Take 15 g by mouth as needed Take 120ml by mouth single dose      traZODone (DESYREL) 100 mg tablet Take 100 mg by mouth daily at bedtime     No current facility-administered medications on file prior to visit       Review of Systems   Constitutional: Positive for appetite change and unexpected weight change  Musculoskeletal: Positive for arthralgias and back pain  Psychiatric/Behavioral: Positive for decreased concentration and sleep disturbance  All other systems reviewed and are negative          Objective:      /80 (BP Location: Right arm, Patient Position: Sitting, Cuff Size: Standard)   Pulse 68   Temp (!) 95 5 °F (35 3 °C) (Tympanic)   Resp 18   Ht 5' 7" (1 702 m)   Wt 54 7 kg (120 lb 8 oz)   SpO2 97%   BMI 18 87 kg/m² Physical Exam   Constitutional: He is oriented to person, place, and time  He appears well-developed  HENT:   Head: Normocephalic  Right Ear: External ear normal    Left Ear: External ear normal    Nose: Nose normal    Mouth/Throat: Oropharynx is clear and moist    Eyes: Pupils are equal, round, and reactive to light  Conjunctivae and EOM are normal    Neck: Normal range of motion  Neck supple  No thyromegaly present  Cardiovascular: Normal rate, regular rhythm and normal heart sounds  Pulmonary/Chest: Effort normal and breath sounds normal    Abdominal: Soft  There is no tenderness  There is no rebound and no guarding  Musculoskeletal: Normal range of motion  Neurological: He is alert and oriented to person, place, and time  He has normal reflexes  Skin: Skin is dry  Psychiatric: His speech is slurred  He is slowed and withdrawn  He exhibits a depressed mood  Nursing note and vitals reviewed

## 2019-01-17 NOTE — ASSESSMENT & PLAN NOTE
Patient is scheduled to see Dr Johnny Henley at [facility] for medical marijuana and pain management on 1/31/19

## 2019-01-17 NOTE — ASSESSMENT & PLAN NOTE
Follows with Nephrology Dr Lokesh Ritter   Patient's significant other states patient and his family are aware he will need HD soon

## 2019-01-24 NOTE — PROGRESS NOTES
FLIP Care Manager spoke with PT/partner in regards to care needs  PT's partner states she is the care giver for the PT and that she handles his appointments, medications, transportation, etc   PT's partner states no other issues at this time  FLIP provided contact information for any future needs

## 2019-01-29 DIAGNOSIS — R26.2 AMBULATORY DYSFUNCTION: ICD-10-CM

## 2019-01-29 DIAGNOSIS — I63.9 CEREBRAL INFARCTION, UNSPECIFIED MECHANISM (HCC): Primary | ICD-10-CM

## 2019-03-22 ENCOUNTER — TRANSCRIBE ORDERS (OUTPATIENT)
Dept: ADMINISTRATIVE | Facility: HOSPITAL | Age: 59
End: 2019-03-22

## 2019-03-22 ENCOUNTER — APPOINTMENT (OUTPATIENT)
Dept: LAB | Facility: HOSPITAL | Age: 59
End: 2019-03-22
Payer: COMMERCIAL

## 2019-03-22 DIAGNOSIS — E55.9 VITAMIN D DEFICIENCY DISEASE: ICD-10-CM

## 2019-03-22 DIAGNOSIS — D63.1 ANEMIA OF CHRONIC RENAL FAILURE, UNSPECIFIED CKD STAGE: ICD-10-CM

## 2019-03-22 DIAGNOSIS — Z79.4 ENCOUNTER FOR LONG-TERM (CURRENT) USE OF INSULIN (HCC): ICD-10-CM

## 2019-03-22 DIAGNOSIS — I12.9 PARENCHYMAL RENAL HYPERTENSION, STAGE 1 THROUGH STAGE 4 OR UNSPECIFIED CHRONIC KIDNEY DISEASE: ICD-10-CM

## 2019-03-22 DIAGNOSIS — D50.9 IRON DEFICIENCY ANEMIA, UNSPECIFIED IRON DEFICIENCY ANEMIA TYPE: ICD-10-CM

## 2019-03-22 DIAGNOSIS — E11.43 DIABETIC AUTONOMIC NEUROPATHY ASSOCIATED WITH TYPE 2 DIABETES MELLITUS (HCC): ICD-10-CM

## 2019-03-22 DIAGNOSIS — E87.5 HYPERPOTASSEMIA: ICD-10-CM

## 2019-03-22 DIAGNOSIS — N18.9 ANEMIA OF CHRONIC RENAL FAILURE, UNSPECIFIED CKD STAGE: ICD-10-CM

## 2019-03-22 DIAGNOSIS — E11.649 UNCONTROLLED TYPE 2 DIABETES MELLITUS WITH HYPOGLYCEMIA WITHOUT COMA (HCC): ICD-10-CM

## 2019-03-22 DIAGNOSIS — E11.21 DIABETIC GLOMERULOPATHY (HCC): ICD-10-CM

## 2019-03-22 DIAGNOSIS — N18.4 CHRONIC KIDNEY DISEASE, STAGE IV (SEVERE) (HCC): ICD-10-CM

## 2019-03-22 DIAGNOSIS — Z86.79 PERSONAL HISTORY OF CARDIOVASCULAR DISORDER: ICD-10-CM

## 2019-03-22 DIAGNOSIS — E78.49 FAMILIAL COMBINED HYPERLIPIDEMIA: ICD-10-CM

## 2019-03-22 DIAGNOSIS — N25.81 SECONDARY HYPERPARATHYROIDISM OF RENAL ORIGIN (HCC): ICD-10-CM

## 2019-03-22 DIAGNOSIS — N18.4 CHRONIC KIDNEY DISEASE, STAGE IV (SEVERE) (HCC): Primary | ICD-10-CM

## 2019-03-22 DIAGNOSIS — E78.49 FAMILIAL COMBINED HYPERLIPIDEMIA: Primary | ICD-10-CM

## 2019-03-22 LAB
ALBUMIN SERPL BCP-MCNC: 3.2 G/DL (ref 3.5–5)
ALP SERPL-CCNC: 170 U/L (ref 46–116)
ALT SERPL W P-5'-P-CCNC: 10 U/L (ref 12–78)
ANION GAP SERPL CALCULATED.3IONS-SCNC: 11 MMOL/L (ref 4–13)
AST SERPL W P-5'-P-CCNC: 15 U/L (ref 5–45)
BILIRUB SERPL-MCNC: 0.49 MG/DL (ref 0.2–1)
BUN SERPL-MCNC: 35 MG/DL (ref 5–25)
CALCIUM SERPL-MCNC: 8.8 MG/DL (ref 8.3–10.1)
CHLORIDE SERPL-SCNC: 106 MMOL/L (ref 100–108)
CHOLEST SERPL-MCNC: 180 MG/DL (ref 50–200)
CO2 SERPL-SCNC: 22 MMOL/L (ref 21–32)
CREAT SERPL-MCNC: 3.67 MG/DL (ref 0.6–1.3)
EST. AVERAGE GLUCOSE BLD GHB EST-MCNC: 169 MG/DL
GFR SERPL CREATININE-BSD FRML MDRD: 17 ML/MIN/1.73SQ M
GLUCOSE P FAST SERPL-MCNC: 179 MG/DL (ref 65–99)
HBA1C MFR BLD: 7.5 % (ref 4.2–6.3)
HCT VFR BLD AUTO: 38 % (ref 36.5–49.3)
HDLC SERPL-MCNC: 66 MG/DL (ref 40–60)
HGB BLD-MCNC: 12 G/DL (ref 12–17)
LDLC SERPL CALC-MCNC: 97 MG/DL (ref 0–100)
NONHDLC SERPL-MCNC: 114 MG/DL
PHOSPHATE SERPL-MCNC: 3.7 MG/DL (ref 2.7–4.5)
POTASSIUM SERPL-SCNC: 5.4 MMOL/L (ref 3.5–5.3)
PROT SERPL-MCNC: 6.9 G/DL (ref 6.4–8.2)
PTH-INTACT SERPL-MCNC: 305.6 PG/ML (ref 18.4–80.1)
SODIUM SERPL-SCNC: 139 MMOL/L (ref 136–145)
TRIGL SERPL-MCNC: 85 MG/DL

## 2019-03-22 PROCEDURE — 85018 HEMOGLOBIN: CPT

## 2019-03-22 PROCEDURE — 80061 LIPID PANEL: CPT

## 2019-03-22 PROCEDURE — 80053 COMPREHEN METABOLIC PANEL: CPT

## 2019-03-22 PROCEDURE — 85014 HEMATOCRIT: CPT

## 2019-03-22 PROCEDURE — 83970 ASSAY OF PARATHORMONE: CPT

## 2019-03-22 PROCEDURE — 80307 DRUG TEST PRSMV CHEM ANLYZR: CPT | Performed by: FAMILY MEDICINE

## 2019-03-22 PROCEDURE — 83036 HEMOGLOBIN GLYCOSYLATED A1C: CPT

## 2019-03-22 PROCEDURE — 36415 COLL VENOUS BLD VENIPUNCTURE: CPT

## 2019-03-22 PROCEDURE — 84100 ASSAY OF PHOSPHORUS: CPT

## 2019-03-28 LAB
AMPHETAMINES UR QL SCN: NEGATIVE NG/ML
BARBITURATES UR QL SCN: NEGATIVE NG/ML
BENZODIAZ UR QL: NEGATIVE NG/ML
BZE UR QL: NEGATIVE NG/ML
CANNABINOIDS UR QL SCN: POSITIVE
METHADONE UR QL SCN: NEGATIVE NG/ML
OPIATES UR QL: NEGATIVE NG/ML
PCP UR QL: NEGATIVE NG/ML
PROPOXYPH UR QL SCN: NEGATIVE NG/ML

## 2019-03-29 ENCOUNTER — EVALUATION (OUTPATIENT)
Dept: PHYSICAL THERAPY | Facility: REHABILITATION | Age: 59
End: 2019-03-29
Payer: COMMERCIAL

## 2019-03-29 DIAGNOSIS — I69.30 CHRONIC CEREBROVASCULAR ACCIDENT (CVA): Primary | ICD-10-CM

## 2019-03-29 DIAGNOSIS — R26.2 AMBULATORY DYSFUNCTION: ICD-10-CM

## 2019-03-29 PROCEDURE — 97163 PT EVAL HIGH COMPLEX 45 MIN: CPT

## 2019-03-29 NOTE — PROGRESS NOTES
Seating and Mobility Evaluation    Name: Linda Porter  Date: 19  : 1960  Referring Provider: William Barksdale MD  Insurance: Payor: Lisa Drew W Nick  REP / Plan: Mindi John  REP / Product Type: Medicare HMO /     SUBJECTIVE    HPI:  Patient reports he had a stroke in   His L side was affected side  He reports he is having a lot of difficulty with his walking from room to room in his home as he requires assistance  He finds stairs challenging but he has a stair lift at home to the second floor  He requires assistance with showering, transfers, eating, cooking  Patient and his caregiver report that he becomes easily fatigued with ambulation  Patient Goals for Power Mobility: Patient's caregiver is here with him today and reports that he would be able to do a lot more on his own with power mobility  He currently requires assistance to get from point A to point B in his home and with a scooter he would not require assistance with this task  DME: Patient had a scooter that stopped working, and it was un-repairable  His caregiver reports he was much safer with the scooter and it was much easier for him to get around  They believe they obtained this scooter more than 5 years ago  Anthropometric Measurements  Height: 5' 7"  Weight: 135 lbs      PMH: CVA 2010, Type II diabetes, Kidney disease (severe), ambulatory dysfunction, LBP, hypertension   Visual Impairments: Cataracts     Falls History: Patient's caregiver reports a fall a couple of months ago that resulted in a couple of rib fractures  He also has been tripping and stumbling more frequently  Pain Assessment:  Location: Global pain   Exacerbating factors: Walking, standing  Pain at Best: 8/10  Pain at worst: 10/10     Subjective Sensation: Caregiver reports he complains of changes in sensation on the L side of his body     Skin Integrity: Diabetic ulcers on B LE  1-2x in the past 5 years   Check for wounds frequently and they are treat the wounds early in the process so they heal quickly  Bowel/Bladder: Poor control over bladder at times  Fully control over bowel     Home Environment:  Living in a 2 story home with 1 flight of stairs to the 2nd with a stair lift  3 steps to enter the home with a railing he requires assistance to negotiate the stairs to get in and out of his house  There has been a discussion with the patient's care coordinator about building a ramp to enter the home as the patient is on a waiver program that will cover the cost of the ramp  Reports that most doorways are wide enough on the 1st floor to accommodate a scooter  Bathroom may not accommodate a scooter, but he has assistance to get into and out of the bathroom  Has caregiver within him 24/7  Occupation: Patient was a ,    Patient became disabled secondary to stroke and stopped working      OBJECTIVE:    Sensation Left Right   Kinesthesia Intact Intact   Gross sensation  Intact Intact   Protective Sensation Intact Intact      Coordination Left Right   Heel To Shin Imparied Normal   Finger To Nose Unable Intact     Upper Extremity ROM Left (PROM)  Right (Active)   Shoulder Flexion 100 WNL   Shoulder Abduction 70 with pain  WNL   Elbow Flexion WNL WNL   Elbow Extension -22  WNL   Wrist Flexion 20 with pain  WNL   Wrist Extension 65 WNL     Manual Muscle Testing  Upper Extremity  Left Right   Shoulder Flexion Unable to test secondary to Synergy pattern  5   Shoulder Abduction Unable to test secondary to Synergy pattern  5   Elbow Flexion Unable to test secondary to Synergy pattern  5   Elbow Extension Unable to test secondary to Synergy pattern  5   Wrist Flexion Unable to test secondary to Synergy pattern  5   Wrist Extension Unable to test secondary to Synergy pattern  5     Lower Extremity ROM Left Right   HS Length 35 66   Hip Abduction 20 22   Knee Extension -2 0   Knee Flexion 122 130   Ankle Dorsiflexion -4 with pain 10   Ankle Plantarflexion  WNL WNL     Manual Muscle Testing Lower Extremity Left Right   Hip Flexion 3- 4   Hip Abduction 3 4   Knee Flexion 3 5   Knee Extension 3- 4   Ankle Dorsiflexion 0 5     Muscle Tone Left Right   Modified Charles Scale     Hamstring 0 0   Gastroc 1 0   Quad 0 0     Transfers    Sit To Stand Performs from 19" surface with CGA and use of armrest  Typically performing at home with Min A   Ambulatory transfer  Completes with CGA > Min A   Sit To Supine Mod I     Balance    Static sitting balance normal   Dynamic sitting balance normal   Static standing balance, feet apart good   Dynamic standing balance, feet apart fair   Static standing balance, feet together zero   Dynamic standing balance, feet together zero       Normal Static: Patient able to maintain steady balance without handhold support   Dynamic: Patient accepts maximal challenge and can shift weight easily within full range in all directions   Good Static: Patient able to maintain balance without handhold support, limited postural sway  Dynamic: Patient accepts moderate challenge; able to maintain balance while picking object off floor   Fair Static: Patient able to maintain balance with handhold support; may require occasional minimal assistance    Dynamic: Patient accepts minimal challenge; able to maintain balance while turning head/trunk   Poor Static: Patient requires handhold support and moderate to maximal assistance to maintain position   Dynamic: Patient unable to accept challenge or move without loss of balance     Gait Assessment: Patient ambulates with CGA > Min A and L MAFO to minimize L knee buckling  He ambulates with decreased step length B, decreased gait speed, and decreased toe clearance on L   Mild hip hike noted on L for improved toe clearance     Balance Test    Gait Speed (ft/s): 17 2 sec with CGA      0 58 m/s   5x Sit To Stand (s): NT as patient requires arm rest from 19" surface    TU 3 seconds with CGA       ASSESMENT:  Patient is a 66 y o  old male with chronic CVA presenting for a mobility evaluation  Patient is ambulating with CGA > Min A at all times with poor gait mechanics  Patient also presents with pain, significantly increased muscle tone in L gastroc, significant weakness in the L LE/UE, and decreased ROM in L LE/UE  Patient will benefit from a power scooter increase his independence with mobility related ADLs and facilitate improved quality of life with decreased risk for falls  STG/LTG's  Patient will demonstrate ability to negotiate and operate his scooter in an enclosed space with good safety awareness in 4 months  Patient will deny falls or near falls with using the scooter  in 4 months  Patient will have increased independence with MRADLs within 4 months with use of scooter  PLAN:  Patient will return to outpatient clinic once scooter has been delivered to ensure safety with device as well as instruction on proper maintenance

## 2019-04-22 ENCOUNTER — OFFICE VISIT (OUTPATIENT)
Dept: FAMILY MEDICINE CLINIC | Facility: CLINIC | Age: 59
End: 2019-04-22

## 2019-04-22 VITALS
SYSTOLIC BLOOD PRESSURE: 134 MMHG | HEIGHT: 67 IN | WEIGHT: 137 LBS | DIASTOLIC BLOOD PRESSURE: 78 MMHG | RESPIRATION RATE: 18 BRPM | BODY MASS INDEX: 21.5 KG/M2 | TEMPERATURE: 97.5 F | OXYGEN SATURATION: 98 % | HEART RATE: 76 BPM

## 2019-04-22 DIAGNOSIS — R26.9 GAIT DISTURBANCE: Primary | ICD-10-CM

## 2019-04-22 DIAGNOSIS — I10 ESSENTIAL HYPERTENSION: ICD-10-CM

## 2019-04-22 DIAGNOSIS — M54.50 CHRONIC BILATERAL LOW BACK PAIN WITHOUT SCIATICA: ICD-10-CM

## 2019-04-22 DIAGNOSIS — G89.29 CHRONIC BILATERAL LOW BACK PAIN WITHOUT SCIATICA: ICD-10-CM

## 2019-04-22 DIAGNOSIS — N18.4 CHRONIC KIDNEY DISEASE, STAGE IV (SEVERE) (HCC): ICD-10-CM

## 2019-04-22 DIAGNOSIS — R26.2 AMBULATORY DYSFUNCTION: ICD-10-CM

## 2019-04-22 PROCEDURE — 99215 OFFICE O/P EST HI 40 MIN: CPT | Performed by: FAMILY MEDICINE

## 2019-04-22 PROCEDURE — 3078F DIAST BP <80 MM HG: CPT | Performed by: FAMILY MEDICINE

## 2019-04-22 PROCEDURE — 3075F SYST BP GE 130 - 139MM HG: CPT | Performed by: FAMILY MEDICINE

## 2019-04-29 ENCOUNTER — TRANSCRIBE ORDERS (OUTPATIENT)
Dept: ADMINISTRATIVE | Facility: HOSPITAL | Age: 59
End: 2019-04-29

## 2019-04-29 ENCOUNTER — APPOINTMENT (OUTPATIENT)
Dept: LAB | Facility: HOSPITAL | Age: 59
End: 2019-04-29
Payer: COMMERCIAL

## 2019-04-29 DIAGNOSIS — I12.9 BENIGN HYPERTENSIVE KIDNEY DISEASE WITH CHRONIC KIDNEY DISEASE STAGE I THROUGH STAGE IV, OR UNSPECIFIED: ICD-10-CM

## 2019-04-29 DIAGNOSIS — N18.4 CHRONIC KIDNEY DISEASE, STAGE IV (SEVERE) (HCC): ICD-10-CM

## 2019-04-29 DIAGNOSIS — E55.9 VITAMIN D DEFICIENCY, UNSPECIFIED: ICD-10-CM

## 2019-04-29 DIAGNOSIS — N18.4 CKD (CHRONIC KIDNEY DISEASE), STAGE IV (HCC): ICD-10-CM

## 2019-04-29 DIAGNOSIS — E11.43 DIABETIC GASTROPARESIS (HCC): ICD-10-CM

## 2019-04-29 DIAGNOSIS — D50.9 IRON DEFICIENCY ANEMIA, UNSPECIFIED IRON DEFICIENCY ANEMIA TYPE: ICD-10-CM

## 2019-04-29 DIAGNOSIS — D63.1 ANEMIA IN CHRONIC KIDNEY DISEASE, UNSPECIFIED CKD STAGE: ICD-10-CM

## 2019-04-29 DIAGNOSIS — E87.5 HYPERPOTASSEMIA: ICD-10-CM

## 2019-04-29 DIAGNOSIS — E11.21 DIABETIC NEPHROPATHY ASSOCIATED WITH TYPE 2 DIABETES MELLITUS (HCC): ICD-10-CM

## 2019-04-29 DIAGNOSIS — N18.9 ANEMIA IN CHRONIC KIDNEY DISEASE, UNSPECIFIED CKD STAGE: ICD-10-CM

## 2019-04-29 DIAGNOSIS — Z86.79 PERSONAL HISTORY OF UNSPECIFIED CIRCULATORY DISEASE: ICD-10-CM

## 2019-04-29 DIAGNOSIS — K31.84 DIABETIC GASTROPARESIS (HCC): ICD-10-CM

## 2019-04-29 DIAGNOSIS — E11.649 UNCONTROLLED TYPE 2 DIABETES MELLITUS WITH HYPOGLYCEMIA, UNSPECIFIED HYPOGLYCEMIA COMA STATUS (HCC): ICD-10-CM

## 2019-04-29 DIAGNOSIS — E78.49 OTHER HYPERLIPIDEMIA: ICD-10-CM

## 2019-04-29 DIAGNOSIS — N25.81 SECONDARY HYPERPARATHYROIDISM, RENAL (HCC): ICD-10-CM

## 2019-04-29 DIAGNOSIS — N18.4 CHRONIC KIDNEY DISEASE, STAGE IV (SEVERE) (HCC): Primary | ICD-10-CM

## 2019-04-29 LAB
ANION GAP SERPL CALCULATED.3IONS-SCNC: 8 MMOL/L (ref 4–13)
BUN SERPL-MCNC: 41 MG/DL (ref 5–25)
CALCIUM SERPL-MCNC: 9.1 MG/DL (ref 8.3–10.1)
CHLORIDE SERPL-SCNC: 107 MMOL/L (ref 100–108)
CO2 SERPL-SCNC: 26 MMOL/L (ref 21–32)
CREAT SERPL-MCNC: 4.37 MG/DL (ref 0.6–1.3)
GFR SERPL CREATININE-BSD FRML MDRD: 14 ML/MIN/1.73SQ M
GLUCOSE SERPL-MCNC: 242 MG/DL (ref 65–140)
POTASSIUM SERPL-SCNC: 5.5 MMOL/L (ref 3.5–5.3)
SODIUM SERPL-SCNC: 141 MMOL/L (ref 136–145)

## 2019-04-29 PROCEDURE — 80048 BASIC METABOLIC PNL TOTAL CA: CPT

## 2019-04-29 PROCEDURE — 36415 COLL VENOUS BLD VENIPUNCTURE: CPT

## 2019-05-18 ENCOUNTER — HOSPITAL ENCOUNTER (EMERGENCY)
Facility: HOSPITAL | Age: 59
Discharge: HOME | End: 2019-05-18
Attending: EMERGENCY MEDICINE | Admitting: EMERGENCY MEDICINE
Payer: COMMERCIAL

## 2019-05-18 ENCOUNTER — APPOINTMENT (EMERGENCY)
Dept: RADIOLOGY | Facility: HOSPITAL | Age: 59
End: 2019-05-18
Payer: COMMERCIAL

## 2019-05-18 VITALS
TEMPERATURE: 98.2 F | HEART RATE: 74 BPM | OXYGEN SATURATION: 97 % | SYSTOLIC BLOOD PRESSURE: 180 MMHG | RESPIRATION RATE: 16 BRPM | DIASTOLIC BLOOD PRESSURE: 96 MMHG

## 2019-05-18 DIAGNOSIS — S80.00XA CONTUSION OF KNEE: Primary | ICD-10-CM

## 2019-05-18 PROCEDURE — 99283 EMERGENCY DEPT VISIT LOW MDM: CPT

## 2019-05-18 PROCEDURE — 73564 X-RAY EXAM KNEE 4 OR MORE: CPT

## 2019-05-18 PROCEDURE — 99283 EMERGENCY DEPT VISIT LOW MDM: CPT | Performed by: PHYSICIAN ASSISTANT

## 2019-05-23 ENCOUNTER — APPOINTMENT (OUTPATIENT)
Dept: LAB | Facility: HOSPITAL | Age: 59
End: 2019-05-23
Payer: COMMERCIAL

## 2019-05-23 ENCOUNTER — OFFICE VISIT (OUTPATIENT)
Dept: FAMILY MEDICINE CLINIC | Facility: CLINIC | Age: 59
End: 2019-05-23

## 2019-05-23 VITALS
DIASTOLIC BLOOD PRESSURE: 76 MMHG | HEART RATE: 45 BPM | BODY MASS INDEX: 21.03 KG/M2 | SYSTOLIC BLOOD PRESSURE: 150 MMHG | OXYGEN SATURATION: 98 % | RESPIRATION RATE: 18 BRPM | TEMPERATURE: 97.5 F | HEIGHT: 67 IN | WEIGHT: 134 LBS

## 2019-05-23 DIAGNOSIS — E55.9 VITAMIN D DEFICIENCY, UNSPECIFIED: ICD-10-CM

## 2019-05-23 DIAGNOSIS — E78.49 OTHER HYPERLIPIDEMIA: ICD-10-CM

## 2019-05-23 DIAGNOSIS — I10 ESSENTIAL HYPERTENSION: ICD-10-CM

## 2019-05-23 DIAGNOSIS — N18.9 ANEMIA IN CHRONIC KIDNEY DISEASE, UNSPECIFIED CKD STAGE: ICD-10-CM

## 2019-05-23 DIAGNOSIS — I12.9 BENIGN HYPERTENSIVE KIDNEY DISEASE WITH CHRONIC KIDNEY DISEASE STAGE I THROUGH STAGE IV, OR UNSPECIFIED: ICD-10-CM

## 2019-05-23 DIAGNOSIS — N18.4 CHRONIC KIDNEY DISEASE, STAGE IV (SEVERE) (HCC): Primary | ICD-10-CM

## 2019-05-23 DIAGNOSIS — D50.9 IRON DEFICIENCY ANEMIA, UNSPECIFIED IRON DEFICIENCY ANEMIA TYPE: ICD-10-CM

## 2019-05-23 DIAGNOSIS — E11.43 DIABETIC GASTROPARESIS (HCC): ICD-10-CM

## 2019-05-23 DIAGNOSIS — D63.1 ANEMIA IN CHRONIC KIDNEY DISEASE, UNSPECIFIED CKD STAGE: ICD-10-CM

## 2019-05-23 DIAGNOSIS — E87.5 HYPERPOTASSEMIA: ICD-10-CM

## 2019-05-23 DIAGNOSIS — N25.81 SECONDARY HYPERPARATHYROIDISM, RENAL (HCC): ICD-10-CM

## 2019-05-23 DIAGNOSIS — N18.4 CHRONIC KIDNEY DISEASE, STAGE IV (SEVERE) (HCC): ICD-10-CM

## 2019-05-23 DIAGNOSIS — Z86.79 PERSONAL HISTORY OF UNSPECIFIED CIRCULATORY DISEASE: ICD-10-CM

## 2019-05-23 DIAGNOSIS — R26.2 AMBULATORY DYSFUNCTION: ICD-10-CM

## 2019-05-23 DIAGNOSIS — Z79.4 TYPE 2 DIABETES MELLITUS WITH CHRONIC KIDNEY DISEASE, WITH LONG-TERM CURRENT USE OF INSULIN, UNSPECIFIED CKD STAGE (HCC): ICD-10-CM

## 2019-05-23 DIAGNOSIS — N18.4 CKD (CHRONIC KIDNEY DISEASE), STAGE IV (HCC): ICD-10-CM

## 2019-05-23 DIAGNOSIS — K31.84 DIABETIC GASTROPARESIS (HCC): ICD-10-CM

## 2019-05-23 DIAGNOSIS — E11.22 TYPE 2 DIABETES MELLITUS WITH CHRONIC KIDNEY DISEASE, WITH LONG-TERM CURRENT USE OF INSULIN, UNSPECIFIED CKD STAGE (HCC): ICD-10-CM

## 2019-05-23 DIAGNOSIS — E11.21 DIABETIC NEPHROPATHY ASSOCIATED WITH TYPE 2 DIABETES MELLITUS (HCC): ICD-10-CM

## 2019-05-23 DIAGNOSIS — E11.649 UNCONTROLLED TYPE 2 DIABETES MELLITUS WITH HYPOGLYCEMIA, UNSPECIFIED HYPOGLYCEMIA COMA STATUS (HCC): ICD-10-CM

## 2019-05-23 PROBLEM — R00.1 BRADYCARDIA: Status: ACTIVE | Noted: 2019-05-23

## 2019-05-23 LAB
25(OH)D3 SERPL-MCNC: 10.6 NG/ML (ref 30–100)
ALBUMIN SERPL BCP-MCNC: 2.9 G/DL (ref 3.5–5)
ALP SERPL-CCNC: 150 U/L (ref 46–116)
ALT SERPL W P-5'-P-CCNC: 9 U/L (ref 12–78)
ANION GAP SERPL CALCULATED.3IONS-SCNC: 6 MMOL/L (ref 4–13)
AST SERPL W P-5'-P-CCNC: 12 U/L (ref 5–45)
BILIRUB SERPL-MCNC: 0.31 MG/DL (ref 0.2–1)
BUN SERPL-MCNC: 47 MG/DL (ref 5–25)
CALCIUM SERPL-MCNC: 9 MG/DL (ref 8.3–10.1)
CHLORIDE SERPL-SCNC: 108 MMOL/L (ref 100–108)
CO2 SERPL-SCNC: 24 MMOL/L (ref 21–32)
CREAT SERPL-MCNC: 4.36 MG/DL (ref 0.6–1.3)
GFR SERPL CREATININE-BSD FRML MDRD: 14 ML/MIN/1.73SQ M
GLUCOSE SERPL-MCNC: 243 MG/DL (ref 65–140)
HCT VFR BLD AUTO: 36.5 % (ref 36.5–49.3)
HGB BLD-MCNC: 11.7 G/DL (ref 12–17)
PHOSPHATE SERPL-MCNC: 3.6 MG/DL (ref 2.7–4.5)
POTASSIUM SERPL-SCNC: 5.8 MMOL/L (ref 3.5–5.3)
PROT SERPL-MCNC: 6.5 G/DL (ref 6.4–8.2)
PTH-INTACT SERPL-MCNC: 202.5 PG/ML (ref 18.4–80.1)
SODIUM SERPL-SCNC: 138 MMOL/L (ref 136–145)

## 2019-05-23 PROCEDURE — 85014 HEMATOCRIT: CPT

## 2019-05-23 PROCEDURE — 85018 HEMOGLOBIN: CPT

## 2019-05-23 PROCEDURE — 83970 ASSAY OF PARATHORMONE: CPT

## 2019-05-23 PROCEDURE — 84100 ASSAY OF PHOSPHORUS: CPT

## 2019-05-23 PROCEDURE — 82306 VITAMIN D 25 HYDROXY: CPT

## 2019-05-23 PROCEDURE — 36415 COLL VENOUS BLD VENIPUNCTURE: CPT

## 2019-05-23 PROCEDURE — 80053 COMPREHEN METABOLIC PANEL: CPT

## 2019-05-23 PROCEDURE — 99214 OFFICE O/P EST MOD 30 MIN: CPT | Performed by: FAMILY MEDICINE

## 2019-06-03 DIAGNOSIS — N18.9 CHRONIC KIDNEY DISEASE, UNSPECIFIED CKD STAGE: ICD-10-CM

## 2019-06-03 RX ORDER — PANTOPRAZOLE SODIUM 40 MG/1
TABLET, DELAYED RELEASE ORAL
Qty: 90 TABLET | Refills: 1 | Status: SHIPPED | OUTPATIENT
Start: 2019-06-03 | End: 2019-11-27 | Stop reason: SDUPTHER

## 2019-09-18 ENCOUNTER — OFFICE VISIT (OUTPATIENT)
Dept: GASTROENTEROLOGY | Facility: CLINIC | Age: 59
End: 2019-09-18
Payer: COMMERCIAL

## 2019-09-18 ENCOUNTER — TELEPHONE (OUTPATIENT)
Dept: GASTROENTEROLOGY | Facility: CLINIC | Age: 59
End: 2019-09-18

## 2019-09-18 ENCOUNTER — TELEPHONE (OUTPATIENT)
Dept: GASTROENTEROLOGY | Facility: MEDICAL CENTER | Age: 59
End: 2019-09-18

## 2019-09-18 VITALS
DIASTOLIC BLOOD PRESSURE: 96 MMHG | HEART RATE: 49 BPM | BODY MASS INDEX: 21.19 KG/M2 | WEIGHT: 135 LBS | HEIGHT: 67 IN | SYSTOLIC BLOOD PRESSURE: 202 MMHG | TEMPERATURE: 96.4 F

## 2019-09-18 DIAGNOSIS — K59.09 CHRONIC CONSTIPATION: ICD-10-CM

## 2019-09-18 DIAGNOSIS — K21.00 GASTROESOPHAGEAL REFLUX DISEASE WITH ESOPHAGITIS: Primary | ICD-10-CM

## 2019-09-18 DIAGNOSIS — K31.A0 INTESTINAL METAPLASIA OF GASTRIC MUCOSA: ICD-10-CM

## 2019-09-18 PROBLEM — K21.9 GASTROESOPHAGEAL REFLUX DISEASE WITHOUT ESOPHAGITIS: Status: ACTIVE | Noted: 2019-09-18

## 2019-09-18 PROCEDURE — 99214 OFFICE O/P EST MOD 30 MIN: CPT | Performed by: PHYSICIAN ASSISTANT

## 2019-09-18 NOTE — PROGRESS NOTES
[facility] - Outpatient Follow-up Note  Yang Borjas 66 y o  male MRN: [id number]  Encounter: [id number]          ASSESSMENT AND PLAN:      1  Gastroesophageal reflux disease with esophagitis  EGD x 2 in 2017 showed esophagitis  His heartburn is well controlled with pantoprazole 40 mg daily  We will re-evaluate his esophagus on upcoming EGD  If there is no evidence of esophagitis, we will try to decrease his dose of pantoprazole to 20 mg daily  Continue dietary and lifestyle modifications to prevent reflux  2  Intestinal metaplasia of gastric mucosa  Gastric biopsies in 2017 were positive for intestinal metaplasia, which is considered to be a precancerous condition  We will schedule repeat EGD for surveillance purposes  3  Possible extrinsic compression of stomach  Previous EGD from 2017 revealed antral fullness with concern for extrinsic compression  He was recommended CT abdomen pelvis to further evaluate, however, this was never done  He denies nausea, vomiting, abdominal pain, abnormal weight loss  We will re-evaluate his stomach on upcoming EGD  If there still appears to be extrinsic compression, we will obtain CT scan  4  Chronic constipation  Continue Amitiza 24 mcg twice daily  Add Metamucil twice daily to help increase stool transit time through the colon  - psyllium (METAMUCIL SMOOTH TEXTURE) 28 % packet; Take 1 packet by mouth 2 (two) times a day  Dispense: 60 packet; Refill: 2    Follow-up after EGD  ______________________________________________________________________    SUBJECTIVE:  37-year-old male with type 2 diabetes mellitus presenting for follow-up of GERD and constipation  He last saw Dr Anthony Sanchez in 2017  Due to nausea, vomiting, and abdominal pain, he underwent EGD which was significant for esophagitis and gastric ulcer  Biopsies from the ulcer positive for intestinal metaplasia, negative for H pylori  He was treated with PPI twice daily   Repeat EGD confirm healing of gastric ulcer  He was still found to have mild esophagitis  There was antral fullness and concern for extrinsic compression  He was recommended CT abdomen pelvis, but this was not done  He has been on pantoprazole 40 mg daily for about the past year  This generally controls his heartburn  He has been unable to identify triggers of heartburn  He does drink 1-2 cups of coffee daily  His wife admits they do eat greasy, fatty, fried foods frequently  He does not drink alcohol or smoke tobacco   He denies dysphagia, odynophagia, nausea, vomiting, abdominal pain, abnormal weight loss  He has chronic constipation and moves his bowels every 3-4 days  He rates his stool type 2 on the [facility] stool chart  He denies any blood in the stool  He takes Amitiza 24 mcg twice daily  His last colonoscopy was a few years ago with Dr Gray Antonio  REVIEW OF SYSTEMS IS OTHERWISE NEGATIVE  Historical Information   Past Medical History:   Diagnosis Date    Anxiety     CAD (coronary artery disease)     last assessed 4/10/13    Chronic [facility], [facility] IV (severe) [facility] [address] [facility]     Concussion     last assessed 9/3/14    Depression     Diabetes mellitus ([facility] )     type 2    Failure to gain weight in adult     last assessed 2/24/14    Gastroparesis     GERD (gastroesophageal reflux disease)     Hyperkalemia     last assessed 11/9/15    Hyperlipidemia     Hypertension     accelerated essential last assessed 10/7/16    Insomnia     Late effects of cerebrovascular disease     Overflow incontinence     last assessed 7/24/14    Renal disorder     Secondary hyperparathyroidism ([facility] )     Stroke [facility])      Past Surgical History:   Procedure Laterality Date    COLONOSCOPY      HERNIA REPAIR      [place] ESOPHAGOGASTRODUODENOSCOPY TRANSORAL DIAGNOSTIC N/A 2/16/2017    Procedure: ESOPHAGOGASTRODUODENOSCOPY (EGD) with biopsy;  Surgeon: Lindsey Mcpherson DO;  [facility];   Service: Gastroenterology   [employer] ESOPHAGOGASTRODUODENOSCOPY TRANSORAL DIAGNOSTIC N/A 6/2/2017    Procedure: ESOPHAGOGASTRODUODENOSCOPY (EGD) with bx;  Surgeon: Sofia Gaviria DO;  Location: [facility];   Service: Gastroenterology    TYMPANOSTOMY TUBE PLACEMENT       Social History   Social History     Substance and Sexual Activity   Alcohol Use No    Comment: social     Social History     Substance and Sexual Activity   Drug Use Yes    Types: Marijuana    Comment: smokes once keven while for pain 1 xper  week     Social History     Tobacco Use   Smoking Status Never Smoker   Smokeless Tobacco Never Used     Family History   Problem Relation Age of Onset    Cancer Family         pt and friend denies this hx    Hypertension Family         essential    Hyperlipidemia Family     Diabetes Mother     No Known Problems Father        Meds/Allergies       Current Outpatient Medications:     ALPRAZolam (XANAX) 0 5 mg tablet    amLODIPine (NORVASC) 10 mg tablet    ARIPiprazole (ABILIFY) 2 mg tablet    aspirin 81 mg chewable tablet    buPROPion (WELLBUTRIN) 100 mg tablet    dicyclomine (BENTYL) 20 mg tablet    doxazosin (CARDURA) 1 mg tablet    furosemide (LASIX) 40 mg tablet    Insulin Glargine (TOUJEO SOLOSTAR) 300 UNIT/ML SOPN    Liraglutide (VICTOZA) 18 MG/3ML SOPN    lovastatin (MEVACOR) 20 mg tablet    lubiprostone (AMITIZA) 24 mcg capsule    metoprolol succinate (TOPROL-XL) 25 mg 24 hr tablet    pantoprazole (PROTONIX) 40 mg tablet    paricalcitol (ZEMPLAR) 1 mcg capsule    pioglitazone (ACTOS) 15 mg tablet    sodium polystyrene sulfonate (KAYEXALATE) 15 g/60 mL suspension    traZODone (DESYREL) 100 mg tablet    acetaminophen (TYLENOL) 325 mg tablet    methocarbamol (ROBAXIN) 750 mg tablet    psyllium (METAMUCIL SMOOTH TEXTURE) 28 % packet    No Known Allergies        Objective     Blood pressure (!) 202/96, pulse (!) 49, temperature (!) 96 4 °F (35 8 °C), temperature source Tympanic, height 5' 7" (1 702 m), weight 61 2 kg (135 lb)  Body mass index is 21 14 kg/m²  PHYSICAL EXAM:      General Appearance:   Alert, cooperative, no distress   HEENT:   Normocephalic, atraumatic, anicteric      Neck:  Supple, symmetrical, trachea midline   Lungs:   Clear to auscultation bilaterally; no rales, rhonchi or wheezing; respirations unlabored    Heart[de-identified]   Regular rate and rhythm; no murmur, rub, or gallop  Abdomen:   Soft, non-tender, non-distended; normal bowel sounds; no masses, no organomegaly    Genitalia:   Deferred    Rectal:   Deferred    Extremities:  No cyanosis, clubbing or edema    Pulses:  2+ and symmetric    Skin:  No jaundice, rashes, or lesions    Lymph nodes:  No palpable cervical lymphadenopathy        Lab Results:   No visits with results within 1 Day(s) from this visit  Latest known visit with results is:   Appointment on 05/23/2019   Component Date Value    Sodium 05/23/2019 138     Potassium 05/23/2019 5 8*    Chloride 05/23/2019 108     CO2 05/23/2019 24     ANION GAP 05/23/2019 6     BUN 05/23/2019 47*    Creatinine 05/23/2019 4 36*    Glucose 05/23/2019 243*    Calcium 05/23/2019 9 0     AST 05/23/2019 12     ALT 05/23/2019 9*    Alkaline Phosphatase 05/23/2019 150*    Total Protein 05/23/2019 6 5     Albumin 05/23/2019 2 9*    Total Bilirubin 05/23/2019 0 31     eGFR 05/23/2019 14     Vit D, 25-Hydroxy 05/23/2019 10 6*    Phosphorus 05/23/2019 3 6     PTH 05/23/2019 202 5*    Hemoglobin 05/23/2019 11 7*    Hematocrit 05/23/2019 36 5          Radiology Results:   No results found

## 2019-09-18 NOTE — TELEPHONE ENCOUNTER
Patient and Spouse came in to the [place] office I explained their appointment was in the wrong office and we did not have a provider to see the patient here  I offered to call [facility] and Queenie Rhoades stated she will not go to [facility] office that is to far and she wouldn't have scheduled there to begin with   I offered to reschedule the appointment and she said no instead she will be changing gastroenterologists to one on [address]   I once again offered to call [facility] and she stated she will not be going to [facility] so the appointment was cancelled per the patients request

## 2019-09-18 NOTE — LETTER
September 18, 2019     Lauren Hernández MD  [address]  [address]  [id number]  [id number]    Patient: William Davis   YOB: 1960   Date of Visit: 9/18/2019       Dear Dr Gertrude Berkowitz:    Thank you for referring Alonso Matamoros to me for evaluation  Below are my notes for this consultation  If you have questions, please do not hesitate to call me  I look forward to following your patient along with you  Sincerely,        Emani Vora PA-C        CC: No Recipients  Emani Vora PA-C  9/18/2019  5:28 PM  Sign at close encounter  [facility] - Outpatient Follow-up Note  William Davis 66 y o  male MRN: [id number]  Encounter: [id number]          ASSESSMENT AND PLAN:      1  Gastroesophageal reflux disease with esophagitis  EGD x 2 in 2017 showed esophagitis  His heartburn is well controlled with pantoprazole 40 mg daily  We will re-evaluate his esophagus on upcoming EGD  If there is no evidence of esophagitis, we will try to decrease his dose of pantoprazole to 20 mg daily  Continue dietary and lifestyle modifications to prevent reflux  2  Intestinal metaplasia of gastric mucosa  Gastric biopsies in 2017 were positive for intestinal metaplasia, which is considered to be a precancerous condition  We will schedule repeat EGD for surveillance purposes  3  Possible extrinsic compression of stomach  Previous EGD from 2017 revealed antral fullness with concern for extrinsic compression  He was recommended CT abdomen pelvis to further evaluate, however, this was never done  He denies nausea, vomiting, abdominal pain, abnormal weight loss  We will re-evaluate his stomach on upcoming EGD  If there still appears to be extrinsic compression, we will obtain CT scan  4  Chronic constipation  Continue Amitiza 24 mcg twice daily  Add Metamucil twice daily to help increase stool transit time through the colon  - psyllium (METAMUCIL SMOOTH TEXTURE) 28 % packet;  Take 1 packet by mouth 2 (two) times a day  Dispense: 60 packet; Refill: 2    Follow-up after EGD  ______________________________________________________________________    SUBJECTIVE:  68-year-old male with type 2 diabetes mellitus presenting for follow-up of GERD and constipation  He last saw Dr Adelina Lamar in 2017  Due to nausea, vomiting, and abdominal pain, he underwent EGD which was significant for esophagitis and gastric ulcer  Biopsies from the ulcer positive for intestinal metaplasia, negative for H pylori  He was treated with PPI twice daily  Repeat EGD confirm healing of gastric ulcer  He was still found to have mild esophagitis  There was antral fullness and concern for extrinsic compression  He was recommended CT abdomen pelvis, but this was not done  He has been on pantoprazole 40 mg daily for about the past year  This generally controls his heartburn  He has been unable to identify triggers of heartburn  He does drink 1-2 cups of coffee daily  His wife admits they do eat greasy, fatty, fried foods frequently  He does not drink alcohol or smoke tobacco   He denies dysphagia, odynophagia, nausea, vomiting, abdominal pain, abnormal weight loss  He has chronic constipation and moves his bowels every 3-4 days  He rates his stool type 2 on the [facility] stool chart  He denies any blood in the stool  He takes Amitiza 24 mcg twice daily  His last colonoscopy was a few years ago with Dr Carr Major  REVIEW OF SYSTEMS IS OTHERWISE NEGATIVE        Historical Information   Past Medical History:   Diagnosis Date    Anxiety     CAD (coronary artery disease)     last assessed 4/10/13    Chronic kidney disease, stage IV (severe) ([facility] [address] )     Concussion     last assessed 9/3/14    Depression     Diabetes mellitus ([facility] [address] )     type 2    Failure to gain weight in adult     last assessed 2/24/14    Gastroparesis     GERD (gastroesophageal reflux disease)     Hyperkalemia     last assessed 11/9/15    Hyperlipidemia     Hypertension     accelerated essential last assessed 10/7/16    Insomnia     Late effects of cerebrovascular disease     Overflow incontinence     last assessed 14    Renal disorder     Secondary hyperparathyroidism ([facility] [address] )     Stroke [facility])      Past Surgical History:   Procedure Laterality Date    COLONOSCOPY      HERNIA REPAIR      [place] ESOPHAGOGASTRODUODENOSCOPY TRANSORAL DIAGNOSTIC N/A 2017    Procedure: ESOPHAGOGASTRODUODENOSCOPY (EGD) with biopsy;  Surgeon: Louretta Apgar, DO;  Location: [facility]; Service: Gastroenterology    [place] ESOPHAGOGASTRODUODENOSCOPY TRANSORAL DIAGNOSTIC N/A 2017    Procedure: ESOPHAGOGASTRODUODENOSCOPY (EGD) with bx;  Surgeon: Louretta Apgar, DO;  [facility];   Service: Gastroenterology    TYMPANOSTOMY TUBE PLACEMENT       Social History   Social History     Substance and Sexual Activity   Alcohol Use No    Comment: social     Social History     Substance and Sexual Activity   Drug Use Yes    Types: Marijuana    Comment: smokes once keven while for pain 1 xper  week     Social History     Tobacco Use   Smoking Status Never Smoker   Smokeless Tobacco Never Used     Family History   Problem Relation Age of Onset    Cancer Family         pt and friend denies this hx    Hypertension Family         essential    Hyperlipidemia Family     Diabetes Mother     No Known Problems Father        Meds/Allergies       Current Outpatient Medications:     ALPRAZolam (XANAX) 0 5 mg tablet    amLODIPine (NORVASC) 10 mg tablet    ARIPiprazole (ABILIFY) 2 mg tablet    aspirin 81 mg chewable tablet    buPROPion (WELLBUTRIN) 100 mg tablet    dicyclomine (BENTYL) 20 mg tablet    doxazosin (CARDURA) 1 mg tablet    furosemide (LASIX) 40 mg tablet    Insulin Glargine (TOUJEO SOLOSTAR) 300 UNIT/ML SOPN    Liraglutide (VICTOZA) 18 MG/3ML SOPN    lovastatin (MEVACOR) 20 mg tablet    lubiprostone (AMITIZA) 24 mcg capsule    metoprolol succinate (TOPROL-XL) 25 mg 24 hr tablet    pantoprazole (PROTONIX) 40 mg tablet    paricalcitol (ZEMPLAR) 1 mcg capsule    pioglitazone (ACTOS) 15 mg tablet    sodium polystyrene sulfonate (KAYEXALATE) 15 g/60 mL suspension    traZODone (DESYREL) 100 mg tablet    acetaminophen (TYLENOL) 325 mg tablet    methocarbamol (ROBAXIN) 750 mg tablet    psyllium (METAMUCIL SMOOTH TEXTURE) 28 % packet    No Known Allergies        Objective     Blood pressure (!) 202/96, pulse (!) 49, temperature (!) 96 4 °F (35 8 °C), temperature source Tympanic, height 5' 7" (1 702 m), weight 61 2 kg (135 lb)  Body mass index is 21 14 kg/m²  PHYSICAL EXAM:      General Appearance:   Alert, cooperative, no distress   HEENT:   Normocephalic, atraumatic, anicteric      Neck:  Supple, symmetrical, trachea midline   Lungs:   Clear to auscultation bilaterally; no rales, rhonchi or wheezing; respirations unlabored    Heart[de-identified]   Regular rate and rhythm; no murmur, rub, or gallop  Abdomen:   Soft, non-tender, non-distended; normal bowel sounds; no masses, no organomegaly    Genitalia:   Deferred    Rectal:   Deferred    Extremities:  No cyanosis, clubbing or edema    Pulses:  2+ and symmetric    Skin:  No jaundice, rashes, or lesions    Lymph nodes:  No palpable cervical lymphadenopathy        Lab Results:   No visits with results within 1 Day(s) from this visit     Latest known visit with results is:   Appointment on 05/23/2019   Component Date Value    Sodium 05/23/2019 138     Potassium 05/23/2019 5 8*    Chloride 05/23/2019 108     CO2 05/23/2019 24     ANION GAP 05/23/2019 6     BUN 05/23/2019 47*    Creatinine 05/23/2019 4 36*    Glucose 05/23/2019 243*    Calcium 05/23/2019 9 0     AST 05/23/2019 12     ALT 05/23/2019 9*    Alkaline Phosphatase 05/23/2019 150*    Total Protein 05/23/2019 6 5     Albumin 05/23/2019 2 9*    Total Bilirubin 05/23/2019 0 31     eGFR 05/23/2019 14     Vit D, 25-Hydroxy 05/23/2019 10 6*    Phosphorus 05/23/2019 3 6  PTH 05/23/2019 202 5*    Hemoglobin 05/23/2019 11 7*    Hematocrit 05/23/2019 36 5          Radiology Results:   No results found

## 2019-09-20 ENCOUNTER — TELEPHONE (OUTPATIENT)
Dept: GASTROENTEROLOGY | Facility: CLINIC | Age: 59
End: 2019-09-20

## 2019-09-20 NOTE — TELEPHONE ENCOUNTER
----- Message from Maude Skiff, PA-C sent at 9/18/2019  5:34 PM EDT -----  Lacy Richardsence,    Can you please call Nedra Wasserman and schedule EGD at [facility]? This is non-urgent  I entered the order       Thanks,  [employer]

## 2019-09-20 NOTE — TELEPHONE ENCOUNTER
LMOM for patient to call office to schedule EGD   Was going to offer 11/21/19 with Dr Hailee Sumner at [facility]

## 2019-09-23 ENCOUNTER — OFFICE VISIT (OUTPATIENT)
Dept: FAMILY MEDICINE CLINIC | Facility: CLINIC | Age: 59
End: 2019-09-23

## 2019-09-23 VITALS
SYSTOLIC BLOOD PRESSURE: 168 MMHG | BODY MASS INDEX: 20.88 KG/M2 | RESPIRATION RATE: 18 BRPM | OXYGEN SATURATION: 98 % | HEIGHT: 67 IN | WEIGHT: 133 LBS | TEMPERATURE: 98 F | DIASTOLIC BLOOD PRESSURE: 80 MMHG | HEART RATE: 65 BPM

## 2019-09-23 DIAGNOSIS — R05.9 COUGH: ICD-10-CM

## 2019-09-23 DIAGNOSIS — E11.22 CONTROLLED TYPE 2 DIABETES MELLITUS WITH STAGE 4 CHRONIC KIDNEY DISEASE, WITH LONG-TERM CURRENT USE OF INSULIN (HCC): Primary | ICD-10-CM

## 2019-09-23 DIAGNOSIS — Z79.4 CONTROLLED TYPE 2 DIABETES MELLITUS WITH STAGE 4 CHRONIC KIDNEY DISEASE, WITH LONG-TERM CURRENT USE OF INSULIN (HCC): Primary | ICD-10-CM

## 2019-09-23 DIAGNOSIS — N18.4 CONTROLLED TYPE 2 DIABETES MELLITUS WITH STAGE 4 CHRONIC KIDNEY DISEASE, WITH LONG-TERM CURRENT USE OF INSULIN (HCC): Primary | ICD-10-CM

## 2019-09-23 LAB — SL AMB POCT HEMOGLOBIN AIC: 7.5 (ref ?–6.5)

## 2019-09-23 PROCEDURE — 83036 HEMOGLOBIN GLYCOSYLATED A1C: CPT | Performed by: FAMILY MEDICINE

## 2019-09-23 PROCEDURE — 3051F HG A1C>EQUAL 7.0%<8.0%: CPT | Performed by: PHYSICIAN ASSISTANT

## 2019-09-23 PROCEDURE — 99214 OFFICE O/P EST MOD 30 MIN: CPT | Performed by: FAMILY MEDICINE

## 2019-09-23 NOTE — PROGRESS NOTES
Assessment/Plan:    Follow up with Endo as scheduled in 2 weeks  HgA1c stable, unchanged since 3/19  Follow up with Nephrology    Cough: OTC Mucinex and Loratadine 10 mg daily      Problem List Items Addressed This Visit     None      Visit Diagnoses     Controlled type 2 diabetes mellitus with stage 4 chronic kidney disease, with long-term current use of insulin (HCC)    -  Primary    Relevant Orders    POCT hemoglobin A1c (Completed)    Cough                Subjective:      Patient ID: Brandi Beckwith is a 66 y o  male  65 yo male with multiple medical problems including but not limited to DM, HTN, s/p CVA with resulting hemiparesis, CKD Stage IV here today with complains of nausea and cough  Cough is productive of clear sputum, worse at night  Denies fever, chills  Patient's significant other Veola Fraction, states patient started having nausea 1 day after starting Sodium bicarb as indicated by Nephrology  Patient states nausea has improve slightly after stopping sodium bicarb  Patient is also following with GI and is scheduled for EGD next month  As per Veola Fraction patient has poor appetite and is very picky with what he wants to eat  Has a hard time following low carb diet  Does not eat much vegetables or fiber in general        The following portions of the patient's history were reviewed and updated as appropriate: He  has a past medical history of Anxiety, CAD (coronary artery disease), Chronic kidney disease, stage IV (severe) ([address] ), Concussion, Depression, Diabetes mellitus ([address] ), Failure to gain weight in adult, Gastroparesis, GERD (gastroesophageal reflux disease), Hyperkalemia, Hyperlipidemia, Hypertension, Insomnia, Late effects of cerebrovascular disease, Overflow incontinence, Renal disorder, Secondary hyperparathyroidism ([address] ), and Stroke ([address] )    He   Patient Active Problem List    Diagnosis Date Noted    Gastroesophageal reflux disease without esophagitis 09/18/2019    Bradycardia 05/23/2019    Chronic kidney disease, stage IV (severe) ([facility] ) 01/17/2019    Acute on chronic kidney failure ([place] [address] ) 12/21/2018    Rib fractures 12/19/2018    Chronic narcotic use 09/28/2018    Violation of controlled substance agreement 09/28/2018    Essential hypertension 09/17/2018    Low back pain 01/22/2018    Ambulatory dysfunction 02/22/2016    Anxiety 11/05/2014    Cerebral infarction ([place] [address] ) 10/09/2014    Diabetes mellitus ([place] [address] ) 07/24/2014    Difficulty walking 04/03/2014    Gait disturbance 11/27/2013    Constipation 01/23/2013     He  has a past surgical history that includes Tympanostomy tube placement; Hernia repair; pr esophagogastroduodenoscopy transoral diagnostic (N/A, 2/16/2017); Colonoscopy; and pr esophagogastroduodenoscopy transoral diagnostic (N/A, 6/2/2017)  His family history includes Cancer in his family; Diabetes in his mother; Hyperlipidemia in his family; Hypertension in his family; No Known Problems in his father  He  reports that he has never smoked  He has never used smokeless tobacco  He reports that he has current or past drug history  Drug: Marijuana  He reports that he does not drink alcohol    Current Outpatient Medications   Medication Sig Dispense Refill    ALPRAZolam (XANAX) 0 5 mg tablet Take 0 5 mg by mouth daily      amLODIPine (NORVASC) 10 mg tablet Take 10 mg by mouth daily      ARIPiprazole (ABILIFY) 2 mg tablet Take 4 mg by mouth daily      aspirin 81 mg chewable tablet Chew 1 tablet (81 mg total) daily 30 tablet 0    buPROPion (WELLBUTRIN) 100 mg tablet Take 1 tablet (100 mg total) by mouth 2 (two) times a day 180 tablet 0    dicyclomine (BENTYL) 20 mg tablet Take 1 tablet by mouth every 6 (six) hours (Patient taking differently: Take 20 mg by mouth daily as needed  ) 20 tablet 0    doxazosin (CARDURA) 1 mg tablet Take 2 mg by mouth 2 (two) times a day Take 1 1/2 tablet by mouth twice a day       furosemide (LASIX) 40 mg tablet Take by mouth daily      Liraglutide (VICTOZA) 18 MG/3ML SOPN Inject 1 8 mg under the skin daily at bedtime as needed        lovastatin (MEVACOR) 20 mg tablet Take 20 mg by mouth daily      lubiprostone (AMITIZA) 24 mcg capsule Take 24 mcg by mouth 2 (two) times a day with meals      metoprolol succinate (TOPROL-XL) 25 mg 24 hr tablet Take 25 mg by mouth daily      traZODone (DESYREL) 100 mg tablet Take 100 mg by mouth daily at bedtime      acetaminophen (TYLENOL) 325 mg tablet Take 3 tablets (975 mg total) by mouth every 8 (eight) hours (Patient not taking: Reported on 9/18/2019) 30 tablet 0    Insulin Glargine (TOUJEO SOLOSTAR) 300 UNIT/ML SOPN Inject 24 Units under the skin daily at bedtime        methocarbamol (ROBAXIN) 750 mg tablet Take 1 tablet (750 mg total) by mouth every 6 (six) hours as needed for muscle spasms (Patient not taking: Reported on 9/18/2019) 30 tablet 0    pantoprazole (PROTONIX) 40 mg tablet TAKE 1 TABLET BY MOUTH DAILY IN THE EARLY MORNING 90 tablet 1    paricalcitol (ZEMPLAR) 1 mcg capsule Take 2 mcg by mouth daily        pioglitazone (ACTOS) 15 mg tablet Take 30 mg by mouth daily       psyllium (METAMUCIL SMOOTH TEXTURE) 28 % packet Take 1 packet by mouth 2 (two) times a day 60 packet 2    sodium polystyrene sulfonate (KAYEXALATE) 15 g/60 mL suspension Take 15 g by mouth as needed Take 120ml by mouth single dose         No current facility-administered medications for this visit        Current Outpatient Medications on File Prior to Visit   Medication Sig    ALPRAZolam (XANAX) 0 5 mg tablet Take 0 5 mg by mouth daily    amLODIPine (NORVASC) 10 mg tablet Take 10 mg by mouth daily    ARIPiprazole (ABILIFY) 2 mg tablet Take 4 mg by mouth daily    aspirin 81 mg chewable tablet Chew 1 tablet (81 mg total) daily    buPROPion (WELLBUTRIN) 100 mg tablet Take 1 tablet (100 mg total) by mouth 2 (two) times a day    dicyclomine (BENTYL) 20 mg tablet Take 1 tablet by mouth every 6 (six) hours (Patient taking differently: Take 20 mg by mouth daily as needed  )    doxazosin (CARDURA) 1 mg tablet Take 2 mg by mouth 2 (two) times a day Take 1 1/2 tablet by mouth twice a day     furosemide (LASIX) 40 mg tablet Take by mouth daily    Liraglutide (VICTOZA) 18 MG/3ML SOPN Inject 1 8 mg under the skin daily at bedtime as needed      lovastatin (MEVACOR) 20 mg tablet Take 20 mg by mouth daily    lubiprostone (AMITIZA) 24 mcg capsule Take 24 mcg by mouth 2 (two) times a day with meals    metoprolol succinate (TOPROL-XL) 25 mg 24 hr tablet Take 25 mg by mouth daily    traZODone (DESYREL) 100 mg tablet Take 100 mg by mouth daily at bedtime    acetaminophen (TYLENOL) 325 mg tablet Take 3 tablets (975 mg total) by mouth every 8 (eight) hours (Patient not taking: Reported on 9/18/2019)    Insulin Glargine (TOUJEO SOLOSTAR) 300 UNIT/ML SOPN Inject 24 Units under the skin daily at bedtime      methocarbamol (ROBAXIN) 750 mg tablet Take 1 tablet (750 mg total) by mouth every 6 (six) hours as needed for muscle spasms (Patient not taking: Reported on 9/18/2019)    pantoprazole (PROTONIX) 40 mg tablet TAKE 1 TABLET BY MOUTH DAILY IN THE EARLY MORNING    paricalcitol (ZEMPLAR) 1 mcg capsule Take 2 mcg by mouth daily      pioglitazone (ACTOS) 15 mg tablet Take 30 mg by mouth daily     psyllium (METAMUCIL SMOOTH TEXTURE) 28 % packet Take 1 packet by mouth 2 (two) times a day    sodium polystyrene sulfonate (KAYEXALATE) 15 g/60 mL suspension Take 15 g by mouth as needed Take 120ml by mouth single dose       No current facility-administered medications on file prior to visit       Review of Systems   Gastrointestinal: Positive for abdominal distention  Musculoskeletal: Positive for gait problem  Neurological:        As per HPI   All other systems reviewed and are negative          Objective:      /80 (BP Location: Right arm, Patient Position: Sitting, Cuff Size: Standard)   Pulse 65   Temp 98 °F (36 7 °C) (Temporal)   Resp 18   Ht 5' 7" (1 702 m)   Wt 60 3 kg (133 lb)   SpO2 98%   BMI 20 83 kg/m²          Physical Exam   Constitutional: He is oriented to person, place, and time  He appears well-developed  HENT:   Head: Normocephalic  Right Ear: External ear normal    Left Ear: External ear normal    Nose: Nose normal    Mouth/Throat: Oropharynx is clear and moist    Eyes: Pupils are equal, round, and reactive to light  Conjunctivae and EOM are normal    Neck: Normal range of motion  Neck supple  No thyromegaly present  Cardiovascular: Normal rate, regular rhythm and normal heart sounds  Pulmonary/Chest: Effort normal and breath sounds normal    Abdominal: Soft  There is tenderness  There is no rebound and no guarding  Musculoskeletal:        Left foot: There is decreased range of motion and deformity  Neurological: He is alert and oriented to person, place, and time  He has normal reflexes  He exhibits abnormal muscle tone  Coordination abnormal    Skin: Skin is dry  Scalp has hypochromic discolored confluent plaques    Psychiatric: He has a normal mood and affect  Nursing note and vitals reviewed

## 2019-10-18 ENCOUNTER — OFFICE VISIT (OUTPATIENT)
Dept: FAMILY MEDICINE CLINIC | Facility: CLINIC | Age: 59
End: 2019-10-18

## 2019-10-18 VITALS
WEIGHT: 138 LBS | TEMPERATURE: 97.8 F | SYSTOLIC BLOOD PRESSURE: 136 MMHG | BODY MASS INDEX: 20.44 KG/M2 | HEIGHT: 69 IN | RESPIRATION RATE: 18 BRPM | OXYGEN SATURATION: 98 % | HEART RATE: 65 BPM | DIASTOLIC BLOOD PRESSURE: 84 MMHG

## 2019-10-18 DIAGNOSIS — Z11.59 ENCOUNTER FOR HEPATITIS C SCREENING TEST FOR LOW RISK PATIENT: ICD-10-CM

## 2019-10-18 DIAGNOSIS — Z00.00 MEDICARE ANNUAL WELLNESS VISIT, SUBSEQUENT: Primary | ICD-10-CM

## 2019-10-18 DIAGNOSIS — Z12.5 PROSTATE CANCER SCREENING: ICD-10-CM

## 2019-10-18 DIAGNOSIS — Z23 FLU VACCINE NEED: ICD-10-CM

## 2019-10-18 PROCEDURE — 90471 IMMUNIZATION ADMIN: CPT | Performed by: PHYSICIAN ASSISTANT

## 2019-10-18 PROCEDURE — G0439 PPPS, SUBSEQ VISIT: HCPCS | Performed by: PHYSICIAN ASSISTANT

## 2019-10-18 PROCEDURE — 90682 RIV4 VACC RECOMBINANT DNA IM: CPT | Performed by: PHYSICIAN ASSISTANT

## 2019-10-18 PROCEDURE — 3008F BODY MASS INDEX DOCD: CPT | Performed by: PHYSICIAN ASSISTANT

## 2019-10-18 NOTE — PATIENT INSTRUCTIONS
Medicare Preventive Visit Patient Instructions  Thank you for completing your Welcome to Medicare Visit or Medicare Annual Wellness Visit today  Your next wellness visit will be due in one year (10/18/2020)  The screening/preventive services that you may require over the next 5-10 years are detailed below  Some tests may not apply to you based off risk factors and/or age  Screening tests ordered at today's visit but not completed yet may show as past due  Also, please note that scanned in results may not display below  Preventive Screenings:  Service Recommendations Previous Testing/Comments   Colorectal Cancer Screening  · Colonoscopy    · Fecal Occult Blood Test (FOBT)/Fecal Immunochemical Test (FIT)  · Fecal DNA/Cologuard Test  · Flexible Sigmoidoscopy Age: 54-65 years old   Colonoscopy: every 10 years (May be performed more frequently if at higher risk)  OR  FOBT/FIT: every 1 year  OR  Cologuard: every 3 years  OR  Sigmoidoscopy: every 5 years  Screening may be recommended earlier than age [address] if at higher risk for colorectal cancer  Also, an individualized decision between you and your healthcare provider will decide whether screening between the ages of 74-80 would be appropriate   Colonoscopy: 03/01/2014  FOBT/FIT: Not on file  Cologuard: Not on file  Sigmoidoscopy: Not on file    Screening Current     Prostate Cancer Screening Individualized decision between patient and health care provider in men between ages of 53-78   Medicare will cover every 12 months beginning on the day after your 50th birthday PSA: No results in last 5 years          Hepatitis C Screening Once for adults born between [birth year] and 1965  More frequently in patients at high risk for Hepatitis C Hep C Antibody: Not on file       Diabetes Screening 1-2 times per year if you're at risk for diabetes or have pre-diabetes Fasting glucose: 179 mg/dL   A1C: 7 5    Screening Not Indicated  History Diabetes   Cholesterol Screening Once every 5 years if you don't have a lipid disorder  May order more often based on risk factors  Lipid panel: 03/22/2019    Screening Not Indicated  History Lipid Disorder      Other Preventive Screenings Covered by Medicare:  1  Abdominal Aortic Aneurysm (AAA) Screening: covered once if your at risk  You're considered to be at risk if you have a family history of AAA or a male between the age of 73-68 who smoking at least 100 cigarettes in your lifetime  2  Lung Cancer Screening: covers low dose CT scan once per year if you meet all of the following conditions: (1) Age 50-69; (2) No signs or symptoms of lung cancer; (3) Current smoker or have quit smoking within the last 15 years; (4) You have a tobacco smoking history of at least 30 pack years (packs per day x number of years you smoked); (5) You get a written order from a healthcare provider  3  Glaucoma Screening: covered annually if you're considered high risk: (1) You have diabetes OR (2) Family history of glaucoma OR (3)  aged 48 and older OR (3)  American aged 72 and older  3  Osteoporosis Screening: covered every 2 years if you meet one of the following conditions: (1) Have a vertebral abnormality; (2) On glucocorticoid therapy for more than 3 months; (3) Have primary hyperparathyroidism; (4) On osteoporosis medications and need to assess response to drug therapy  5  HIV Screening: covered annually if you're between the age of 12-76  Also covered annually if you are younger than 13 and older than 72 with risk factors for HIV infection  For pregnant patients, it is covered up to 3 times per pregnancy      Immunizations:  Immunization Recommendations   Influenza Vaccine Annual influenza vaccination during flu season is recommended for all persons aged >= 6 months who do not have contraindications   Pneumococcal Vaccine (Prevnar and Pneumovax)  * Prevnar = PCV13  * Pneumovax = PPSV23 Adults 25-60 years old: 1-3 doses may be recommended based on certain risk factors  Adults 72 years old: Prevnar (PCV13) vaccine recommended followed by Pneumovax (PPSV23) vaccine  If already received PPSV23 since turning 65, then PCV13 recommended at least one year after PPSV23 dose  Hepatitis B Vaccine 3 dose series if at intermediate or high risk (ex: diabetes, end stage renal disease, liver disease)   Tetanus (Td) Vaccine - COST NOT COVERED BY MEDICARE PART B Following completion of primary series, a booster dose should be given every 10 years to maintain immunity against tetanus  Td may also be given as tetanus wound prophylaxis  Tdap Vaccine - COST NOT COVERED BY MEDICARE PART B Recommended at least once for all adults  For pregnant patients, recommended with each pregnancy  Shingles Vaccine (Shingrix) - COST NOT COVERED BY MEDICARE PART B  2 shot series recommended in those aged 48 and above     Health Maintenance Due:      Topic Date Due    Hepatitis C Screening  1960    CRC Screening: Colonoscopy  03/01/2024     Immunizations Due:      Topic Date Due    HEPATITIS B VACCINES (1 of 3 - Risk 3-dose series) 09/30/1981    DTaP,Tdap,and Td Vaccines (1 - Tdap) 09/30/1983    INFLUENZA VACCINE  07/01/2019     Advance Directives   What are advance directives? Advance directives are legal documents that state your wishes and plans for medical care  These plans are made ahead of time in case you lose your ability to make decisions for yourself  Advance directives can apply to any medical decision, such as the treatments you want, and if you want to donate organs  What are the types of advance directives? There are many types of advance directives, and each state has rules about how to use them  You may choose a combination of any of the following:  · Living will: This is a written record of the treatment you want  You can also choose which treatments you do not want, which to limit, and which to stop at a certain time   This includes surgery, medicine, IV fluid, and tube feedings  · Durable power of  for healthcare [facility]): This is a written record that states who you want to make healthcare choices for you when you are unable to make them for yourself  This person, called a proxy, is usually a family member or a friend  You may choose more than 1 proxy  · Do not resuscitate (DNR) order:  A DNR order is used in case your heart stops beating or you stop breathing  It is a request not to have certain forms of treatment, such as CPR  A DNR order may be included in other types of advance directives  · Medical directive: This covers the care that you want if you are in a coma, near death, or unable to make decisions for yourself  You can list the treatments you want for each condition  Treatment may include pain medicine, surgery, blood transfusions, dialysis, IV or tube feedings, and a ventilator (breathing machine)  · Values history: This document has questions about your views, beliefs, and how you feel and think about life  This information can help others choose the care that you would choose  Why are advance directives important? An advance directive helps you control your care  Although spoken wishes may be used, it is better to have your wishes written down  Spoken wishes can be misunderstood, or not followed  Treatments may be given even if you do not want them  An advance directive may make it easier for your family to make difficult choices about your care  © Copyright [employer] 2018 Information is for End User's use only and may not be sold, redistributed or otherwise used for commercial purposes   All illustrations and images included in CareNotes® are the copyrighted property of A D A M , [employer]  or [address] [place], [place] [address]

## 2019-10-18 NOTE — PROGRESS NOTES
Assessment and Plan:     Problem List Items Addressed This Visit     None      Visit Diagnoses     Medicare annual wellness visit, subsequent    -  Primary    Prostate cancer screening        Relevant Orders    PSA Total, Diagnostic    Encounter for hepatitis C screening test for low risk patient        Relevant Orders    Hepatitis C antibody    Flu vaccine need        Relevant Orders    FLUBLOK: influenza vaccine, quadrivalent, recombinant, PF, 0 5 mL (Completed)           Preventive health issues were discussed with patient, and age appropriate screening tests were ordered as noted in patient's After Visit Summary  Personalized health advice and appropriate referrals for health education or preventive services given if needed, as noted in patient's After Visit Summary       History of Present Illness:     Patient presents for Medicare Annual Wellness visit    Patient Care Team:  Artemio Alcantara MD as PCP - General  DO Amos Gauthier MD Stephen Ferretti, MD Cristie Hush, DO     Problem List:     Patient Active Problem List   Diagnosis    Ambulatory dysfunction    Anxiety    Cerebral infarction [facility] [address] [facility]    Constipation    Diabetes mellitus ([facility] [address] [facility]    Difficulty walking    Essential hypertension    Gait disturbance    Chronic narcotic use    Violation of controlled substance agreement    Low back pain    Rib fractures    Acute on chronic kidney failure (HCC)    Chronic kidney disease, stage IV (severe) (HCC)    Bradycardia    Gastroesophageal reflux disease without esophagitis      Past Medical and Surgical History:     Past Medical History:   Diagnosis Date    Anxiety     CAD (coronary artery disease)     last assessed 4/10/13    Chronic kidney disease, stage IV (severe) ([address] )     Concussion     last assessed 9/3/14    Depression     Diabetes mellitus ([facility] [address] )     type 2    Failure to gain weight in adult     last assessed 2/24/14    Gastroparesis     GERD (gastroesophageal reflux disease)     Hyperkalemia     last assessed 11/9/15    Hyperlipidemia     Hypertension     accelerated essential last assessed 10/7/16    Insomnia     Late effects of cerebrovascular disease     Overflow incontinence     last assessed 7/24/14    Renal disorder     Secondary hyperparathyroidism ([facility] [address] [facility]     Stroke [facility])      Past Surgical History:   Procedure Laterality Date    COLONOSCOPY      HERNIA REPAIR      MI ESOPHAGOGASTRODUODENOSCOPY TRANSORAL DIAGNOSTIC N/A 2/16/2017    Procedure: ESOPHAGOGASTRODUODENOSCOPY (EGD) with biopsy;  Surgeon: Nora Peck DO;  Location: [facility]; Service: Gastroenterology    MI ESOPHAGOGASTRODUODENOSCOPY TRANSORAL DIAGNOSTIC N/A 6/2/2017    Procedure: ESOPHAGOGASTRODUODENOSCOPY (EGD) with bx;  Surgeon: Nora Peck DO;  Location: [facility];   Service: Gastroenterology    TYMPANOSTOMY TUBE PLACEMENT        Family History:     Family History   Problem Relation Age of Onset    Cancer Family         pt and friend denies this hx    Hypertension Family         essential    Hyperlipidemia Family     Diabetes Mother     No Known Problems Father       Social History:     Social History     Socioeconomic History    Marital status: Single     Spouse name: None    Number of children: None    Years of education: None    Highest education level: None   Occupational History    None   Social Needs    Financial resource strain: None    Food insecurity:     Worry: None     Inability: None    Transportation needs:     Medical: None     Non-medical: None   Tobacco Use    Smoking status: Never Smoker    Smokeless tobacco: Never Used   Substance and Sexual Activity    Alcohol use: No     Comment: social    Drug use: Yes     Types: Marijuana     Comment: smokes once keven while for pain 1 xper  week    Sexual activity: None   Lifestyle    Physical activity:     Days per week: None     Minutes per session: None    Stress: None   Relationships    Social connections:     Talks on phone: None     Gets together: None     Attends Zoroastrian service: None     Active member of club or organization: None     Attends meetings of clubs or organizations: None     Relationship status: None    Intimate partner violence:     Fear of current or ex partner: None     Emotionally abused: None     Physically abused: None     Forced sexual activity: None   Other Topics Concern    None   Social History Narrative    Advance directive declined by pt    Caffeine use    No preference or Zoroastrian beliefs    Social hx reviewed, unchanged       Medications and Allergies:     Current Outpatient Medications   Medication Sig Dispense Refill    acetaminophen (TYLENOL) 325 mg tablet Take 3 tablets (975 mg total) by mouth every 8 (eight) hours (Patient not taking: Reported on 9/18/2019) 30 tablet 0    ALPRAZolam (XANAX) 0 5 mg tablet Take 0 5 mg by mouth daily      amLODIPine (NORVASC) 10 mg tablet Take 10 mg by mouth daily      ARIPiprazole (ABILIFY) 2 mg tablet Take 4 mg by mouth daily      aspirin 81 mg chewable tablet Chew 1 tablet (81 mg total) daily 30 tablet 0    buPROPion (WELLBUTRIN) 100 mg tablet Take 1 tablet (100 mg total) by mouth 2 (two) times a day 180 tablet 0    dicyclomine (BENTYL) 20 mg tablet Take 1 tablet by mouth every 6 (six) hours (Patient taking differently: Take 20 mg by mouth daily as needed  ) 20 tablet 0    doxazosin (CARDURA) 1 mg tablet Take 2 mg by mouth 2 (two) times a day Take 1 1/2 tablet by mouth twice a day       furosemide (LASIX) 40 mg tablet Take by mouth daily      Insulin Glargine (TOUJEO SOLOSTAR) 300 UNIT/ML SOPN Inject 24 Units under the skin daily at bedtime        Liraglutide (VICTOZA) 18 MG/3ML SOPN Inject 1 8 mg under the skin daily at bedtime as needed        lovastatin (MEVACOR) 20 mg tablet Take 20 mg by mouth daily      lubiprostone (AMITIZA) 24 mcg capsule Take 24 mcg by mouth 2 (two) times a day with meals      methocarbamol (ROBAXIN) 750 mg tablet Take 1 tablet (750 mg total) by mouth every 6 (six) hours as needed for muscle spasms (Patient not taking: Reported on 9/18/2019) 30 tablet 0    metoprolol succinate (TOPROL-XL) 25 mg 24 hr tablet Take 25 mg by mouth daily      pantoprazole (PROTONIX) 40 mg tablet TAKE 1 TABLET BY MOUTH DAILY IN THE EARLY MORNING 90 tablet 1    paricalcitol (ZEMPLAR) 1 mcg capsule Take 2 mcg by mouth daily        pioglitazone (ACTOS) 15 mg tablet Take 30 mg by mouth daily       psyllium (METAMUCIL SMOOTH TEXTURE) 28 % packet Take 1 packet by mouth 2 (two) times a day 60 packet 2    sodium polystyrene sulfonate (KAYEXALATE) 15 g/60 mL suspension Take 15 g by mouth as needed Take 120ml by mouth single dose        traZODone (DESYREL) 100 mg tablet Take 100 mg by mouth daily at bedtime       No current facility-administered medications for this visit  No Known Allergies   Immunizations:     Immunization History   Administered Date(s) Administered    INFLUENZA 10/20/2013, 09/03/2014, 08/12/2015, 12/19/2018    Influenza TIV (IM) 01/04/2017    Influenza, recombinant, quadrivalent,injectable, preservative free 09/28/2018, 12/17/2018, 10/18/2019    Pneumococcal Conjugate 13-Valent 10/08/2015    Pneumococcal Polysaccharide PPV23 01/04/2017    Zoster 08/01/2013      Health Maintenance:         Topic Date Due    Hepatitis C Screening  1960    CRC Screening: Colonoscopy  03/01/2024         Topic Date Due    HEPATITIS B VACCINES (1 of 3 - Risk 3-dose series) 09/30/1981    DTaP,Tdap,and Td Vaccines (1 - Tdap) 09/30/1983      Medicare Health Risk Assessment:     /84 (BP Location: Right arm, Patient Position: Sitting, Cuff Size: Standard)   Pulse 65   Temp 97 8 °F (36 6 °C) (Temporal)   Resp 18   Ht 5' 9" (1 753 m)   Wt 62 6 kg (138 lb)   SpO2 98%   BMI 20 38 kg/m²      Edwyna Homes is here for his Subsequent Wellness visit  Health Risk Assessment:   Patient rates overall health as good  Patient feels that their physical health rating is same  Eyesight was rated as same  Hearing was rated as same  Patient feels that their emotional and mental health rating is same  Pain experienced in the last 7 days has been none  Patient states that he has experienced no weight loss or gain in last 6 months  Fall Risk Screening: In the past year, patient has experienced: no history of falling in past year      Home Safety:  Patient has trouble with stairs inside or outside of their home  Patient has working smoke alarms and has working carbon monoxide detector  Home safety hazards include: none  History of CVA    Nutrition:   Current diet is Diabetic and Limited junk food  Medications:   Patient is not currently taking any over-the-counter supplements  Patient is not able to manage medications  Activities of Daily Living (ADLs)/Instrumental Activities of Daily Living (IADLs):   Walk and transfer into and out of bed and chair?: Yes  Dress and groom yourself?: No    Bathe or shower yourself?: No    Feed yourself?  Yes  Do your laundry/housekeeping?: No  Manage your money, pay your bills and track your expenses?: No  Make your own meals?: No    Do your own shopping?: No    ADL comments: Has a care giver to help with ADLs    Previous Hospitalizations:   Any hospitalizations or ED visits within the last 12 months?: No      Advance Care Planning:   Living will: No      Cognitive Screening:   Provider or family/friend/caregiver concerned regarding cognition?: No    PREVENTIVE SCREENINGS      Cardiovascular Screening:    General: History Lipid Disorder and Screening Current      Diabetes Screening:     General: History Diabetes and Screening Current      Colorectal Cancer Screening:     General: Screening Current      Prostate Cancer Screening:    General: Risks and Benefits Discussed    Due for: PSA      Osteoporosis Screening:    General: Screening Not Indicated      Abdominal Aortic Aneurysm (AAA) Screening:        General: Screening Not Indicated      Lung Cancer Screening:     General: Screening Current      Hepatitis C Screening:    General: Risks and Benefits Discussed    Hep C Screening Accepted: Yes        Preventive Screening Comments: ALBIN was completed today, and prostate was within normal limits      Other Counseling Topics:   Car/seat belt/driving safety and skin self-exam        Alma Wong PA-C

## 2019-10-28 DIAGNOSIS — I10 ESSENTIAL HYPERTENSION: Primary | ICD-10-CM

## 2019-10-28 DIAGNOSIS — F32.A DEPRESSION, UNSPECIFIED DEPRESSION TYPE: ICD-10-CM

## 2019-10-28 RX ORDER — METOPROLOL SUCCINATE 25 MG/1
TABLET, EXTENDED RELEASE ORAL
Qty: 90 TABLET | Refills: 3 | Status: SHIPPED | OUTPATIENT
Start: 2019-10-28 | End: 2020-01-01 | Stop reason: HOSPADM

## 2019-10-28 RX ORDER — BUPROPION HYDROCHLORIDE 100 MG/1
TABLET ORAL
Qty: 180 TABLET | Refills: 3 | Status: SHIPPED | OUTPATIENT
Start: 2019-10-28 | End: 2020-01-01 | Stop reason: SDUPTHER

## 2019-10-28 RX ORDER — ARIPIPRAZOLE 2 MG/1
TABLET ORAL
Qty: 180 TABLET | Refills: 3 | Status: SHIPPED | OUTPATIENT
Start: 2019-10-28 | End: 2020-01-01 | Stop reason: SDUPTHER

## 2019-11-27 DIAGNOSIS — N18.9 CHRONIC KIDNEY DISEASE, UNSPECIFIED CKD STAGE: ICD-10-CM

## 2019-11-27 DIAGNOSIS — K59.01 SLOW TRANSIT CONSTIPATION: Primary | ICD-10-CM

## 2019-11-27 RX ORDER — LUBIPROSTONE 24 UG/1
CAPSULE, GELATIN COATED ORAL
Qty: 180 CAPSULE | Refills: 10 | Status: SHIPPED | OUTPATIENT
Start: 2019-11-27 | End: 2020-01-01 | Stop reason: SDUPTHER

## 2019-11-27 RX ORDER — PANTOPRAZOLE SODIUM 40 MG/1
TABLET, DELAYED RELEASE ORAL
Qty: 90 TABLET | Refills: 10 | Status: SHIPPED | OUTPATIENT
Start: 2019-11-27 | End: 2020-01-01 | Stop reason: DRUGHIGH

## 2019-12-24 DIAGNOSIS — I10 ESSENTIAL HYPERTENSION: Primary | ICD-10-CM

## 2019-12-26 RX ORDER — AMLODIPINE BESYLATE 10 MG/1
TABLET ORAL
Qty: 90 TABLET | Refills: 10 | Status: SHIPPED | OUTPATIENT
Start: 2019-12-26 | End: 2020-01-01 | Stop reason: HOSPADM

## 2020-01-01 ENCOUNTER — TELEPHONE (OUTPATIENT)
Dept: GASTROENTEROLOGY | Facility: CLINIC | Age: 60
End: 2020-01-01

## 2020-01-01 ENCOUNTER — TELEPHONE (OUTPATIENT)
Dept: FAMILY MEDICINE CLINIC | Facility: CLINIC | Age: 60
End: 2020-01-01

## 2020-01-01 ENCOUNTER — APPOINTMENT (INPATIENT)
Dept: RADIOLOGY | Facility: HOSPITAL | Age: 60
DRG: 480 | End: 2020-01-01
Payer: COMMERCIAL

## 2020-01-01 ENCOUNTER — APPOINTMENT (INPATIENT)
Dept: DIALYSIS | Facility: HOSPITAL | Age: 60
DRG: 480 | End: 2020-01-01
Payer: COMMERCIAL

## 2020-01-01 ENCOUNTER — OFFICE VISIT (OUTPATIENT)
Dept: FAMILY MEDICINE CLINIC | Facility: CLINIC | Age: 60
End: 2020-01-01

## 2020-01-01 ENCOUNTER — HOSPITAL ENCOUNTER (EMERGENCY)
Facility: HOSPITAL | Age: 60
End: 2020-04-24
Attending: EMERGENCY MEDICINE | Admitting: EMERGENCY MEDICINE
Payer: COMMERCIAL

## 2020-01-01 ENCOUNTER — ANESTHESIA (OUTPATIENT)
Dept: GASTROENTEROLOGY | Facility: HOSPITAL | Age: 60
End: 2020-01-01

## 2020-01-01 ENCOUNTER — OFFICE VISIT (OUTPATIENT)
Dept: GASTROENTEROLOGY | Facility: CLINIC | Age: 60
End: 2020-01-01
Payer: COMMERCIAL

## 2020-01-01 ENCOUNTER — HOSPITAL ENCOUNTER (INPATIENT)
Facility: HOSPITAL | Age: 60
LOS: 2 days | Discharge: HOME | DRG: 641 | End: 2020-02-22
Attending: EMERGENCY MEDICINE | Admitting: INTERNAL MEDICINE
Payer: COMMERCIAL

## 2020-01-01 ENCOUNTER — OFFICE VISIT (OUTPATIENT)
Dept: OBGYN CLINIC | Facility: CLINIC | Age: 60
End: 2020-01-01

## 2020-01-01 ENCOUNTER — TELEPHONE (OUTPATIENT)
Dept: OBGYN CLINIC | Facility: HOSPITAL | Age: 60
End: 2020-01-01

## 2020-01-01 ENCOUNTER — ANESTHESIA EVENT (INPATIENT)
Dept: PERIOP | Facility: HOSPITAL | Age: 60
DRG: 480 | End: 2020-01-01
Payer: COMMERCIAL

## 2020-01-01 ENCOUNTER — ANESTHESIA EVENT (OUTPATIENT)
Dept: GASTROENTEROLOGY | Facility: HOSPITAL | Age: 60
End: 2020-01-01

## 2020-01-01 ENCOUNTER — APPOINTMENT (OUTPATIENT)
Dept: RADIOLOGY | Facility: CLINIC | Age: 60
End: 2020-01-01
Payer: COMMERCIAL

## 2020-01-01 ENCOUNTER — HOSPITAL ENCOUNTER (OUTPATIENT)
Dept: GASTROENTEROLOGY | Facility: HOSPITAL | Age: 60
Setting detail: OUTPATIENT SURGERY
Discharge: HOME | End: 2020-12-10
Attending: INTERNAL MEDICINE

## 2020-01-01 ENCOUNTER — APPOINTMENT (EMERGENCY)
Dept: RADIOLOGY | Facility: HOSPITAL | Age: 60
End: 2020-01-01
Payer: COMMERCIAL

## 2020-01-01 ENCOUNTER — HOSPITAL ENCOUNTER (OUTPATIENT)
Dept: GASTROENTEROLOGY | Facility: HOSPITAL | Age: 60
Setting detail: OUTPATIENT SURGERY
Discharge: HOME | End: 2020-09-10
Attending: INTERNAL MEDICINE
Payer: COMMERCIAL

## 2020-01-01 ENCOUNTER — HOSPITAL ENCOUNTER (EMERGENCY)
Facility: HOSPITAL | Age: 60
Discharge: ANOTHER HOSPITAL | End: 2020-03-02
Attending: EMERGENCY MEDICINE | Admitting: EMERGENCY MEDICINE
Payer: COMMERCIAL

## 2020-01-01 ENCOUNTER — NURSE TRIAGE (OUTPATIENT)
Dept: OTHER | Facility: OTHER | Age: 60
End: 2020-01-01

## 2020-01-01 ENCOUNTER — ANESTHESIA (INPATIENT)
Dept: PERIOP | Facility: HOSPITAL | Age: 60
DRG: 480 | End: 2020-01-01
Payer: COMMERCIAL

## 2020-01-01 ENCOUNTER — TRANSCRIBE ORDERS (OUTPATIENT)
Dept: GASTROENTEROLOGY | Facility: CLINIC | Age: 60
End: 2020-01-01

## 2020-01-01 ENCOUNTER — TELEPHONE (OUTPATIENT)
Dept: OBGYN CLINIC | Facility: MEDICAL CENTER | Age: 60
End: 2020-01-01

## 2020-01-01 ENCOUNTER — TRANSITIONAL CARE MANAGEMENT (OUTPATIENT)
Dept: FAMILY MEDICINE CLINIC | Facility: CLINIC | Age: 60
End: 2020-01-01

## 2020-01-01 ENCOUNTER — HOSPITAL ENCOUNTER (INPATIENT)
Facility: HOSPITAL | Age: 60
LOS: 5 days | Discharge: HOME HEALTH | DRG: 480 | End: 2020-04-29
Attending: STUDENT IN AN ORGANIZED HEALTH CARE EDUCATION/TRAINING PROGRAM | Admitting: STUDENT IN AN ORGANIZED HEALTH CARE EDUCATION/TRAINING PROGRAM
Payer: COMMERCIAL

## 2020-01-01 ENCOUNTER — OFFICE VISIT (OUTPATIENT)
Dept: GASTROENTEROLOGY | Facility: MEDICAL CENTER | Age: 60
End: 2020-01-01
Payer: COMMERCIAL

## 2020-01-01 ENCOUNTER — PREP FOR PROCEDURE (OUTPATIENT)
Dept: GASTROENTEROLOGY | Facility: CLINIC | Age: 60
End: 2020-01-01

## 2020-01-01 VITALS
RESPIRATION RATE: 16 BRPM | DIASTOLIC BLOOD PRESSURE: 75 MMHG | BODY MASS INDEX: 21 KG/M2 | WEIGHT: 142.2 LBS | SYSTOLIC BLOOD PRESSURE: 158 MMHG | TEMPERATURE: 95.6 F | HEART RATE: 69 BPM | OXYGEN SATURATION: 98 %

## 2020-01-01 VITALS
SYSTOLIC BLOOD PRESSURE: 180 MMHG | HEART RATE: 55 BPM | OXYGEN SATURATION: 99 % | RESPIRATION RATE: 18 BRPM | BODY MASS INDEX: 20.14 KG/M2 | WEIGHT: 136 LBS | HEIGHT: 69 IN | TEMPERATURE: 97 F | DIASTOLIC BLOOD PRESSURE: 80 MMHG

## 2020-01-01 VITALS
SYSTOLIC BLOOD PRESSURE: 145 MMHG | HEART RATE: 55 BPM | DIASTOLIC BLOOD PRESSURE: 64 MMHG | BODY MASS INDEX: 23.54 KG/M2 | HEIGHT: 67 IN | RESPIRATION RATE: 12 BRPM | TEMPERATURE: 98.4 F | OXYGEN SATURATION: 100 % | WEIGHT: 150 LBS

## 2020-01-01 VITALS
SYSTOLIC BLOOD PRESSURE: 130 MMHG | HEART RATE: 71 BPM | TEMPERATURE: 96.5 F | BODY MASS INDEX: 18.9 KG/M2 | WEIGHT: 128 LBS | RESPIRATION RATE: 16 BRPM | DIASTOLIC BLOOD PRESSURE: 80 MMHG | OXYGEN SATURATION: 99 %

## 2020-01-01 VITALS
OXYGEN SATURATION: 95 % | DIASTOLIC BLOOD PRESSURE: 57 MMHG | BODY MASS INDEX: 20.15 KG/M2 | HEART RATE: 54 BPM | RESPIRATION RATE: 18 BRPM | WEIGHT: 136.47 LBS | TEMPERATURE: 98.1 F | SYSTOLIC BLOOD PRESSURE: 119 MMHG

## 2020-01-01 VITALS
OXYGEN SATURATION: 93 % | SYSTOLIC BLOOD PRESSURE: 148 MMHG | TEMPERATURE: 98 F | HEART RATE: 84 BPM | DIASTOLIC BLOOD PRESSURE: 78 MMHG | RESPIRATION RATE: 18 BRPM

## 2020-01-01 VITALS
SYSTOLIC BLOOD PRESSURE: 102 MMHG | TEMPERATURE: 97.3 F | HEART RATE: 78 BPM | DIASTOLIC BLOOD PRESSURE: 68 MMHG | OXYGEN SATURATION: 98 %

## 2020-01-01 VITALS
OXYGEN SATURATION: 98 % | SYSTOLIC BLOOD PRESSURE: 120 MMHG | RESPIRATION RATE: 18 BRPM | HEART RATE: 100 BPM | TEMPERATURE: 95 F | DIASTOLIC BLOOD PRESSURE: 64 MMHG

## 2020-01-01 VITALS
DIASTOLIC BLOOD PRESSURE: 74 MMHG | SYSTOLIC BLOOD PRESSURE: 155 MMHG | OXYGEN SATURATION: 98 % | TEMPERATURE: 95.6 F | WEIGHT: 157.85 LBS | HEART RATE: 94 BPM | RESPIRATION RATE: 20 BRPM | BODY MASS INDEX: 23.31 KG/M2

## 2020-01-01 VITALS
SYSTOLIC BLOOD PRESSURE: 100 MMHG | WEIGHT: 128 LBS | HEART RATE: 89 BPM | DIASTOLIC BLOOD PRESSURE: 45 MMHG | BODY MASS INDEX: 18.9 KG/M2 | TEMPERATURE: 97.6 F

## 2020-01-01 VITALS — TEMPERATURE: 96.2 F

## 2020-01-01 VITALS — HEART RATE: 59 BPM

## 2020-01-01 DIAGNOSIS — I10 ESSENTIAL HYPERTENSION: ICD-10-CM

## 2020-01-01 DIAGNOSIS — N18.9 CHRONIC KIDNEY DISEASE, UNSPECIFIED CKD STAGE: ICD-10-CM

## 2020-01-01 DIAGNOSIS — T38.3X5A HYPOGLYCEMIA DUE TO INSULIN: Primary | ICD-10-CM

## 2020-01-01 DIAGNOSIS — N18.9 ACUTE ON CHRONIC RENAL FAILURE (HCC): ICD-10-CM

## 2020-01-01 DIAGNOSIS — R26.2 AMBULATORY DYSFUNCTION: ICD-10-CM

## 2020-01-01 DIAGNOSIS — I63.9 CEREBRAL INFARCTION, UNSPECIFIED MECHANISM (HCC): ICD-10-CM

## 2020-01-01 DIAGNOSIS — Z86.73 HISTORY OF CVA (CEREBROVASCULAR ACCIDENT): Primary | ICD-10-CM

## 2020-01-01 DIAGNOSIS — Z12.5 SCREENING FOR MALIGNANT NEOPLASM OF PROSTATE: ICD-10-CM

## 2020-01-01 DIAGNOSIS — R13.19 ESOPHAGEAL DYSPHAGIA: ICD-10-CM

## 2020-01-01 DIAGNOSIS — Z23 NEED FOR VACCINATION: ICD-10-CM

## 2020-01-01 DIAGNOSIS — S72.142A CLOSED FRACTURE OF FEMUR, INTERTROCHANTERIC, LEFT, INITIAL ENCOUNTER (HCC): ICD-10-CM

## 2020-01-01 DIAGNOSIS — Z00.00 MEDICARE ANNUAL WELLNESS VISIT, SUBSEQUENT: Primary | ICD-10-CM

## 2020-01-01 DIAGNOSIS — S72.145D CLOSED NONDISPLACED INTERTROCHANTERIC FRACTURE OF LEFT FEMUR WITH ROUTINE HEALING, SUBSEQUENT ENCOUNTER: Primary | ICD-10-CM

## 2020-01-01 DIAGNOSIS — R26.9 GAIT DISTURBANCE: Primary | ICD-10-CM

## 2020-01-01 DIAGNOSIS — N18.6 ESRD (END STAGE RENAL DISEASE) (HCC): ICD-10-CM

## 2020-01-01 DIAGNOSIS — R00.1 SINUS BRADYCARDIA: ICD-10-CM

## 2020-01-01 DIAGNOSIS — E16.0 HYPOGLYCEMIA DUE TO INSULIN: Primary | ICD-10-CM

## 2020-01-01 DIAGNOSIS — S72.101A: ICD-10-CM

## 2020-01-01 DIAGNOSIS — N18.4 ANEMIA DUE TO STAGE 4 CHRONIC KIDNEY DISEASE (HCC): ICD-10-CM

## 2020-01-01 DIAGNOSIS — K59.01 SLOW TRANSIT CONSTIPATION: ICD-10-CM

## 2020-01-01 DIAGNOSIS — S72.142A CLOSED FRACTURE OF FEMUR, INTERTROCHANTERIC, LEFT, INITIAL ENCOUNTER (HCC): Primary | ICD-10-CM

## 2020-01-01 DIAGNOSIS — Z11.59 SPECIAL SCREENING EXAMINATION FOR UNSPECIFIED VIRAL DISEASE: ICD-10-CM

## 2020-01-01 DIAGNOSIS — N17.9 ACUTE ON CHRONIC RENAL FAILURE (HCC): ICD-10-CM

## 2020-01-01 DIAGNOSIS — K21.9 GASTROESOPHAGEAL REFLUX DISEASE WITHOUT ESOPHAGITIS: ICD-10-CM

## 2020-01-01 DIAGNOSIS — D63.1 ANEMIA DUE TO STAGE 4 CHRONIC KIDNEY DISEASE (HCC): ICD-10-CM

## 2020-01-01 DIAGNOSIS — F51.01 PRIMARY INSOMNIA: ICD-10-CM

## 2020-01-01 DIAGNOSIS — F32.A DEPRESSION, UNSPECIFIED DEPRESSION TYPE: ICD-10-CM

## 2020-01-01 DIAGNOSIS — K20.90 ESOPHAGITIS: ICD-10-CM

## 2020-01-01 DIAGNOSIS — K21.00 GASTROESOPHAGEAL REFLUX DISEASE WITH ESOPHAGITIS WITHOUT HEMORRHAGE: ICD-10-CM

## 2020-01-01 DIAGNOSIS — E87.5 HYPERKALEMIA: Primary | ICD-10-CM

## 2020-01-01 DIAGNOSIS — K20.90 ESOPHAGITIS: Primary | ICD-10-CM

## 2020-01-01 DIAGNOSIS — Z79.4 TYPE 2 DIABETES MELLITUS WITH STAGE 5 CHRONIC KIDNEY DISEASE NOT ON CHRONIC DIALYSIS, WITH LONG-TERM CURRENT USE OF INSULIN (HCC): ICD-10-CM

## 2020-01-01 DIAGNOSIS — E16.2 HYPOGLYCEMIA: Primary | ICD-10-CM

## 2020-01-01 DIAGNOSIS — I10 UNCONTROLLED HYPERTENSION: ICD-10-CM

## 2020-01-01 DIAGNOSIS — N18.9 CHRONIC RENAL FAILURE: ICD-10-CM

## 2020-01-01 DIAGNOSIS — K59.01 SLOW TRANSIT CONSTIPATION: Primary | ICD-10-CM

## 2020-01-01 DIAGNOSIS — Z12.11 SCREENING FOR COLON CANCER: ICD-10-CM

## 2020-01-01 DIAGNOSIS — N18.5 TYPE 2 DIABETES MELLITUS WITH STAGE 5 CHRONIC KIDNEY DISEASE NOT ON CHRONIC DIALYSIS, WITH LONG-TERM CURRENT USE OF INSULIN (HCC): ICD-10-CM
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
## 2020-01-01 DIAGNOSIS — R26.9 GAIT DISTURBANCE: ICD-10-CM

## 2020-01-01 DIAGNOSIS — F51.01 PRIMARY INSOMNIA: Primary | ICD-10-CM

## 2020-01-01 DIAGNOSIS — R13.19 ESOPHAGEAL DYSPHAGIA: Primary | ICD-10-CM

## 2020-01-01 DIAGNOSIS — S72.145D CLOSED NONDISPLACED INTERTROCHANTERIC FRACTURE OF LEFT FEMUR WITH ROUTINE HEALING, SUBSEQUENT ENCOUNTER: ICD-10-CM

## 2020-01-01 DIAGNOSIS — E11.22 TYPE 2 DIABETES MELLITUS WITH STAGE 5 CHRONIC KIDNEY DISEASE NOT ON CHRONIC DIALYSIS, WITH LONG-TERM CURRENT USE OF INSULIN (HCC): ICD-10-CM

## 2020-01-01 LAB
ABO GROUP BLD: NORMAL
ABO GROUP BLD: NORMAL
ALBUMIN SERPL BCP-MCNC: 2.7 G/DL (ref 3.5–5)
ALBUMIN SERPL BCP-MCNC: 3.7 G/DL (ref 3–5.2)
ALP SERPL-CCNC: 161 U/L (ref 43–122)
ALP SERPL-CCNC: 192 U/L (ref 46–116)
ALT SERPL W P-5'-P-CCNC: 25 U/L (ref 9–52)
ALT SERPL W P-5'-P-CCNC: 9 U/L (ref 12–78)
ANION GAP SERPL CALCULATED.3IONS-SCNC: 10 MMOL/L (ref 4–13)
ANION GAP SERPL CALCULATED.3IONS-SCNC: 10 MMOL/L (ref 4–13)
ANION GAP SERPL CALCULATED.3IONS-SCNC: 11 MMOL/L (ref 4–13)
ANION GAP SERPL CALCULATED.3IONS-SCNC: 13 MMOL/L (ref 4–13)
ANION GAP SERPL CALCULATED.3IONS-SCNC: 13 MMOL/L (ref 5–14)
ANION GAP SERPL CALCULATED.3IONS-SCNC: 14 MMOL/L (ref 5–14)
ANION GAP SERPL CALCULATED.3IONS-SCNC: 7 MMOL/L (ref 4–13)
ANION GAP SERPL CALCULATED.3IONS-SCNC: 8 MMOL/L (ref 4–13)
ANION GAP SERPL CALCULATED.3IONS-SCNC: 9 MMOL/L (ref 4–13)
APTT PPP: 33 SECONDS (ref 23–37)
AST SERPL W P-5'-P-CCNC: 21 U/L (ref 17–59)
AST SERPL W P-5'-P-CCNC: 40 U/L (ref 5–45)
ATRIAL RATE: 42 BPM
ATRIAL RATE: 59 BPM
ATRIAL RATE: 79 BPM
ATRIAL RATE: 87 BPM
BACTERIA UR QL AUTO: ABNORMAL /HPF
BASOPHILS # BLD AUTO: 0.05 THOUSANDS/ΜL (ref 0–0.1)
BASOPHILS # BLD AUTO: 0.06 THOUSANDS/ΜL (ref 0–0.1)
BASOPHILS # BLD AUTO: 0.07 THOUSANDS/ΜL (ref 0–0.1)
BASOPHILS # BLD AUTO: 0.1 THOUSANDS/ΜL (ref 0–0.1)
BASOPHILS # BLD AUTO: 0.1 THOUSANDS/ΜL (ref 0–0.1)
BASOPHILS NFR BLD AUTO: 1 % (ref 0–1)
BILIRUB SERPL-MCNC: 0.4 MG/DL
BILIRUB SERPL-MCNC: 0.42 MG/DL (ref 0.2–1)
BILIRUB UR QL STRIP: NEGATIVE
BLD GP AB SCN SERPL QL: NEGATIVE
BUN SERPL-MCNC: 30 MG/DL (ref 5–25)
BUN SERPL-MCNC: 45 MG/DL (ref 5–25)
BUN SERPL-MCNC: 53 MG/DL (ref 5–25)
BUN SERPL-MCNC: 54 MG/DL (ref 5–25)
BUN SERPL-MCNC: 54 MG/DL (ref 5–25)
BUN SERPL-MCNC: 55 MG/DL (ref 5–25)
BUN SERPL-MCNC: 55 MG/DL (ref 5–25)
BUN SERPL-MCNC: 57 MG/DL (ref 5–25)
BUN SERPL-MCNC: 66 MG/DL (ref 5–25)
BUN SERPL-MCNC: 80 MG/DL (ref 5–25)
BUN SERPL-MCNC: 90 MG/DL (ref 5–25)
CALCIUM SERPL-MCNC: 7.7 MG/DL (ref 8.3–10.1)
CALCIUM SERPL-MCNC: 8 MG/DL (ref 8.3–10.1)
CALCIUM SERPL-MCNC: 8 MG/DL (ref 8.3–10.1)
CALCIUM SERPL-MCNC: 8.2 MG/DL (ref 8.3–10.1)
CALCIUM SERPL-MCNC: 8.2 MG/DL (ref 8.3–10.1)
CALCIUM SERPL-MCNC: 8.8 MG/DL (ref 8.3–10.1)
CALCIUM SERPL-MCNC: 9 MG/DL (ref 8.3–10.1)
CALCIUM SERPL-MCNC: 9 MG/DL (ref 8.3–10.1)
CALCIUM SERPL-MCNC: 9.1 MG/DL (ref 8.4–10.2)
CALCIUM SERPL-MCNC: 9.1 MG/DL (ref 8.4–10.2)
CALCIUM SERPL-MCNC: 9.5 MG/DL (ref 8.3–10.1)
CHLORIDE SERPL-SCNC: 101 MMOL/L (ref 100–108)
CHLORIDE SERPL-SCNC: 106 MMOL/L (ref 97–108)
CHLORIDE SERPL-SCNC: 107 MMOL/L (ref 100–108)
CHLORIDE SERPL-SCNC: 107 MMOL/L (ref 100–108)
CHLORIDE SERPL-SCNC: 108 MMOL/L (ref 100–108)
CHLORIDE SERPL-SCNC: 95 MMOL/L (ref 100–108)
CHLORIDE SERPL-SCNC: 96 MMOL/L (ref 100–108)
CHLORIDE SERPL-SCNC: 96 MMOL/L (ref 97–108)
CHLORIDE SERPL-SCNC: 98 MMOL/L (ref 100–108)
CHLORIDE SERPL-SCNC: 98 MMOL/L (ref 100–108)
CHLORIDE SERPL-SCNC: 99 MMOL/L (ref 100–108)
CLARITY UR: CLEAR
CO2 SERPL-SCNC: 19 MMOL/L (ref 22–30)
CO2 SERPL-SCNC: 22 MMOL/L (ref 21–32)
CO2 SERPL-SCNC: 23 MMOL/L (ref 21–32)
CO2 SERPL-SCNC: 23 MMOL/L (ref 21–32)
CO2 SERPL-SCNC: 25 MMOL/L (ref 21–32)
CO2 SERPL-SCNC: 26 MMOL/L (ref 21–32)
CO2 SERPL-SCNC: 26 MMOL/L (ref 22–30)
CO2 SERPL-SCNC: 27 MMOL/L (ref 21–32)
CO2 SERPL-SCNC: 28 MMOL/L (ref 21–32)
CO2 SERPL-SCNC: 30 MMOL/L (ref 21–32)
CO2 SERPL-SCNC: 30 MMOL/L (ref 21–32)
COLOR UR: ABNORMAL
CREAT SERPL-MCNC: 5.73 MG/DL (ref 0.6–1.3)
CREAT SERPL-MCNC: 5.76 MG/DL (ref 0.6–1.3)
CREAT SERPL-MCNC: 5.8 MG/DL (ref 0.6–1.3)
CREAT SERPL-MCNC: 5.87 MG/DL (ref 0.6–1.3)
CREAT SERPL-MCNC: 5.9 MG/DL (ref 0.6–1.3)
CREAT SERPL-MCNC: 5.94 MG/DL (ref 0.6–1.3)
CREAT SERPL-MCNC: 7.83 MG/DL (ref 0.6–1.3)
CREAT SERPL-MCNC: 8.1 MG/DL (ref 0.7–1.5)
CREAT SERPL-MCNC: 8.18 MG/DL (ref 0.6–1.3)
CREAT SERPL-MCNC: 8.96 MG/DL (ref 0.6–1.3)
CREAT SERPL-MCNC: 9 MG/DL (ref 0.7–1.5)
EOSINOPHIL # BLD AUTO: 0 THOUSAND/ΜL (ref 0–0.4)
EOSINOPHIL # BLD AUTO: 0.02 THOUSAND/ΜL (ref 0–0.61)
EOSINOPHIL # BLD AUTO: 0.1 THOUSAND/ΜL (ref 0–0.4)
EOSINOPHIL # BLD AUTO: 0.1 THOUSAND/ΜL (ref 0–0.61)
EOSINOPHIL # BLD AUTO: 0.12 THOUSAND/ΜL (ref 0–0.61)
EOSINOPHIL # BLD AUTO: 0.15 THOUSAND/ΜL (ref 0–0.61)
EOSINOPHIL # BLD AUTO: 0.22 THOUSAND/ΜL (ref 0–0.61)
EOSINOPHIL NFR BLD AUTO: 0 % (ref 0–6)
EOSINOPHIL NFR BLD AUTO: 0 % (ref 0–6)
EOSINOPHIL NFR BLD AUTO: 1 % (ref 0–6)
EOSINOPHIL NFR BLD AUTO: 3 % (ref 0–6)
EOSINOPHIL NFR BLD AUTO: 4 % (ref 0–6)
ERYTHROCYTE [DISTWIDTH] IN BLOOD BY AUTOMATED COUNT: 12.9 % (ref 11.6–15.1)
ERYTHROCYTE [DISTWIDTH] IN BLOOD BY AUTOMATED COUNT: 13 % (ref 11.6–15.1)
ERYTHROCYTE [DISTWIDTH] IN BLOOD BY AUTOMATED COUNT: 13.2 % (ref 11.6–15.1)
ERYTHROCYTE [DISTWIDTH] IN BLOOD BY AUTOMATED COUNT: 13.2 % (ref 11.6–15.1)
ERYTHROCYTE [DISTWIDTH] IN BLOOD BY AUTOMATED COUNT: 13.3 % (ref 11.6–15.1)
ERYTHROCYTE [DISTWIDTH] IN BLOOD BY AUTOMATED COUNT: 14.3 %
ERYTHROCYTE [DISTWIDTH] IN BLOOD BY AUTOMATED COUNT: 14.9 %
GFR SERPL CREATININE-BSD FRML MDRD: 11 ML/MIN/1.73SQ M
GFR SERPL CREATININE-BSD FRML MDRD: 12 ML/MIN/1.73SQ M
GFR SERPL CREATININE-BSD FRML MDRD: 12 ML/MIN/1.73SQ M
GFR SERPL CREATININE-BSD FRML MDRD: 7 ML/MIN/1.73SQ M
GFR SERPL CREATININE-BSD FRML MDRD: 7 ML/MIN/1.73SQ M
GFR SERPL CREATININE-BSD FRML MDRD: 8 ML/MIN/1.73SQ M
GLUCOSE SERPL-MCNC: 100 MG/DL (ref 65–140)
GLUCOSE SERPL-MCNC: 107 MG/DL (ref 65–140)
GLUCOSE SERPL-MCNC: 108 MG/DL (ref 65–140)
GLUCOSE SERPL-MCNC: 115 MG/DL (ref 65–140)
GLUCOSE SERPL-MCNC: 115 MG/DL (ref 65–140)
GLUCOSE SERPL-MCNC: 117 MG/DL (ref 65–140)
GLUCOSE SERPL-MCNC: 118 MG/DL (ref 65–140)
GLUCOSE SERPL-MCNC: 120 MG/DL (ref 65–140)
GLUCOSE SERPL-MCNC: 121 MG/DL (ref 65–140)
GLUCOSE SERPL-MCNC: 123 MG/DL (ref 65–140)
GLUCOSE SERPL-MCNC: 124 MG/DL (ref 65–140)
GLUCOSE SERPL-MCNC: 128 MG/DL (ref 65–140)
GLUCOSE SERPL-MCNC: 134 MG/DL (ref 65–140)
GLUCOSE SERPL-MCNC: 138 MG/DL (ref 65–140)
GLUCOSE SERPL-MCNC: 141 MG/DL (ref 65–140)
GLUCOSE SERPL-MCNC: 143 MG/DL (ref 65–140)
GLUCOSE SERPL-MCNC: 146 MG/DL (ref 65–140)
GLUCOSE SERPL-MCNC: 149 MG/DL (ref 65–140)
GLUCOSE SERPL-MCNC: 151 MG/DL (ref 65–140)
GLUCOSE SERPL-MCNC: 151 MG/DL (ref 65–140)
GLUCOSE SERPL-MCNC: 159 MG/DL (ref 65–140)
GLUCOSE SERPL-MCNC: 162 MG/DL (ref 65–140)
GLUCOSE SERPL-MCNC: 167 MG/DL (ref 65–140)
GLUCOSE SERPL-MCNC: 169 MG/DL (ref 65–140)
GLUCOSE SERPL-MCNC: 174 MG/DL (ref 65–140)
GLUCOSE SERPL-MCNC: 174 MG/DL (ref 65–140)
GLUCOSE SERPL-MCNC: 187 MG/DL (ref 65–140)
GLUCOSE SERPL-MCNC: 194 MG/DL (ref 70–99)
GLUCOSE SERPL-MCNC: 198 MG/DL (ref 65–140)
GLUCOSE SERPL-MCNC: 233 MG/DL (ref 65–140)
GLUCOSE SERPL-MCNC: 27 MG/DL (ref 65–140)
GLUCOSE SERPL-MCNC: 42 MG/DL (ref 65–140)
GLUCOSE SERPL-MCNC: 58 MG/DL (ref 65–140)
GLUCOSE SERPL-MCNC: 60 MG/DL (ref 65–140)
GLUCOSE SERPL-MCNC: 62 MG/DL (ref 70–99)
GLUCOSE SERPL-MCNC: 77 MG/DL (ref 65–140)
GLUCOSE SERPL-MCNC: 79 MG/DL (ref 65–140)
GLUCOSE SERPL-MCNC: 79 MG/DL (ref 65–140)
GLUCOSE SERPL-MCNC: 81 MG/DL (ref 65–140)
GLUCOSE SERPL-MCNC: 87 MG/DL (ref 65–140)
GLUCOSE SERPL-MCNC: 87 MG/DL (ref 65–140)
GLUCOSE SERPL-MCNC: 88 MG/DL (ref 65–140)
GLUCOSE SERPL-MCNC: 94 MG/DL (ref 65–140)
GLUCOSE SERPL-MCNC: 95 MG/DL (ref 65–140)
GLUCOSE SERPL-MCNC: 97 MG/DL (ref 65–140)
GLUCOSE SERPL-MCNC: 98 MG/DL (ref 65–140)
GLUCOSE UR STRIP-MCNC: NEGATIVE MG/DL
HCT VFR BLD AUTO: 30 % (ref 41–53)
HCT VFR BLD AUTO: 32.7 % (ref 36.5–49.3)
HCT VFR BLD AUTO: 33.2 % (ref 36.5–49.3)
HCT VFR BLD AUTO: 36.2 % (ref 36.5–49.3)
HCT VFR BLD AUTO: 36.9 % (ref 36.5–49.3)
HCT VFR BLD AUTO: 37.8 % (ref 41–53)
HCT VFR BLD AUTO: 38.3 % (ref 36.5–49.3)
HCT VFR BLD AUTO: 38.4 % (ref 36.5–49.3)
HCT VFR BLD AUTO: 39.8 % (ref 36.5–49.3)
HGB BLD-MCNC: 10 G/DL (ref 13.5–17.5)
HGB BLD-MCNC: 10.5 G/DL (ref 12–17)
HGB BLD-MCNC: 10.6 G/DL (ref 12–17)
HGB BLD-MCNC: 11.5 G/DL (ref 12–17)
HGB BLD-MCNC: 11.7 G/DL (ref 12–17)
HGB BLD-MCNC: 12.1 G/DL (ref 12–17)
HGB BLD-MCNC: 12.2 G/DL (ref 12–17)
HGB BLD-MCNC: 12.4 G/DL (ref 13.5–17.5)
HGB BLD-MCNC: 12.8 G/DL (ref 12–17)
HGB UR QL STRIP.AUTO: 10
IMM GRANULOCYTES # BLD AUTO: 0.01 THOUSAND/UL (ref 0–0.2)
IMM GRANULOCYTES # BLD AUTO: 0.02 THOUSAND/UL (ref 0–0.2)
IMM GRANULOCYTES # BLD AUTO: 0.03 THOUSAND/UL (ref 0–0.2)
IMM GRANULOCYTES # BLD AUTO: 0.05 THOUSAND/UL (ref 0–0.2)
IMM GRANULOCYTES # BLD AUTO: 0.06 THOUSAND/UL (ref 0–0.2)
IMM GRANULOCYTES NFR BLD AUTO: 0 % (ref 0–2)
IMM GRANULOCYTES NFR BLD AUTO: 1 % (ref 0–2)
IMM GRANULOCYTES NFR BLD AUTO: 1 % (ref 0–2)
INR PPP: 1.09 (ref 0.84–1.19)
KETONES UR STRIP-MCNC: NEGATIVE MG/DL
LEUKOCYTE ESTERASE UR QL STRIP: NEGATIVE
LYMPHOCYTES # BLD AUTO: 0.4 THOUSANDS/ΜL (ref 0.5–4)
LYMPHOCYTES # BLD AUTO: 0.7 THOUSANDS/ΜL (ref 0.5–4)
LYMPHOCYTES # BLD AUTO: 0.78 THOUSANDS/ΜL (ref 0.6–4.47)
LYMPHOCYTES # BLD AUTO: 0.82 THOUSANDS/ΜL (ref 0.6–4.47)
LYMPHOCYTES # BLD AUTO: 0.85 THOUSANDS/ΜL (ref 0.6–4.47)
LYMPHOCYTES # BLD AUTO: 0.97 THOUSANDS/ΜL (ref 0.6–4.47)
LYMPHOCYTES # BLD AUTO: 1.54 THOUSANDS/ΜL (ref 0.6–4.47)
LYMPHOCYTES NFR BLD AUTO: 16 % (ref 14–44)
LYMPHOCYTES NFR BLD AUTO: 25 % (ref 14–44)
LYMPHOCYTES NFR BLD AUTO: 6 % (ref 25–45)
LYMPHOCYTES NFR BLD AUTO: 7 % (ref 25–45)
LYMPHOCYTES NFR BLD AUTO: 8 % (ref 14–44)
LYMPHOCYTES NFR BLD AUTO: 9 % (ref 14–44)
LYMPHOCYTES NFR BLD AUTO: 9 % (ref 14–44)
MAGNESIUM SERPL-MCNC: 1.7 MG/DL (ref 1.6–2.6)
MAGNESIUM SERPL-MCNC: 2.1 MG/DL (ref 1.6–2.6)
MCH RBC QN AUTO: 30.2 PG (ref 26.8–34.3)
MCH RBC QN AUTO: 30.2 PG (ref 26.8–34.3)
MCH RBC QN AUTO: 30.2 PG (ref 26–34)
MCH RBC QN AUTO: 30.3 PG (ref 26.8–34.3)
MCH RBC QN AUTO: 30.3 PG (ref 26.8–34.3)
MCH RBC QN AUTO: 30.4 PG (ref 26.8–34.3)
MCH RBC QN AUTO: 30.4 PG (ref 26.8–34.3)
MCH RBC QN AUTO: 30.5 PG (ref 26.8–34.3)
MCH RBC QN AUTO: 30.9 PG (ref 26–34)
MCHC RBC AUTO-ENTMCNC: 31.5 G/DL (ref 31.4–37.4)
MCHC RBC AUTO-ENTMCNC: 31.7 G/DL (ref 31.4–37.4)
MCHC RBC AUTO-ENTMCNC: 31.8 G/DL (ref 31.4–37.4)
MCHC RBC AUTO-ENTMCNC: 31.9 G/DL (ref 31.4–37.4)
MCHC RBC AUTO-ENTMCNC: 31.9 G/DL (ref 31.4–37.4)
MCHC RBC AUTO-ENTMCNC: 32.1 G/DL (ref 31.4–37.4)
MCHC RBC AUTO-ENTMCNC: 32.2 G/DL (ref 31.4–37.4)
MCHC RBC AUTO-ENTMCNC: 32.8 G/DL (ref 31–36)
MCHC RBC AUTO-ENTMCNC: 33.5 G/DL (ref 31–36)
MCV RBC AUTO: 92 FL (ref 80–100)
MCV RBC AUTO: 92 FL (ref 80–100)
MCV RBC AUTO: 94 FL (ref 82–98)
MCV RBC AUTO: 95 FL (ref 82–98)
MCV RBC AUTO: 96 FL (ref 82–98)
MCV RBC AUTO: 96 FL (ref 82–98)
MONOCYTES # BLD AUTO: 0.5 THOUSAND/ΜL (ref 0.2–0.9)
MONOCYTES # BLD AUTO: 0.66 THOUSAND/ΜL (ref 0.17–1.22)
MONOCYTES # BLD AUTO: 0.73 THOUSAND/ΜL (ref 0.17–1.22)
MONOCYTES # BLD AUTO: 0.75 THOUSAND/ΜL (ref 0.17–1.22)
MONOCYTES # BLD AUTO: 0.9 THOUSAND/ΜL (ref 0.2–0.9)
MONOCYTES # BLD AUTO: 1.02 THOUSAND/ΜL (ref 0.17–1.22)
MONOCYTES # BLD AUTO: 1.18 THOUSAND/ΜL (ref 0.17–1.22)
MONOCYTES NFR BLD AUTO: 10 % (ref 4–12)
MONOCYTES NFR BLD AUTO: 12 % (ref 4–12)
MONOCYTES NFR BLD AUTO: 12 % (ref 4–12)
MONOCYTES NFR BLD AUTO: 13 % (ref 4–12)
MONOCYTES NFR BLD AUTO: 7 % (ref 1–10)
MONOCYTES NFR BLD AUTO: 8 % (ref 1–10)
MONOCYTES NFR BLD AUTO: 8 % (ref 4–12)
MRSA NOSE QL CULT: NORMAL
MUCOUS THREADS UR QL AUTO: ABNORMAL
NEUTROPHILS # BLD AUTO: 3.59 THOUSANDS/ΜL (ref 1.85–7.62)
NEUTROPHILS # BLD AUTO: 4.05 THOUSANDS/ΜL (ref 1.85–7.62)
NEUTROPHILS # BLD AUTO: 5.9 THOUSANDS/ΜL (ref 1.8–7.8)
NEUTROPHILS # BLD AUTO: 6.73 THOUSANDS/ΜL (ref 1.85–7.62)
NEUTROPHILS # BLD AUTO: 6.81 THOUSANDS/ΜL (ref 1.85–7.62)
NEUTROPHILS # BLD AUTO: 8.03 THOUSANDS/ΜL (ref 1.85–7.62)
NEUTROPHILS # BLD AUTO: 9.1 THOUSANDS/ΜL (ref 1.8–7.8)
NEUTS SEG NFR BLD AUTO: 58 % (ref 43–75)
NEUTS SEG NFR BLD AUTO: 68 % (ref 43–75)
NEUTS SEG NFR BLD AUTO: 75 % (ref 43–75)
NEUTS SEG NFR BLD AUTO: 80 % (ref 43–75)
NEUTS SEG NFR BLD AUTO: 81 % (ref 43–75)
NEUTS SEG NFR BLD AUTO: 84 % (ref 45–65)
NEUTS SEG NFR BLD AUTO: 85 % (ref 45–65)
NITRITE UR QL STRIP: NEGATIVE
NON-SQ EPI CELLS URNS QL MICRO: ABNORMAL /HPF
NRBC BLD AUTO-RTO: 0 /100 WBCS
P AXIS: 48 DEGREES
P AXIS: 52 DEGREES
P AXIS: 53 DEGREES
P AXIS: 60 DEGREES
PH UR STRIP.AUTO: 5 [PH]
PHOSPHATE SERPL-MCNC: 5 MG/DL (ref 2.7–4.5)
PLATELET # BLD AUTO: 151 THOUSANDS/UL (ref 149–390)
PLATELET # BLD AUTO: 153 THOUSANDS/UL (ref 149–390)
PLATELET # BLD AUTO: 161 THOUSANDS/UL (ref 149–390)
PLATELET # BLD AUTO: 177 THOUSANDS/UL (ref 150–450)
PLATELET # BLD AUTO: 182 THOUSANDS/UL (ref 149–390)
PLATELET # BLD AUTO: 185 THOUSANDS/UL (ref 149–390)
PLATELET # BLD AUTO: 186 THOUSANDS/UL (ref 149–390)
PLATELET # BLD AUTO: 186 THOUSANDS/UL (ref 149–390)
PLATELET # BLD AUTO: 199 THOUSANDS/UL (ref 150–450)
PLATELET # BLD AUTO: 201 THOUSANDS/UL (ref 149–390)
PMV BLD AUTO: 10.3 FL (ref 8.9–12.7)
PMV BLD AUTO: 11.3 FL (ref 8.9–12.7)
PMV BLD AUTO: 11.6 FL (ref 8.9–12.7)
PMV BLD AUTO: 11.7 FL (ref 8.9–12.7)
PMV BLD AUTO: 11.7 FL (ref 8.9–12.7)
PMV BLD AUTO: 11.8 FL (ref 8.9–12.7)
PMV BLD AUTO: 12.8 FL (ref 8.9–12.7)
PMV BLD AUTO: 9.8 FL (ref 8.9–12.7)
POTASSIUM SERPL-SCNC: 3.8 MMOL/L (ref 3.6–5)
POTASSIUM SERPL-SCNC: 4.5 MMOL/L (ref 3.5–5.3)
POTASSIUM SERPL-SCNC: 4.5 MMOL/L (ref 3.5–5.3)
POTASSIUM SERPL-SCNC: 4.6 MMOL/L (ref 3.5–5.3)
POTASSIUM SERPL-SCNC: 4.7 MMOL/L (ref 3.5–5.3)
POTASSIUM SERPL-SCNC: 4.8 MMOL/L (ref 3.5–5.3)
POTASSIUM SERPL-SCNC: 4.8 MMOL/L (ref 3.5–5.3)
POTASSIUM SERPL-SCNC: 5.2 MMOL/L (ref 3.5–5.3)
POTASSIUM SERPL-SCNC: 5.2 MMOL/L (ref 3.5–5.3)
POTASSIUM SERPL-SCNC: 5.5 MMOL/L (ref 3.6–5)
POTASSIUM SERPL-SCNC: 7.5 MMOL/L (ref 3.5–5.3)
PR INTERVAL: 204 MS
PR INTERVAL: 206 MS
PR INTERVAL: 226 MS
PR INTERVAL: 240 MS
PROT SERPL-MCNC: 6.7 G/DL (ref 5.9–8.4)
PROT SERPL-MCNC: 6.9 G/DL (ref 6.4–8.2)
PROT UR STRIP-MCNC: ABNORMAL MG/DL
PROTHROMBIN TIME: 14.2 SECONDS (ref 11.6–14.5)
QRS AXIS: -10 DEGREES
QRS AXIS: -20 DEGREES
QRS AXIS: -20 DEGREES
QRS AXIS: -27 DEGREES
QRSD INTERVAL: 74 MS
QRSD INTERVAL: 80 MS
QRSD INTERVAL: 82 MS
QRSD INTERVAL: 86 MS
QT INTERVAL: 390 MS
QT INTERVAL: 396 MS
QT INTERVAL: 444 MS
QT INTERVAL: 522 MS
QTC INTERVAL: 435 MS
QTC INTERVAL: 439 MS
QTC INTERVAL: 447 MS
QTC INTERVAL: 476 MS
RBC # BLD AUTO: 3.25 MILLION/UL (ref 4.5–5.9)
RBC # BLD AUTO: 3.45 MILLION/UL (ref 3.88–5.62)
RBC # BLD AUTO: 3.49 MILLION/UL (ref 3.88–5.62)
RBC # BLD AUTO: 3.77 MILLION/UL (ref 3.88–5.62)
RBC # BLD AUTO: 3.88 MILLION/UL (ref 3.88–5.62)
RBC # BLD AUTO: 4 MILLION/UL (ref 3.88–5.62)
RBC # BLD AUTO: 4.02 MILLION/UL (ref 3.88–5.62)
RBC # BLD AUTO: 4.11 MILLION/UL (ref 4.5–5.9)
RBC # BLD AUTO: 4.24 MILLION/UL (ref 3.88–5.62)
RBC #/AREA URNS AUTO: ABNORMAL /HPF
RH BLD: POSITIVE
RH BLD: POSITIVE
SARS-COV-2 RNA SPEC QL NAA+PROBE: NOT DETECTED
SL AMB POCT GLUCOSE BLD: 37
SL AMB POCT GLUCOSE BLD: 37
SODIUM SERPL-SCNC: 135 MMOL/L (ref 136–145)
SODIUM SERPL-SCNC: 135 MMOL/L (ref 136–145)
SODIUM SERPL-SCNC: 136 MMOL/L (ref 137–147)
SODIUM SERPL-SCNC: 137 MMOL/L (ref 136–145)
SODIUM SERPL-SCNC: 138 MMOL/L (ref 136–145)
SODIUM SERPL-SCNC: 138 MMOL/L (ref 137–147)
SODIUM SERPL-SCNC: 139 MMOL/L (ref 136–145)
SODIUM SERPL-SCNC: 142 MMOL/L (ref 136–145)
SP GR UR STRIP.AUTO: 1.01 (ref 1–1.04)
SPECIMEN EXPIRATION DATE: NORMAL
T WAVE AXIS: 35 DEGREES
T WAVE AXIS: 51 DEGREES
T WAVE AXIS: 53 DEGREES
T WAVE AXIS: 56 DEGREES
TROPONIN I SERPL-MCNC: 0.06 NG/ML (ref 0–0.03)
UROBILINOGEN UA: NEGATIVE MG/DL
VENTRICULAR RATE: 42 BPM
VENTRICULAR RATE: 59 BPM
VENTRICULAR RATE: 79 BPM
VENTRICULAR RATE: 87 BPM
WBC # BLD AUTO: 10 THOUSAND/UL (ref 4.31–10.16)
WBC # BLD AUTO: 10.36 THOUSAND/UL (ref 4.31–10.16)
WBC # BLD AUTO: 10.8 THOUSAND/UL (ref 4.5–11)
WBC # BLD AUTO: 5.98 THOUSAND/UL (ref 4.31–10.16)
WBC # BLD AUTO: 6.16 THOUSAND/UL (ref 4.31–10.16)
WBC # BLD AUTO: 6.16 THOUSAND/UL (ref 4.31–10.16)
WBC # BLD AUTO: 7 THOUSAND/UL (ref 4.5–11)
WBC # BLD AUTO: 8.37 THOUSAND/UL (ref 4.31–10.16)
WBC # BLD AUTO: 9.06 THOUSAND/UL (ref 4.31–10.16)
WBC #/AREA URNS AUTO: ABNORMAL /HPF

## 2020-01-01 PROCEDURE — 99291 CRITICAL CARE FIRST HOUR: CPT | Performed by: EMERGENCY MEDICINE

## 2020-01-01 PROCEDURE — 80053 COMPREHEN METABOLIC PANEL: CPT | Performed by: EMERGENCY MEDICINE

## 2020-01-01 PROCEDURE — C1713 ANCHOR/SCREW BN/BN,TIS/BN: HCPCS | Performed by: ORTHOPAEDIC SURGERY

## 2020-01-01 PROCEDURE — 99239 HOSP IP/OBS DSCHRG MGMT >30: CPT | Performed by: NURSE PRACTITIONER

## 2020-01-01 PROCEDURE — 96374 THER/PROPH/DIAG INJ IV PUSH: CPT

## 2020-01-01 PROCEDURE — 93005 ELECTROCARDIOGRAM TRACING: CPT

## 2020-01-01 PROCEDURE — 73502 X-RAY EXAM HIP UNI 2-3 VIEWS: CPT

## 2020-01-01 PROCEDURE — 82948 REAGENT STRIP/BLOOD GLUCOSE: CPT

## 2020-01-01 PROCEDURE — 85025 COMPLETE CBC W/AUTO DIFF WBC: CPT | Performed by: PHYSICIAN ASSISTANT

## 2020-01-01 PROCEDURE — 3066F NEPHROPATHY DOC TX: CPT | Performed by: INTERNAL MEDICINE

## 2020-01-01 PROCEDURE — 1036F TOBACCO NON-USER: CPT | Performed by: FAMILY MEDICINE

## 2020-01-01 PROCEDURE — 96375 TX/PRO/DX INJ NEW DRUG ADDON: CPT

## 2020-01-01 PROCEDURE — 5A1D70Z PERFORMANCE OF URINARY FILTRATION, INTERMITTENT, LESS THAN 6 HOURS PER DAY: ICD-10-PCS | Performed by: INTERNAL MEDICINE

## 2020-01-01 PROCEDURE — 94762 N-INVAS EAR/PLS OXIMTRY CONT: CPT

## 2020-01-01 PROCEDURE — 85730 THROMBOPLASTIN TIME PARTIAL: CPT | Performed by: STUDENT IN AN ORGANIZED HEALTH CARE EDUCATION/TRAINING PROGRAM

## 2020-01-01 PROCEDURE — 36415 COLL VENOUS BLD VENIPUNCTURE: CPT | Performed by: EMERGENCY MEDICINE

## 2020-01-01 PROCEDURE — 82948 REAGENT STRIP/BLOOD GLUCOSE: CPT | Performed by: INTERNAL MEDICINE

## 2020-01-01 PROCEDURE — 97530 THERAPEUTIC ACTIVITIES: CPT

## 2020-01-01 PROCEDURE — 1111F DSCHRG MED/CURRENT MED MERGE: CPT | Performed by: FAMILY MEDICINE

## 2020-01-01 PROCEDURE — 99222 1ST HOSP IP/OBS MODERATE 55: CPT | Performed by: INTERNAL MEDICINE

## 2020-01-01 PROCEDURE — 99284 EMERGENCY DEPT VISIT MOD MDM: CPT

## 2020-01-01 PROCEDURE — 3008F BODY MASS INDEX DOCD: CPT | Performed by: FAMILY MEDICINE

## 2020-01-01 PROCEDURE — 0QH734Z INSERTION OF INTERNAL FIXATION DEVICE INTO LEFT UPPER FEMUR, PERCUTANEOUS APPROACH: ICD-10-PCS | Performed by: ORTHOPAEDIC SURGERY

## 2020-01-01 PROCEDURE — 99496 TRANSJ CARE MGMT HIGH F2F 7D: CPT | Performed by: INTERNAL MEDICINE

## 2020-01-01 PROCEDURE — 88305 TISSUE EXAM BY PATHOLOGIST: CPT | Performed by: PATHOLOGY

## 2020-01-01 PROCEDURE — 85025 COMPLETE CBC W/AUTO DIFF WBC: CPT | Performed by: EMERGENCY MEDICINE

## 2020-01-01 PROCEDURE — 80048 BASIC METABOLIC PNL TOTAL CA: CPT | Performed by: PHYSICIAN ASSISTANT

## 2020-01-01 PROCEDURE — 86850 RBC ANTIBODY SCREEN: CPT | Performed by: STUDENT IN AN ORGANIZED HEALTH CARE EDUCATION/TRAINING PROGRAM

## 2020-01-01 PROCEDURE — 80048 BASIC METABOLIC PNL TOTAL CA: CPT | Performed by: INTERNAL MEDICINE

## 2020-01-01 PROCEDURE — 93010 ELECTROCARDIOGRAM REPORT: CPT | Performed by: INTERNAL MEDICINE

## 2020-01-01 PROCEDURE — 3066F NEPHROPATHY DOC TX: CPT | Performed by: ORTHOPAEDIC SURGERY

## 2020-01-01 PROCEDURE — 80048 BASIC METABOLIC PNL TOTAL CA: CPT | Performed by: NURSE PRACTITIONER

## 2020-01-01 PROCEDURE — 81001 URINALYSIS AUTO W/SCOPE: CPT | Performed by: EMERGENCY MEDICINE

## 2020-01-01 PROCEDURE — 1036F TOBACCO NON-USER: CPT | Performed by: PHYSICIAN ASSISTANT

## 2020-01-01 PROCEDURE — 84484 ASSAY OF TROPONIN QUANT: CPT | Performed by: EMERGENCY MEDICINE

## 2020-01-01 PROCEDURE — 99223 1ST HOSP IP/OBS HIGH 75: CPT | Performed by: STUDENT IN AN ORGANIZED HEALTH CARE EDUCATION/TRAINING PROGRAM

## 2020-01-01 PROCEDURE — 99232 SBSQ HOSP IP/OBS MODERATE 35: CPT | Performed by: STUDENT IN AN ORGANIZED HEALTH CARE EDUCATION/TRAINING PROGRAM

## 2020-01-01 PROCEDURE — 99232 SBSQ HOSP IP/OBS MODERATE 35: CPT | Performed by: PHYSICIAN ASSISTANT

## 2020-01-01 PROCEDURE — 97163 PT EVAL HIGH COMPLEX 45 MIN: CPT

## 2020-01-01 PROCEDURE — 85027 COMPLETE CBC AUTOMATED: CPT | Performed by: NURSE PRACTITIONER

## 2020-01-01 PROCEDURE — G0439 PPPS, SUBSEQ VISIT: HCPCS | Performed by: FAMILY MEDICINE

## 2020-01-01 PROCEDURE — 99232 SBSQ HOSP IP/OBS MODERATE 35: CPT | Performed by: INTERNAL MEDICINE

## 2020-01-01 PROCEDURE — 97167 OT EVAL HIGH COMPLEX 60 MIN: CPT

## 2020-01-01 PROCEDURE — 84100 ASSAY OF PHOSPHORUS: CPT | Performed by: PHYSICIAN ASSISTANT

## 2020-01-01 PROCEDURE — 83735 ASSAY OF MAGNESIUM: CPT | Performed by: STUDENT IN AN ORGANIZED HEALTH CARE EDUCATION/TRAINING PROGRAM

## 2020-01-01 PROCEDURE — 3077F SYST BP >= 140 MM HG: CPT | Performed by: FAMILY MEDICINE

## 2020-01-01 PROCEDURE — 3066F NEPHROPATHY DOC TX: CPT | Performed by: FAMILY MEDICINE

## 2020-01-01 PROCEDURE — 99223 1ST HOSP IP/OBS HIGH 75: CPT | Performed by: ORTHOPAEDIC SURGERY

## 2020-01-01 PROCEDURE — 99024 POSTOP FOLLOW-UP VISIT: CPT | Performed by: PHYSICIAN ASSISTANT

## 2020-01-01 PROCEDURE — NC001 PR NO CHARGE: Performed by: INTERNAL MEDICINE

## 2020-01-01 PROCEDURE — 99285 EMERGENCY DEPT VISIT HI MDM: CPT | Performed by: PHYSICIAN ASSISTANT

## 2020-01-01 PROCEDURE — 71045 X-RAY EXAM CHEST 1 VIEW: CPT

## 2020-01-01 PROCEDURE — 86900 BLOOD TYPING SEROLOGIC ABO: CPT | Performed by: STUDENT IN AN ORGANIZED HEALTH CARE EDUCATION/TRAINING PROGRAM

## 2020-01-01 PROCEDURE — 85025 COMPLETE CBC W/AUTO DIFF WBC: CPT | Performed by: STUDENT IN AN ORGANIZED HEALTH CARE EDUCATION/TRAINING PROGRAM

## 2020-01-01 PROCEDURE — 3074F SYST BP LT 130 MM HG: CPT | Performed by: INTERNAL MEDICINE

## 2020-01-01 PROCEDURE — 43239 EGD BIOPSY SINGLE/MULTIPLE: CPT | Performed by: INTERNAL MEDICINE

## 2020-01-01 PROCEDURE — 3066F NEPHROPATHY DOC TX: CPT | Performed by: PHYSICIAN ASSISTANT

## 2020-01-01 PROCEDURE — C1769 GUIDE WIRE: HCPCS | Performed by: ORTHOPAEDIC SURGERY

## 2020-01-01 PROCEDURE — 99214 OFFICE O/P EST MOD 30 MIN: CPT | Performed by: INTERNAL MEDICINE

## 2020-01-01 PROCEDURE — 3008F BODY MASS INDEX DOCD: CPT | Performed by: INTERNAL MEDICINE

## 2020-01-01 PROCEDURE — 99285 EMERGENCY DEPT VISIT HI MDM: CPT

## 2020-01-01 PROCEDURE — 99214 OFFICE O/P EST MOD 30 MIN: CPT | Performed by: PHYSICIAN ASSISTANT

## 2020-01-01 PROCEDURE — 85049 AUTOMATED PLATELET COUNT: CPT | Performed by: PHYSICIAN ASSISTANT

## 2020-01-01 PROCEDURE — 27245 TREAT THIGH FRACTURE: CPT | Performed by: ORTHOPAEDIC SURGERY

## 2020-01-01 PROCEDURE — 3078F DIAST BP <80 MM HG: CPT | Performed by: INTERNAL MEDICINE

## 2020-01-01 PROCEDURE — 90935 HEMODIALYSIS ONE EVALUATION: CPT | Performed by: INTERNAL MEDICINE

## 2020-01-01 PROCEDURE — 96376 TX/PRO/DX INJ SAME DRUG ADON: CPT

## 2020-01-01 PROCEDURE — NC001 PR NO CHARGE: Performed by: ORTHOPAEDIC SURGERY

## 2020-01-01 PROCEDURE — 36415 COLL VENOUS BLD VENIPUNCTURE: CPT | Performed by: PHYSICIAN ASSISTANT

## 2020-01-01 PROCEDURE — 94640 AIRWAY INHALATION TREATMENT: CPT

## 2020-01-01 PROCEDURE — 99214 OFFICE O/P EST MOD 30 MIN: CPT | Performed by: FAMILY MEDICINE

## 2020-01-01 PROCEDURE — 85610 PROTHROMBIN TIME: CPT | Performed by: STUDENT IN AN ORGANIZED HEALTH CARE EDUCATION/TRAINING PROGRAM

## 2020-01-01 PROCEDURE — U0003 INFECTIOUS AGENT DETECTION BY NUCLEIC ACID (DNA OR RNA); SEVERE ACUTE RESPIRATORY SYNDROME CORONAVIRUS 2 (SARS-COV-2) (CORONAVIRUS DISEASE [COVID-19]), AMPLIFIED PROBE TECHNIQUE, MAKING USE OF HIGH THROUGHPUT TECHNOLOGIES AS DESCRIBED BY CMS-2020-01-R: HCPCS

## 2020-01-01 PROCEDURE — 3079F DIAST BP 80-89 MM HG: CPT | Performed by: FAMILY MEDICINE

## 2020-01-01 PROCEDURE — 87081 CULTURE SCREEN ONLY: CPT | Performed by: STUDENT IN AN ORGANIZED HEALTH CARE EDUCATION/TRAINING PROGRAM

## 2020-01-01 PROCEDURE — 99024 POSTOP FOLLOW-UP VISIT: CPT | Performed by: ORTHOPAEDIC SURGERY

## 2020-01-01 PROCEDURE — 97110 THERAPEUTIC EXERCISES: CPT

## 2020-01-01 PROCEDURE — 99232 SBSQ HOSP IP/OBS MODERATE 35: CPT | Performed by: NURSE PRACTITIONER

## 2020-01-01 PROCEDURE — RECHECK: Performed by: INTERNAL MEDICINE

## 2020-01-01 PROCEDURE — 80048 BASIC METABOLIC PNL TOTAL CA: CPT | Performed by: STUDENT IN AN ORGANIZED HEALTH CARE EDUCATION/TRAINING PROGRAM

## 2020-01-01 PROCEDURE — 85027 COMPLETE CBC AUTOMATED: CPT | Performed by: PHYSICIAN ASSISTANT

## 2020-01-01 PROCEDURE — 86901 BLOOD TYPING SEROLOGIC RH(D): CPT | Performed by: STUDENT IN AN ORGANIZED HEALTH CARE EDUCATION/TRAINING PROGRAM

## 2020-01-01 PROCEDURE — 1111F DSCHRG MED/CURRENT MED MERGE: CPT | Performed by: ORTHOPAEDIC SURGERY

## 2020-01-01 PROCEDURE — 97535 SELF CARE MNGMENT TRAINING: CPT

## 2020-01-01 PROCEDURE — 99285 EMERGENCY DEPT VISIT HI MDM: CPT | Performed by: EMERGENCY MEDICINE

## 2020-01-01 PROCEDURE — 83735 ASSAY OF MAGNESIUM: CPT | Performed by: PHYSICIAN ASSISTANT

## 2020-01-01 PROCEDURE — 99223 1ST HOSP IP/OBS HIGH 75: CPT | Performed by: HOSPITALIST

## 2020-01-01 PROCEDURE — 99239 HOSP IP/OBS DSCHRG MGMT >30: CPT | Performed by: INTERNAL MEDICINE

## 2020-01-01 DEVICE — 11MM/130 DEG TI CANN TROCH FIXATION NAIL 170MM-STERILE: Type: IMPLANTABLE DEVICE | Site: LEG | Status: FUNCTIONAL

## 2020-01-01 DEVICE — 5.0MM TI LOCKING SCREW 34MM- FOR IM NAILS-STERILE: Type: IMPLANTABLE DEVICE | Site: LEG | Status: FUNCTIONAL

## 2020-01-01 DEVICE — 11.0MM TI HELICAL BLADE 100MM-STERILE: Type: IMPLANTABLE DEVICE | Site: LEG | Status: FUNCTIONAL

## 2020-01-01 RX ORDER — ARIPIPRAZOLE 2 MG/1
4 TABLET ORAL DAILY
Qty: 180 TABLET | Refills: 3 | Status: SHIPPED | OUTPATIENT
Start: 2020-01-01 | End: 2021-02-11 | Stop reason: HOSPADM

## 2020-01-01 RX ORDER — DOXAZOSIN MESYLATE 4 MG/1
4 TABLET ORAL 2 TIMES DAILY
COMMUNITY
Start: 2020-01-01 | End: 2020-01-01 | Stop reason: HOSPADM

## 2020-01-01 RX ORDER — TRAZODONE HYDROCHLORIDE 100 MG/1
100 TABLET ORAL
Status: DISCONTINUED | OUTPATIENT
Start: 2020-01-01 | End: 2020-01-01 | Stop reason: HOSPADM

## 2020-01-01 RX ORDER — LIDOCAINE AND PRILOCAINE 25; 25 MG/G; MG/G
CREAM TOPICAL
COMMUNITY
Start: 2020-01-01 | End: 2021-02-11 | Stop reason: HOSPADM

## 2020-01-01 RX ORDER — HEPARIN SODIUM 5000 [USP'U]/ML
5000 INJECTION, SOLUTION INTRAVENOUS; SUBCUTANEOUS EVERY 8 HOURS SCHEDULED
Qty: 81 ML | Refills: 0 | Status: SHIPPED | OUTPATIENT
Start: 2020-01-01 | End: 2020-01-01 | Stop reason: ALTCHOICE

## 2020-01-01 RX ORDER — MIDODRINE HYDROCHLORIDE 5 MG/1
10 TABLET ORAL 3 TIMES DAILY
COMMUNITY
End: 2021-02-11 | Stop reason: HOSPADM

## 2020-01-01 RX ORDER — INSULIN GLARGINE 100 [IU]/ML
15 INJECTION, SOLUTION SUBCUTANEOUS
Status: DISCONTINUED | OUTPATIENT
Start: 2020-01-01 | End: 2020-01-01

## 2020-01-01 RX ORDER — LANTHANUM CARBONATE 1000 MG/1
POWDER ORAL
COMMUNITY
Start: 2020-01-01 | End: 2021-02-11 | Stop reason: HOSPADM

## 2020-01-01 RX ORDER — HEPARIN SODIUM 5000 [USP'U]/ML
5000 INJECTION, SOLUTION INTRAVENOUS; SUBCUTANEOUS EVERY 8 HOURS SCHEDULED
Status: DISCONTINUED | OUTPATIENT
Start: 2020-01-01 | End: 2020-01-01 | Stop reason: HOSPADM

## 2020-01-01 RX ORDER — HYDRALAZINE HYDROCHLORIDE 20 MG/ML
10 INJECTION INTRAMUSCULAR; INTRAVENOUS EVERY 4 HOURS PRN
Status: DISCONTINUED | OUTPATIENT
Start: 2020-01-01 | End: 2020-01-01 | Stop reason: HOSPADM

## 2020-01-01 RX ORDER — LIDOCAINE HYDROCHLORIDE 10 MG/ML
INJECTION, SOLUTION EPIDURAL; INFILTRATION; INTRACAUDAL; PERINEURAL AS NEEDED
Status: DISCONTINUED | OUTPATIENT
Start: 2020-01-01 | End: 2020-01-01

## 2020-01-01 RX ORDER — PRAVASTATIN SODIUM 40 MG
40 TABLET ORAL
Status: DISCONTINUED | OUTPATIENT
Start: 2020-01-01 | End: 2020-01-01 | Stop reason: HOSPADM

## 2020-01-01 RX ORDER — EPHEDRINE SULFATE 50 MG/ML
INJECTION INTRAVENOUS AS NEEDED
Status: DISCONTINUED | OUTPATIENT
Start: 2020-01-01 | End: 2020-01-01 | Stop reason: SURG

## 2020-01-01 RX ORDER — CALCIUM CARBONATE 200(500)MG
1000 TABLET,CHEWABLE ORAL DAILY PRN
Status: DISCONTINUED | OUTPATIENT
Start: 2020-01-01 | End: 2020-01-01 | Stop reason: HOSPADM

## 2020-01-01 RX ORDER — SODIUM CHLORIDE 9 MG/ML
INJECTION, SOLUTION INTRAVENOUS CONTINUOUS PRN
Status: DISCONTINUED | OUTPATIENT
Start: 2020-01-01 | End: 2020-01-01 | Stop reason: SURG

## 2020-01-01 RX ORDER — ALBUTEROL SULFATE 2.5 MG/3ML
2.5 SOLUTION RESPIRATORY (INHALATION) ONCE AS NEEDED
Status: DISCONTINUED | OUTPATIENT
Start: 2020-01-01 | End: 2020-01-01 | Stop reason: HOSPADM

## 2020-01-01 RX ORDER — HYDROMORPHONE HCL/PF 1 MG/ML
0.5 SYRINGE (ML) INJECTION
Status: DISCONTINUED | OUTPATIENT
Start: 2020-01-01 | End: 2020-01-01 | Stop reason: HOSPADM

## 2020-01-01 RX ORDER — PANTOPRAZOLE SODIUM 40 MG/1
40 TABLET, DELAYED RELEASE ORAL
Status: DISCONTINUED | OUTPATIENT
Start: 2020-01-01 | End: 2020-01-01 | Stop reason: HOSPADM

## 2020-01-01 RX ORDER — HYDRALAZINE HYDROCHLORIDE 10 MG/1
10 TABLET, FILM COATED ORAL EVERY 8 HOURS SCHEDULED
Status: DISCONTINUED | OUTPATIENT
Start: 2020-01-01 | End: 2020-01-01 | Stop reason: HOSPADM

## 2020-01-01 RX ORDER — INSULIN GLARGINE 100 [IU]/ML
5 INJECTION, SOLUTION SUBCUTANEOUS
Status: DISCONTINUED | OUTPATIENT
Start: 2020-01-01 | End: 2020-01-01 | Stop reason: HOSPADM

## 2020-01-01 RX ORDER — ARIPIPRAZOLE 2 MG/1
4 TABLET ORAL DAILY
Status: DISCONTINUED | OUTPATIENT
Start: 2020-01-01 | End: 2020-01-01 | Stop reason: HOSPADM

## 2020-01-01 RX ORDER — ZOLPIDEM TARTRATE 10 MG/1
10 TABLET ORAL
Qty: 30 TABLET | Refills: 0 | Status: SHIPPED | OUTPATIENT
Start: 2020-01-01 | End: 2020-01-01

## 2020-01-01 RX ORDER — HEPARIN SODIUM 5000 [USP'U]/ML
5000 INJECTION, SOLUTION INTRAVENOUS; SUBCUTANEOUS EVERY 8 HOURS SCHEDULED
Qty: 81 ML | Refills: 0 | Status: SHIPPED | OUTPATIENT
Start: 2020-01-01 | End: 2020-01-01 | Stop reason: HOSPADM

## 2020-01-01 RX ORDER — PANTOPRAZOLE SODIUM 40 MG/1
40 TABLET, DELAYED RELEASE ORAL
Qty: 180 TABLET | Refills: 0 | Status: SHIPPED | OUTPATIENT
Start: 2020-01-01 | End: 2021-02-11 | Stop reason: HOSPADM

## 2020-01-01 RX ORDER — ACETAMINOPHEN 325 MG/1
650 TABLET ORAL EVERY 6 HOURS PRN
Status: DISCONTINUED | OUTPATIENT
Start: 2020-01-01 | End: 2020-01-01 | Stop reason: HOSPADM

## 2020-01-01 RX ORDER — ZOLPIDEM TARTRATE 10 MG/1
TABLET ORAL
Qty: 30 TABLET | Refills: 0 | Status: SHIPPED | OUTPATIENT
Start: 2020-01-01 | End: 2021-02-11 | Stop reason: HOSPADM

## 2020-01-01 RX ORDER — SODIUM CHLORIDE 9 MG/ML
50 INJECTION, SOLUTION INTRAVENOUS CONTINUOUS
Status: DISCONTINUED | OUTPATIENT
Start: 2020-01-01 | End: 2020-01-01 | Stop reason: HOSPADM

## 2020-01-01 RX ORDER — ROCURONIUM BROMIDE 10 MG/ML
INJECTION, SOLUTION INTRAVENOUS AS NEEDED
Status: DISCONTINUED | OUTPATIENT
Start: 2020-01-01 | End: 2020-01-01 | Stop reason: SURG

## 2020-01-01 RX ORDER — PANTOPRAZOLE SODIUM 40 MG/1
40 TABLET, DELAYED RELEASE ORAL
Qty: 60 TABLET | Refills: 2 | Status: SHIPPED | OUTPATIENT
Start: 2020-01-01 | End: 2020-01-01 | Stop reason: SDUPTHER

## 2020-01-01 RX ORDER — FUROSEMIDE 40 MG/1
40 TABLET ORAL DAILY
Status: DISCONTINUED | OUTPATIENT
Start: 2020-01-01 | End: 2020-01-01 | Stop reason: HOSPADM

## 2020-01-01 RX ORDER — INSULIN GLARGINE 100 [IU]/ML
10 INJECTION, SOLUTION SUBCUTANEOUS
Status: DISCONTINUED | OUTPATIENT
Start: 2020-01-01 | End: 2020-01-01

## 2020-01-01 RX ORDER — LATANOPROST 50 UG/ML
SOLUTION/ DROPS OPHTHALMIC
COMMUNITY
Start: 2020-01-01 | End: 2021-01-01 | Stop reason: ALTCHOICE

## 2020-01-01 RX ORDER — GLYCOPYRROLATE 0.2 MG/ML
INJECTION INTRAMUSCULAR; INTRAVENOUS AS NEEDED
Status: DISCONTINUED | OUTPATIENT
Start: 2020-01-01 | End: 2020-01-01 | Stop reason: SURG

## 2020-01-01 RX ORDER — FENTANYL CITRATE/PF 50 MCG/ML
25 SYRINGE (ML) INJECTION
Status: DISCONTINUED | OUTPATIENT
Start: 2020-01-01 | End: 2020-01-01 | Stop reason: HOSPADM

## 2020-01-01 RX ORDER — METOPROLOL SUCCINATE 25 MG/1
25 TABLET, EXTENDED RELEASE ORAL DAILY
Status: DISCONTINUED | OUTPATIENT
Start: 2020-01-01 | End: 2020-01-01

## 2020-01-01 RX ORDER — ALPRAZOLAM 0.5 MG/1
0.5 TABLET ORAL DAILY PRN
Status: DISCONTINUED | OUTPATIENT
Start: 2020-01-01 | End: 2020-01-01 | Stop reason: HOSPADM

## 2020-01-01 RX ORDER — ASPIRIN 81 MG/1
81 TABLET, CHEWABLE ORAL DAILY
Status: DISCONTINUED | OUTPATIENT
Start: 2020-01-01 | End: 2020-01-01 | Stop reason: HOSPADM

## 2020-01-01 RX ORDER — DEXTROSE MONOHYDRATE 25 G/50ML
25 INJECTION, SOLUTION INTRAVENOUS ONCE
Status: COMPLETED | OUTPATIENT
Start: 2020-01-01 | End: 2020-01-01

## 2020-01-01 RX ORDER — ALBUTEROL SULFATE 2.5 MG/3ML
2.5 SOLUTION RESPIRATORY (INHALATION) ONCE
Status: COMPLETED | OUTPATIENT
Start: 2020-01-01 | End: 2020-01-01

## 2020-01-01 RX ORDER — BUPROPION HYDROCHLORIDE 100 MG/1
100 TABLET ORAL 2 TIMES DAILY
Status: DISCONTINUED | OUTPATIENT
Start: 2020-01-01 | End: 2020-01-01 | Stop reason: HOSPADM

## 2020-01-01 RX ORDER — ONDANSETRON 2 MG/ML
4 INJECTION INTRAMUSCULAR; INTRAVENOUS ONCE AS NEEDED
Status: DISCONTINUED | OUTPATIENT
Start: 2020-01-01 | End: 2020-01-01 | Stop reason: HOSPADM

## 2020-01-01 RX ORDER — PROPOFOL 10 MG/ML
INJECTION, EMULSION INTRAVENOUS AS NEEDED
Status: DISCONTINUED | OUTPATIENT
Start: 2020-01-01 | End: 2020-01-01 | Stop reason: SURG

## 2020-01-01 RX ORDER — SODIUM POLYSTYRENE SULFONATE 4.1 MEQ/G
30 POWDER, FOR SUSPENSION ORAL; RECTAL ONCE
Status: COMPLETED | OUTPATIENT
Start: 2020-01-01 | End: 2020-01-01

## 2020-01-01 RX ORDER — INSULIN GLARGINE 100 [IU]/ML
20 INJECTION, SOLUTION SUBCUTANEOUS
Status: DISCONTINUED | OUTPATIENT
Start: 2020-01-01 | End: 2020-01-01

## 2020-01-01 RX ORDER — ALPRAZOLAM 0.5 MG/1
0.5 TABLET ORAL 3 TIMES DAILY PRN
Status: DISCONTINUED | OUTPATIENT
Start: 2020-01-01 | End: 2020-01-01 | Stop reason: HOSPADM

## 2020-01-01 RX ORDER — LANCETS 28 GAUGE
EACH MISCELLANEOUS
COMMUNITY
Start: 2020-01-01

## 2020-01-01 RX ORDER — BLOOD-GLUCOSE METER
KIT MISCELLANEOUS
COMMUNITY
Start: 2020-01-01 | End: 2021-02-11 | Stop reason: HOSPADM

## 2020-01-01 RX ORDER — SUCRALFATE ORAL 1 G/10ML
1 SUSPENSION ORAL 4 TIMES DAILY
Qty: 420 ML | Refills: 0 | Status: SHIPPED | OUTPATIENT
Start: 2020-01-01 | End: 2021-02-11 | Stop reason: HOSPADM

## 2020-01-01 RX ORDER — ONDANSETRON 2 MG/ML
4 INJECTION INTRAMUSCULAR; INTRAVENOUS EVERY 6 HOURS PRN
Status: DISCONTINUED | OUTPATIENT
Start: 2020-01-01 | End: 2020-01-01 | Stop reason: HOSPADM

## 2020-01-01 RX ORDER — INSULIN GLARGINE 100 [IU]/ML
15 INJECTION, SOLUTION SUBCUTANEOUS
Status: DISCONTINUED | OUTPATIENT
Start: 2020-01-01 | End: 2020-01-01 | Stop reason: HOSPADM

## 2020-01-01 RX ORDER — OXYCODONE HYDROCHLORIDE 5 MG/1
5 TABLET ORAL EVERY 4 HOURS PRN
Qty: 30 TABLET | Refills: 0 | Status: SHIPPED | OUTPATIENT
Start: 2020-01-01 | End: 2020-01-01

## 2020-01-01 RX ORDER — CEFAZOLIN SODIUM 1 G/50ML
SOLUTION INTRAVENOUS AS NEEDED
Status: DISCONTINUED | OUTPATIENT
Start: 2020-01-01 | End: 2020-01-01 | Stop reason: SURG

## 2020-01-01 RX ORDER — OXYCODONE HYDROCHLORIDE 10 MG/1
10 TABLET ORAL EVERY 4 HOURS PRN
Status: DISCONTINUED | OUTPATIENT
Start: 2020-01-01 | End: 2020-01-01 | Stop reason: HOSPADM

## 2020-01-01 RX ORDER — ONDANSETRON 2 MG/ML
4 INJECTION INTRAMUSCULAR; INTRAVENOUS ONCE
Status: COMPLETED | OUTPATIENT
Start: 2020-01-01 | End: 2020-01-01

## 2020-01-01 RX ORDER — CLONIDINE 0.1 MG/24H
1 PATCH, EXTENDED RELEASE TRANSDERMAL WEEKLY
Qty: 4 PATCH | Refills: 0 | Status: SHIPPED | OUTPATIENT
Start: 2020-01-01 | End: 2020-01-01 | Stop reason: ALTCHOICE

## 2020-01-01 RX ORDER — DEXTROSE MONOHYDRATE 25 G/50ML
50 INJECTION, SOLUTION INTRAVENOUS ONCE
Status: COMPLETED | OUTPATIENT
Start: 2020-01-01 | End: 2020-01-01

## 2020-01-01 RX ORDER — VITAMIN B COMPLEX
1000 TABLET ORAL DAILY
COMMUNITY
End: 2020-01-01 | Stop reason: ALTCHOICE

## 2020-01-01 RX ORDER — MEPERIDINE HYDROCHLORIDE 50 MG/ML
12.5 INJECTION INTRAMUSCULAR; INTRAVENOUS; SUBCUTANEOUS
Status: DISCONTINUED | OUTPATIENT
Start: 2020-01-01 | End: 2020-01-01 | Stop reason: HOSPADM

## 2020-01-01 RX ORDER — MAGNESIUM HYDROXIDE 1200 MG/15ML
LIQUID ORAL AS NEEDED
Status: DISCONTINUED | OUTPATIENT
Start: 2020-01-01 | End: 2020-01-01 | Stop reason: HOSPADM

## 2020-01-01 RX ORDER — ZOLPIDEM TARTRATE 10 MG/1
TABLET ORAL
COMMUNITY
Start: 2016-09-27 | End: 2020-01-01 | Stop reason: SDUPTHER

## 2020-01-01 RX ORDER — NEOSTIGMINE METHYLSULFATE 1 MG/ML
INJECTION INTRAVENOUS AS NEEDED
Status: DISCONTINUED | OUTPATIENT
Start: 2020-01-01 | End: 2020-01-01 | Stop reason: SURG

## 2020-01-01 RX ORDER — OXYCODONE HYDROCHLORIDE 5 MG/1
5 TABLET ORAL EVERY 4 HOURS PRN
Status: DISCONTINUED | OUTPATIENT
Start: 2020-01-01 | End: 2020-01-01 | Stop reason: HOSPADM

## 2020-01-01 RX ORDER — HYDROMORPHONE HCL/PF 1 MG/ML
1 SYRINGE (ML) INJECTION ONCE
Status: COMPLETED | OUTPATIENT
Start: 2020-01-01 | End: 2020-01-01

## 2020-01-01 RX ORDER — ATORVASTATIN CALCIUM 20 MG/1
TABLET, FILM COATED ORAL
COMMUNITY
Start: 2019-11-27 | End: 2021-02-11 | Stop reason: HOSPADM

## 2020-01-01 RX ORDER — AMLODIPINE BESYLATE 10 MG/1
10 TABLET ORAL DAILY
Status: DISCONTINUED | OUTPATIENT
Start: 2020-01-01 | End: 2020-01-01 | Stop reason: HOSPADM

## 2020-01-01 RX ORDER — LATANOPROST 50 UG/ML
1 SOLUTION/ DROPS OPHTHALMIC
Status: DISCONTINUED | OUTPATIENT
Start: 2020-01-01 | End: 2020-01-01 | Stop reason: HOSPADM

## 2020-01-01 RX ORDER — BUPROPION HYDROCHLORIDE 100 MG/1
100 TABLET ORAL 2 TIMES DAILY
Qty: 180 TABLET | Refills: 3 | Status: SHIPPED | OUTPATIENT
Start: 2020-01-01 | End: 2021-02-11 | Stop reason: HOSPADM

## 2020-01-01 RX ORDER — ZOLPIDEM TARTRATE 5 MG/1
10 TABLET ORAL
Status: DISCONTINUED | OUTPATIENT
Start: 2020-01-01 | End: 2020-01-01 | Stop reason: HOSPADM

## 2020-01-01 RX ORDER — DICYCLOMINE HCL 20 MG
20 TABLET ORAL DAILY PRN
Status: DISCONTINUED | OUTPATIENT
Start: 2020-01-01 | End: 2020-01-01 | Stop reason: HOSPADM

## 2020-01-01 RX ORDER — PROMETHAZINE HYDROCHLORIDE 25 MG/ML
12.5 INJECTION, SOLUTION INTRAMUSCULAR; INTRAVENOUS ONCE AS NEEDED
Status: DISCONTINUED | OUTPATIENT
Start: 2020-01-01 | End: 2020-01-01 | Stop reason: HOSPADM

## 2020-01-01 RX ORDER — HEPARIN SODIUM 5000 [USP'U]/ML
5000 INJECTION, SOLUTION INTRAVENOUS; SUBCUTANEOUS EVERY 8 HOURS SCHEDULED
Qty: 1 ML | Refills: 0 | Status: SHIPPED | OUTPATIENT
Start: 2020-01-01 | End: 2021-01-01 | Stop reason: ALTCHOICE

## 2020-01-01 RX ORDER — FENTANYL CITRATE 50 UG/ML
INJECTION, SOLUTION INTRAMUSCULAR; INTRAVENOUS AS NEEDED
Status: DISCONTINUED | OUTPATIENT
Start: 2020-01-01 | End: 2020-01-01 | Stop reason: SURG

## 2020-01-01 RX ORDER — ATORVASTATIN CALCIUM 20 MG/1
20 TABLET, FILM COATED ORAL
Status: DISCONTINUED | OUTPATIENT
Start: 2020-01-01 | End: 2020-01-01 | Stop reason: HOSPADM

## 2020-01-01 RX ORDER — CLONIDINE 0.1 MG/24H
0.1 PATCH, EXTENDED RELEASE TRANSDERMAL WEEKLY
Status: DISCONTINUED | OUTPATIENT
Start: 2020-01-01 | End: 2020-01-01 | Stop reason: HOSPADM

## 2020-01-01 RX ORDER — CLONIDINE 0.1 MG/24H
1 PATCH, EXTENDED RELEASE TRANSDERMAL WEEKLY
Status: DISCONTINUED | OUTPATIENT
Start: 2020-01-01 | End: 2020-01-01 | Stop reason: HOSPADM

## 2020-01-01 RX ORDER — INSULIN GLARGINE 100 [IU]/ML
24 INJECTION, SOLUTION SUBCUTANEOUS
Status: DISCONTINUED | OUTPATIENT
Start: 2020-01-01 | End: 2020-01-01

## 2020-01-01 RX ORDER — BLOOD-GLUCOSE METER
KIT MISCELLANEOUS
COMMUNITY
Start: 2018-09-18

## 2020-01-01 RX ORDER — DEXTROSE AND SODIUM CHLORIDE 5; .9 G/100ML; G/100ML
75 INJECTION, SOLUTION INTRAVENOUS CONTINUOUS
Status: DISCONTINUED | OUTPATIENT
Start: 2020-01-01 | End: 2020-01-01 | Stop reason: HOSPADM

## 2020-01-01 RX ORDER — DOXAZOSIN MESYLATE 4 MG/1
4 TABLET ORAL 2 TIMES DAILY
Status: DISCONTINUED | OUTPATIENT
Start: 2020-01-01 | End: 2020-01-01 | Stop reason: HOSPADM

## 2020-01-01 RX ORDER — SODIUM CHLORIDE 9 MG/ML
125 INJECTION, SOLUTION INTRAVENOUS CONTINUOUS
Status: CANCELLED | OUTPATIENT
Start: 2020-01-01

## 2020-01-01 RX ORDER — SEVELAMER CARBONATE 800 MG/1
800 TABLET, FILM COATED ORAL
COMMUNITY
End: 2021-01-01 | Stop reason: ALTCHOICE

## 2020-01-01 RX ORDER — HYDROMORPHONE HCL/PF 1 MG/ML
0.2 SYRINGE (ML) INJECTION EVERY 4 HOURS PRN
Status: DISCONTINUED | OUTPATIENT
Start: 2020-01-01 | End: 2020-01-01 | Stop reason: HOSPADM

## 2020-01-01 RX ORDER — CALCITRIOL 0.25 UG/1
0.25 CAPSULE, LIQUID FILLED ORAL DAILY
Status: DISCONTINUED | OUTPATIENT
Start: 2020-01-01 | End: 2020-01-01 | Stop reason: HOSPADM

## 2020-01-01 RX ORDER — HYDRALAZINE HYDROCHLORIDE 10 MG/1
10 TABLET, FILM COATED ORAL EVERY 8 HOURS SCHEDULED
Qty: 90 TABLET | Refills: 0 | Status: SHIPPED | OUTPATIENT
Start: 2020-01-01 | End: 2020-01-01 | Stop reason: HOSPADM

## 2020-01-01 RX ORDER — ONDANSETRON 2 MG/ML
INJECTION INTRAMUSCULAR; INTRAVENOUS AS NEEDED
Status: DISCONTINUED | OUTPATIENT
Start: 2020-01-01 | End: 2020-01-01 | Stop reason: SURG

## 2020-01-01 RX ORDER — PROPOFOL 10 MG/ML
INJECTION, EMULSION INTRAVENOUS AS NEEDED
Status: DISCONTINUED | OUTPATIENT
Start: 2020-01-01 | End: 2020-01-01

## 2020-01-01 RX ORDER — DEXTROSE MONOHYDRATE 25 G/50ML
INJECTION, SOLUTION INTRAVENOUS
Status: COMPLETED
Start: 2020-01-01 | End: 2020-01-01

## 2020-01-01 RX ORDER — ONDANSETRON 4 MG/1
TABLET, FILM COATED ORAL
COMMUNITY
Start: 2019-05-02 | End: 2021-01-01 | Stop reason: ALTCHOICE

## 2020-01-01 RX ORDER — CALCIUM CHLORIDE 100 MG/ML
1 INJECTION INTRAVENOUS; INTRAVENTRICULAR ONCE
Status: COMPLETED | OUTPATIENT
Start: 2020-01-01 | End: 2020-01-01

## 2020-01-01 RX ADMIN — NEOSTIGMINE METHYLSULFATE 3 MG: 1 INJECTION, SOLUTION INTRAVENOUS at 09:13

## 2020-01-01 RX ADMIN — LIDOCAINE HYDROCHLORIDE 100 MG: 10 INJECTION, SOLUTION EPIDURAL; INFILTRATION; INTRACAUDAL; PERINEURAL at 11:27

## 2020-01-01 RX ADMIN — TRAZODONE HYDROCHLORIDE 100 MG: 100 TABLET ORAL at 21:17

## 2020-01-01 RX ADMIN — INSULIN LISPRO 2 UNITS: 100 INJECTION, SOLUTION INTRAVENOUS; SUBCUTANEOUS at 22:13

## 2020-01-01 RX ADMIN — ROCURONIUM BROMIDE 40 MG: 10 INJECTION, SOLUTION INTRAVENOUS at 08:11

## 2020-01-01 RX ADMIN — FENTANYL CITRATE 25 MCG: 50 INJECTION, SOLUTION INTRAMUSCULAR; INTRAVENOUS at 09:54

## 2020-01-01 RX ADMIN — BUPROPION HYDROCHLORIDE 100 MG: 100 TABLET, FILM COATED ORAL at 08:43

## 2020-01-01 RX ADMIN — SODIUM POLYSTYRENE SULFONATE 30 G: 1 POWDER ORAL; RECTAL at 18:45

## 2020-01-01 RX ADMIN — INSULIN GLARGINE 24 UNITS: 100 INJECTION, SOLUTION SUBCUTANEOUS at 22:16

## 2020-01-01 RX ADMIN — TRAZODONE HYDROCHLORIDE 100 MG: 100 TABLET ORAL at 22:16

## 2020-01-01 RX ADMIN — BUPROPION HYDROCHLORIDE 100 MG: 100 TABLET, FILM COATED ORAL at 18:09

## 2020-01-01 RX ADMIN — PANTOPRAZOLE SODIUM 40 MG: 40 TABLET, DELAYED RELEASE ORAL at 05:49

## 2020-01-01 RX ADMIN — ATORVASTATIN CALCIUM 20 MG: 20 TABLET, FILM COATED ORAL at 18:44

## 2020-01-01 RX ADMIN — ASPIRIN 81 MG 81 MG: 81 TABLET ORAL at 08:59

## 2020-01-01 RX ADMIN — ARIPIPRAZOLE 4 MG: 2 TABLET ORAL at 08:28

## 2020-01-01 RX ADMIN — TRAZODONE HYDROCHLORIDE 100 MG: 100 TABLET ORAL at 22:29

## 2020-01-01 RX ADMIN — INSULIN GLARGINE 20 UNITS: 100 INJECTION, SOLUTION SUBCUTANEOUS at 00:22

## 2020-01-01 RX ADMIN — DEXTROSE AND SODIUM CHLORIDE 75 ML/HR: 5; .9 INJECTION, SOLUTION INTRAVENOUS at 19:07

## 2020-01-01 RX ADMIN — INSULIN LISPRO 1 UNITS: 100 INJECTION, SOLUTION INTRAVENOUS; SUBCUTANEOUS at 12:25

## 2020-01-01 RX ADMIN — INSULIN LISPRO 1 UNITS: 100 INJECTION, SOLUTION INTRAVENOUS; SUBCUTANEOUS at 16:54

## 2020-01-01 RX ADMIN — HEPARIN SODIUM 5000 UNITS: 5000 INJECTION INTRAVENOUS; SUBCUTANEOUS at 05:14

## 2020-01-01 RX ADMIN — SODIUM BICARBONATE 50 MEQ: 84 INJECTION, SOLUTION INTRAVENOUS at 18:51

## 2020-01-01 RX ADMIN — CALCITRIOL CAPSULES 0.25 MCG 0.25 MCG: 0.25 CAPSULE ORAL at 08:59

## 2020-01-01 RX ADMIN — METOPROLOL SUCCINATE 25 MG: 25 TABLET, EXTENDED RELEASE ORAL at 08:27

## 2020-01-01 RX ADMIN — ONDANSETRON HYDROCHLORIDE 4 MG: 2 INJECTION, SOLUTION INTRAMUSCULAR; INTRAVENOUS at 19:06

## 2020-01-01 RX ADMIN — AMLODIPINE BESYLATE 10 MG: 10 TABLET ORAL at 08:59

## 2020-01-01 RX ADMIN — HEPARIN SODIUM 5000 UNITS: 5000 INJECTION INTRAVENOUS; SUBCUTANEOUS at 05:40

## 2020-01-01 RX ADMIN — ARIPIPRAZOLE 4 MG: 2 TABLET ORAL at 08:59

## 2020-01-01 RX ADMIN — HEPARIN SODIUM 5000 UNITS: 5000 INJECTION INTRAVENOUS; SUBCUTANEOUS at 13:17

## 2020-01-01 RX ADMIN — PATIROMER 8.4 G: 8.4 POWDER, FOR SUSPENSION ORAL at 17:00

## 2020-01-01 RX ADMIN — PANTOPRAZOLE SODIUM 40 MG: 40 TABLET, DELAYED RELEASE ORAL at 05:14

## 2020-01-01 RX ADMIN — BUPROPION HYDROCHLORIDE 100 MG: 100 TABLET, FILM COATED ORAL at 18:44

## 2020-01-01 RX ADMIN — OXYCODONE HYDROCHLORIDE 10 MG: 10 TABLET ORAL at 07:15

## 2020-01-01 RX ADMIN — TRAZODONE HYDROCHLORIDE 100 MG: 100 TABLET ORAL at 22:42

## 2020-01-01 RX ADMIN — DEXTROSE 50 % IN WATER (D50W) INTRAVENOUS SYRINGE 50 ML: at 16:31

## 2020-01-01 RX ADMIN — SODIUM CHLORIDE: 0.9 INJECTION, SOLUTION INTRAVENOUS at 08:06

## 2020-01-01 RX ADMIN — CALCITRIOL CAPSULES 0.25 MCG 0.25 MCG: 0.25 CAPSULE ORAL at 08:27

## 2020-01-01 RX ADMIN — HEPARIN SODIUM 5000 UNITS: 5000 INJECTION INTRAVENOUS; SUBCUTANEOUS at 05:36

## 2020-01-01 RX ADMIN — HEPARIN SODIUM 5000 UNITS: 5000 INJECTION INTRAVENOUS; SUBCUTANEOUS at 06:30

## 2020-01-01 RX ADMIN — ACETAMINOPHEN 650 MG: 325 TABLET ORAL at 05:49

## 2020-01-01 RX ADMIN — PANTOPRAZOLE SODIUM 40 MG: 40 TABLET, DELAYED RELEASE ORAL at 05:36

## 2020-01-01 RX ADMIN — ONDANSETRON 4 MG: 2 INJECTION INTRAMUSCULAR; INTRAVENOUS at 14:32

## 2020-01-01 RX ADMIN — LATANOPROST 1 DROP: 50 SOLUTION OPHTHALMIC at 22:42

## 2020-01-01 RX ADMIN — ATORVASTATIN CALCIUM 20 MG: 20 TABLET, FILM COATED ORAL at 17:31

## 2020-01-01 RX ADMIN — HYDRALAZINE HYDROCHLORIDE 10 MG: 10 TABLET, FILM COATED ORAL at 06:13

## 2020-01-01 RX ADMIN — MORPHINE SULFATE 1 MG: 2 INJECTION, SOLUTION INTRAMUSCULAR; INTRAVENOUS at 14:16

## 2020-01-01 RX ADMIN — ARIPIPRAZOLE 4 MG: 2 TABLET ORAL at 11:59

## 2020-01-01 RX ADMIN — HEPARIN SODIUM 5000 UNITS: 5000 INJECTION INTRAVENOUS; SUBCUTANEOUS at 05:51

## 2020-01-01 RX ADMIN — PANTOPRAZOLE SODIUM 40 MG: 40 TABLET, DELAYED RELEASE ORAL at 05:15

## 2020-01-01 RX ADMIN — LUBIPROSTONE 24 MCG: 24 CAPSULE, GELATIN COATED ORAL at 00:20

## 2020-01-01 RX ADMIN — PRAVASTATIN SODIUM 40 MG: 40 TABLET ORAL at 17:07

## 2020-01-01 RX ADMIN — CALCIUM CHLORIDE 1 G: 100 INJECTION, SOLUTION INTRAVENOUS; INTRAVENTRICULAR at 17:50

## 2020-01-01 RX ADMIN — DEXTROSE MONOHYDRATE 50 ML: 25 INJECTION, SOLUTION INTRAVENOUS at 16:31

## 2020-01-01 RX ADMIN — TRAZODONE HYDROCHLORIDE 100 MG: 100 TABLET ORAL at 21:21

## 2020-01-01 RX ADMIN — LUBIPROSTONE 24 MCG: 24 CAPSULE, GELATIN COATED ORAL at 17:06

## 2020-01-01 RX ADMIN — PANTOPRAZOLE SODIUM 40 MG: 40 TABLET, DELAYED RELEASE ORAL at 06:30

## 2020-01-01 RX ADMIN — PSYLLIUM HUSK 1 PACKET: 3.4 POWDER ORAL at 08:27

## 2020-01-01 RX ADMIN — OXYCODONE HYDROCHLORIDE 10 MG: 10 TABLET ORAL at 03:08

## 2020-01-01 RX ADMIN — FENTANYL CITRATE 50 MCG: 50 INJECTION, SOLUTION INTRAMUSCULAR; INTRAVENOUS at 08:42

## 2020-01-01 RX ADMIN — PROPOFOL 80 MG: 10 INJECTION, EMULSION INTRAVENOUS at 11:27

## 2020-01-01 RX ADMIN — ASPIRIN 81 MG 81 MG: 81 TABLET ORAL at 08:43

## 2020-01-01 RX ADMIN — PANTOPRAZOLE SODIUM 40 MG: 40 TABLET, DELAYED RELEASE ORAL at 05:40

## 2020-01-01 RX ADMIN — BUPROPION HYDROCHLORIDE 100 MG: 100 TABLET, FILM COATED ORAL at 09:00

## 2020-01-01 RX ADMIN — LATANOPROST 1 DROP: 50 SOLUTION OPHTHALMIC at 22:14

## 2020-01-01 RX ADMIN — DEXTROSE MONOHYDRATE 25 ML: 500 INJECTION PARENTERAL at 20:00

## 2020-01-01 RX ADMIN — HEPARIN SODIUM 5000 UNITS: 5000 INJECTION INTRAVENOUS; SUBCUTANEOUS at 22:13

## 2020-01-01 RX ADMIN — PHENYLEPHRINE HYDROCHLORIDE 150 MCG: 10 INJECTION INTRAVENOUS at 09:03

## 2020-01-01 RX ADMIN — OXYCODONE HYDROCHLORIDE 10 MG: 10 TABLET ORAL at 18:44

## 2020-01-01 RX ADMIN — HEPARIN SODIUM 5000 UNITS: 5000 INJECTION INTRAVENOUS; SUBCUTANEOUS at 00:21

## 2020-01-01 RX ADMIN — HEPARIN SODIUM 5000 UNITS: 5000 INJECTION INTRAVENOUS; SUBCUTANEOUS at 21:18

## 2020-01-01 RX ADMIN — PHENYLEPHRINE HYDROCHLORIDE 200 MCG: 10 INJECTION INTRAVENOUS at 08:17

## 2020-01-01 RX ADMIN — PROPOFOL 40 MG: 10 INJECTION, EMULSION INTRAVENOUS at 11:32

## 2020-01-01 RX ADMIN — ONDANSETRON 4 MG: 2 INJECTION INTRAMUSCULAR; INTRAVENOUS at 19:33

## 2020-01-01 RX ADMIN — ASPIRIN 81 MG 81 MG: 81 TABLET ORAL at 11:58

## 2020-01-01 RX ADMIN — CLONIDINE 0.1 MG: 0.1 PATCH TRANSDERMAL at 15:21

## 2020-01-01 RX ADMIN — INSULIN HUMAN 10 UNITS: 100 INJECTION, SOLUTION PARENTERAL at 18:47

## 2020-01-01 RX ADMIN — ARIPIPRAZOLE 4 MG: 2 TABLET ORAL at 08:43

## 2020-01-01 RX ADMIN — DOXAZOSIN 4 MG: 4 TABLET ORAL at 09:00

## 2020-01-01 RX ADMIN — ALBUTEROL SULFATE 2.5 MG: 2.5 SOLUTION RESPIRATORY (INHALATION) at 18:54

## 2020-01-01 RX ADMIN — SODIUM CHLORIDE 50 ML/HR: 0.9 INJECTION, SOLUTION INTRAVENOUS at 10:38

## 2020-01-01 RX ADMIN — Medication: at 20:40

## 2020-01-01 RX ADMIN — HEPARIN SODIUM 5000 UNITS: 5000 INJECTION INTRAVENOUS; SUBCUTANEOUS at 13:45

## 2020-01-01 RX ADMIN — ASPIRIN 81 MG 81 MG: 81 TABLET ORAL at 09:05

## 2020-01-01 RX ADMIN — ATORVASTATIN CALCIUM 20 MG: 20 TABLET, FILM COATED ORAL at 17:12

## 2020-01-01 RX ADMIN — BUPROPION HYDROCHLORIDE 100 MG: 100 TABLET, FILM COATED ORAL at 09:05

## 2020-01-01 RX ADMIN — HEPARIN SODIUM 5000 UNITS: 5000 INJECTION INTRAVENOUS; SUBCUTANEOUS at 06:13

## 2020-01-01 RX ADMIN — HYDRALAZINE HYDROCHLORIDE 10 MG: 10 TABLET, FILM COATED ORAL at 14:56

## 2020-01-01 RX ADMIN — BUPROPION HYDROCHLORIDE 100 MG: 100 TABLET, FILM COATED ORAL at 09:54

## 2020-01-01 RX ADMIN — BUPROPION HYDROCHLORIDE 100 MG: 100 TABLET, FILM COATED ORAL at 17:31

## 2020-01-01 RX ADMIN — GLYCOPYRROLATE 0.4 MG: 0.2 INJECTION INTRAMUSCULAR; INTRAVENOUS at 09:13

## 2020-01-01 RX ADMIN — LIDOCAINE HYDROCHLORIDE 50 MG: 20 INJECTION, SOLUTION INTRAVENOUS at 08:11

## 2020-01-01 RX ADMIN — OXYCODONE HYDROCHLORIDE 10 MG: 10 TABLET ORAL at 12:03

## 2020-01-01 RX ADMIN — TRAZODONE HYDROCHLORIDE 100 MG: 100 TABLET ORAL at 21:52

## 2020-01-01 RX ADMIN — PSYLLIUM HUSK 1 PACKET: 3.4 POWDER ORAL at 08:57

## 2020-01-01 RX ADMIN — BUPROPION HYDROCHLORIDE 100 MG: 100 TABLET, FILM COATED ORAL at 11:58

## 2020-01-01 RX ADMIN — LUBIPROSTONE 24 MCG: 24 CAPSULE, GELATIN COATED ORAL at 09:00

## 2020-01-01 RX ADMIN — HEPARIN SODIUM 5000 UNITS: 5000 INJECTION INTRAVENOUS; SUBCUTANEOUS at 05:15

## 2020-01-01 RX ADMIN — PROPOFOL 120 MG: 10 INJECTION, EMULSION INTRAVENOUS at 08:11

## 2020-01-01 RX ADMIN — SODIUM BICARBONATE: 84 INJECTION, SOLUTION INTRAVENOUS at 19:00

## 2020-01-01 RX ADMIN — INSULIN GLARGINE 15 UNITS: 100 INJECTION, SOLUTION SUBCUTANEOUS at 21:52

## 2020-01-01 RX ADMIN — Medication: at 17:17

## 2020-01-01 RX ADMIN — HEPARIN SODIUM 5000 UNITS: 5000 INJECTION INTRAVENOUS; SUBCUTANEOUS at 13:32

## 2020-01-01 RX ADMIN — PSYLLIUM HUSK 1 PACKET: 3.4 POWDER ORAL at 00:22

## 2020-01-01 RX ADMIN — LATANOPROST 1 DROP: 50 SOLUTION OPHTHALMIC at 22:29

## 2020-01-01 RX ADMIN — INSULIN GLARGINE 15 UNITS: 100 INJECTION, SOLUTION SUBCUTANEOUS at 21:17

## 2020-01-01 RX ADMIN — PROPOFOL 40 MG: 10 INJECTION, EMULSION INTRAVENOUS at 11:30

## 2020-01-01 RX ADMIN — TRAZODONE HYDROCHLORIDE 100 MG: 100 TABLET ORAL at 00:21

## 2020-01-01 RX ADMIN — PANTOPRAZOLE SODIUM 40 MG: 40 TABLET, DELAYED RELEASE ORAL at 06:13

## 2020-01-01 RX ADMIN — BUPROPION HYDROCHLORIDE 100 MG: 100 TABLET, FILM COATED ORAL at 00:21

## 2020-01-01 RX ADMIN — INSULIN LISPRO 1 UNITS: 100 INJECTION, SOLUTION INTRAVENOUS; SUBCUTANEOUS at 18:54

## 2020-01-01 RX ADMIN — FENTANYL CITRATE 50 MCG: 50 INJECTION, SOLUTION INTRAMUSCULAR; INTRAVENOUS at 08:34

## 2020-01-01 RX ADMIN — ASPIRIN 81 MG 81 MG: 81 TABLET ORAL at 09:54

## 2020-01-01 RX ADMIN — DOXAZOSIN 4 MG: 4 TABLET ORAL at 12:51

## 2020-01-01 RX ADMIN — DOXAZOSIN 4 MG: 4 TABLET ORAL at 17:08

## 2020-01-01 RX ADMIN — AMLODIPINE BESYLATE 10 MG: 10 TABLET ORAL at 08:27

## 2020-01-01 RX ADMIN — HEPARIN SODIUM 5000 UNITS: 5000 INJECTION INTRAVENOUS; SUBCUTANEOUS at 21:52

## 2020-01-01 RX ADMIN — EPHEDRINE SULFATE 10 MG: 50 INJECTION, SOLUTION INTRAVENOUS at 08:15

## 2020-01-01 RX ADMIN — EPHEDRINE SULFATE 10 MG: 50 INJECTION, SOLUTION INTRAVENOUS at 08:17

## 2020-01-01 RX ADMIN — BUPROPION HYDROCHLORIDE 100 MG: 100 TABLET, FILM COATED ORAL at 17:06

## 2020-01-01 RX ADMIN — FUROSEMIDE 40 MG: 40 TABLET ORAL at 08:59

## 2020-01-01 RX ADMIN — DEXTROSE 50 % IN WATER (D50W) INTRAVENOUS SYRINGE 50 ML: at 12:37

## 2020-01-01 RX ADMIN — INSULIN LISPRO 1 UNITS: 100 INJECTION, SOLUTION INTRAVENOUS; SUBCUTANEOUS at 12:51

## 2020-01-01 RX ADMIN — FUROSEMIDE 40 MG: 40 TABLET ORAL at 08:27

## 2020-01-01 RX ADMIN — FENTANYL CITRATE 50 MCG: 50 INJECTION, SOLUTION INTRAMUSCULAR; INTRAVENOUS at 08:09

## 2020-01-01 RX ADMIN — ARIPIPRAZOLE 4 MG: 2 TABLET ORAL at 09:54

## 2020-01-01 RX ADMIN — ONDANSETRON 4 MG: 2 INJECTION INTRAMUSCULAR; INTRAVENOUS at 09:07

## 2020-01-01 RX ADMIN — BUPROPION HYDROCHLORIDE 100 MG: 100 TABLET, FILM COATED ORAL at 17:12

## 2020-01-01 RX ADMIN — BUPROPION HYDROCHLORIDE 100 MG: 100 TABLET, FILM COATED ORAL at 16:56

## 2020-01-01 RX ADMIN — DOXAZOSIN 4 MG: 4 TABLET ORAL at 00:20

## 2020-01-01 RX ADMIN — ASPIRIN 81 MG 81 MG: 81 TABLET ORAL at 08:27

## 2020-01-01 RX ADMIN — INSULIN LISPRO 1 UNITS: 100 INJECTION, SOLUTION INTRAVENOUS; SUBCUTANEOUS at 08:59

## 2020-01-01 RX ADMIN — LUBIPROSTONE 24 MCG: 24 CAPSULE, GELATIN COATED ORAL at 08:29

## 2020-01-01 RX ADMIN — LATANOPROST 1 DROP: 50 SOLUTION OPHTHALMIC at 21:18

## 2020-01-01 RX ADMIN — ONDANSETRON 4 MG: 2 INJECTION INTRAMUSCULAR; INTRAVENOUS at 08:43

## 2020-01-01 RX ADMIN — HYDROMORPHONE HYDROCHLORIDE 1 MG: 1 INJECTION, SOLUTION INTRAMUSCULAR; INTRAVENOUS; SUBCUTANEOUS at 12:20

## 2020-01-01 RX ADMIN — ATORVASTATIN CALCIUM 20 MG: 20 TABLET, FILM COATED ORAL at 18:09

## 2020-01-01 RX ADMIN — HEPARIN SODIUM 5000 UNITS: 5000 INJECTION INTRAVENOUS; SUBCUTANEOUS at 15:13

## 2020-01-01 RX ADMIN — ARIPIPRAZOLE 4 MG: 2 TABLET ORAL at 09:05

## 2020-01-01 RX ADMIN — CEFAZOLIN SODIUM 1000 MG: 1 SOLUTION INTRAVENOUS at 08:15

## 2020-01-01 RX ADMIN — BUPROPION HYDROCHLORIDE 100 MG: 100 TABLET, FILM COATED ORAL at 08:28

## 2020-01-01 RX ADMIN — HEPARIN SODIUM 5000 UNITS: 5000 INJECTION INTRAVENOUS; SUBCUTANEOUS at 22:29

## 2020-01-01 RX ADMIN — DEXTROSE MONOHYDRATE 50 ML: 25 INJECTION, SOLUTION INTRAVENOUS at 18:56

## 2020-01-01 RX ADMIN — HEPARIN SODIUM 5000 UNITS: 5000 INJECTION INTRAVENOUS; SUBCUTANEOUS at 22:42

## 2020-01-01 RX ADMIN — LATANOPROST 1 DROP: 50 SOLUTION OPHTHALMIC at 21:52

## 2020-01-01 RX ADMIN — HEPARIN SODIUM 5000 UNITS: 5000 INJECTION INTRAVENOUS; SUBCUTANEOUS at 21:21

## 2020-01-01 RX ADMIN — HYDRALAZINE HYDROCHLORIDE 10 MG: 10 TABLET, FILM COATED ORAL at 21:21

## 2020-01-01 RX ADMIN — INSULIN GLARGINE 5 UNITS: 100 INJECTION, SOLUTION SUBCUTANEOUS at 22:42

## 2020-01-01 RX ADMIN — ATORVASTATIN CALCIUM 20 MG: 20 TABLET, FILM COATED ORAL at 16:54

## 2020-01-01 RX ADMIN — OXYCODONE HYDROCHLORIDE 10 MG: 10 TABLET ORAL at 17:12

## 2020-01-16 ENCOUNTER — TELEPHONE (OUTPATIENT)
Dept: FAMILY MEDICINE CLINIC | Facility: CLINIC | Age: 60
End: 2020-01-16

## 2020-01-16 NOTE — TELEPHONE ENCOUNTER
Patient's daughter Kaela Cottrell called stating her father needs refills on his asthma medicine and pain medication  Kaela Cottrell states pt is having a lot of back pains

## 2020-01-16 NOTE — TELEPHONE ENCOUNTER
I have not been handling his pain for over a year now, as per my understanding he was seeing Dr Cabrera Arzate at [facility] for medical marijuana and pain management    I have not seen him for about 4 months, so he would need an appointment so we can discuss pain medication, however he should reach out to his pain specialist since I will not prescribe narcotics

## 2020-01-16 NOTE — TELEPHONE ENCOUNTER
Dolores Bolivar states she will contact pain specialist for meds, but Alida Nuno wanted to know if you will send the inhaler for his asthma and patient is not schedule with you on 02/04/2020

## 2020-01-23 ENCOUNTER — TELEPHONE (OUTPATIENT)
Dept: FAMILY MEDICINE CLINIC | Facility: CLINIC | Age: 60
End: 2020-01-23

## 2020-01-23 NOTE — TELEPHONE ENCOUNTER
Patient is not due for follow-up colonoscopy until 2024  As far as the prostate physical exam was done in the office, and lab work was given to check prostate hormone level

## 2020-01-23 NOTE — TELEPHONE ENCOUNTER
Andriy Tesfaye is calling in stating she was told going to be referred so ami can have prostate screening also with colonoscopy and hasn't heard anything  Wondering if tests have been ordered for him and placed and scheduling

## 2020-02-20 PROBLEM — E87.5 HYPERKALEMIA: Status: ACTIVE | Noted: 2020-01-01

## 2020-02-20 PROBLEM — N18.5 STAGE 5 CHRONIC KIDNEY DISEASE NOT ON CHRONIC DIALYSIS (HCC): Status: ACTIVE | Noted: 2019-01-17

## 2020-02-20 NOTE — PATIENT INSTRUCTIONS
Medicare Preventive Visit Patient Instructions  Thank you for completing your Welcome to Medicare Visit or Medicare Annual Wellness Visit today  Your next wellness visit will be due in one year (2/20/2021)  The screening/preventive services that you may require over the next 5-10 years are detailed below  Some tests may not apply to you based off risk factors and/or age  Screening tests ordered at today's visit but not completed yet may show as past due  Also, please note that scanned in results may not display below  Preventive Screenings:  Service Recommendations Previous Testing/Comments   Colorectal Cancer Screening  · Colonoscopy    · Fecal Occult Blood Test (FOBT)/Fecal Immunochemical Test (FIT)  · Fecal DNA/Cologuard Test  · Flexible Sigmoidoscopy Age: 54-65 years old   Colonoscopy: every 10 years (May be performed more frequently if at higher risk)  OR  FOBT/FIT: every 1 year  OR  Cologuard: every 3 years  OR  Sigmoidoscopy: every 5 years  Screening may be recommended earlier than age 48 if at higher risk for colorectal cancer  Also, an individualized decision between you and your healthcare provider will decide whether screening between the ages of 74-80 would be appropriate   Colonoscopy: 03/01/2014  FOBT/FIT: Not on file  Cologuard: Not on file  Sigmoidoscopy: Not on file    Screening Current     Prostate Cancer Screening Individualized decision between patient and health care provider in men between ages of 53-78   Medicare will cover every 12 months beginning on the day after your 50th birthday PSA: No results in last 5 years          Hepatitis C Screening Once for adults born between 80 and 1965  More frequently in patients at high risk for Hepatitis C Hep C Antibody: Not on file       Diabetes Screening 1-2 times per year if you're at risk for diabetes or have pre-diabetes Fasting glucose: 179 mg/dL   A1C: 7 5    Screening Not Indicated  History Diabetes   Cholesterol Screening Once every 5 years if you don't have a lipid disorder  May order more often based on risk factors  Lipid panel: 01/22/2020    Screening Not Indicated  History Lipid Disorder      Other Preventive Screenings Covered by Medicare:  1  Abdominal Aortic Aneurysm (AAA) Screening: covered once if your at risk  You're considered to be at risk if you have a family history of AAA or a male between the age of 73-68 who smoking at least 100 cigarettes in your lifetime  2  Lung Cancer Screening: covers low dose CT scan once per year if you meet all of the following conditions: (1) Age 50-69; (2) No signs or symptoms of lung cancer; (3) Current smoker or have quit smoking within the last 15 years; (4) You have a tobacco smoking history of at least 30 pack years (packs per day x number of years you smoked); (5) You get a written order from a healthcare provider  3  Glaucoma Screening: covered annually if you're considered high risk: (1) You have diabetes OR (2) Family history of glaucoma OR (3)  aged 48 and older OR (3)  American aged 72 and older  3  Osteoporosis Screening: covered every 2 years if you meet one of the following conditions: (1) Have a vertebral abnormality; (2) On glucocorticoid therapy for more than 3 months; (3) Have primary hyperparathyroidism; (4) On osteoporosis medications and need to assess response to drug therapy  5  HIV Screening: covered annually if you're between the age of 12-76  Also covered annually if you are younger than 13 and older than 72 with risk factors for HIV infection  For pregnant patients, it is covered up to 3 times per pregnancy      Immunizations:  Immunization Recommendations   Influenza Vaccine Annual influenza vaccination during flu season is recommended for all persons aged >= 6 months who do not have contraindications   Pneumococcal Vaccine (Prevnar and Pneumovax)  * Prevnar = PCV13  * Pneumovax = PPSV23 Adults 25-60 years old: 1-3 doses may be recommended based on certain risk factors  Adults 72 years old: Prevnar (PCV13) vaccine recommended followed by Pneumovax (PPSV23) vaccine  If already received PPSV23 since turning 65, then PCV13 recommended at least one year after PPSV23 dose  Hepatitis B Vaccine 3 dose series if at intermediate or high risk (ex: diabetes, end stage renal disease, liver disease)   Tetanus (Td) Vaccine - COST NOT COVERED BY MEDICARE PART B Following completion of primary series, a booster dose should be given every 10 years to maintain immunity against tetanus  Td may also be given as tetanus wound prophylaxis  Tdap Vaccine - COST NOT COVERED BY MEDICARE PART B Recommended at least once for all adults  For pregnant patients, recommended with each pregnancy  Shingles Vaccine (Shingrix) - COST NOT COVERED BY MEDICARE PART B  2 shot series recommended in those aged 48 and above     Health Maintenance Due:      Topic Date Due    Hepatitis C Screening  1960    CRC Screening: Colonoscopy  03/01/2024     Immunizations Due:      Topic Date Due    DTaP,Tdap,and Td Vaccines (1 - Tdap) 09/30/1973     Advance Directives   What are advance directives? Advance directives are legal documents that state your wishes and plans for medical care  These plans are made ahead of time in case you lose your ability to make decisions for yourself  Advance directives can apply to any medical decision, such as the treatments you want, and if you want to donate organs  What are the types of advance directives? There are many types of advance directives, and each state has rules about how to use them  You may choose a combination of any of the following:  · Living will: This is a written record of the treatment you want  You can also choose which treatments you do not want, which to limit, and which to stop at a certain time  This includes surgery, medicine, IV fluid, and tube feedings  · Durable power of  for healthcare [facility]):   This is a written record that states who you want to make healthcare choices for you when you are unable to make them for yourself  This person, called a proxy, is usually a family member or a friend  You may choose more than 1 proxy  · Do not resuscitate (DNR) order:  A DNR order is used in case your heart stops beating or you stop breathing  It is a request not to have certain forms of treatment, such as CPR  A DNR order may be included in other types of advance directives  · Medical directive: This covers the care that you want if you are in a coma, near death, or unable to make decisions for yourself  You can list the treatments you want for each condition  Treatment may include pain medicine, surgery, blood transfusions, dialysis, IV or tube feedings, and a ventilator (breathing machine)  · Values history: This document has questions about your views, beliefs, and how you feel and think about life  This information can help others choose the care that you would choose  Why are advance directives important? An advance directive helps you control your care  Although spoken wishes may be used, it is better to have your wishes written down  Spoken wishes can be misunderstood, or not followed  Treatments may be given even if you do not want them  An advance directive may make it easier for your family to make difficult choices about your care  © Copyright [employer] 2018 Information is for End User's use only and may not be sold, redistributed or otherwise used for commercial purposes   All illustrations and images included in CareNotes® are the copyrighted property of [employer]  or [address] [place] [address] [employer]

## 2020-02-20 NOTE — PROGRESS NOTES
Assessment and Plan:     Problem List Items Addressed This Visit     None           Preventive health issues were discussed with patient, and age appropriate screening tests were ordered as noted in patient's After Visit Summary  Personalized health advice and appropriate referrals for health education or preventive services given if needed, as noted in patient's After Visit Summary  History of Present Illness:     Patient presents for Welcome to Medicare visit  Patient Care Team:  Sae Pulido MD as PCP - General  MD Sae Banegas Che, MD Melida Malkin, DO     Review of Systems:     Review of Systems   Constitutional: Positive for fatigue  Genitourinary: Positive for urgency  Musculoskeletal: Positive for arthralgias and back pain  All other systems reviewed and are negative       Problem List:     Patient Active Problem List   Diagnosis    Ambulatory dysfunction    Anxiety    Cerebral infarction ([facility] [address]    Constipation    Diabetes mellitus ([address] )    Difficulty walking    Essential hypertension    Gait disturbance    Chronic narcotic use    Violation of controlled substance agreement    Low back pain    Rib fractures    Acute on chronic kidney failure (HCC)    Chronic kidney disease, stage IV (severe) ([facility])    Bradycardia    Gastroesophageal reflux disease without esophagitis      Past Medical and Surgical History:     Past Medical History:   Diagnosis Date    Anxiety     CAD (coronary artery disease)     last assessed 4/10/13    Chronic kidney disease, stage IV (severe) ([address] )     Concussion     last assessed 9/3/14    Depression     Diabetes mellitus ([address] )     type 2    Failure to gain weight in adult     last assessed 2/24/14    Gastroparesis     GERD (gastroesophageal reflux disease)     Hyperkalemia     last assessed 11/9/15    Hyperlipidemia     Hypertension     accelerated essential last assessed 10/7/16    Insomnia     Late effects of cerebrovascular disease     Overflow incontinence     last assessed 7/24/14    Renal disorder     Secondary hyperparathyroidism ([address] )     Stroke [facility])      Past Surgical History:   Procedure Laterality Date    COLONOSCOPY      HERNIA REPAIR      DC ESOPHAGOGASTRODUODENOSCOPY TRANSORAL DIAGNOSTIC N/A 2/16/2017    Procedure: ESOPHAGOGASTRODUODENOSCOPY (EGD) with biopsy;  Surgeon: Romana Barth, DO;  Location: [facility]; Service: Gastroenterology    DC ESOPHAGOGASTRODUODENOSCOPY TRANSORAL DIAGNOSTIC N/A 6/2/2017    Procedure: ESOPHAGOGASTRODUODENOSCOPY (EGD) with bx;  Surgeon: Romana Barth, DO;  Location: [facility];   Service: Gastroenterology    TYMPANOSTOMY TUBE PLACEMENT        Family History:     Family History   Problem Relation Age of Onset    Cancer Family         pt and friend denies this hx    Hypertension Family         essential    Hyperlipidemia Family     Diabetes Mother     No Known Problems Father       Social History:        Social History     Socioeconomic History    Marital status: Single     Spouse name: None    Number of children: None    Years of education: None    Highest education level: None   Occupational History    None   Social Needs    Financial resource strain: None    Food insecurity:     Worry: None     Inability: None    Transportation needs:     Medical: None     Non-medical: None   Tobacco Use    Smoking status: Never Smoker    Smokeless tobacco: Never Used   Substance and Sexual Activity    Alcohol use: No     Comment: social    Drug use: Yes     Types: Marijuana     Comment: smokes once keven while for pain 1 xper  week    Sexual activity: None   Lifestyle    Physical activity:     Days per week: None     Minutes per session: None    Stress: None   Relationships    Social connections:     Talks on phone: None     Gets together: None     Attends [employer] service: None     Active member of club or organization: None     Attends meetings of clubs or organizations: None     Relationship status: None    Intimate partner violence:     Fear of current or ex partner: None     Emotionally abused: None     Physically abused: None     Forced sexual activity: None   Other Topics Concern    None   Social History Narrative    Advance directive declined by pt    Caffeine use    No preference or [employer] beliefs    Social hx reviewed, unchanged      Medications and Allergies:     Current Outpatient Medications   Medication Sig Dispense Refill    ALPRAZolam (XANAX) 0 5 mg tablet Take 0 5 mg by mouth daily      AMITIZA 24 MCG capsule TAKE 1 CAPSULE BY MOUTH TWICE DAILY 180 capsule 10    amLODIPine (NORVASC) 10 mg tablet TAKE 1 TABLET BY MOUTH ONCE DAILY 90 tablet 10    ARIPiprazole (ABILIFY) 2 mg tablet TAKE 2 TABLETS BY MOUTH ONCE DAILY 180 tablet 3    aspirin 81 mg chewable tablet Chew 1 tablet (81 mg total) daily 30 tablet 0    buPROPion (WELLBUTRIN) 100 mg tablet TAKE 1 TABLET BY MOUTH TWICE DAILY 180 tablet 3    doxazosin (CARDURA) 1 mg tablet Take 4 mg by mouth 2 (two) times a day Take 1 1/2 tablet by mouth twice a day      furosemide (LASIX) 40 mg tablet Take by mouth daily      Insulin Glargine (TOUJEO SOLOSTAR) 300 UNIT/ML SOPN Inject 24 Units under the skin daily at bedtime        Liraglutide (VICTOZA) 18 MG/3ML SOPN Inject 1 8 mg under the skin daily at bedtime as needed        lovastatin (MEVACOR) 20 mg tablet Take 20 mg by mouth daily      metoprolol succinate (TOPROL-XL) 25 mg 24 hr tablet TAKE 1 TABLET BY MOUTH ONCE DAILY 90 tablet 3    pantoprazole (PROTONIX) 40 mg tablet TAKE 1 TABLET BY MOUTH DAILY IN THE EARLY MORNING 90 tablet 10    paricalcitol (ZEMPLAR) 1 mcg capsule Take 2 mcg by mouth daily        pioglitazone (ACTOS) 15 mg tablet Take 30 mg by mouth daily       psyllium (METAMUCIL SMOOTH TEXTURE) 28 % packet Take 1 packet by mouth 2 (two) times a day 60 packet 2    sodium polystyrene sulfonate (KAYEXALATE) 15 g/60 mL suspension Take 15 g by mouth as needed Take 120ml by mouth single dose        traZODone (DESYREL) 100 mg tablet Take 100 mg by mouth daily at bedtime      acetaminophen (TYLENOL) 325 mg tablet Take 3 tablets (975 mg total) by mouth every 8 (eight) hours (Patient not taking: Reported on 9/18/2019) 30 tablet 0    dicyclomine (BENTYL) 20 mg tablet Take 1 tablet by mouth every 6 (six) hours (Patient taking differently: Take 20 mg by mouth daily as needed  ) 20 tablet 0    methocarbamol (ROBAXIN) 750 mg tablet Take 1 tablet (750 mg total) by mouth every 6 (six) hours as needed for muscle spasms (Patient not taking: Reported on 9/18/2019) 30 tablet 0     No current facility-administered medications for this visit  No Known Allergies   Immunizations:     Immunization History   Administered Date(s) Administered    INFLUENZA 10/20/2013, 09/03/2014, 08/12/2015, 12/19/2018    Influenza TIV (IM) 01/04/2017    Influenza, recombinant, quadrivalent,injectable, preservative free 09/28/2018, 12/17/2018, 10/18/2019    Pneumococcal Conjugate 13-Valent 10/08/2015    Pneumococcal Polysaccharide PPV23 01/04/2017    Zoster 08/01/2013      Health Maintenance:         Topic Date Due    Hepatitis C Screening  1960    CRC Screening: Colonoscopy  03/01/2024         Topic Date Due    DTaP,Tdap,and Td Vaccines (1 - Tdap) 09/30/1973      Medicare Screening Tests and Risk Assessments:     Amanda Fernandes is here for his Subsequent Wellness visit  Last Medicare Wellness visit information reviewed, patient interviewed and updates made to the record today  Health Risk Assessment:   Patient rates overall health as good  Patient feels that their physical health rating is same  Eyesight was rated as slightly worse  Hearing was rated as same  Patient feels that their emotional and mental health rating is same  Pain experienced in the last 7 days has been a lot  Patient's pain rating has been 7/10   Patient states that he has experienced weight loss or gain in last 6 months  Depression Screening:   PHQ-2 Score: 0  PHQ-9 Score: 0      Fall Risk Screening: In the past year, patient has experienced: no history of falling in past year      Home Safety:  Patient has trouble with stairs inside or outside of their home  Patient has working smoke alarms and has working carbon monoxide detector  Home safety hazards include: none  Nutrition:   Current diet is Low Carb and Diabetic  Medications:   Patient is not currently taking any over-the-counter supplements  Patient is not able to manage medications  Activities of Daily Living (ADLs)/Instrumental Activities of Daily Living (IADLs):   Walk and transfer into and out of bed and chair?: No  Dress and groom yourself?: No    Bathe or shower yourself?: No    Feed yourself? Yes  Do your laundry/housekeeping?: No  Manage your money, pay your bills and track your expenses?: No  Make your own meals?: No    Do your own shopping?: No    Previous Hospitalizations:   Any hospitalizations or ED visits within the last 12 months?: No      Advance Care Planning:   Living will: No      PREVENTIVE SCREENINGS      Cardiovascular Screening:    General: Screening Not Indicated and History Lipid Disorder      Diabetes Screening:     General: Screening Not Indicated and History Diabetes      Colorectal Cancer Screening:     General: Screening Current      Prostate Cancer Screening:    General: Risks and Benefits Discussed    Due for: PSA      Osteoporosis Screening:    General: Screening Not Indicated      Abdominal Aortic Aneurysm (AAA) Screening:        General: Screening Not Indicated      Lung Cancer Screening:     General: Screening Not Indicated      Hepatitis C Screening:    General: Screening Current    Other Counseling Topics:   Sunscreen       No exam data present     Physical Exam:     Pulse 55   Temp (!) 97 °F (36 1 °C) (Temporal)   Resp 18   Ht 5' 9" (1 753 m)   Wt 61 7 kg (136 lb) SpO2 99%   BMI 20 08 kg/m²     Physical Exam   Constitutional: He is oriented to person, place, and time  He appears well-developed  HENT:   Head: Normocephalic  Right Ear: External ear normal    Left Ear: External ear normal    Nose: Nose normal    Mouth/Throat: Oropharynx is clear and moist    Eyes: Pupils are equal, round, and reactive to light  Conjunctivae and EOM are normal    Neck: Normal range of motion  Neck supple  No thyromegaly present  Cardiovascular: Normal rate, regular rhythm and normal heart sounds  Pulmonary/Chest: Effort normal and breath sounds normal    Abdominal: Soft  There is no tenderness  There is no rebound and no guarding  Musculoskeletal: Normal range of motion  Neurological: He is alert and oriented to person, place, and time  He has normal reflexes  He exhibits abnormal muscle tone  Skin: Skin is dry  Psychiatric: He has a normal mood and affect  Nursing note and vitals reviewed        Elisabeth Conner MD

## 2020-02-20 NOTE — TELEPHONE ENCOUNTER
Jeb Hidalgo, pt's friend, called to schedule pt's EGD  Pt is scheduled for EGD 2/24/20 with Dr Ofelia Morales at [facility]  Instructions, diabetic instructions, and ASC directions emailed to [email or web address]  Follow up scheduled 3/6/20 with Mikayla Davis at [facility]

## 2020-02-20 NOTE — ED PROVIDER NOTES
History  Chief Complaint   Patient presents with    Hypertension     Blood pressure 180/80 at PCP  Taking all BP meds as directed  Denies chest pain, shortness of breath, headache, dizziness  no other complaints  visitor states she checked BP at home and it was 130 but referred to ED by nephrologist do to systolic BP of 019 in office  63 yo male with HTN, CKD, DM, h/o CVA brought to ED by his spouse after they were advised to come immediately for high potassium and elevated BP  He is alert, and at his functional baseline  He understands, follows commands, but has his wife do most of the talking for him because of chronic speech issues from CVA  His BP has been difficult to control for several weeks, and his nephrologist and PCP have been adjusting medications  Today he had PCP follow up and outpatient labwork, and then they were notified the results were abnormal   I have tried to access them through [facility] but they are not available  His wife is trying to get access to their patient portal to see if they are released there  He is otherwise nontoxic appearing, and asymptomatic  His wife says the SBP values of 180s-190s are similar to the recent values that they have been trying to control with medication  However, he is bradycardic in the ED which she does not recall ever being told  History provided by:  Patient and spouse      Prior to Admission Medications   Prescriptions Last Dose Informant Patient Reported? Taking?    ALPRAZolam (XANAX) 0 5 mg tablet  Self Yes Yes   Sig: Take 0 5 mg by mouth daily   AMITIZA 24 MCG capsule   No Yes   Sig: TAKE 1 CAPSULE BY MOUTH TWICE DAILY   ARIPiprazole (ABILIFY) 2 mg tablet   No Yes   Sig: TAKE 2 TABLETS BY MOUTH ONCE DAILY   Insulin Glargine (TOUJEO SOLOSTAR) 300 UNIT/ML SOPN  Self Yes Yes   Sig: Inject 24 Units under the skin daily at bedtime     Liraglutide (VICTOZA) 18 MG/3ML SOPN  Self Yes Yes   Sig: Inject 1 8 mg under the skin daily at bedtime as needed     acetaminophen (TYLENOL) 325 mg tablet Not Taking at Unknown time Self No No   Sig: Take 3 tablets (975 mg total) by mouth every 8 (eight) hours   Patient not taking: Reported on 9/18/2019   amLODIPine (NORVASC) 10 mg tablet   No Yes   Sig: TAKE 1 TABLET BY MOUTH ONCE DAILY   aspirin 81 mg chewable tablet  Self No Yes   Sig: Chew 1 tablet (81 mg total) daily   buPROPion (WELLBUTRIN) 100 mg tablet   No Yes   Sig: TAKE 1 TABLET BY MOUTH TWICE DAILY   dicyclomine (BENTYL) 20 mg tablet Not Taking at Unknown time Self No No   Sig: Take 1 tablet by mouth every 6 (six) hours   Patient not taking: Reported on 2/20/2020   doxazosin (CARDURA) 1 mg tablet  Self Yes Yes   Sig: Take 4 mg by mouth 2 (two) times a day Take 1 1/2 tablet by mouth twice a day   furosemide (LASIX) 40 mg tablet  Self Yes Yes   Sig: Take by mouth daily   lovastatin (MEVACOR) 20 mg tablet  Self Yes Yes   Sig: Take 20 mg by mouth daily   methocarbamol (ROBAXIN) 750 mg tablet Not Taking at Unknown time Self No No   Sig: Take 1 tablet (750 mg total) by mouth every 6 (six) hours as needed for muscle spasms   Patient not taking: Reported on 9/18/2019   metoprolol succinate (TOPROL-XL) 25 mg 24 hr tablet   No Yes   Sig: TAKE 1 TABLET BY MOUTH ONCE DAILY   pantoprazole (PROTONIX) 40 mg tablet   No Yes   Sig: TAKE 1 TABLET BY MOUTH DAILY IN THE EARLY MORNING   paricalcitol (ZEMPLAR) 1 mcg capsule  Self Yes Yes   Sig: Take 2 mcg by mouth daily     pioglitazone (ACTOS) 15 mg tablet  Self Yes Yes   Sig: Take 30 mg by mouth daily    psyllium (METAMUCIL SMOOTH TEXTURE) 28 % packet Not Taking at Unknown time  No No   Sig: Take 1 packet by mouth 2 (two) times a day   Patient not taking: Reported on 2/20/2020   sodium polystyrene sulfonate (KAYEXALATE) 15 g/60 mL suspension  Self Yes Yes   Sig: Take 15 g by mouth as needed Take 120ml by mouth single dose     traZODone (DESYREL) 100 mg tablet  Self Yes Yes   Sig: Take 100 mg by mouth daily at bedtime Facility-Administered Medications: None       Past Medical History:   Diagnosis Date    Anxiety     CAD (coronary artery disease)     last assessed 4/10/13    Chronic kidney disease, stage IV (severe) ([facility] )     Concussion     last assessed 9/3/14    Depression     Diabetes mellitus ([place] [address] )     type 2    Failure to gain weight in adult     last assessed 2/24/14    Gastroparesis     GERD (gastroesophageal reflux disease)     Hyperkalemia     last assessed 11/9/15    Hyperlipidemia     Hypertension     accelerated essential last assessed 10/7/16    Insomnia     Late effects of cerebrovascular disease     Overflow incontinence     last assessed 7/24/14    Renal disorder     Secondary hyperparathyroidism ([place] [address] )     Stroke [facility])        Past Surgical History:   Procedure Laterality Date    COLONOSCOPY      HERNIA REPAIR      AR ESOPHAGOGASTRODUODENOSCOPY TRANSORAL DIAGNOSTIC N/A 2/16/2017    Procedure: ESOPHAGOGASTRODUODENOSCOPY (EGD) with biopsy;  Surgeon: Claude Crane DO;  Location: [facility]; Service: Gastroenterology    AR ESOPHAGOGASTRODUODENOSCOPY TRANSORAL DIAGNOSTIC N/A 6/2/2017    Procedure: ESOPHAGOGASTRODUODENOSCOPY (EGD) with bx;  Surgeon: Claude Crane DO;  Location: [facility]; Service: Gastroenterology    TYMPANOSTOMY TUBE PLACEMENT         Family History   Problem Relation Age of Onset    Cancer Family         pt and friend denies this hx    Hypertension Family         essential    Hyperlipidemia Family     Diabetes Mother     No Known Problems Father      I have reviewed and agree with the history as documented  Social History     Tobacco Use    Smoking status: Never Smoker    Smokeless tobacco: Never Used   Substance Use Topics    Alcohol use: No     Comment: social    Drug use: Yes     Types: Marijuana     Comment: smokes once keven while for pain 1 xper  week       Review of Systems   Constitutional: Negative for appetite change, chills and fever     HENT: Negative for sore throat  Respiratory: Negative for cough, shortness of breath and wheezing  Cardiovascular: Negative for chest pain and palpitations  Gastrointestinal: Negative for abdominal pain, diarrhea, nausea and vomiting  Genitourinary: Negative for dysuria and hematuria  Musculoskeletal: Negative for neck pain  Skin: Negative for rash  Neurological: Positive for weakness (chronic)  Negative for dizziness and headaches  Psychiatric/Behavioral: Negative for suicidal ideas  All other systems reviewed and are negative  Physical Exam  Physical Exam   Constitutional: He is oriented to person, place, and time  He appears well-developed and well-nourished  Non-toxic appearance  Chronically ill appearing, appears older than recorded age   HENT:   Head: Normocephalic  Right Ear: Tympanic membrane and external ear normal    Left Ear: External ear normal    Nose: Nose normal    Mouth/Throat: Oropharynx is clear and moist    Eyes: Pupils are equal, round, and reactive to light  Conjunctivae, EOM and lids are normal    Neck: Normal range of motion  Neck supple  No JVD present  No Brudzinski's sign and no Kernig's sign noted  Cardiovascular: Normal rate, regular rhythm and normal heart sounds  No murmur heard  Pulmonary/Chest: Effort normal and breath sounds normal  No accessory muscle usage  No tachypnea  No respiratory distress  He has no wheezes  Abdominal: Soft  Normal appearance and bowel sounds are normal  He exhibits no distension and no mass  There is no tenderness  There is no rigidity, no rebound and no guarding  Musculoskeletal: Normal range of motion  Lymphadenopathy:        Head (right side): No submental, no submandibular, no preauricular and no posterior auricular adenopathy present  Head (left side): No submental, no submandibular, no preauricular and no posterior auricular adenopathy present  He has no cervical adenopathy     Neurological: He is alert and oriented to person, place, and time  He has normal reflexes  He displays atrophy  No cranial nerve deficit or sensory deficit  Skin: Skin is warm and dry  Capillary refill takes less than 2 seconds  No rash noted  He is not diaphoretic  Psychiatric: His speech is delayed and slurred  Nursing note and vitals reviewed        Vital Signs  ED Triage Vitals   Temperature Pulse Respirations Blood Pressure SpO2   02/20/20 1608 02/20/20 1608 02/20/20 1608 02/20/20 1608 02/20/20 1608   97 6 °F (36 4 °C) (!) 50 16 (!) 210/78 100 %      Temp Source Heart Rate Source Patient Position - Orthostatic VS BP Location FiO2 (%)   02/20/20 1608 02/20/20 1608 02/20/20 1706 02/20/20 1706 --   Temporal Monitor Lying Right arm       Pain Score       02/20/20 1608       No Pain           Vitals:    02/21/20 2339 02/22/20 0611 02/22/20 0720 02/22/20 1145   BP: 114/55 134/64 156/67 119/57   Pulse: 55 (!) 53 58 (!) 54   Patient Position - Orthostatic VS: Lying Lying Lying Lying         Visual Acuity      ED Medications  Medications   calcium chloride 10 % injection 1 g (1 g Intravenous Given 2/20/20 1750)   albuterol inhalation solution 2 5 mg (2 5 mg Nebulization Given 2/20/20 1854)   insulin regular (HumuLIN R,NovoLIN R) injection 10 Units (10 Units Intravenous Given 2/20/20 1847)   sodium bicarbonate 8 4 % injection 50 mEq (50 mEq Intravenous Given 2/20/20 1851)   sodium polystyrene (KAYEXALATE) powder 30 g (30 g Oral Given 2/20/20 1845)   dextrose 50 % IV solution 50 mL (50 mL Intravenous Given 2/20/20 1856)   dextrose 50 % IV solution 25 mL (25 mL Intravenous Given 2/20/20 2000)   Patiromer Sorbitex Calcium PACK 8 4 g (8 4 g Oral Given 2/21/20 1700)       Diagnostic Studies  Results Reviewed     Procedure Component Value Units Date/Time    Basic metabolic panel [[id number]]  (Abnormal) Collected:  02/20/20 2150    Lab Status:  Final result Specimen:  Blood from Hand, Right Updated:  02/20/20 2208     Sodium 139 mmol/L      Potassium 5 2 mmol/L      Chloride 107 mmol/L      CO2 22 mmol/L      ANION GAP 10 mmol/L      BUN 55 mg/dL      Creatinine 5 80 mg/dL      Glucose 151 mg/dL      Calcium 8 0 mg/dL      eGFR 11 ml/min/1 73sq m     Narrative:       [place] guidelines for Chronic Kidney Disease (CKD):     Stage 1 with normal or high GFR (GFR > 90 mL/min/1 73 square meters)    Stage 2 Mild CKD (GFR = 60-89 mL/min/1 73 square meters)    Stage 3A Moderate CKD (GFR = 45-59 mL/min/1 73 square meters)    Stage 3B Moderate CKD (GFR = 30-44 mL/min/1 73 square meters)    Stage 4 Severe CKD (GFR = 15-29 mL/min/1 73 square meters)    Stage 5 End Stage CKD (GFR <15 mL/min/1 73 square meters)  Note: GFR calculation is accurate only with a steady state creatinine    Fingerstick Glucose (POCT) [[id number]]  (Normal) Collected:  02/20/20 2013    Lab Status:  Final result Updated:  02/20/20 2014     POC Glucose 128 mg/dl     Fingerstick Glucose (POCT) [[id number]]  (Abnormal) Collected:  02/20/20 1957    Lab Status:  Final result Updated:  02/20/20 1958     POC Glucose 42 mg/dl     Comprehensive metabolic panel [[id number]]  (Abnormal) Collected:  02/20/20 1739    Lab Status:  Final result Specimen:  Blood from Hand, Right Updated:  02/20/20 1829     Sodium 137 mmol/L      Potassium 7 5 mmol/L      Chloride 107 mmol/L      CO2 23 mmol/L      ANION GAP 7 mmol/L      BUN 54 mg/dL      Creatinine 5 87 mg/dL      Glucose 174 mg/dL      Calcium 8 2 mg/dL      AST 40 U/L      ALT 9 U/L      Alkaline Phosphatase 192 U/L      Total Protein 6 9 g/dL      Albumin 2 7 g/dL      Total Bilirubin 0 42 mg/dL      eGFR 11 ml/min/1 73sq m     Narrative:       [place] guidelines for Chronic Kidney Disease (CKD):     Stage 1 with normal or high GFR (GFR > 90 mL/min/1 73 square meters)    Stage 2 Mild CKD (GFR = 60-89 mL/min/1 73 square meters)    Stage 3A Moderate CKD (GFR = 45-59 mL/min/1 73 square meters)    Stage 3B Moderate CKD (GFR = 30-44 mL/min/1 73 square meters)    Stage 4 Severe CKD (GFR = 15-29 mL/min/1 73 square meters)    Stage 5 End Stage CKD (GFR <15 mL/min/1 73 square meters)  Note: GFR calculation is accurate only with a steady state creatinine    CBC and differential [[id number]] Collected:  02/20/20 1739    Lab Status:  Final result Specimen:  Blood from Hand, Right Updated:  02/20/20 1745     WBC 6 16 Thousand/uL      RBC 4 00 Million/uL      Hemoglobin 12 1 g/dL      Hematocrit 38 4 %      MCV 96 fL      MCH 30 3 pg      MCHC 31 5 g/dL      RDW 13 0 %      MPV 11 8 fL      Platelets 040 Thousands/uL      nRBC 0 /100 WBCs      Neutrophils Relative 58 %      Immat GRANS % 0 %      Lymphocytes Relative 25 %      Monocytes Relative 12 %      Eosinophils Relative 4 %      Basophils Relative 1 %      Neutrophils Absolute 3 59 Thousands/µL      Immature Grans Absolute 0 01 Thousand/uL      Lymphocytes Absolute 1 54 Thousands/µL      Monocytes Absolute 0 75 Thousand/µL      Eosinophils Absolute 0 22 Thousand/µL      Basophils Absolute 0 05 Thousands/µL                  No orders to display              Procedures  ECG 12 Lead Documentation Only  Date/Time: 2/20/2020 5:29 PM  Performed by: Marilou Pearce MD  Authorized by: Marilou Pearce MD     Previous ECG:     Previous ECG:  Compared to current    Similarity:  Changes noted  Interpretation:     Interpretation: abnormal    Rate:     ECG rate:  42  Rhythm:     Rhythm: sinus bradycardia    Conduction:     Conduction: abnormal      Abnormal conduction: 1st degree    ST segments:     ST segments:  Non-specific  T waves:     T waves: peaked      CriticalCare Time  Performed by: Marilou Pearce MD  Authorized by: Marilou Pearce MD     Critical care provider statement:     Critical care time (minutes):  45    Critical care time was exclusive of:  Separately billable procedures and treating other patients and teaching time    Critical care was necessary to treat or prevent imminent or life-threatening deterioration of the following conditions:  Metabolic crisis    Critical care was time spent personally by me on the following activities:  Blood draw for specimens, obtaining history from patient or surrogate, development of treatment plan with patient or surrogate, discussions with consultants, evaluation of patient's response to treatment, examination of patient, interpretation of cardiac output measurements, ordering and performing treatments and interventions, ordering and review of laboratory studies, ordering and review of radiographic studies, re-evaluation of patient's condition and review of old charts    I assumed direction of critical care for this patient from another provider in my specialty: no               ED Course  ED Course as of Feb 24 0648   Thu Feb 20, 2020   1730 From EKG changes I suspect severe hyperkalemia,  that I will treat presumptively with CaCl as labs are collected      1744 His wife was able to get access to his [facility] portal, but the lab values from today are not available, I was able to see his baseline CRT is 5 8, so I am still suspecting an acute worsening today with new onset bradycardia      1839 Meds given   Potassium(!!): 7 5   1856 D/W Nephrologist, agreed with treatment up to this point, recommended bicar drip at 100/h and to repeat BMPat 21;00                                  MDM      Disposition  Final diagnoses:   Hyperkalemia   Chronic renal failure   Uncontrolled hypertension   Sinus bradycardia     Time reflects when diagnosis was documented in both MDM as applicable and the Disposition within this note     Time User Action Codes Description Comment    2/20/2020  6:56 PM Elli Sero Add [E87 5] Hyperkalemia     2/20/2020  6:57 PM Irene Kin L Add [N18 9] Chronic renal failure     2/20/2020  6:57 PM Irene Onset L Add [I10] Uncontrolled hypertension     2/20/2020 10:30 PM Irene Onset L Add [R00 1] Sinus bradycardia     2/22/2020  2:06 PM Elida Rogel 4 Essential hypertension       ED Disposition     ED Disposition Condition Date/Time Comment    Admit Stable Thu Feb 20, 2020  6:56 PM Case was discussed with Dr Praful Villar and the patient's admission status was agreed to be Admission Status: inpatient status to the service of Dr Praful Villar   Follow-up Information     Follow up With Specialties Details Why [address], [place] Family Medicine Schedule an appointment as soon as possible for a visit in 10 day(s)  [address]  [address] [facility] [address]       Abel Suh MD Nephrology Schedule an appointment as soon as possible for a visit in 6 day(s)  [address]    [address]            Discharge Medication List as of 2/22/2020  2:51 PM      START taking these medications    Details   cloNIDine (CATAPRES-TTS-1) 0 1 mg/24 hr Place 1 patch (0 1 mg total) on the skin once a week, Starting Fri 2/28/2020, Print      hydrALAZINE (APRESOLINE) 10 mg tablet Take 1 tablet (10 mg total) by mouth every 8 (eight) hours, Starting Sat 2/22/2020, Print      Patiromer Sorbitex Calcium 8 4 g PACK Take 8 4 g by mouth daily for 2 doses, Starting Sun 2/23/2020, Until Tue 2/25/2020, Print         CONTINUE these medications which have NOT CHANGED    Details   ALPRAZolam (XANAX) 0 5 mg tablet Take 0 5 mg by mouth daily, Historical Med      AMITIZA 24 MCG capsule TAKE 1 CAPSULE BY MOUTH TWICE DAILY, Normal      amLODIPine (NORVASC) 10 mg tablet TAKE 1 TABLET BY MOUTH ONCE DAILY, Normal      ARIPiprazole (ABILIFY) 2 mg tablet TAKE 2 TABLETS BY MOUTH ONCE DAILY, Normal      aspirin 81 mg chewable tablet Chew 1 tablet (81 mg total) daily, Starting Mon 12/24/2018, Print      buPROPion (WELLBUTRIN) 100 mg tablet TAKE 1 TABLET BY MOUTH TWICE DAILY, Normal      doxazosin (CARDURA) 1 mg tablet Take 4 mg by mouth 2 (two) times a day Take 1 1/2 tablet by mouth twice a day, Historical Med      furosemide (LASIX) 40 mg tablet Take by mouth daily, Historical Med      Insulin Glargine (TOUJEO SOLOSTAR) 300 UNIT/ML SOPN Inject 24 Units under the skin daily at bedtime  , Historical Med      lovastatin (MEVACOR) 20 mg tablet Take 20 mg by mouth daily, Historical Med      pantoprazole (PROTONIX) 40 mg tablet TAKE 1 TABLET BY MOUTH DAILY IN THE EARLY MORNING, Normal      paricalcitol (ZEMPLAR) 1 mcg capsule Take 2 mcg by mouth daily  , Historical Med      traZODone (DESYREL) 100 mg tablet Take 100 mg by mouth daily at bedtime, Historical Med      psyllium (METAMUCIL SMOOTH TEXTURE) 28 % packet Take 1 packet by mouth 2 (two) times a day, Starting Wed 9/18/2019, Normal         STOP taking these medications       acetaminophen (TYLENOL) 325 mg tablet Comments:   Reason for Stopping:         dicyclomine (BENTYL) 20 mg tablet Comments:   Reason for Stopping:         Liraglutide (VICTOZA) 18 MG/3ML SOPN Comments:   Reason for Stopping:         methocarbamol (ROBAXIN) 750 mg tablet Comments:   Reason for Stopping:         metoprolol succinate (TOPROL-XL) 25 mg 24 hr tablet Comments:   Reason for Stopping:         pioglitazone (ACTOS) 15 mg tablet Comments:   Reason for Stopping:         sodium polystyrene sulfonate (KAYEXALATE) 15 g/60 mL suspension Comments:   Reason for Stopping:             Outpatient Discharge Orders   Discharge Diet     Activity as tolerated       PDMP Review       Value Time User    PDMP Reviewed  Yes 1/16/2020 12:59 PM Miguel Angel Lewis MD          ED Provider  Electronically Signed by           Alejandro Juárez MD  02/24/20 4027

## 2020-02-21 NOTE — ASSESSMENT & PLAN NOTE
BP elevated at time of admission, 200/84  Patient report BP has been running high for many weeks  Does have history of medication noncompliance   Caregiver reports some of his medications cause him to vomit so he often refuses his medications   Will continue to monitor with frequent VS  Consider need for IV hydralazine if SBP remains significantly elevated

## 2020-02-21 NOTE — CONSULTS
Consultation - Nephrology   Norman Hilton 64 y o  male MRN: [id number]  Unit/Bed#: E4 -01 Encounter: [id number]    ASSESSMENT/PLAN:  CKD stage 5: With history of proteinuria, likely secondary to diabetes and hypertension   -baseline creatinine previously 4 7-5 6   -follows with [facility]   -creatinine stable at 5 7    -currently on bicarbonate drip   -no urgent indication for hemodialysis  Denies uremic symptoms  -wishes to pursue hemodialysis if renal function worsens  -will likely need initiation on dialysis in the near future  Patient and wife feel uncomfortable starting at this time  Would like to defer this to outpatient nephrologist   -last renal ultrasound in December 2018 was negative for hydronephrosis  -avoid nephrotoxins  -I/O  Access:  Not a candidate for AVF and will need AVG per outpatient nephrology note  Hyperkalemia:  With history of hyperkalemia, on veltassa previously as OP per  nephrology note   -presented with potassium of 7 5 which was moderately hemolyzed  -potassium prior to admission on outpatient blood work was 6 5   -status post medical management with D5W, insulin, albuterol, 30 g of Kayexalate   -repeat potassium this morning normalized at 4 7   - currently on bicarb drip, would discontinue after current bag   -continue low-potassium diet   -continue home Veltassa  Essential hypertension:  With history of medication noncompliance  Presented with systolic blood pressure greater than 200, now improved  -continue amlodipine 10 mg daily, Lasix 40 mg daily, Cardura 4 mg 2 times per day, and metoprolol 25 mg daily  Asymptomatic bradycardia:  -currently on metoprolol 25 mg daily with holding parameters  -consider reducing dose to 12 5 mg daily for sustained bradycardia or patient becomes symptomatic      MBD in CKD:  -would place on renal diet   -phosphorus level 5 0   -PTH elevated at 835 0, on Zemplar 3 mcg daily and vitamin-D supplementation as Gastroparesis     GERD (gastroesophageal reflux disease)     Hyperkalemia     last assessed 11/9/15    Hyperlipidemia     Hypertension     accelerated essential last assessed 10/7/16    Insomnia     Late effects of cerebrovascular disease     Overflow incontinence     last assessed 7/24/14    Renal disorder     Secondary hyperparathyroidism ([facility] [address] )     Stroke [facility])        PAST SURGICAL HISTORY:  Past Surgical History:   Procedure Laterality Date    COLONOSCOPY      HERNIA REPAIR      TN ESOPHAGOGASTRODUODENOSCOPY TRANSORAL DIAGNOSTIC N/A 2/16/2017    Procedure: ESOPHAGOGASTRODUODENOSCOPY (EGD) with biopsy;  Surgeon: Carrie Nuñez DO;  Location: [facility]; Service: Gastroenterology    TN ESOPHAGOGASTRODUODENOSCOPY TRANSORAL DIAGNOSTIC N/A 6/2/2017    Procedure: ESOPHAGOGASTRODUODENOSCOPY (EGD) with bx;  Surgeon: Carrie Nuñez DO;  Location: [facility];   Service: Gastroenterology    TYMPANOSTOMY TUBE PLACEMENT         ALLERGIES:  No Known Allergies    SOCIAL HISTORY:  Social History     Substance and Sexual Activity   Alcohol Use No    Comment: social     Social History     Substance and Sexual Activity   Drug Use Yes    Types: Marijuana    Comment: smokes once keven while for pain 1 xper  week     Social History     Tobacco Use   Smoking Status Never Smoker   Smokeless Tobacco Never Used       FAMILY HISTORY:  Family History   Problem Relation Age of Onset    Cancer Family         pt and friend denies this hx    Hypertension Family         essential    Hyperlipidemia Family     Diabetes Mother     No Known Problems Father        MEDICATIONS:    Current Facility-Administered Medications:     acetaminophen (TYLENOL) tablet 650 mg, 650 mg, Oral, Q6H PRN, Artemio Dull Smudde, PA-C    ALPRAZolam (XANAX) tablet 0 5 mg, 0 5 mg, Oral, Daily PRN, Artemio Dull Smudde, PA-C    amLODIPine (NORVASC) tablet 10 mg, 10 mg, Oral, Daily, Catalina Smudde, PA-C, 10 mg at 02/21/20 0827    ARIPiprazole (ABILIFY) tablet 4 mg, 4 mg, Oral, Daily, Catalina Smudde, PA-C, 4 mg at 02/21/20 7355    aspirin chewable tablet 81 mg, 81 mg, Oral, Daily, Catalina Smudde, PA-C, 81 mg at 02/21/20 0827    buPROPion [facility]) tablet 100 mg, 100 mg, Oral, BID, Catalina Smudde, PA-C, 100 mg at 02/21/20 6699    calcitriol (ROCALTROL) capsule 0 25 mcg, 0 25 mcg, Oral, Daily, Catalina Smudde, PA-C, 0 25 mcg at 02/21/20 0827    calcium carbonate (TUMS) chewable tablet 1,000 mg, 1,000 mg, Oral, Daily PRN, Laqueta Gilford Smudde, PA-C    dicyclomine (BENTYL) tablet 20 mg, 20 mg, Oral, Daily PRN, Laqueta Gilford Smudde, PA-C    doxazosin (CARDURA) tablet 4 mg, 4 mg, Oral, BID, Catalina Smudde, PA-C, 4 mg at 02/21/20 0020    furosemide (LASIX) tablet 40 mg, 40 mg, Oral, Daily, Catalina Oseguerade, PA-C, 40 mg at 02/21/20 0827    heparin (porcine) subcutaneous injection 5,000 Units, 5,000 Units, Subcutaneous, Q8H [facility], Catalina Morfin, PA-C, 5,000 Units at 02/21/20 0630    hydrALAZINE (APRESOLINE) injection 10 mg, 10 mg, Intravenous, Q4H PRN, Ke Rodríguez MD    insulin glargine (LANTUS) subcutaneous injection 20 Units 0 2 mL, 20 Units, Subcutaneous, HS, Catalina Morfin PA-C, 20 Units at 02/21/20 0022    insulin lispro (HumaLOG) 100 units/mL subcutaneous injection 1-5 Units, 1-5 Units, Subcutaneous, TID AC **AND** Fingerstick Glucose (POCT), , , TID AC, COLLINS Garcia-JORJE    insulin lispro (HumaLOG) 100 units/mL subcutaneous injection 1-5 Units, 1-5 Units, Subcutaneous, HS, COLLINS Garcia-JORJE    lubiprostone (AMITIZA) capsule 24 mcg, 24 mcg, Oral, BID, COLLINS Garcia-C, 24 mcg at 02/21/20 1107    metoprolol succinate (TOPROL-XL) 24 hr tablet 25 mg, 25 mg, Oral, Daily, COLLINS Garcia-C, 25 mg at 02/21/20 0827    ondansetron (ZOFRAN) injection 4 mg, 4 mg, Intravenous, Q6H PRN, Laqueta Gilford Smudde, PA-C    pantoprazole (PROTONIX) EC tablet 40 mg, 40 mg, Oral, Daily Before Breakfast, COLLINS Garcia-JORJE, 40 mg at 02/21/20 0630    pravastatin (PRAVACHOL) tablet 40 mg, 40 mg, Oral, Daily With Dinner, Debby Spencer Morfin PA-C    psyllium (METAMUCIL) 1 packet, 1 packet, Oral, BID, Abel SWETA Newsome, 1 packet at 02/21/20 0827    sodium bicarbonate 150 mEq in dextrose 5 % 1,000 mL infusion, , Intravenous, Continuous, Jazmín Narvaez MD, Last Rate: 75 mL/hr at 02/20/20 2230    traZODone (DESYREL) tablet 100 mg, 100 mg, Oral, HS, Catalina SWETA Morfin, 100 mg at 02/21/20 0021    REVIEW OF SYSTEMS:  A complete review of systems was performed and found to be negative unless otherwise noted in the history of present illness  General: No fevers, chills  Cardiovascular:  - chest pain, - leg edema  Respiratory: No cough, sputum production,  - shortness of breath  Gastrointestinal:  + nausea/vomiting,  - diarrhea,  - abdominal pain  Genitourinary: No hematuria  No foamy urine    No dysuria    PHYSICAL EXAM:  Current Weight: Weight - Scale: 61 9 kg (136 lb 7 4 oz)  First Weight: Weight - Scale: 61 9 kg (136 lb 7 4 oz)  Vitals:    02/20/20 2037 02/20/20 2300 02/21/20 0300 02/21/20 0700   BP:  (!) 183/78 148/72 156/70   BP Location:  Right arm Right arm Right arm   Pulse:  56 (!) 52 (!) 47   Resp:  18 20 20   Temp:  97 6 °F (36 4 °C) 97 6 °F (36 4 °C) 97 8 °F (36 6 °C)   TempSrc:  Tympanic Tympanic Tympanic   SpO2: 98% 97% 98% 97%   Weight:           Intake/Output Summary (Last 24 hours) at 2/21/2020 0846  Last data filed at 2/21/2020 5334  Gross per 24 hour   Intake 240 ml   Output    Net 240 ml     General: NAD  Skin: warm, dry, intact, no rash  HEENT: Moist mucous membranes, sclera anicteric, normocephalic, atraumatic  Neck: No apparent JVD appreciated  Chest:lung sounds clear B/L, on RA   CVS:Regular rate and rhythm, no murmer   Abdomen: Soft, round, non-tender, +BS  Extremities: No B/L LE edema present  Neuro: alert and oriented  Psych: appropriate mood and affect     Invasive Devices:      Lab Results:   Results from last 7 days   Lab Units 02/21/20  0503 02/20/20  2150 02/20/20  1739   WBC Thousand/uL 6 16  --  6 16   HEMOGLOBIN g/dL 10 5*  --  12 1   HEMATOCRIT % 32 7*  --  38 4   PLATELETS Thousands/uL 182  --  201   POTASSIUM mmol/L 4 7 5 2 7 5*   CHLORIDE mmol/L 108 107 107   CO2 mmol/L 23 22 23   BUN mg/dL 55* 55* 54*   CREATININE mg/dL 5 76* 5 80* 5 87*   CALCIUM mg/dL 8 0* 8 0* 8 2*   MAGNESIUM mg/dL 1 7  --   --    PHOSPHORUS mg/dL 5 0*  --   --    ALK PHOS U/L  --   --  192*   ALT U/L  --   --  9*   AST U/L  --   --  40

## 2020-02-21 NOTE — ED NOTES
Pt and wife unable to identify exactly which medications the patient is and is not taking  Pt's wife states she can bring the pills in that she has at home   Admitting provider made aware     Julia Chinchilla, RN  78/34/87 7083 [address], RN  52/96/51 8076

## 2020-02-21 NOTE — UTILIZATION REVIEW
Initial Clinical Review    Admission: Date/Time/Statement: Admission Orders (From admission, onward)     Ordered        02/20/20 1857  Inpatient Admission  Once                   Orders Placed This Encounter   Procedures    Inpatient Admission     Standing Status:   Standing     Number of Occurrences:   1     Order Specific Question:   Admitting Physician     Answer:   Nitza Gavin [[id number]]     Order Specific Question:   Level of Care     Answer:   Level 2 Stepdown / HOT [14]     Order Specific Question:   Estimated length of stay     Answer:   More than 2 Midnights     Order Specific Question:   Certification     Answer:   I certify that inpatient services are medically necessary for this patient for a duration of greater than two midnights  See H&P and MD Progress Notes for additional information about the patient's course of treatment  ED Arrival Information     Expected Arrival Acuity Means of Arrival Escorted By Service Admission Type    - 2/20/2020 15:58 Urgent Walk-In Self General Medicine Urgent    Arrival Complaint    HBP        Chief Complaint   Patient presents with    Hypertension     Blood pressure 180/80 at PCP  Taking all BP meds as directed  Denies chest pain, shortness of breath, headache, dizziness  no other complaints  visitor states she checked BP at home and it was 130 but referred to ED by nephrologist do to systolic BP of 004 in office  Assessment/Plan: 64 y o  male with a PMHx of CKD stage 5 not on dialysis, HTN, CVA, DM who presents with abnormal outpatient labs & elevated BP  His BP has been difficult to control for several weeks, and his nephrologist and PCP have been adjusting medications  Patient reports that sometimes after taking his medications he vomits and therefore he has not been taking most of his medications recently  OP K 6 5  In ED K 7 5 (hemolyzed),  Cr 5 87 (baseline 4 7-5 6) Rec'd calcium chloride, D50 water, insulin, 30 g Kayexalate, albuterol in ED  Admitted to IP with Hyperkalemia, Stage 5 CKD, HTN, Bradycardia  Plan: Tele, Na Bicarb drip, Consult Nephrology, Lo K Diet, monitor BP, prn Hydralazine, hold parameters on Metoprolol for jewels, accu checks with ssi for DM     2/21: Remains weak, easily fatigued  Hyperkalemia improved to 4 7 today  Added hydralazine & clonidine for HTN  DC'd Metoprolol   Per Nephrology - Severe hyperkalemia in the setting of advanced chronic kidney disease and noncompliance with medication  Hyperkalemia improved with medical treatment  Long discussion with patient and caregiver regarding importance of taking Veltassa daily   No urgent indication for dialysis at this moment    ED Triage Vitals   Temperature Pulse Respirations Blood Pressure SpO2   02/20/20 1608 02/20/20 1608 02/20/20 1608 02/20/20 1608 02/20/20 1608   97 6 °F (36 4 °C) (!) 50 16 (!) 210/78 100 %      Temp Source Heart Rate Source Patient Position - Orthostatic VS BP Location FiO2 (%)   02/20/20 1608 02/20/20 1608 02/20/20 1706 02/20/20 1706 --   Temporal Monitor Lying Right arm       Pain Score       02/20/20 1608       No Pain        Wt Readings from Last 1 Encounters:   02/20/20 61 9 kg (136 lb 7 4 oz)     Additional Vital Signs:   02/21/20 1115  97 6 °F (36 4 °C) 48 18 160/72 97 % None (Room air) Sitting   02/21/20 0700  97 8 °F (36 6 °C) 47 20 156/70 97 % None (Room air) Lying   02/21/20 0300  97 6 °F (36 4 °C) 52  20 148/72 98 % None (Room air) Lying   02/20/20 2300  97 6 °F (36 4 °C) 56 18 183/78Abnormal  97 % None (Room air) Lying   02/20/20 2025   47 20 188/88Abnormal  97 % None (Room air) Lying   02/20/20 2010   50 20 167/76 98 % None (Room air) Lying   02/20/20 1917   48  22 200/84Abnormal  100 %     02/20/20 1830   45 22 214/88Abnormal  98 %     02/20/20 1751   46 22 221/91Abnormal  98 % None (Room air)    02/20/20 1706   40 22 196/84Abnormal    Lying       Pertinent Labs/Diagnostic Test Results:   Results from last 7 days   Lab Units 02/21/20  0503 02/20/20  1739   WBC Thousand/uL 6 16 6 16   HEMOGLOBIN g/dL 10 5* 12 1   HEMATOCRIT % 32 7* 38 4   PLATELETS Thousands/uL 182 201   NEUTROS ABS Thousands/µL  --  3 59     Results from last 7 days   Lab Units 02/21/20  0503 02/20/20  2150 02/20/20  1739   SODIUM mmol/L 142 139 137   POTASSIUM mmol/L 4 7 5 2 7 5*   CHLORIDE mmol/L 108 107 107   CO2 mmol/L 23 22 23   ANION GAP mmol/L 11 10 7   BUN mg/dL 55* 55* 54*   CREATININE mg/dL 5 76* 5 80* 5 87*   EGFR ml/min/1 73sq m 12 11 11   CALCIUM mg/dL 8 0* 8 0* 8 2*   MAGNESIUM mg/dL 1 7  --   --    PHOSPHORUS mg/dL 5 0*  --   --      Results from last 7 days   Lab Units 02/20/20  1739   AST U/L 40   ALT U/L 9*   ALK PHOS U/L 192*   TOTAL PROTEIN g/dL 6 9   ALBUMIN g/dL 2 7*   TOTAL BILIRUBIN mg/dL 0 42     Results from last 7 days   Lab Units 02/21/20  1118 02/21/20  0747 02/20/20 2131 02/20/20 2013 02/20/20  1957   POC GLUCOSE mg/dl 162* 58* 138 128 42*     Results from last 7 days   Lab Units 02/21/20  0503 02/20/20  2150 02/20/20  1739   GLUCOSE RANDOM mg/dL 141* 151* 174*     2/20 EKG: Marked sinus bradycardia with 1st degree A-V block, with minimally peaked T-waves,     ED Treatment:   Medication Administration from 02/20/2020 1558 to 02/20/2020 2018       Date/Time Order Dose Route Action     02/20/2020 1750 calcium chloride 10 % injection 1 g 1 g Intravenous Given     02/20/2020 1854 albuterol inhalation solution 2 5 mg 2 5 mg Nebulization Given     02/20/2020 1847 insulin regular (HumuLIN R,NovoLIN R) injection 10 Units 10 Units Intravenous Given     02/20/2020 1851 sodium bicarbonate 8 4 % injection 50 mEq 50 mEq Intravenous Given     02/20/2020 1845 sodium polystyrene (KAYEXALATE) powder 30 g 30 g Oral Given     02/20/2020 1856 dextrose 50 % IV solution 50 mL 50 mL Intravenous Given     02/20/2020 2000 dextrose 50 % IV solution 25 mL 25 mL Intravenous Given        Past Medical History:   Diagnosis Date    Anxiety     CAD (coronary artery disease)     last assessed 4/10/13    [facility], [facility] IV (severe) ([facility]     Concussion     last assessed 9/3/14    Depression     Diabetes mellitus ([facility] [address] )     type 2    Failure to gain weight in adult     last assessed 2/24/14    Gastroparesis     GERD (gastroesophageal reflux disease)     Hyperkalemia     last assessed 11/9/15    Hyperlipidemia     Hypertension     accelerated essential last assessed 10/7/16    Insomnia     Late effects of cerebrovascular disease     Overflow incontinence     last assessed 7/24/14    Renal disorder     Secondary hyperparathyroidism ([facility] [address] )     Stroke [facility])      Present on Admission:   Essential hypertension   Gait disturbance   Stage 5 chronic kidney disease not on chronic dialysis ([facility])   Bradycardia      Admitting Diagnosis: Hyperkalemia [E87 5]  Chronic renal failure [N18 9]  Uncontrolled hypertension [I10]     Age/Sex: 64 y o  male  Admission Orders:  Scheduled Medications:  Medications:  amLODIPine 10 mg Oral Daily   ARIPiprazole 4 mg Oral Daily   aspirin 81 mg Oral Daily   buPROPion 100 mg Oral BID   calcitriol 0 25 mcg Oral Daily   cloNIDine 0 1 mg Transdermal Weekly start 2/21   doxazosin 4 mg Oral BID   furosemide 40 mg Oral Daily   heparin (porcine) 5,000 Units Subcutaneous Q8H [address]   hydrALAZINE 10 mg Oral Q8H [address]  Start 2/21   insulin glargine 15 Units Subcutaneous HS   insulin lispro 1-5 Units Subcutaneous TID AC   insulin lispro 1-5 Units Subcutaneous HS   lubiprostone 24 mcg Oral BID   NON FORMULARY 8 4 g Oral Daily   pantoprazole 40 mg Oral Daily Before Breakfast   pravastatin 40 mg Oral Daily With Dinner   psyllium 1 packet Oral BID   traZODone 100 mg Oral HS     Continuous IV Infusions:  NA Bicarb   Intravenous Continuous @ 75 / hr     PRN Meds:  acetaminophen 650 mg Oral Q6H PRN   ALPRAZolam 0 5 mg Oral Daily PRN   calcium carbonate 1,000 mg Oral Daily PRN   dicyclomine 20 mg Oral Daily PRN   hydrALAZINE 10 mg Intravenous Q4H PRN ondansetron 4 mg Intravenous Q6H PRN     TELE  SCDs  IP CONSULT TO NEPHROLOGY    Network Utilization Review Department  [email or web address]  ATTENTION: Please call with any questions or concerns to [phone] and carefully listen to the prompts so that you are directed to the right person  All voicemails are confidential   Violet Cheung all requests for admission clinical reviews, approved or denied determinations and any other requests to dedicated fax number below belonging to the campus where the patient is receiving treatment   List of dedicated fax numbers for the Facilities:  [address] DENIALS (Administrative/Medical Necessity) [phone]   [address] (Maternity/NICU/Pediatrics) [phone]   Master Osborne [phone]   Kirill Lal [phone]   Serene Rincon [phone]   Desi Taylor [phone]   [address] [place] [address] [phone]   [facility]  [phone]   [address] [facility]  [address] [facility] [address] [facility] [phone]

## 2020-02-21 NOTE — H&P
[address] [facility]  H&P- Ke Ziegler 1960, 64 y o  male MRN: [id number]    Unit/Bed#: ED 10 Encounter: [id number]    Primary Care Provider: Artemio Alcantara MD   Date and time admitted to hospital: 2/20/2020  5:00 PM        * Hyperkalemia  Assessment & Plan  Patient sent in for hyperkalemia found on outpatient labs  Potassium 7 5, although moderately hemolyzes sample; was 6 5 on outpatient labs this AM  Given albuterol, insulin/dextrose, bicarb and Kayexalate in ED  Nephrology consulted by ED, recommended bicarb drip and repeat labs in 2 hours  EKG changes with peaked T waves, repeat in 2 hours  Monitor on telemetry   Nephrology consult   Low potassium diet  Monitor at level 2 step down status   Does report he follows with [facility] for nephrology, talking about dialysis, but has not started  Patient reports he does not want to begin dialysis, but will if required     Stage 5 chronic kidney disease not on chronic dialysis [facility])  Assessment & Plan  Creatinine 5 87, GFR 11 on admission labs  Per nephrology note November 2019, baseline 4 7-5 6 and worsening   Nephrology consult     Essential hypertension  Assessment & Plan  BP elevated at time of admission, 200/84  Patient report BP has been running high for many weeks  Does have history of medication noncompliance   Caregiver reports some of his medications cause him to vomit so he often refuses his medications   Will continue to monitor with frequent VS  Consider need for IV hydralazine if SBP remains significantly elevated     Bradycardia  Assessment & Plan  Asymptomatic bradycardia 40-50s in ED   Does have documented history of bradycardia  Continue hold parameters on metoprolol     Gait disturbance  Assessment & Plan  History of CVA in 2010, patient reports chronic hemiparesis of left side  Nonambulatory, uses power scooter at home  Supportive measures   Out of bed as tolerated, fall precautions     Type 2 diabetes mellitus, with long-term current use of insulin [facility])  Assessment & Plan  Lab Results   Component Value Date    HGBA1C 7 5 (A) 09/23/2019       No results for input(s): POCGLU in the last 72 hours  Blood Sugar Average: Last 72 hrs:  Continue home regimen of Lantus 20 units HS, decreased from home regimen 24 units  Sliding scale insulin with accu checks         VTE Prophylaxis: Heparin     Code Status: full code  POLST: POLST form is not discussed and not completed at this time  Discussion with family: care giver/ significant other at bedside    Anticipated Length of Stay:  Patient will be admitted on an Inpatient basis with an anticipated length of stay of  More than 2 midnights  Justification for Hospital Stay: hyperkalemia, CKD stage 5    Total Time for Visit, including Counseling / Coordination of Care: 1 hour  Greater than 50% of this total time spent on direct patient counseling and coordination of care  Chief Complaint:   Abnormal labs    History of Present Illness:    Lety Doshi is a 64 y o  male with a PMHx of CKD stage 5 not on dialysis, HTN, CVA, DM who presents with abnormal outpatient labs  Patient was found to have elevated potassium level on outpatient labs as well as elevated blood pressure reading at PCP appointment today and was sent to ED  Has had multiple episodes of elevated potassium, given Kayexalate outpatient in the past  Has not required hospitalization  Patient's caregiver reports patient has been refusing to take his medications at times due to nausea and vomiting after taking certain pills  Reports unable to tolerate his outpatient bicarb due to nausea  Has been following with [facility] nephrology, discussions have been occurring about dialysis but patient has not been started on dialysis  Patient unhappy about the idea of dialysis but is agreeable if the time comes that it is necessary  Patient also notes history of bradycardia, denies chest pain, dizziness or lightheadedness   Patient denies any abdominal pain or shortness of breath at this time  Also notes his BP has been running high for the past few weeks, denies headaches  Review of Systems:    Review of Systems   Constitutional: Negative for chills and fever  HENT: Negative for trouble swallowing  Eyes: Negative for visual disturbance  Respiratory: Negative for cough, shortness of breath and wheezing  Cardiovascular: Negative for chest pain and leg swelling  Gastrointestinal: Positive for nausea and vomiting  Negative for abdominal pain  Genitourinary: Negative for difficulty urinating  Musculoskeletal: Positive for gait problem  Negative for back pain  Skin: Negative for rash  Neurological: Negative for dizziness and weakness  Psychiatric/Behavioral: Negative for confusion  Past Medical and Surgical History:     Past Medical History:   Diagnosis Date    Anxiety     CAD (coronary artery disease)     last assessed 4/10/13    [facility], [facility]     Concussion     last assessed 9/3/14    Depression     Diabetes mellitus ([facility] )     type 2    Failure to gain weight in adult     last assessed 2/24/14    Gastroparesis     GERD (gastroesophageal reflux disease)     Hyperkalemia     last assessed 11/9/15    Hyperlipidemia     Hypertension     accelerated essential last assessed 10/7/16    Insomnia     Late effects of cerebrovascular disease     Overflow incontinence     last assessed 7/24/14    Renal disorder     Secondary hyperparathyroidism ([facility] )     Stroke [facility])        Past Surgical History:   Procedure Laterality Date    COLONOSCOPY      HERNIA REPAIR      MS ESOPHAGOGASTRODUODENOSCOPY TRANSORAL DIAGNOSTIC N/A 2/16/2017    Procedure: ESOPHAGOGASTRODUODENOSCOPY (EGD) with biopsy;  Surgeon: Sharan Freeman DO;  Location: [facility];   Service: Gastroenterology    MS ESOPHAGOGASTRODUODENOSCOPY TRANSORAL DIAGNOSTIC N/A 6/2/2017    Procedure: ESOPHAGOGASTRODUODENOSCOPY (EGD) with bx;  Surgeon: Maria Luz Moffett DO;  Location: [facility]; Service: Gastroenterology    TYMPANOSTOMY TUBE PLACEMENT         Meds/Allergies:    Prior to Admission medications    Medication Sig Start Date End Date Taking?  Authorizing Provider   acetaminophen (TYLENOL) 325 mg tablet Take 3 tablets (975 mg total) by mouth every 8 (eight) hours  Patient not taking: Reported on 9/18/2019 12/23/18   Gilma Cord, CRNP   ALPRAZolam Rojean Million) 0 5 mg tablet Take 0 5 mg by mouth daily    Historical Provider, MD   AMITIZA 24 MCG capsule TAKE 1 CAPSULE BY MOUTH TWICE DAILY 11/27/19   Hailee Self MD   amLODIPine (NORVASC) 10 mg tablet TAKE 1 TABLET BY MOUTH ONCE DAILY 12/26/19   Hailee Self, MD   ARIPiprazole (ABILIFY) 2 mg tablet TAKE 2 TABLETS BY MOUTH ONCE DAILY 10/28/19   Hailee Self MD   aspirin 81 mg chewable tablet Chew 1 tablet (81 mg total) daily 12/24/18   Gilma Cord CRCARROLL   buPROPion (WELLBUTRIN) 100 mg tablet TAKE 1 TABLET BY MOUTH TWICE DAILY 10/28/19   Hailee Self MD   dicyclomine (BENTYL) 20 mg tablet Take 1 tablet by mouth every 6 (six) hours  Patient taking differently: Take 20 mg by mouth daily as needed   10/22/16   Fernando Marina MD   doxazosin (CARDURA) 1 mg tablet Take 4 mg by mouth 2 (two) times a day Take 1 1/2 tablet by mouth twice a day    Historical Provider, MD   furosemide (LASIX) 40 mg tablet Take by mouth daily    Historical Provider, MD   Insulin Glargine (TOUJEO SOLOSTAR) 300 UNIT/ML SOPN Inject 24 Units under the skin daily at bedtime      Historical Provider, MD   Liraglutide (VICTOZA) 18 MG/3ML SOPN Inject 1 8 mg under the skin daily at bedtime as needed      Historical Provider, MD   lovastatin (MEVACOR) 20 mg tablet Take 20 mg by mouth daily    Historical Provider, MD   methocarbamol (ROBAXIN) 750 mg tablet Take 1 tablet (750 mg total) by mouth every 6 (six) hours as needed for muscle spasms  Patient not taking: Reported on 9/18/2019 1/3/19   Ngoc Coy PA-C   metoprolol succinate (TOPROL-XL) 25 mg 24 hr tablet TAKE 1 TABLET BY MOUTH ONCE DAILY 10/28/19   Marie Ash MD   pantoprazole (PROTONIX) 40 mg tablet TAKE 1 TABLET BY MOUTH DAILY IN THE EARLY MORNING 11/27/19   Marie Ash MD   paricalcitol (ZEMPLAR) 1 mcg capsule Take 2 mcg by mouth daily      Historical Provider, MD   pioglitazone (ACTOS) 15 mg tablet Take 30 mg by mouth daily     Historical Provider, MD   psyllium (METAMUCIL SMOOTH TEXTURE) 28 % packet Take 1 packet by mouth 2 (two) times a day 9/18/19   Vandana Stiles PA-C   sodium polystyrene sulfonate (KAYEXALATE) 15 g/60 mL suspension Take 15 g by mouth as needed Take 120ml by mouth single dose      Historical Provider, MD   traZODone (DESYREL) 100 mg tablet Take 100 mg by mouth daily at bedtime    Historical Provider, MD     I have reviewed home medications with patient family member      Allergies: No Known Allergies    Social History:     Marital Status: Single   Occupation: disabled  Patient Pre-hospital Living Situation: home with caregiver  Patient Pre-hospital Level of Mobility: uses electric scooter to get around  Patient Pre-hospital Diet Restrictions: diabetic, renal   Substance Use History:   Social History     Substance and Sexual Activity   Alcohol Use No    Comment: social     Social History     Tobacco Use   Smoking Status Never Smoker   Smokeless Tobacco Never Used     Social History     Substance and Sexual Activity   Drug Use Yes    Types: Marijuana    Comment: smokes once keven while for pain 1 xper  week       Family History:    Family History   Problem Relation Age of Onset    Cancer Family         pt and friend denies this hx    Hypertension Family         essential    Hyperlipidemia Family     Diabetes Mother     No Known Problems Father        Physical Exam:     Vitals:   Blood Pressure: (!) 200/84 (02/20/20 1917)  Pulse: (!) 48 (02/20/20 1917)  Temperature: 97 6 °F (36 4 °C) (02/20/20 1608)  Temp Source: Temporal (02/20/20 1608)  Respirations: 22 (02/20/20 1917)  Weight - Scale: 61 9 kg (136 lb 7 4 oz) (02/20/20 1608)  SpO2: 100 % (02/20/20 1917)    Physical Exam   Constitutional: He is oriented to person, place, and time  He appears well-nourished  No distress  HENT:   Head: Normocephalic and atraumatic  Eyes: Conjunctivae are normal  No scleral icterus  Neck: Neck supple  Cardiovascular: Regular rhythm  Bradycardia present  No murmur heard  Pulmonary/Chest: Effort normal and breath sounds normal  No respiratory distress  He has no wheezes  Abdominal: Soft  Bowel sounds are normal  He exhibits no distension  There is no tenderness  Musculoskeletal: He exhibits no edema  Neurological: He is alert and oriented to person, place, and time  He exhibits abnormal muscle tone (left upper and lower extremity )  Skin: Skin is warm and dry  Psychiatric: He has a normal mood and affect  His behavior is normal  Thought content normal  His speech is delayed  Vitals reviewed  Additional Data:     Lab Results: I have personally reviewed pertinent reports  Results from last 7 days   Lab Units 02/20/20  1739   WBC Thousand/uL 6 16   HEMOGLOBIN g/dL 12 1   HEMATOCRIT % 38 4   PLATELETS Thousands/uL 201   NEUTROS PCT % 58   LYMPHS PCT % 25   MONOS PCT % 12   EOS PCT % 4     Results from last 7 days   Lab Units 02/20/20  1739   SODIUM mmol/L 137   POTASSIUM mmol/L 7 5*   CHLORIDE mmol/L 107   CO2 mmol/L 23   BUN mg/dL 54*   CREATININE mg/dL 5 87*   ANION GAP mmol/L 7   CALCIUM mg/dL 8 2*   ALBUMIN g/dL 2 7*   TOTAL BILIRUBIN mg/dL 0 42   ALK PHOS U/L 192*   ALT U/L 9*   AST U/L 40   GLUCOSE RANDOM mg/dL 174*         Results from last 7 days   Lab Units 02/20/20 1957   POC GLUCOSE mg/dl 42*               Imaging: I have personally reviewed pertinent reports        No orders to display       EKG, Pathology, and Other Studies Reviewed on Admission:   · EKG: sinus bradycardia, T wave abnormality     Allscripts / Epic Records Reviewed: Yes     ** Please Note: This note has been constructed using a voice recognition system   **

## 2020-02-21 NOTE — PLAN OF CARE
Problem: Potential for Falls  Goal: Patient will remain free of falls  Description  INTERVENTIONS:  - Assess patient frequently for physical needs  -  Identify cognitive and physical deficits and behaviors that affect risk of falls    -  Doon fall precautions as indicated by assessment   - Educate patient/family on patient safety including physical limitations  - Instruct patient to call for assistance with activity based on assessment  - Modify environment to reduce risk of injury  - Consider OT/PT consult to assist with strengthening/mobility  Outcome: Progressing     Problem: PAIN - ADULT  Goal: Verbalizes/displays adequate comfort level or baseline comfort level  Description  Interventions:  - Encourage patient to monitor pain and request assistance  - Assess pain using appropriate pain scale  - Administer analgesics based on type and severity of pain and evaluate response  - Implement non-pharmacological measures as appropriate and evaluate response  - Consider cultural and social influences on pain and pain management  - Notify physician/advanced practitioner if interventions unsuccessful or patient reports new pain  Outcome: Progressing     Problem: DISCHARGE PLANNING  Goal: Discharge to home or other facility with appropriate resources  Description  INTERVENTIONS:  - Identify barriers to discharge w/patient and caregiver  - Arrange for needed discharge resources and transportation as appropriate  - Identify discharge learning needs (meds, wound care, etc )  - Arrange for interpretive services to assist at discharge as needed  - Refer to Case Management Department for coordinating discharge planning if the patient needs post-hospital services based on physician/advanced practitioner order or complex needs related to functional status, cognitive ability, or social support system  Outcome: Progressing     Problem: Knowledge Deficit  Goal: Patient/family/caregiver demonstrates understanding of disease process, treatment plan, medications, and discharge instructions  Description  Complete learning assessment and assess knowledge base    Interventions:  - Provide teaching at level of understanding  - Provide teaching via preferred learning methods  Outcome: Progressing     Problem: CARDIOVASCULAR - ADULT  Goal: Maintains optimal cardiac output and hemodynamic stability  Description  INTERVENTIONS:  - Monitor I/O, vital signs and rhythm  - Monitor for S/S and trends of decreased cardiac output  - Administer and titrate ordered vasoactive medications to optimize hemodynamic stability  - Assess quality of pulses, skin color and temperature  - Assess for signs of decreased coronary artery perfusion  - Instruct patient to report change in severity of symptoms  Outcome: Progressing     Problem: METABOLIC, FLUID AND ELECTROLYTES - ADULT  Goal: Electrolytes maintained within normal limits  Description  INTERVENTIONS:  - Monitor labs and assess patient for signs and symptoms of electrolyte imbalances  - Administer electrolyte replacement as ordered  - Monitor response to electrolyte replacements, including repeat lab results as appropriate  - Instruct patient on fluid and nutrition as appropriate  Outcome: Progressing  Goal: Fluid balance maintained  Description  INTERVENTIONS:  - Monitor labs   - Monitor I/O and WT  - Instruct patient on fluid and nutrition as appropriate  - Assess for signs & symptoms of volume excess or deficit  Outcome: Progressing  Goal: Glucose maintained within target range  Description  INTERVENTIONS:  - Monitor Blood Glucose as ordered  - Assess for signs and symptoms of hyperglycemia and hypoglycemia  - Administer ordered medications to maintain glucose within target range  - Assess nutritional intake and initiate nutrition service referral as needed  Outcome: Progressing     Problem: MUSCULOSKELETAL - ADULT  Goal: Maintain or return mobility to safest level of function  Description  INTERVENTIONS:  - Assess patient's ability to carry out ADLs; assess patient's baseline for ADL function and identify physical deficits which impact ability to perform ADLs (bathing, care of mouth/teeth, toileting, grooming, dressing, etc )  - Assess/evaluate cause of self-care deficits   - Assess range of motion  - Assess patient's mobility  - Assess patient's need for assistive devices and provide as appropriate  - Encourage maximum independence but intervene and supervise when necessary  - Involve family in performance of ADLs  - Assess for home care needs following discharge   - Consider OT consult to assist with ADL evaluation and planning for discharge  - Provide patient education as appropriate  Outcome: Progressing

## 2020-02-21 NOTE — ASSESSMENT & PLAN NOTE
Patient sent in for hyperkalemia found on outpatient labs  Potassium 7 5, although moderately hemolyzes sample; was 6 5 on outpatient labs this AM  Given albuterol, insulin/dextrose, bicarb and Kayexalate in ED  Nephrology consulted by ED, recommended bicarb drip and repeat labs in 2 hours  EKG changes with peaked T waves, repeat in 2 hours  Monitor on telemetry   Nephrology consult   Low potassium diet  Monitor at level 2 step down status   Does report he follows with [facility] for nephrology, talking about dialysis, but has not started  Patient reports he does not want to begin dialysis, but will if required

## 2020-02-21 NOTE — PROGRESS NOTES
Progress Note - Lizeth Harper 64 y o  male MRN: [id number]    Unit/Bed#: E4 -01 Encounter: [id number]    Assessment/Plan:    Hyperkalemia   improved to 4 7 today after Kayexalate and IV fluids, etiology CKD 5, continue to monitor with Nephrology    CKD 5    intermittent nausea/vomiting, increasing creatinine 5 76, with hyperkalemia, discussed with nephrology consideration to initiate dialysis    Diabetes   erratic intake equals erratic control, continue Lantus with ADA diet and sliding scale, glucose 58 this a m  Will decrease Lantus to 15 units    Hypertension   will start hydralazine with clonidine       Bradycardia   asymptomatic bradycardia, chronic, history of bradycardia heart rate 40s and 50s, will titrate beta-blocker    Ambulatory dysfunction uses power scooter with assistance at home    Subjective:   Feels better still weak, easily fatigued denies chest pain or shortness of breath no nausea vomiting today no fevers chills appetite better    Objective:     Vitals: Blood pressure 160/72, pulse (!) 48, temperature 97 6 °F (36 4 °C), temperature source Tympanic, resp  rate 18, weight 61 9 kg (136 lb 7 4 oz), SpO2 97 %  ,Body mass index is 20 15 kg/m²  Results from last 7 days   Lab Units 02/21/20  0503   WBC Thousand/uL 6 16   HEMOGLOBIN g/dL 10 5*   HEMATOCRIT % 32 7*   PLATELETS Thousands/uL 182     Results from last 7 days   Lab Units 02/21/20  0503  02/20/20  1739   POTASSIUM mmol/L 4 7   < > 7 5*   CHLORIDE mmol/L 108   < > 107   CO2 mmol/L 23   < > 23   BUN mg/dL 55*   < > 54*   CREATININE mg/dL 5 76*   < > 5 87*   CALCIUM mg/dL 8 0*   < > 8 2*   ALK PHOS U/L  --   --  192*   ALT U/L  --   --  9*   AST U/L  --   --  40    < > = values in this interval not displayed         Scheduled Meds:    Current Facility-Administered Medications:  acetaminophen 650 mg Oral Q6H PRN Catalina Morfin PA-C    ALPRAZolam 0 5 mg Oral Daily PRN Catalina Morfin PA-C    amLODIPine 10 mg Oral Daily Marc Donahue PA-C ARIPiprazole 4 mg Oral Daily Catalina Smudde, PA-C    aspirin 81 mg Oral Daily Catalina Smudde, PA-C    buPROPion 100 mg Oral BID Ubaldo Grey, SWETA    calcitriol 0 25 mcg Oral Daily Catalina Smudde, PA-C    calcium carbonate 1,000 mg Oral Daily PRN Catalina Smudde, PA-C    dicyclomine 20 mg Oral Daily PRN Catalina Smudde, PA-C    doxazosin 4 mg Oral BID Catalina Smudde, PA-C    furosemide 40 mg Oral Daily Catalina Smudde, PA-C    heparin (porcine) 5,000 Units Subcutaneous Q8H [address] Catalina Smudde, PA-C    hydrALAZINE 10 mg Intravenous Q4H PRN Mirtha Francisco MD    insulin glargine 20 Units Subcutaneous HS Catalina Smudde, PA-C    insulin lispro 1-5 Units Subcutaneous TID AC Catalina Smudde, PA-C    insulin lispro 1-5 Units Subcutaneous HS Catalina Smudde, PA-C    lubiprostone 24 mcg Oral BID Catalina Smudde, PA-C    metoprolol succinate 25 mg Oral Daily Catalina Smudde, PA-C    ondansetron 4 mg Intravenous Q6H PRN Catalina Smudde, PA-C    pantoprazole 40 mg Oral Daily Before Breakfast Catalina Smudde, PA-C    pravastatin 40 mg Oral Daily With JORJE Hernadez, SWETA    psyllium 1 packet Oral BID Ubaldo Grey PA-C    IV infusion builder  Intravenous Continuous Fern Mullins MD Last Rate: 75 mL/hr at 02/20/20 2230   traZODone 100 mg Oral HS Catalina Smaliza, PAALVARO        Continuous Infusions:    IV infusion builder  Last Rate: 75 mL/hr at 02/20/20 2230     Physical exam:  General appearance:  Alert no distress interaction appropriate  Head/Eyes:  Nonicteric pale slightly  Neck:  Supple  Lungs:  Decreased BS bilateral no wheezing rhonchi or rales  Heart: normal S1 S2 regular  Abdomen: Soft nontender with bowel sounds  Extremities: no edema  Skin: no rash    Invasive Devices     Peripheral Intravenous Line            Peripheral IV 02/20/20 Right Antecubital less than 1 day    Peripheral IV 02/20/20 Right Hand less than 1 day                  VTE Pharmacologic Prophylaxis:  Heparin      Counseling / Coordination of Care  Total floor / unit time spent today 30  minutes  Greater than 50% of total time was spent with the patient and / or family counseling and / or coordination of care    A description of the counseling / coordination of care:  Discussed with nephrology and caregiver

## 2020-02-21 NOTE — ASSESSMENT & PLAN NOTE
Lab Results   Component Value Date    HGBA1C 7 5 (A) 09/23/2019       No results for input(s): POCGLU in the last 72 hours      Blood Sugar Average: Last 72 hrs:  Continue home regimen of Lantus 20 units HS, decreased from home regimen 24 units  Sliding scale insulin with accu checks

## 2020-02-21 NOTE — ASSESSMENT & PLAN NOTE
History of CVA in 2010, patient reports chronic hemiparesis of left side  Nonambulatory, uses power scooter at home  Supportive measures   Out of bed as tolerated, fall precautions

## 2020-02-21 NOTE — ED NOTES
Pt denies any symtpoms of high BP and low HR   Denies dizziness, headache, blurred vision, nausea     Jaret Lyon RN  [birth year] [id number]

## 2020-02-21 NOTE — ASSESSMENT & PLAN NOTE
Creatinine 5 87, GFR 11 on admission labs  Per nephrology note November 2019, baseline 4 7-5 6 and worsening   Nephrology consult

## 2020-02-22 NOTE — PLAN OF CARE
Problem: Potential for Falls  Goal: Patient will remain free of falls  Description  INTERVENTIONS:  - Assess patient frequently for physical needs  -  Identify cognitive and physical deficits and behaviors that affect risk of falls    -  [place] fall precautions as indicated by assessment   - Educate patient/family on patient safety including physical limitations  - Instruct patient to call for assistance with activity based on assessment  - Modify environment to reduce risk of injury  - Consider OT/PT consult to assist with strengthening/mobility  Outcome: Progressing     Problem: PAIN - ADULT  Goal: Verbalizes/displays adequate comfort level or baseline comfort level  Description  Interventions:  - Encourage patient to monitor pain and request assistance  - Assess pain using appropriate pain scale  - Administer analgesics based on type and severity of pain and evaluate response  - Implement non-pharmacological measures as appropriate and evaluate response  - Consider cultural and social influences on pain and pain management  - Notify physician/advanced practitioner if interventions unsuccessful or patient reports new pain  Outcome: Progressing     Problem: DISCHARGE PLANNING  Goal: Discharge to home or other facility with appropriate resources  Description  INTERVENTIONS:  - Identify barriers to discharge w/patient and caregiver  - Arrange for needed discharge resources and transportation as appropriate  - Identify discharge learning needs (meds, wound care, etc )  - Arrange for interpretive services to assist at discharge as needed  - Refer to Case Management Department for coordinating discharge planning if the patient needs post-hospital services based on physician/advanced practitioner order or complex needs related to functional status, cognitive ability, or social support system  Outcome: Progressing     Problem: Knowledge Deficit  Goal: Patient/family/caregiver demonstrates understanding of disease process, treatment plan, medications, and discharge instructions  Description  Complete learning assessment and assess knowledge base    Interventions:  - Provide teaching at level of understanding  - Provide teaching via preferred learning methods  Outcome: Progressing     Problem: CARDIOVASCULAR - ADULT  Goal: Maintains optimal cardiac output and hemodynamic stability  Description  INTERVENTIONS:  - Monitor I/O, vital signs and rhythm  - Monitor for S/S and trends of decreased cardiac output  - Administer and titrate ordered vasoactive medications to optimize hemodynamic stability  - Assess quality of pulses, skin color and temperature  - Assess for signs of decreased coronary artery perfusion  - Instruct patient to report change in severity of symptoms  Outcome: Progressing     Problem: METABOLIC, FLUID AND ELECTROLYTES - ADULT  Goal: Electrolytes maintained within normal limits  Description  INTERVENTIONS:  - Monitor labs and assess patient for signs and symptoms of electrolyte imbalances  - Administer electrolyte replacement as ordered  - Monitor response to electrolyte replacements, including repeat lab results as appropriate  - Instruct patient on fluid and nutrition as appropriate  Outcome: Progressing  Goal: Fluid balance maintained  Description  INTERVENTIONS:  - Monitor labs   - Monitor I/O and WT  - Instruct patient on fluid and nutrition as appropriate  - Assess for signs & symptoms of volume excess or deficit  Outcome: Progressing  Goal: Glucose maintained within target range  Description  INTERVENTIONS:  - Monitor Blood Glucose as ordered  - Assess for signs and symptoms of hyperglycemia and hypoglycemia  - Administer ordered medications to maintain glucose within target range  - Assess nutritional intake and initiate nutrition service referral as needed  Outcome: Progressing     Problem: MUSCULOSKELETAL - ADULT  Goal: Maintain or return mobility to safest level of function  Description  INTERVENTIONS:  - Assess patient's ability to carry out ADLs; assess patient's baseline for ADL function and identify physical deficits which impact ability to perform ADLs (bathing, care of mouth/teeth, toileting, grooming, dressing, etc )  - Assess/evaluate cause of self-care deficits   - Assess range of motion  - Assess patient's mobility  - Assess patient's need for assistive devices and provide as appropriate  - Encourage maximum independence but intervene and supervise when necessary  - Involve family in performance of ADLs  - Assess for home care needs following discharge   - Consider OT consult to assist with ADL evaluation and planning for discharge  - Provide patient education as appropriate  Outcome: Progressing     Problem: Prexisting or High Potential for Compromised Skin Integrity  Goal: Skin integrity is maintained or improved  Description  INTERVENTIONS:  - Identify patients at risk for skin breakdown  - Assess and monitor skin integrity  - Assess and monitor nutrition and hydration status  - Monitor labs   - Assess for incontinence   - Turn and reposition patient  - Assist with mobility/ambulation  - Relieve pressure over bony prominences  - Avoid friction and shearing  - Provide appropriate hygiene as needed including keeping skin clean and dry  - Evaluate need for skin moisturizer/barrier cream  - Collaborate with interdisciplinary team   - Patient/family teaching  - Consider wound care consult   Outcome: Progressing

## 2020-02-22 NOTE — PLAN OF CARE
Problem: Potential for Falls  Goal: Patient will remain free of falls  Description  INTERVENTIONS:  - Assess patient frequently for physical needs  -  Identify cognitive and physical deficits and behaviors that affect risk of falls    -  [place] fall precautions as indicated by assessment   - Educate patient/family on patient safety including physical limitations  - Instruct patient to call for assistance with activity based on assessment  - Modify environment to reduce risk of injury  - Consider OT/PT consult to assist with strengthening/mobility  2/21/2020 2116 by Drake Parish RN  Outcome: Progressing  2/21/2020 2115 by Drake Parish RN  Outcome: Progressing     Problem: PAIN - ADULT  Goal: Verbalizes/displays adequate comfort level or baseline comfort level  Description  Interventions:  - Encourage patient to monitor pain and request assistance  - Assess pain using appropriate pain scale  - Administer analgesics based on type and severity of pain and evaluate response  - Implement non-pharmacological measures as appropriate and evaluate response  - Consider cultural and social influences on pain and pain management  - Notify physician/advanced practitioner if interventions unsuccessful or patient reports new pain  2/21/2020 2116 by Drake Parish RN  Outcome: Progressing  2/21/2020 2115 by Drake Parish RN  Outcome: Progressing     Problem: DISCHARGE PLANNING  Goal: Discharge to home or other facility with appropriate resources  Description  INTERVENTIONS:  - Identify barriers to discharge w/patient and caregiver  - Arrange for needed discharge resources and transportation as appropriate  - Identify discharge learning needs (meds, wound care, etc )  - Arrange for interpretive services to assist at discharge as needed  - Refer to Case Management Department for coordinating discharge planning if the patient needs post-hospital services based on physician/advanced practitioner order or complex needs related to functional status, cognitive ability, or social support system  2/21/2020 2116 by Génesis Short RN  Outcome: Progressing  2/21/2020 2115 by Génesis Short RN  Outcome: Progressing     Problem: Knowledge Deficit  Goal: Patient/family/caregiver demonstrates understanding of disease process, treatment plan, medications, and discharge instructions  Description  Complete learning assessment and assess knowledge base    Interventions:  - Provide teaching at level of understanding  - Provide teaching via preferred learning methods  2/21/2020 2116 by Génesis Short RN  Outcome: Progressing  2/21/2020 2115 by Génesis Short RN  Outcome: Progressing     Problem: CARDIOVASCULAR - ADULT  Goal: Maintains optimal cardiac output and hemodynamic stability  Description  INTERVENTIONS:  - Monitor I/O, vital signs and rhythm  - Monitor for S/S and trends of decreased cardiac output  - Administer and titrate ordered vasoactive medications to optimize hemodynamic stability  - Assess quality of pulses, skin color and temperature  - Assess for signs of decreased coronary artery perfusion  - Instruct patient to report change in severity of symptoms  2/21/2020 2116 by Génesis Short RN  Outcome: Progressing  2/21/2020 2115 by Génesis Short RN  Outcome: Progressing     Problem: METABOLIC, FLUID AND ELECTROLYTES - ADULT  Goal: Electrolytes maintained within normal limits  Description  INTERVENTIONS:  - Monitor labs and assess patient for signs and symptoms of electrolyte imbalances  - Administer electrolyte replacement as ordered  - Monitor response to electrolyte replacements, including repeat lab results as appropriate  - Instruct patient on fluid and nutrition as appropriate  2/21/2020 2116 by Génesis Short RN  Outcome: Progressing  2/21/2020 2115 by Génesis Short RN  Outcome: Progressing  Goal: Fluid balance maintained  Description  INTERVENTIONS:  - Monitor labs   - Monitor I/O and WT  - Instruct patient on fluid and nutrition as appropriate  - Assess for signs & symptoms of volume excess or deficit  2/21/2020 2116 by Drake Parish RN  Outcome: Progressing  2/21/2020 2115 by Drake Parish RN  Outcome: Progressing  Goal: Glucose maintained within target range  Description  INTERVENTIONS:  - Monitor Blood Glucose as ordered  - Assess for signs and symptoms of hyperglycemia and hypoglycemia  - Administer ordered medications to maintain glucose within target range  - Assess nutritional intake and initiate nutrition service referral as needed  2/21/2020 2116 by Drake Parish RN  Outcome: Progressing  2/21/2020 2115 by Drake Parish RN  Outcome: Progressing     Problem: MUSCULOSKELETAL - ADULT  Goal: Maintain or return mobility to safest level of function  Description  INTERVENTIONS:  - Assess patient's ability to carry out ADLs; assess patient's baseline for ADL function and identify physical deficits which impact ability to perform ADLs (bathing, care of mouth/teeth, toileting, grooming, dressing, etc )  - Assess/evaluate cause of self-care deficits   - Assess range of motion  - Assess patient's mobility  - Assess patient's need for assistive devices and provide as appropriate  - Encourage maximum independence but intervene and supervise when necessary  - Involve family in performance of ADLs  - Assess for home care needs following discharge   - Consider OT consult to assist with ADL evaluation and planning for discharge  - Provide patient education as appropriate  2/21/2020 2116 by Drake Parish RN  Outcome: Progressing  2/21/2020 2115 by Drake Parish RN  Outcome: Progressing     Problem: Prexisting or High Potential for Compromised Skin Integrity  Goal: Skin integrity is maintained or improved  Description  INTERVENTIONS:  - Identify patients at risk for skin breakdown  - Assess and monitor skin integrity  - Assess and monitor nutrition and hydration status  - Monitor labs   - Assess for incontinence   - Turn and reposition patient  - Assist with mobility/ambulation  - Relieve pressure over bony prominences  - Avoid friction and shearing  - Provide appropriate hygiene as needed including keeping skin clean and dry  - Evaluate need for skin moisturizer/barrier cream  - Collaborate with interdisciplinary team   - Patient/family teaching  - Consider wound care consult   2/21/2020 2116 by Juanita Roper, RN  Outcome: Progressing  2/21/2020 2115 by Juanita Roper RN  Outcome: Progressing

## 2020-02-22 NOTE — PROGRESS NOTES
NEPHROLOGY PROGRESS NOTE    Kalyn Patino 64 y o  male MRN: [id number]  Unit/Bed#: E4 -01 Encounter: [id number]  Reason for Consult:  Chronic kidney disease and hyperkalemia    Patient is awake alert says he feels okay  He reports no nausea or vomiting for me this morning  Also told me that at some point he is getting an AV access placed in the near future  ASSESSMENT/PLAN:  1  Renal    Patient's chronic kidney disease stage 5 is followed by another outside nephrologist and has plans to have an AV access placed soon  In my impression he does not appear overtly uremic  He was admitted to the hospital with severe hyperkalemia which is likely due to dietary indiscretion and also he is supposed to be on Veltassa which is a potassium binder and he has not been taking it  With treatment the potassium has normalized  There are no plans to start dialysis urgently and the patient should follow-up with his primary nephrologist have his AV access placed and then 1 symptoms developed he should then initiate  I also stressed the importance of taking the medication to help keep his potassium down and talked about dietary things that would have high potassium that her in his diet  Patient is stable creatinine at baseline  Hyperkalemia resolved  Veltassa as an outpatient as he is instructed for hyperkalemia prevention  SUBJECTIVE:  Review of Systems   Constitution: Negative for chills, decreased appetite, fever and malaise/fatigue  HENT: Negative  Eyes: Negative  Cardiovascular: Negative  Negative for chest pain, dyspnea on exertion, leg swelling and orthopnea  Respiratory: Negative  Negative for cough, shortness of breath, sputum production and wheezing  Skin: Negative  Negative for itching  Musculoskeletal: Negative  Gastrointestinal: Negative  Negative for bloating, abdominal pain, anorexia, diarrhea, nausea and vomiting  Genitourinary: Negative    Negative for dysuria, flank pain, hematuria and incomplete emptying  Psychiatric/Behavioral: Negative  Negative for altered mental status  OBJECTIVE:  Current Weight: Weight - Scale: 61 9 kg (136 lb 7 4 oz)  Vitals:Temp (24hrs), Av 9 °F (36 6 °C), Min:97 5 °F (36 4 °C), Max:98 6 °F (37 °C)  Current: Temperature: 98 6 °F (37 °C)   Blood pressure 156/67, pulse 58, temperature 98 6 °F (37 °C), temperature source Tympanic, resp  rate 18, weight 61 9 kg (136 lb 7 4 oz), SpO2 98 %  , Body mass index is 20 15 kg/m²  Intake/Output Summary (Last 24 hours) at 2020 0956  Last data filed at 2020 0514  Gross per 24 hour   Intake 666 67 ml   Output 300 ml   Net 366 67 ml       Physical Exam: /67 (BP Location: Left arm)   Pulse 58   Temp 98 6 °F (37 °C) (Tympanic)   Resp 18   Wt 61 9 kg (136 lb 7 4 oz)   SpO2 98%   BMI 20 15 kg/m²   Physical Exam   Constitutional: He is oriented to person, place, and time  No distress  HENT:   Head: Normocephalic and atraumatic  Eyes: EOM are normal  No scleral icterus  Neck: Neck supple  No JVD present  Cardiovascular: Normal rate and regular rhythm  Exam reveals no gallop and no friction rub  No significant edema  Pulmonary/Chest: Effort normal and breath sounds normal  No respiratory distress  He has no wheezes  He has no rales  Abdominal: Soft  Bowel sounds are normal  He exhibits no distension  There is no tenderness  There is no rebound  Neurological: He is alert and oriented to person, place, and time  No asterixis  Skin: He is not diaphoretic  Psychiatric: He has a normal mood and affect         Medications:    Current Facility-Administered Medications:     acetaminophen (TYLENOL) tablet 650 mg, 650 mg, Oral, Q6H PRN, Nita Morfin PA-C    ALPRAZolam (XANAX) tablet 0 5 mg, 0 5 mg, Oral, Daily PRN, COLLINS Lyon-JORJE    amLODIPine (NORVASC) tablet 10 mg, 10 mg, Oral, Daily, Catalina Morfin PA-C, 10 mg at 20 0859    ARIPiprazole (ABILIFY) tablet 4 mg, 4 mg, Oral, Daily, Catalina Smudde, PA-C, 4 mg at 02/22/20 1431    aspirin chewable tablet 81 mg, 81 mg, Oral, Daily, Catalina Smudde, PA-C, 81 mg at 02/22/20 0859    buPROPion [facility]) tablet 100 mg, 100 mg, Oral, BID, Catalina Smudde, PA-C, 100 mg at 02/22/20 0900    calcitriol (ROCALTROL) capsule 0 25 mcg, 0 25 mcg, Oral, Daily, Catalina Smudde, PA-C, 0 25 mcg at 02/22/20 3214    calcium carbonate (TUMS) chewable tablet 1,000 mg, 1,000 mg, Oral, Daily PRN, Sharon Skinner PA-C    cloNIDine (CATAPRES-TTS-1) 0 1 mg/24 hr TD weekly patch, 0 1 mg, Transdermal, Weekly, Patrick Rod DO, 0 1 mg at 02/21/20 1521    dicyclomine (BENTYL) tablet 20 mg, 20 mg, Oral, Daily PRN, Sharon Skinner PA-C    doxazosin (CARDURA) tablet 4 mg, 4 mg, Oral, BID, Catalina Smudde, PA-C, 4 mg at 02/22/20 0900    furosemide (LASIX) tablet 40 mg, 40 mg, Oral, Daily, Catalina Smudde, PA-C, 40 mg at 02/22/20 0859    heparin (porcine) subcutaneous injection 5,000 Units, 5,000 Units, Subcutaneous, Q8H [address], Catalina Smtimde, PA-C, 5,000 Units at 02/22/20 6808    hydrALAZINE (APRESOLINE) injection 10 mg, 10 mg, Intravenous, Q4H PRN, Nga Mckay MD    hydrALAZINE (APRESOLINE) tablet 10 mg, 10 mg, Oral, Q8H [address], Patrick Rod DO, 10 mg at 02/22/20 9605    insulin glargine (LANTUS) subcutaneous injection 15 Units 0 15 mL, 15 Units, Subcutaneous, HS, Patrick Rod DO, Stopped at 02/21/20 2124    insulin lispro (HumaLOG) 100 units/mL subcutaneous injection 1-5 Units, 1-5 Units, Subcutaneous, TID AC, 1 Units at 02/21/20 1251 **AND** Fingerstick Glucose (POCT), , , TID AC, Catalina Morfin PA-JORJE    insulin lispro (HumaLOG) 100 units/mL subcutaneous injection 1-5 Units, 1-5 Units, Subcutaneous, HS, Catalina Morfin PA-C    lubiprostone (AMITIZA) capsule 24 mcg, 24 mcg, Oral, BID, Catalina Morfin PA-C, 24 mcg at 02/22/20 0900    ondansetron (ZOFRAN) injection 4 mg, 4 mg, Intravenous, Q6H PRN, Catalina Morfin PA-C    pantoprazole (PROTONIX) EC tablet 40 mg, 40 mg, Oral, Daily Before Breakfast, COLLINS Garcia-C, 40 mg at 02/22/20 6671    Patiromer Sorbitex Calcium PACK 8 4 g, 8 4 g, Oral, Daily, Kyle Rogel DO    pravastatin (PRAVACHOL) tablet 40 mg, 40 mg, Oral, Daily With COLLINS Varma-C, 40 mg at 02/21/20 1707    psyllium (METAMUCIL) 1 packet, 1 packet, Oral, BID, COLLINS Garcia-C, 1 packet at 02/22/20 0857    sodium bicarbonate 150 mEq in dextrose 5 % 1,000 mL infusion, , Intravenous, Continuous, Toy Costello DO, Last Rate: 50 mL/hr at 02/22/20 0121    traZODone (DESYREL) tablet 100 mg, 100 mg, Oral, HS, COLLINS Garcia-C, 100 mg at 02/21/20 2121    Laboratory Results:  Lab Results   Component Value Date    WBC 6 16 02/21/2020    HGB 10 5 (L) 02/21/2020    HCT 32 7 (L) 02/21/2020    MCV 95 02/21/2020     02/21/2020     Lab Results   Component Value Date    SODIUM 135 (L) 02/22/2020    K 4 5 02/22/2020     02/22/2020    CO2 26 02/22/2020    BUN 54 (H) 02/22/2020    CREATININE 5 94 (H) 02/22/2020    GLUC 134 02/22/2020    CALCIUM 7 7 (L) 02/22/2020     Lab Results   Component Value Date    CALCIUM 7 7 (L) 02/22/2020    PHOS 5 0 (H) 02/21/2020     No results found for: LABPROT

## 2020-02-22 NOTE — PROGRESS NOTES
Progress Note - Riaz Garcia 64 y o  male MRN: [id number]    Unit/Bed#: E4 -01 Encounter: [id number]    Assessment/Plan:    Hyperkalemia   potassium now stable 4 5, stressed compliance with Veltassa and diet    CKD 5    follow-up with nephrology for graft and consideration initiation of dialysis    Diabetes   good control with Lantus and ADA diet, last 24 hour glucose 79 to 121    Hypertension   control with Norvasc clonidine and hydralazine    Bradycardia   discontinue beta-blocker heart rate stable in the 50s    Ambulatory dysfunction continue use of power scooter with family support    Subjective:   Feels good today offers no complaints, denies chest pain shortness of breath nausea vomiting diarrhea no fevers chills appetite is okay    Objective:     Vitals: Blood pressure 156/67, pulse 58, temperature 98 6 °F (37 °C), temperature source Tympanic, resp  rate 18, weight 61 9 kg (136 lb 7 4 oz), SpO2 98 %  ,Body mass index is 20 15 kg/m²  Results from last 7 days   Lab Units 02/21/20  0503   WBC Thousand/uL 6 16   HEMOGLOBIN g/dL 10 5*   HEMATOCRIT % 32 7*   PLATELETS Thousands/uL 182     Results from last 7 days   Lab Units 02/22/20  0512  02/20/20  1739   POTASSIUM mmol/L 4 5   < > 7 5*   CHLORIDE mmol/L 101   < > 107   CO2 mmol/L 26   < > 23   BUN mg/dL 54*   < > 54*   CREATININE mg/dL 5 94*   < > 5 87*   CALCIUM mg/dL 7 7*   < > 8 2*   ALK PHOS U/L  --   --  192*   ALT U/L  --   --  9*   AST U/L  --   --  40    < > = values in this interval not displayed         Scheduled Meds:    Current Facility-Administered Medications:  acetaminophen 650 mg Oral Q6H PRN Catalina Smudde, PA-C   ALPRAZolam 0 5 mg Oral Daily PRN Catalina Smudde, PA-C   amLODIPine 10 mg Oral Daily Catalina Smudde, PA-C   ARIPiprazole 4 mg Oral Daily Catalina Smudde, PA-C   aspirin 81 mg Oral Daily Catalina Smudde, PA-C   buPROPion 100 mg Oral BID Catalina Smudde, PA-C   calcitriol 0 25 mcg Oral Daily Catalina Smudde, PA-C   calcium carbonate 1,000 mg Oral Daily PRN Mandie Forts, PA-C   cloNIDine 0 1 mg Transdermal Weekly Ardell Aw, DO   dicyclomine 20 mg Oral Daily PRN Mandie Forts, PA-C   doxazosin 4 mg Oral BID Catalina Smudde, PA-C   furosemide 40 mg Oral Daily Catalina Smudde, PA-C   heparin (porcine) 5,000 Units Subcutaneous Q8H [facility] Catalina Smtimde, PA-C   hydrALAZINE 10 mg Intravenous Q4H PRN Franky Bloch, MD   hydrALAZINE 10 mg Oral [facility] Ardell Aw, DO   insulin glargine 15 Units Subcutaneous HS Ardell Aw, DO   insulin lispro 1-5 Units Subcutaneous TID AC Catalina Smudde, PA-C   insulin lispro 1-5 Units Subcutaneous HS Catalina Smudde, PA-C   lubiprostone 24 mcg Oral BID Catalina Smudde, PA-C   ondansetron 4 mg Intravenous Q6H PRN Catalina Smudde, PA-C   pantoprazole 40 mg Oral Daily Before Breakfast Mandie Faustins, PA-C   [START ON 2/23/2020] Patiromer Sorbitex Calcium 8 4 g Oral Daily Ardell Aw, DO   pravastatin 40 mg Oral Daily With JORJE Hernadez PA-C   psyllium 1 packet Oral BID Catalina Smudde, PA-C   traZODone 100 mg Oral HS Catalina Smudde, PA-C       Continuous Infusions:     Physical exam:  General appearance:  Alert no distress interaction appropriate  Head/Eyes:  Nonicteric PEERL EOMI  Neck:  Supple  Lungs:  Decreased BS bilateral no wheezing rhonchi or rales  Heart: normal S1 S2 regular  Abdomen: Soft nontender with bowel sounds  Extremities: no edema  Skin: no rash    Invasive Devices     Peripheral Intravenous Line            Peripheral IV 02/22/20 Right;Dorsal (posterior) Forearm less than 1 day                  VTE Pharmacologic Prophylaxis:  Heparin    Counseling / Coordination of Care  Total floor / unit time spent today 30  minutes  Greater than 50% of total time was spent with the patient and / or family counseling and / or coordination of care    A description of the counseling / coordination of care:  Discussed with caretaker

## 2020-02-22 NOTE — DISCHARGE INSTRUCTIONS
Check blood sugars 4 times daily call family doctor if blood sugar less than 75 or greater than 250  Follow-up with kidney doctor and vascular surgery to consider initiating dialysis

## 2020-02-22 NOTE — DISCHARGE SUMMARY
Discharge Summary - Medical Stephy Manrique 64 y o  male MRN: [id number]    [address] [address] MED SURG Room / Bed: [address] [address] [address] [id number] -* Encounter: [id number]    BRIEF OVERVIEW    Admitting Provider: Ayanna Osullivan DO  Discharge Provider: Charan Norman MD  Admission Date: 2/20/2020     Discharge Date: No discharge date for patient encounter    Primary Care Physician at Discharge: Sugey Concepcion -[phone]    Primary Discharge Diagnosis    Hyperkalemia  CKD 5    Other Problems Addressed  Patient Active Problem List    Diagnosis Date Noted    Hyperkalemia 02/20/2020    Gastroesophageal reflux disease without esophagitis 09/18/2019    Bradycardia 05/23/2019    Stage 5 chronic kidney disease not on chronic dialysis ([facility] ) 01/17/2019    Acute on chronic kidney failure ([facility] ) 12/21/2018    Rib fractures 12/19/2018    Chronic narcotic use 09/28/2018    Violation of controlled substance agreement 09/28/2018    Essential hypertension 09/17/2018    Low back pain 01/22/2018    Ambulatory dysfunction 02/22/2016    Anxiety 11/05/2014    Cerebral infarction ([facility] [facility] ) 10/09/2014    Type 2 diabetes mellitus, with long-term current use of insulin ([facility] ) 07/24/2014    Difficulty walking 04/03/2014    Gait disturbance 11/27/2013    Constipation 01/23/2013       Consulting Providers   Nephrology    Discharge Disposition: home    Allergies  No Known Allergies  Diet restrictions:  A diabetic renal diet  Activity restrictions:  Electric scooter  Discharge Condition:  Poor    Outpatient [address] in 1 week  Follow up with consulting providers  Nephrology Dr Saba Ashley in 1 week       [address] [place] [address]    Presenting Problem/History of Present Illness  Hyperkalemia  Hospital Course  53-year-old presents after being called by nephrologist because of high potassium level    Hyperkalemia   potassium on presentation 7 5, provided IVF, calcium, Kayexalate and Patiromer on admission, repeat potassium 5 2 then 4 7 and 4 5 on discharge  Stressed compliance with his Patiromar and diet  CKD 5    creatinine increased to 5 94, nephrology consulted consideration of dialysis  Patient initially refused, then did agree, but nephrology consensus was no emergent need for dialysis, needs to follow-up with Dr Sasha Swan to initiate dialysis soon  Has an appointment with vascular surgery and Nephrology within 2 weeks  Bradycardia   beta-blocker was discontinued, heart rate improved from 40 to 50s, recommend discontinue beta-blocker    Hypertension   Klonopin patch and hydralazine was added to Norvasc Cardura and Lasix, blood pressure padded discharge 119/57  Diabetes   Lantus with sliding scale an ADA diet during admission, continue Toujeo at home  Must continue with strict carbohydrate diet  A m  Glucose day of discharge 121  Discharge  Statement   I spent 35 minutes discharging the patient  This time was spent on the day of discharge  I had direct contact with the patient on the day of discharge  Additional documentation is required if more than 30 minutes were spent on discharge  Explained discharge and follow-up with patient and caretaker stressed medication and diet compliance  Follow-up with blood work within a week and with Nephrology to initiate dialysis    Discharge instructions/Information to patient and family:   See after visit summary for information provided to patient and family

## 2020-02-22 NOTE — PLAN OF CARE
Problem: Potential for Falls  Goal: Patient will remain free of falls  Description  INTERVENTIONS:  - Assess patient frequently for physical needs  -  Identify cognitive and physical deficits and behaviors that affect risk of falls    -  [place] fall precautions as indicated by assessment   - Educate patient/family on patient safety including physical limitations  - Instruct patient to call for assistance with activity based on assessment  - Modify environment to reduce risk of injury  - Consider OT/PT consult to assist with strengthening/mobility  Outcome: Progressing     Problem: PAIN - ADULT  Goal: Verbalizes/displays adequate comfort level or baseline comfort level  Description  Interventions:  - Encourage patient to monitor pain and request assistance  - Assess pain using appropriate pain scale  - Administer analgesics based on type and severity of pain and evaluate response  - Implement non-pharmacological measures as appropriate and evaluate response  - Consider cultural and social influences on pain and pain management  - Notify physician/advanced practitioner if interventions unsuccessful or patient reports new pain  Outcome: Progressing     Problem: DISCHARGE PLANNING  Goal: Discharge to home or other facility with appropriate resources  Description  INTERVENTIONS:  - Identify barriers to discharge w/patient and caregiver  - Arrange for needed discharge resources and transportation as appropriate  - Identify discharge learning needs (meds, wound care, etc )  - Arrange for interpretive services to assist at discharge as needed  - Refer to Case Management Department for coordinating discharge planning if the patient needs post-hospital services based on physician/advanced practitioner order or complex needs related to functional status, cognitive ability, or social support system  Outcome: Progressing     Problem: Knowledge Deficit  Goal: Patient/family/caregiver demonstrates understanding of disease process, treatment plan, medications, and discharge instructions  Description  Complete learning assessment and assess knowledge base    Interventions:  - Provide teaching at level of understanding  - Provide teaching via preferred learning methods  Outcome: Progressing     Problem: CARDIOVASCULAR - ADULT  Goal: Maintains optimal cardiac output and hemodynamic stability  Description  INTERVENTIONS:  - Monitor I/O, vital signs and rhythm  - Monitor for S/S and trends of decreased cardiac output  - Administer and titrate ordered vasoactive medications to optimize hemodynamic stability  - Assess quality of pulses, skin color and temperature  - Assess for signs of decreased coronary artery perfusion  - Instruct patient to report change in severity of symptoms  Outcome: Progressing     Problem: METABOLIC, FLUID AND ELECTROLYTES - ADULT  Goal: Electrolytes maintained within normal limits  Description  INTERVENTIONS:  - Monitor labs and assess patient for signs and symptoms of electrolyte imbalances  - Administer electrolyte replacement as ordered  - Monitor response to electrolyte replacements, including repeat lab results as appropriate  - Instruct patient on fluid and nutrition as appropriate  Outcome: Progressing  Goal: Fluid balance maintained  Description  INTERVENTIONS:  - Monitor labs   - Monitor I/O and WT  - Instruct patient on fluid and nutrition as appropriate  - Assess for signs & symptoms of volume excess or deficit  Outcome: Progressing  Goal: Glucose maintained within target range  Description  INTERVENTIONS:  - Monitor Blood Glucose as ordered  - Assess for signs and symptoms of hyperglycemia and hypoglycemia  - Administer ordered medications to maintain glucose within target range  - Assess nutritional intake and initiate nutrition service referral as needed  Outcome: Progressing     Problem: MUSCULOSKELETAL - ADULT  Goal: Maintain or return mobility to safest level of function  Description  INTERVENTIONS:  - Assess patient's ability to carry out ADLs; assess patient's baseline for ADL function and identify physical deficits which impact ability to perform ADLs (bathing, care of mouth/teeth, toileting, grooming, dressing, etc )  - Assess/evaluate cause of self-care deficits   - Assess range of motion  - Assess patient's mobility  - Assess patient's need for assistive devices and provide as appropriate  - Encourage maximum independence but intervene and supervise when necessary  - Involve family in performance of ADLs  - Assess for home care needs following discharge   - Consider OT consult to assist with ADL evaluation and planning for discharge  - Provide patient education as appropriate  Outcome: Progressing     Problem: Prexisting or High Potential for Compromised Skin Integrity  Goal: Skin integrity is maintained or improved  Description  INTERVENTIONS:  - Identify patients at risk for skin breakdown  - Assess and monitor skin integrity  - Assess and monitor nutrition and hydration status  - Monitor labs   - Assess for incontinence   - Turn and reposition patient  - Assist with mobility/ambulation  - Relieve pressure over bony prominences  - Avoid friction and shearing  - Provide appropriate hygiene as needed including keeping skin clean and dry  - Evaluate need for skin moisturizer/barrier cream  - Collaborate with interdisciplinary team   - Patient/family teaching  - Consider wound care consult   Outcome: Progressing

## 2020-03-02 PROBLEM — T38.3X5A HYPOGLYCEMIA DUE TO INSULIN: Status: ACTIVE | Noted: 2020-01-01

## 2020-03-02 PROBLEM — E16.0 HYPOGLYCEMIA DUE TO INSULIN: Status: ACTIVE | Noted: 2020-01-01

## 2020-03-02 NOTE — ASSESSMENT & PLAN NOTE
Recently started on Novolog 5 units TID with meals by his endocrinologist  No previous episodes of hypoglycemia  Blood glucose this AM was 30  Rechecked in the office and noted to be 30  Patient is somnolent but arousable, follows some commands, diaphoretic, tachycardic (108 bpm)  Wheelchair dependent due to history of stroke with residual right sided upper and lower extremity weakness  Tried to administer glucose tablets  However, patient unable to chew them  Caregiver gave him a 16 oz bottle of Coca Cola  Blood glucose rechecked q15 mins for 2 occurrences and noted to be 37 both times  Advised caregiver to stop novolog for now, and follow up with Endocrinology regarding his diabetes regimen  Also advised that patient needs further resuscitation in the ED given severity of hypoglycemia and his symptoms  Offered transport by ambulance, which caregiver refused  She stated she will take him to the ED herself   Discussed with ED attending, who will be awaiting patient's arrival

## 2020-03-02 NOTE — PROGRESS NOTES
Transition of Care  Follow-up After Hospitalization    Marcial Rey 64 y o  male   Date:  3/2/2020       Michele Zaman was seen today for transition of care management  Assessment and Plan:    Hypoglycemia due to insulin  Recently started on Novolog 5 units TID with meals by his endocrinologist  No previous episodes of hypoglycemia  Blood glucose this AM was 30  Rechecked in the office and noted to be 30  Patient is somnolent but arousable, follows some commands, diaphoretic, tachycardic (108 bpm)  Wheelchair dependent due to history of stroke with residual right sided upper and lower extremity weakness  Tried to administer glucose tablets  However, patient unable to chew them  Caregiver gave him a 16 oz bottle of Coca Cola  Blood glucose rechecked q15 mins for 2 occurrences and noted to be 37 both times  Advised caregiver to stop novolog for now, and follow up with Endocrinology regarding his diabetes regimen  Also advised that patient needs further resuscitation in the ED given severity of hypoglycemia and his symptoms  Offered transport by ambulance, which caregiver refused  She stated she will take him to the ED herself  Discussed with ED attending, who will be awaiting patient's arrival       Return for Next scheduled follow up with PCP Dr Aaron Mckinley         Hypoglycemia due to insulin    Type 2 diabetes mellitus with stage 5 chronic kidney disease not on chronic dialysis, with long-term current use of insulin ([facility] [address] )  -     POCT blood glucose  -     POCT blood glucose    TCM Call (since 1/31/2020)     Date and time call was made  2/24/2020 11:43 AM    Hospital care reviewed  Records reviewed    Patient was hospitialized at  [facility]    Date of Admission  02/20/20    Date of discharge  02/22/20    Diagnosis  Herkalemia    Disposition  Home    Were the patients medications reviewed and updated  No    Current Symptoms  None      TCM Call (since 1/31/2020)     Post hospital issues  None    Should patient be enrolled in anticoag monitoring? No    Scheduled for follow up? Yes    Did you obtain your prescribed medications  Yes    Do you need help managing your prescriptions or medications  No    Is transportation to your appointment needed  No    I have advised the patient to call PCP with any new or worsening symptoms  [address] [facility] records were reviewed  Medications upon discharge reviewed/updated  Discharge Disposition:  home  Follow up visits with other specialists:   Nephrology  Appointment scheduled with Dr Lina Nunez ([address] [place] [address] [address]) on 09-March-2020  Appointment with Dr Aurelia Aguilar of [address] Vascular Surgery scheduled for 06-March-2020  HPI:  Paolo Rosenberg was recently admitted to [facility] on 20-Feb-2020 for hyperkalemia  Discharged on 22-Feb-2020  Presents today with caregiver  On follow up today, patient has a blood sugar of 30  Caregiver notes that he has has low blood sugars since starting novolog  He is sweating profusely, and complaining of dizziness  Caregiver notes that at baseline he is alert and verbal  He has residual right sided weakness from a previous stroke  Problems addressed during his admission:     Hyperkalemia                                Potassium on presentation 7 5, provided IVF, calcium, Kayexalate and Patiromer on admission, repeat potassium 5 2 then 4 7 and 4 5 on discharge  Compliance with his Patiromar and diet was stressed at discharge      CKD 5                                            Creatinine increased to 5 94, nephrology consulted consideration of dialysis  Patient initially refused, then did agree, but nephrology consensus was no emergent need for dialysis, needs to follow-up with Dr Lina Nunez to initiate dialysis soon  Appointment is scheduled for 03-March-2020 with Dr Lina Nunez of [facility]  Appointment with Dr Aurelia Aguilar of [address] [facility] scheduled for 06-March-2020     Bradycardia                                   Beta-blocker was discontinued, heart rate improved from 40 to 50s  It was recommended that he discontinue the beta-blocker      Hypertension                                 Klonopin patch and hydralazine were added to Norvasc Cardura and Lasix  Blood pressure at discharge 119/57 mmHg      Diabetes                                        Lantus with sliding scale an ADA diet during admission  Advised to continue Toujeo and strict carbohydrate diet at home  Victoza was discontinued  A m  Glucose day of discharge 121  Was started on novolog 5 units TID with meals by his endocrinologist            ROS: Review of Systems   Reason unable to perform ROS: limited due to acuity of condition  Constitutional: Positive for diaphoresis  Neurological: Positive for dizziness and weakness (residual right sided weakness from previous stroke)  Past Medical History:   Diagnosis Date    Anxiety     CAD (coronary artery disease)     last assessed 4/10/13    Chronic kidney disease, stage IV (severe) ([facility] [address] )     Concussion     last assessed 9/3/14    Depression     Diabetes mellitus ([facility] [address] )     type 2    Failure to gain weight in adult     last assessed 2/24/14    Gastroparesis     GERD (gastroesophageal reflux disease)     Hyperkalemia     last assessed 11/9/15    Hyperlipidemia     Hypertension     accelerated essential last assessed 10/7/16    Insomnia     Late effects of cerebrovascular disease     Overflow incontinence     last assessed 7/24/14    Renal disorder     Secondary hyperparathyroidism ([facility] [address] )     Stroke [facility])        Past Surgical History:   Procedure Laterality Date    COLONOSCOPY      HERNIA REPAIR      SC ESOPHAGOGASTRODUODENOSCOPY TRANSORAL DIAGNOSTIC N/A 2/16/2017    Procedure: ESOPHAGOGASTRODUODENOSCOPY (EGD) with biopsy;  Surgeon: Queta Nowak DO;  Location: [facility];   Service: Gastroenterology    SC ESOPHAGOGASTRODUODENOSCOPY TRANSORAL DIAGNOSTIC N/A 6/2/2017    Procedure: ESOPHAGOGASTRODUODENOSCOPY (EGD) with bx;  Surgeon: Dhiraj Rodriguez DO;  Location: [facility]; Service: Gastroenterology    TYMPANOSTOMY TUBE PLACEMENT         Social History     Socioeconomic History    Marital status: Single     Spouse name: None    Number of children: None    Years of education: None    Highest education level: None   Occupational History    None   Social Needs    Financial resource strain: None    Food insecurity:     Worry: None     Inability: None    Transportation needs:     Medical: None     Non-medical: None   Tobacco Use    Smoking status: Never Smoker    Smokeless tobacco: Never Used   Substance and Sexual Activity    Alcohol use: No     Comment: social    Drug use: Yes     Types: Marijuana     Comment: smokes once keven while for pain 1 xper  week    Sexual activity: None   Lifestyle    Physical activity:     Days per week: None     Minutes per session: None    Stress: None   Relationships    Social connections:     Talks on phone: None     Gets together: None     Attends [employer] service: None     Active member of club or organization: None     Attends meetings of clubs or organizations: None     Relationship status: None    Intimate partner violence:     Fear of current or ex partner: None     Emotionally abused: None     Physically abused: None     Forced sexual activity: None   Other Topics Concern    None   Social History Narrative    Advance directive declined by pt    Caffeine use    No preference or Episcopal beliefs    Social hx reviewed, unchanged       Family History   Problem Relation Age of Onset    Cancer Family         pt and friend denies this hx    Hypertension Family         essential    Hyperlipidemia Family     Diabetes Mother     No Known Problems Father        No Known Allergies    No current facility-administered medications for this visit       Current Outpatient Medications:     ALPRAZolam (XANAX) 0 5 mg tablet, Take 0 5 mg by mouth daily, Disp: , Rfl:     AMITIZA 24 MCG capsule, TAKE 1 CAPSULE BY MOUTH TWICE DAILY, Disp: 180 capsule, Rfl: 10    amLODIPine (NORVASC) 10 mg tablet, TAKE 1 TABLET BY MOUTH ONCE DAILY, Disp: 90 tablet, Rfl: 10    ARIPiprazole (ABILIFY) 2 mg tablet, TAKE 2 TABLETS BY MOUTH ONCE DAILY, Disp: 180 tablet, Rfl: 3    aspirin 81 mg chewable tablet, Chew 1 tablet (81 mg total) daily, Disp: 30 tablet, Rfl: 0    buPROPion (WELLBUTRIN) 100 mg tablet, TAKE 1 TABLET BY MOUTH TWICE DAILY, Disp: 180 tablet, Rfl: 3    cloNIDine (CATAPRES-TTS-1) 0 1 mg/24 hr, Place 1 patch (0 1 mg total) on the skin once a week, Disp: 4 patch, Rfl: 0    doxazosin (CARDURA) 1 mg tablet, Take 4 mg by mouth 2 (two) times a day Take 1 1/2 tablet by mouth twice a day, Disp: , Rfl:     furosemide (LASIX) 40 mg tablet, Take by mouth daily, Disp: , Rfl:     hydrALAZINE (APRESOLINE) 10 mg tablet, Take 1 tablet (10 mg total) by mouth every 8 (eight) hours, Disp: 90 tablet, Rfl: 0    Insulin Glargine (TOUJEO SOLOSTAR) 300 UNIT/ML SOPN, Inject 24 Units under the skin daily at bedtime  , Disp: , Rfl:     lovastatin (MEVACOR) 20 mg tablet, Take 20 mg by mouth daily, Disp: , Rfl:     pantoprazole (PROTONIX) 40 mg tablet, TAKE 1 TABLET BY MOUTH DAILY IN THE EARLY MORNING, Disp: 90 tablet, Rfl: 10    paricalcitol (ZEMPLAR) 1 mcg capsule, Take 2 mcg by mouth daily  , Disp: , Rfl:     traZODone (DESYREL) 100 mg tablet, Take 100 mg by mouth daily at bedtime, Disp: , Rfl:     psyllium (METAMUCIL SMOOTH TEXTURE) 28 % packet, Take 1 packet by mouth 2 (two) times a day (Patient not taking: Reported on 2/20/2020), Disp: 60 packet, Rfl: 2      Physical Exam:  /64 (BP Location: Right arm, Patient Position: Sitting, Cuff Size: Adult)   Pulse 100   Temp (!) 95 °F (35 °C)   Resp 18   SpO2 98%     Physical Exam   Constitutional: He appears well-developed  He appears lethargic  No distress     HENT:   Head: Normocephalic and atraumatic  Eyes: Pupils are equal, round, and reactive to light  Conjunctivae are normal  No scleral icterus  Neck: Normal range of motion  Neck supple  No tracheal deviation present  Cardiovascular: Regular rhythm and normal heart sounds  Exam reveals no friction rub  No murmur heard  Tachycardia (108 bpm)   Pulmonary/Chest: Effort normal  No respiratory distress  He has no wheezes  Rhonchi noted in bilateral upper lobes  Abdominal: Soft  There is no splenomegaly or hepatomegaly  Musculoskeletal: He exhibits no deformity  Neurological: He appears lethargic  GCS eye subscore is 4  GCS verbal subscore is 2  GCS motor subscore is 6  Skin: Skin is warm  No rash noted  He is diaphoretic  Psychiatric: He has a normal mood and affect         Labs:  Lab Results   Component Value Date    WBC 6 16 02/21/2020    HGB 10 5 (L) 02/21/2020    HCT 32 7 (L) 02/21/2020    MCV 95 02/21/2020     02/21/2020     Lab Results   Component Value Date     12/03/2015    K 4 5 02/22/2020     02/22/2020    CO2 26 02/22/2020    ANIONGAP 8 12/03/2015    BUN 54 (H) 02/22/2020    CREATININE 5 94 (H) 02/22/2020    GLUCOSE 117 12/03/2015    GLUF 179 (H) 03/22/2019    CALCIUM 7 7 (L) 02/22/2020    AST 40 02/20/2020    ALT 9 (L) 02/20/2020    ALKPHOS 192 (H) 02/20/2020    PROT 7 0 12/03/2015    BILITOT 0 39 12/03/2015    EGFR 11 02/22/2020     Michelle Alfred MD

## 2020-03-02 NOTE — EMTALA/ACUTE CARE TRANSFER
[facility] EMERGENCY DEPARTMENT  [address] [facility] [address]  [phone]  Dept: [facility] TRANSFER CONSENT    NAME Marcial Rey                                         1960                              MRN [id number]    I have been informed of my rights regarding examination, treatment, and transfer   by Dr Uday Benavides DO    Benefits: Patient preference    Risks: Potential for delay in receiving treatment, Possible worsening of condition or death during transfer, Potential deterioration of medical condition, Loss of IV, Increased discomfort during transfer      Consent for Transfer:  I acknowledge that my medical condition has been evaluated and explained to me by the emergency department physician or other qualified medical person and/or my attending physician, who has recommended that I be transferred to the service of  Accepting Physician: Dr Yoon Greene at    The above potential benefits of such transfer, the potential risks associated with such transfer, and the probable risks of not being transferred have been explained to me, and I fully understand them  The doctor has explained that, in my case, the benefits of transfer outweigh the risks  I agree to be transferred  I authorize the performance of emergency medical procedures and treatments upon me in both transit and upon arrival at the receiving facility  Additionally, I authorize the release of any and all medical records to the receiving facility and request they be transported with me, if possible  I understand that the safest mode of transportation during a medical emergency is an ambulance and that the Hospital advocates the use of this mode of transport  Risks of traveling to the receiving facility by car, including absence of medical control, life sustaining equipment, such as oxygen, and medical personnel has been explained to me and I fully understand them      ([place] BELOW)  [  ]  I consent to the stated transfer and to be transported by ambulance/helicopter  [  ]  I consent to the stated transfer, but refuse transportation by ambulance and accept full responsibility for my transportation by car  I understand the risks of non-ambulance transfers and I exonerate the Hospital and its staff from any deterioration in my condition that results from this refusal     X___________________________________________    DATE  20  TIME________  Signature of patient or legally responsible individual signing on patient behalf           RELATIONSHIP TO PATIENT_________________________          Provider Allie Scott                                         1960                              MRN [id number]    A medical screening exam was performed on the above named patient  Based on the examination:    Condition Necessitating Transfer The primary encounter diagnosis was Hypoglycemia  A diagnosis of Acute on chronic renal failure (HCC) was also pertinent to this visit  Patient Condition: The patient has been stabilized such that within reasonable medical probability, no material deterioration of the patient condition or the condition of the unborn child(sim) is likely to result from the transfer    Reason for Transfer: Patient/Family request    Transfer Requirements: Facility     · Space available and qualified personnel available for treatment as acknowledged by    · Agreed to accept transfer and to provide appropriate medical treatment as acknowledged by       Dr Deepti Franco  · Appropriate medical records of the examination and treatment of the patient are provided at the time of transfer   [address], [address] _______  · Transfer will be performed by qualified personnel from    and appropriate transfer equipment as required, including the use of necessary and appropriate life support measures      Provider Certification: I have examined the patient and explained the following risks and benefits of being transferred/refusing transfer to the patient/family:  General risk, such as traffic hazards, adverse weather conditions, rough terrain or turbulence, possible failure of equipment (including vehicle or aircraft), or consequences of actions of persons outside the control of the transport personnel      Based on these reasonable risks and benefits to the patient and/or the unborn child(sim), and based upon the information available at the time of the patients examination, I certify that the medical benefits reasonably to be expected from the provision of appropriate medical treatments at another medical facility outweigh the increasing risks, if any, to the individuals medical condition, and in the case of labor to the unborn child, from effecting the transfer      X____________________________________________ DATE 03/02/20        TIME_______      ORIGINAL - SEND TO MEDICAL RECORDS   COPY - SEND WITH PATIENT DURING TRANSFER

## 2020-03-02 NOTE — ED PROVIDER NOTES
History  Chief Complaint   Patient presents with    Hypoglycemia - Symptomatic     pt at [facility] office for low blood sugars after starting on new insulin  BG 31 at [facility] and sent to ER for eval   pt arrives awake and alert, caretaker gave sugar PO PTA     28-year-old gentleman presents with complaint of hypoglycemia  His caretaker reports that he was recently admitted (for hypertension and hyperkalemia in the setting of chronic kidney disease) and started on a new form of insulin (Novolog 5 u TID)  Over the past couple days he has had lower blood sugar readings  He had his transition of care follow-up appointment with his primary care clinician this morning  This morning his blood sugar was found to be 30  In the office the intensity of oral glucose and the caretaker attempted to give him oral soda  The highs blood sugar obtained was 37  They were referred to the ER for further evaluation but had refused EMS transport  On arrival here the patient is more awake and alert any had been was found to have a blood sugar in the 60s  He offers no other acute complaints or concerns and there are no reports of any recent illnesses  Hypoglycemia - Symptomatic   Initial blood sugar:  30  Blood sugar after intervention:  Oral agents  Severity:  Severe  Onset quality:  Gradual  Timing:  Intermittent  Progression:  Waxing and waning  Chronicity:  Recurrent  Diabetic status:  Controlled with insulin  Relieved by:  Oral glucose  Ineffective treatments:  Oral glucose  Associated symptoms: altered mental status, decreased responsiveness and speech difficulty    Associated symptoms: no vomiting        Prior to Admission Medications   Prescriptions Last Dose Informant Patient Reported? Taking?    ALPRAZolam (XANAX) 0 5 mg tablet  Self Yes No   Sig: Take 0 5 mg by mouth daily   AMITIZA 24 MCG capsule  Self No No   Sig: TAKE 1 CAPSULE BY MOUTH TWICE DAILY   ARIPiprazole (ABILIFY) 2 mg tablet  Self No No   Sig: TAKE 2 TABLETS BY MOUTH ONCE DAILY   Insulin Glargine (TOUJEO SOLOSTAR) 300 UNIT/ML SOPN  Self Yes No   Sig: Inject 24 Units under the skin daily at bedtime     amLODIPine (NORVASC) 10 mg tablet  Self No No   Sig: TAKE 1 TABLET BY MOUTH ONCE DAILY   aspirin 81 mg chewable tablet  Self No No   Sig: Chew 1 tablet (81 mg total) daily   buPROPion (WELLBUTRIN) 100 mg tablet  Self No No   Sig: TAKE 1 TABLET BY MOUTH TWICE DAILY   cloNIDine (CATAPRES-TTS-1) 0 1 mg/24 hr  Self No No   Sig: Place 1 patch (0 1 mg total) on the skin once a week   doxazosin (CARDURA) 1 mg tablet  Self Yes No   Sig: Take 4 mg by mouth 2 (two) times a day Take 1 1/2 tablet by mouth twice a day   furosemide (LASIX) 40 mg tablet  Self Yes No   Sig: Take by mouth daily   hydrALAZINE (APRESOLINE) 10 mg tablet  Self No No   Sig: Take 1 tablet (10 mg total) by mouth every 8 (eight) hours   lovastatin (MEVACOR) 20 mg tablet  Self Yes No   Sig: Take 20 mg by mouth daily   pantoprazole (PROTONIX) 40 mg tablet  Self No No   Sig: TAKE 1 TABLET BY MOUTH DAILY IN THE EARLY MORNING   paricalcitol (ZEMPLAR) 1 mcg capsule  Self Yes No   Sig: Take 2 mcg by mouth daily     psyllium (METAMUCIL SMOOTH TEXTURE) 28 % packet  Self No No   Sig: Take 1 packet by mouth 2 (two) times a day   Patient not taking: Reported on 2/20/2020   traZODone (DESYREL) 100 mg tablet  Self Yes No   Sig: Take 100 mg by mouth daily at bedtime      Facility-Administered Medications: None       Past Medical History:   Diagnosis Date    Anxiety     CAD (coronary artery disease)     last assessed 4/10/13    Chronic kidney disease, stage IV (severe) (HCC)     Concussion     last assessed 9/3/14    Depression     Diabetes mellitus ([facility] [address] )     type 2    Failure to gain weight in adult     last assessed 2/24/14    Gastroparesis     GERD (gastroesophageal reflux disease)     Hyperkalemia     last assessed 11/9/15    Hyperlipidemia     Hypertension     accelerated essential last assessed 10/7/16    Insomnia  Late effects of cerebrovascular disease     Overflow incontinence     last assessed 7/24/14    Renal disorder     Secondary hyperparathyroidism ([facility] [address] )     Stroke [facility])        Past Surgical History:   Procedure Laterality Date    COLONOSCOPY      HERNIA REPAIR      [place] ESOPHAGOGASTRODUODENOSCOPY TRANSORAL DIAGNOSTIC N/A 2/16/2017    Procedure: ESOPHAGOGASTRODUODENOSCOPY (EGD) with biopsy;  Surgeon: Maren La DO;  Location: [facility]; Service: Gastroenterology    [place] ESOPHAGOGASTRODUODENOSCOPY TRANSORAL DIAGNOSTIC N/A 6/2/2017    Procedure: ESOPHAGOGASTRODUODENOSCOPY (EGD) with bx;  Surgeon: Maren La DO;  Location: [facility]; Service: Gastroenterology    TYMPANOSTOMY TUBE PLACEMENT         Family History   Problem Relation Age of Onset    Cancer Family         pt and friend denies this hx    Hypertension Family         essential    Hyperlipidemia Family     Diabetes Mother     No Known Problems Father      I have reviewed and agree with the history as documented  E-Cigarette/Vaping    E-Cigarette Use Never User      E-Cigarette/Vaping Substances     Social History     Tobacco Use    Smoking status: Never Smoker    Smokeless tobacco: Never Used   Substance Use Topics    Alcohol use: No     Comment: social    Drug use: Yes     Types: Marijuana     Comment: smokes once keven while for pain 1 xper  week       Review of Systems   Constitutional: Positive for decreased responsiveness and fatigue  Negative for activity change, chills and fever  HENT: Negative  Negative for congestion, postnasal drip, rhinorrhea, sinus pain, sore throat and trouble swallowing  Eyes: Negative  Respiratory: Negative  Cardiovascular: Negative for chest pain  Gastrointestinal: Negative for abdominal pain, constipation, diarrhea, nausea and vomiting  Endocrine: Negative  Genitourinary: Negative  Musculoskeletal: Negative  Negative for arthralgias, back pain and myalgias     Skin: Negative  Allergic/Immunologic: Negative  Neurological: Positive for speech difficulty and light-headedness  Hematological: Negative  Physical Exam  Physical Exam   Constitutional: He appears well-developed and well-nourished  No distress  HENT:   Head: Normocephalic and atraumatic  Nose: Nose normal    Mouth/Throat: Oropharynx is clear and moist    Eyes: Pupils are equal, round, and reactive to light  Conjunctivae are normal    Neck: Neck supple  Cardiovascular: Normal rate, regular rhythm and normal heart sounds  Pulmonary/Chest: Effort normal and breath sounds normal  No respiratory distress  Abdominal: Soft  Bowel sounds are normal  He exhibits no distension  There is no tenderness  There is no guarding  Musculoskeletal: Normal range of motion  He exhibits no edema  Neurological: He is alert  He has normal strength  He is disoriented (mildly)  GCS eye subscore is 4  GCS verbal subscore is 4  GCS motor subscore is 6  Skin: Skin is warm and dry  Capillary refill takes less than 2 seconds  He is not diaphoretic  Psychiatric: He has a normal mood and affect  His behavior is normal    Nursing note and vitals reviewed        Vital Signs  ED Triage Vitals   Temperature Pulse Respirations Blood Pressure SpO2   03/02/20 1349 03/02/20 1230 03/02/20 1230 03/02/20 1230 03/02/20 1230   (!) 94 °F (34 4 °C) 89 20 122/66 96 %      Temp Source Heart Rate Source Patient Position - Orthostatic VS BP Location FiO2 (%)   03/02/20 1349 03/02/20 1230 03/02/20 1230 03/02/20 1230 --   Oral Monitor Lying Left arm       Pain Score       03/02/20 1416       Worst Possible Pain           Vitals:    03/02/20 1230 03/02/20 1349 03/02/20 1628   BP: 122/66 120/79 155/74   Pulse: 89 89 94   Patient Position - Orthostatic VS: Lying Lying Lying         Visual Acuity      ED Medications  Medications   dextrose 5 % and sodium chloride 0 9 % infusion (75 mL/hr Intravenous New Bag 3/2/20 1907)   dextrose 50 % IV solution 50 mL (50 mL Intravenous Given 3/2/20 1237)   morphine injection 1 mg (1 mg Intravenous Given 3/2/20 1416)   dextrose 50 % IV solution 50 mL (50 mL Intravenous Given 3/2/20 1631)   ondansetron (ZOFRAN) injection 4 mg (4 mg Intravenous Given 3/2/20 1906)       Diagnostic Studies  Results Reviewed     Procedure Component Value Units Date/Time    Fingerstick Glucose (POCT) [[id number]]  (Abnormal) Collected:  03/02/20 1900    Lab Status:  Final result Updated:  03/02/20 1902     POC Glucose 60 mg/dl     Fingerstick Glucose (POCT) [[id number]]  (Normal) Collected:  03/02/20 1721    Lab Status:  Final result Updated:  03/02/20 1902     POC Glucose 87 mg/dl     Urine Microscopic [[id number]]  (Abnormal) Collected:  03/02/20 1625    Lab Status:  Final result Specimen:  Urine, Clean Catch Updated:  03/02/20 1646     RBC, UA 1-2 /hpf      WBC, UA None Seen /hpf      Epithelial Cells None Seen /hpf      Bacteria, UA None Seen /hpf      MUCUS THREADS Moderate    UA (URINE) with reflex to Scope [[id number]]  (Abnormal) Collected:  03/02/20 1625    Lab Status:  Final result Specimen:  Urine, Clean Catch Updated:  03/02/20 1641     Color, UA Straw     Clarity, UA Clear     Specific Gravity, UA 1 015     pH, UA 5 0     Leukocytes, UA Negative     Nitrite, UA Negative     Protein,  (2+) mg/dl      Glucose, UA Negative mg/dl      Ketones, UA Negative mg/dl      Bilirubin, UA Negative     Blood, UA 10 0     UROBILINOGEN UA Negative mg/dL     Fingerstick Glucose (POCT) [[id number]]  (Abnormal) Collected:  03/02/20 1627    Lab Status:  Final result Updated:  03/02/20 1628     POC Glucose 27 mg/dl     Fingerstick Glucose (POCT) [[id number]]  (Normal) Collected:  03/02/20 1340    Lab Status:  Final result Updated:  03/02/20 1346     POC Glucose 79 mg/dl     Troponin I [[id number]]  (Abnormal) Collected:  03/02/20 1235    Lab Status:  Final result Specimen:  Blood from Arm, Right Updated:  03/02/20 1308     Troponin I 0 06 ng/mL Comprehensive metabolic panel [[id number]]  (Abnormal) Collected:  03/02/20 1235    Lab Status:  Final result Specimen:  Blood from Arm, Right Updated:  03/02/20 1258     Sodium 138 mmol/L      Potassium 3 8 mmol/L      Chloride 106 mmol/L      CO2 19 mmol/L      ANION GAP 13 mmol/L      BUN 90 mg/dL      Creatinine 9 00 mg/dL      Glucose 62 mg/dL      Calcium 9 1 mg/dL      AST 21 U/L      ALT 25 U/L      Alkaline Phosphatase 161 U/L      Total Protein 6 7 g/dL      Albumin 3 7 g/dL      Total Bilirubin 0 40 mg/dL      eGFR 7 ml/min/1 73sq m     Narrative:       National Kidney Disease Foundation guidelines for Chronic Kidney Disease (CKD):     Stage 1 with normal or high GFR (GFR > 90 mL/min/1 73 square meters)    Stage 2 Mild CKD (GFR = 60-89 mL/min/1 73 square meters)    Stage 3A Moderate CKD (GFR = 45-59 mL/min/1 73 square meters)    Stage 3B Moderate CKD (GFR = 30-44 mL/min/1 73 square meters)    Stage 4 Severe CKD (GFR = 15-29 mL/min/1 73 square meters)    Stage 5 End Stage CKD (GFR <15 mL/min/1 73 square meters)  Note: GFR calculation is accurate only with a steady state creatinine    CBC and differential [[id number]]  (Abnormal) Collected:  03/02/20 1235    Lab Status:  Final result Specimen:  Blood from Arm, Right Updated:  03/02/20 1248     WBC 7 00 Thousand/uL      RBC 3 25 Million/uL      Hemoglobin 10 0 g/dL      Hematocrit 30 0 %      MCV 92 fL      MCH 30 9 pg      MCHC 33 5 g/dL      RDW 14 9 %      MPV 9 8 fL      Platelets 085 Thousands/uL      Neutrophils Relative 85 %      Lymphocytes Relative 6 %      Monocytes Relative 7 %      Eosinophils Relative 1 %      Basophils Relative 1 %      Neutrophils Absolute 5 90 Thousands/µL      Lymphocytes Absolute 0 40 Thousands/µL      Monocytes Absolute 0 50 Thousand/µL      Eosinophils Absolute 0 10 Thousand/µL      Basophils Absolute 0 10 Thousands/µL                  No orders to display              Procedures  ECG 12 Lead Documentation Only  Date/Time: 3/2/2020 12:52 PM  Performed by: Magdalena Early DO  Authorized by: Magdalena Early DO     ECG reviewed by me, the ED Provider: yes    Patient location:  ED  Quality:     Tracing quality:  Limited by artifact  Rate:     ECG rate:  87    ECG rate assessment: normal    Rhythm:     Rhythm: sinus rhythm    Ectopy:     Ectopy: none    QRS:     QRS axis:  Left  Conduction:     Conduction: abnormal      Abnormal conduction: 1st degree    ST segments:     ST segments:  Normal  T waves:     T waves: non-specific               ED Course  ED Course as of Mar 02 1956   Mon Mar 02, 2020   1439 Waiting call back from the patient's nephrologist   The patient's caretaker reportedly spoke to her and she was concerned that the kidney function had worsened  1628 Recurrent hypoglycemia noted  Additional round of dextrose has been ordered  1844 Patient reassessed  His caretaker is angry that he has not received his regular home medications  His blood pressures remained stable which is why his regular 3:00 a m  Hydralazine was not given  Patient is still mentating well and have offers no other acute complaints other than mild nausea  The patient is refusing any type of oral nutrition at this time  MDM  Number of Diagnoses or Management Options  Acute on chronic renal failure [facility]): Hypoglycemia:   Diagnosis management comments: 53-year-old gentleman presents from his primary care office with complaint of hypoglycemia  He was recently admitted and had his insulin regimen changed  Was given oral glucose in the office with only slight improvement of his blood sugar level  On arrival here he was given D50  His blood sugar did improve as did his symptoms which had included weakness, lethargy, and sweating  Patient is a known stage 5 chronic kidney disease patient who is not yet started dialysis    I reviewed the patient's presentation with his endocrinologist at [facility]  He recommends that we discontinue the patient's NovoLog and decrease his long-acting insulin to 20 units  From our records, the patient's renal function has acutely worsened  His caretaker states that he is at his baseline  His endocrinologist also states that his renal function is at roughly the same level that it has been in the past   He has follow-up set for port placement later this week  Patient denies any other acute issues other than back pain which is believed to be from the bed that he is currently on  After discussing case with the covering nephrologist, it was decided to transfer the patient to [facility] as this is where all the patient's specialists are located  The plan at this point would be to have the patient have a dialysis catheter placed sooner to allow for more emergent dialysis to occur  Prior to transfer the patient had an episode of recurrent hypoglycemia with a blood sugar reading of less than 27  He was given additional dextrose with correction  The patient was then able to eat  He offered no other acute issues or concerns  The patient and his caretaker were updated on multiple occasions and were agreeable with the plan for transfer and treatment         Amount and/or Complexity of Data Reviewed  Clinical lab tests: ordered and reviewed  Tests in the radiology section of CPT®: reviewed  Tests in the medicine section of CPT®: ordered and reviewed  Decide to obtain previous medical records or to obtain history from someone other than the patient: yes  Obtain history from someone other than the patient: yes  Discuss the patient with other providers: yes  Independent visualization of images, tracings, or specimens: yes          Disposition  Final diagnoses:   Hypoglycemia   Acute on chronic renal failure ([facility] [address] )     Time reflects when diagnosis was documented in both MDM as applicable and the Disposition within this note     Time User Action Codes Description Comment    3/2/2020  4:01 PM Glena Primus Add [E16 2] Hypoglycemia     3/2/2020  4:01 PM Glena Primus Add [N17 9,  N18 9] Acute on chronic renal failure [facility])       ED Disposition     ED Disposition Condition Date/Time Comment    Transfer to Another [address]  4:01 PM Governor Frame should be transferred out to Jenniffer CASTRO Documentation      Most Recent Value   Patient Condition  The patient has been stabilized such that within reasonable medical probability, no material deterioration of the patient condition or the condition of the unborn child(sim) is likely to result from the transfer   Reason for Transfer  Patient/Family request   Benefits of Transfer  Patient preference   Risks of Transfer  Potential for delay in receiving treatment, Possible worsening of condition or death during transfer, Potential deterioration of medical condition, Loss of IV, Increased discomfort during transfer   Accepting Physician  Dr Tana Berkowitz Name, Crystal Dougherty   Provider Certification  General risk, such as traffic hazards, adverse weather conditions, rough terrain or turbulence, possible failure of equipment (including vehicle or aircraft), or consequences of actions of persons outside the control of the transport personnel      RN Documentation      Most [facility] Name, [address] [facility]   Bed Assignment  6B 17B   Report Given to  Angela Murdock RN      Follow-up Information    None         Discharge Medication List as of 3/2/2020  7:21 PM      CONTINUE these medications which have NOT CHANGED    Details   ALPRAZolam (XANAX) 0 5 mg tablet Take 0 5 mg by mouth daily, Historical Med      AMITIZA 24 MCG capsule TAKE 1 CAPSULE BY MOUTH TWICE DAILY, Normal      amLODIPine (NORVASC) 10 mg tablet TAKE 1 TABLET BY MOUTH ONCE DAILY, Normal      ARIPiprazole (ABILIFY) 2 mg tablet TAKE 2 TABLETS BY MOUTH ONCE DAILY, Normal      aspirin 81 mg chewable tablet Chew 1 tablet (81 mg total) daily, Starting Mon 12/24/2018, Print      buPROPion (WELLBUTRIN) 100 mg tablet TAKE 1 TABLET BY MOUTH TWICE DAILY, Normal      cloNIDine (CATAPRES-TTS-1) 0 1 mg/24 hr Place 1 patch (0 1 mg total) on the skin once a week, Starting Fri 2/28/2020, Print      doxazosin (CARDURA) 1 mg tablet Take 4 mg by mouth 2 (two) times a day Take 1 1/2 tablet by mouth twice a day, Historical Med      furosemide (LASIX) 40 mg tablet Take by mouth daily, Historical Med      hydrALAZINE (APRESOLINE) 10 mg tablet Take 1 tablet (10 mg total) by mouth every 8 (eight) hours, Starting Sat 2/22/2020, Print      Insulin Glargine (TOUJEO SOLOSTAR) 300 UNIT/ML SOPN Inject 24 Units under the skin daily at bedtime  , Historical Med      lovastatin (MEVACOR) 20 mg tablet Take 20 mg by mouth daily, Historical Med      pantoprazole (PROTONIX) 40 mg tablet TAKE 1 TABLET BY MOUTH DAILY IN THE EARLY MORNING, Normal      paricalcitol (ZEMPLAR) 1 mcg capsule Take 2 mcg by mouth daily  , Historical Med      psyllium (METAMUCIL SMOOTH TEXTURE) 28 % packet Take 1 packet by mouth 2 (two) times a day, Starting Wed 9/18/2019, Normal      traZODone (DESYREL) 100 mg tablet Take 100 mg by mouth daily at bedtime, Historical Med           No discharge procedures on file      PDMP Review       Value Time User    PDMP Reviewed  Yes 1/16/2020 12:59 PM Juan A Melchor MD          ED Provider  Electronically Signed by           Deisy Garcia DO  03/02/20 [facility], DO  03/02/20 [id number]

## 2020-03-17 NOTE — TELEPHONE ENCOUNTER
Patient needs shower chair script/Prior Auth due to medical coverage      Protein drinks     Daughter of patient requesting a call from a nurse to discus changes to pt medicine  You can call her at anytime she will be available

## 2020-03-17 NOTE — TELEPHONE ENCOUNTER
Spoke with pt's friend listed in chart, Germain Ramses  She was extremely upset and stated she has requested a shower chair multiple times and has not had success obtaining one for pt  She advised me pt is very fragile and requires assistance when in the shower or he will fall and hurt himself  I advised her I would send msg to Dr Sandie Khanna requesting Rx for shower chair and let her know I will continue to f/u regarding this until it is resolved  She appreciated the reassurance  I explained to her that, unless pt's only source of nutrition are the protein shakes AND he is fed through a feeding tube, insurance will not cover the protein shakes  She was very upset about this and asked me if it would help if pt's dietician would generate a letter of medical necessity  She advised me pt needs a certain amt of protein daily, especially the days he has dialysis and he really needs the drinks  I advised her a Rx can be placed for them but she would still pay 100 % out of pocket  Joseph Wesley / RN also suggested I tell her to purchase the protein drinks at [facility] because it would be much cheaper  She stated she will have dietician handle this request for pt  Faxed Rx for shower chair along with OV notes dated 4/22/19, copy of secondary insurance card and print out of pt's primary and secondary insurance information in [place]  Received fax transmittal response "ok"  Will f/u by end of week with Donald Winter for an update on shower chair

## 2020-03-18 NOTE — TELEPHONE ENCOUNTER
He should NOT be taking any type of protein drink at this time given is bad kidney function  He should discuss with his kidney doctor for appropriate protein supplements given his poor kidney function   His lack of appetite, weight loss is also related to his kidney issues

## 2020-03-19 NOTE — TELEPHONE ENCOUNTER
Theron Reynoso regarding pt's shower chair and while updating her, she requested I reconciled pt's med list   This has been completed  Dr Elle Bhatt, while reconciling med list, Vladislav Bates advised me trazadone is not helping pt sleep and would like to know if you can switch him to zolpidem  Please advise, thanks so much

## 2020-03-19 NOTE — TELEPHONE ENCOUNTER
Spoke with Griselda Ricketts / Yolanda Alberto and advised her of Dr Kylah Fam response regarding the protein drinks  She advised me the dietician where pt gets dialysis is the one who recommended pt consume protein drinks  She also advised me she received a call from [employer] letting her know they do not accept pt's insurance  I advised her I would call them to get more info and call her back  Spoke with [employer] and they advised me they accept pt's primary insurance but they will not cover the shower chair and they do not work with pt's secondary insurance, which is [facility]  Faxed Rx along with copy of pt's insurance card and OV notes dated 4/22/2019 to [facility]  Received fax transmittal response "ok"

## 2020-03-19 NOTE — PROGRESS NOTES
Sultana Sensor regarding pt's shower chair and while updating her, she requested I reconciled pt's med list   This has been completed

## 2020-03-19 NOTE — TELEPHONE ENCOUNTER
Yuliana Silveira is calling as she contacted insurance company  They stated they have not received any prior authorizations for a shower chair  She thinks someone is doing something wrong  Would like a call back

## 2020-04-24 PROBLEM — N18.6 ESRD (END STAGE RENAL DISEASE) (HCC): Status: ACTIVE | Noted: 2020-01-01

## 2020-04-24 PROBLEM — S72.142A CLOSED FRACTURE OF FEMUR, INTERTROCHANTERIC, LEFT, INITIAL ENCOUNTER (HCC): Status: ACTIVE | Noted: 2020-01-01

## 2020-04-24 PROBLEM — Z01.818 PREOPERATIVE CLEARANCE: Status: ACTIVE | Noted: 2020-01-01

## 2020-05-21 PROBLEM — N18.6 ESRD ON HEMODIALYSIS (HCC): Status: ACTIVE | Noted: 2020-01-01

## 2020-05-21 PROBLEM — Z99.2 ESRD ON HEMODIALYSIS (HCC): Status: ACTIVE | Noted: 2020-01-01

## 2020-05-21 PROBLEM — S72.145A CLOSED NONDISPLACED INTERTROCHANTERIC FRACTURE OF LEFT FEMUR (HCC): Status: ACTIVE | Noted: 2020-01-01

## 2020-06-09 PROBLEM — K21.9 GERD (GASTROESOPHAGEAL REFLUX DISEASE): Status: ACTIVE | Noted: 2020-01-01

## 2020-06-09 PROBLEM — N18.9 ANEMIA DUE TO CHRONIC KIDNEY DISEASE: Status: ACTIVE | Noted: 2020-01-01

## 2020-06-09 PROBLEM — D63.1 ANEMIA DUE TO CHRONIC KIDNEY DISEASE: Status: ACTIVE | Noted: 2020-01-01

## 2020-06-09 PROBLEM — R13.19 ESOPHAGEAL DYSPHAGIA: Status: ACTIVE | Noted: 2020-01-01

## 2020-06-09 PROBLEM — Z12.11 SCREENING FOR COLON CANCER: Status: ACTIVE | Noted: 2020-01-01

## 2020-07-24 NOTE — ASSESSMENT & PLAN NOTE
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         Dental    No notable dental history.          Pulmonary    Pulmonary exam normal.                 Other findi

## 2020-08-25 NOTE — TELEPHONE ENCOUNTER
[facility] would like to know what she needs to do to get a hospital bed for pt, please advise

## 2020-09-10 NOTE — ANESTHESIA PREPROCEDURE EVALUATION
Procedure:  EGD    Relevant Problems   ANESTHESIA (within normal limits)      CARDIO   (+) Essential hypertension      ENDO   (+) Type 2 diabetes mellitus, with long-term current use of insulin (HCC)      GI/HEPATIC   (+) Esophageal dysphagia   (+) GERD (gastroesophageal reflux disease)   (+) Gastroesophageal reflux disease without esophagitis      /RENAL   (+) Acute on chronic kidney failure (HCC)   (+) ESRD (end stage renal disease) (HCC)   (+) ESRD on hemodialysis (HCC)   (+) Stage 5 chronic kidney disease not on chronic dialysis (HCC)      HEMATOLOGY   (+) Anemia due to chronic kidney disease      MUSCULOSKELETAL   (+) Low back pain      NEURO/PSYCH   (+) Anxiety   (+) History of CVA (cerebrovascular accident)        Physical Exam    Airway    Mallampati score: I  TM Distance: >3 FB  Neck ROM: full     Dental   upper dentures and lower dentures,     Cardiovascular  Rhythm: regular, Rate: normal, Cardiovascular exam normal    Pulmonary  Pulmonary exam normal Breath sounds clear to auscultation,     Other Findings        Anesthesia Plan  ASA Score- 4     Anesthesia Type- IV sedation with anesthesia with ASA Monitors  Additional Monitors:   Airway Plan:           Plan Factors-Exercise tolerance (METS): >4 METS  Chart reviewed  EKG reviewed  Existing labs reviewed  Patient summary reviewed  Patient is not a current smoker  Patient instructed to abstain from smoking on day of procedure  Patient did not smoke on day of surgery  Obstructive sleep apnea risk education given perioperatively  Induction- intravenous  Postoperative Plan-     Informed Consent- Anesthetic plan and risks discussed with patient and spouse  I personally reviewed this patient with the CRNA  Discussed and agreed on the Anesthesia Plan with the CRNA  Karrie Nissen

## 2020-09-10 NOTE — DISCHARGE INSTRUCTIONS
Esophagitis   WHAT YOU NEED TO KNOW:   Esophagitis is inflammation or irritation of the lining of the esophagus  DISCHARGE INSTRUCTIONS:   Call 911 for any of the following:   · You have chest pain that does not go away within a few minutes or gets worse  Seek care immediately if:   · You feel like you have food stuck in your throat and you cannot cough it out  Contact your healthcare provider if:   · You have new or worsening symptoms, even after treatment  · You have questions or concerns about your condition or care  Medicines:   · Medicines  may be given to fight an infection or to control stomach acid  · Take your medicine as directed  Contact your healthcare provider if you think your medicine is not helping or if you have side effects  Tell him or her if you are allergic to any medicine  Keep a list of the medicines, vitamins, and herbs you take  Include the amounts, and when and why you take them  Bring the list or the pill bottles to follow-up visits  Carry your medicine list with you in case of an emergency  Follow up with your healthcare provider as directed: You may need ongoing tests or treatment  Write down your questions so you remember to ask them during your visits  Do not smoke:  Nicotine and other chemicals in cigarettes and cigars can cause blood vessel and lung damage  Ask your healthcare provider for information if you currently smoke and need help to quit  E-cigarettes or smokeless tobacco still contain nicotine  Talk to your healthcare provider before you use these products  Do not drink alcohol:  Alcohol can irritate your esophagus  Talk to your healthcare provider if you need help to stop drinking  Keep batteries and similar objects out of the reach of children:  Babies often put items in their mouths to explore them  Button batteries are easy to swallow and can cause serious damage   Keep the battery covers of electronic devices such as remote controls taped closed  Store all batteries and toxic materials where children cannot get to them  Use childproof locks to keep children away from dangerous materials  Manage or prevent esophagitis:   · Limit or do not eat foods that can lead to esophagitis  Foods such as oranges and salsa can irritate your esophagus  Caffeine and chocolate can cause acid reflux  High-fat and fried foods make your stomach digest food more slowly  This increases the amount of stomach acid your esophagus is exposed to  Eat small meals, and drink water with your meals  Soft foods such as yogurt and applesauce may help soothe your throat  Do not eat for at least 3 hours before you go to bed  · Prevent acid reflux  Do not bend over unless it is necessary  Acid may back up into your esophagus when you bend over  If possible, keep the head of your bed elevated while you sleep  This will help keep acid from backing up  Manage stress  Stress can make your symptoms worse or cause stomach acid to back up  · Drink more liquid when you take pills  Drink a full glass of water when you take your pills  Ask your healthcare provider if you can take your pills at least an hour before you go to bed  © 2017 [address] [facility] Information is for End User's use only and may not be sold, redistributed or otherwise used for commercial purposes  All illustrations and images included in CareNotes® are the copyrighted property of [employer] , [employer]  or Luis Eduardo Reyes  The above information is an  only  It is not intended as medical advice for individual conditions or treatments  Talk to your doctor, nurse or pharmacist before following any medical regimen to see if it is safe and effective for you  Upper Endoscopy   WHAT YOU NEED TO KNOW:   An upper endoscopy is also called an upper gastrointestinal (GI) endoscopy, or an esophagogastroduodenoscopy (EGD)   You may feel bloated, gassy, or have some abdominal discomfort after your procedure  Your throat may be sore for 24 to 36 hours  You may burp or pass gas from air that is still inside your body  DISCHARGE INSTRUCTIONS:   Call 911 for any of the following:   · You have sudden chest pain or trouble breathing  Seek care immediately if:   · You feel dizzy or faint  · You have trouble swallowing  · Your bowel movements are very dark or black  · Your abdomen is hard and firm and you have severe pain  · You vomit blood  Contact your healthcare provider if:   · You feel full or bloated and cannot burp or pass gas  · You have not had a bowel movement for 3 days after your procedure  · You have neck pain  · You have a fever or chills  · You have nausea or are vomiting  · You have a rash or hives  · You have questions or concerns about your endoscopy  Relieve a sore throat:  Suck on throat lozenges or crushed ice  Gargle with a small amount of warm salt water  Mix 1 teaspoon of salt and 1 cup of warm water to make salt water  Relieve gas and discomfort from bloating:  Lie on your right side with a heating pad on your abdomen  Take short walks to help pass gas  Eat small meals until bloating is relieved  Rest after your procedure: You have been given medicine to relax you  Do not  drive or make important decisions until the day after your procedure  Return to your normal activity as directed  You can usually return to work the day after your procedure  Follow up with your healthcare provider as directed:  Write down your questions so you remember to ask them during your visits  © 2017 [address] is for End User's use only and may not be sold, redistributed or otherwise used for commercial purposes  All illustrations and images included in CareNotes® are the copyrighted property of [employer]  or Luis Eduardo Reyes  The above information is an  only   It is not intended as medical advice for individual conditions or treatments  Talk to your doctor, nurse or pharmacist before following any medical regimen to see if it is safe and effective for you  Sucralfate (By mouth)   Sucralfate ([id number])  Treats ulcers  Brand Name(s): Carafate   There may be other brand names for this medicine  When This Medicine Should Not Be Used: You should not use this medicine if you have had an allergic reaction to it  How to Use This Medicine:   Tablet, Liquid  · Your doctor will tell you how much to take and how often  · Do not stop taking the medicine unless your doctor tells you to, even if you feel better  · Take your medicine on an empty stomach  Swallow the tablet with a glass of water  · Unless your doctor tells you otherwise, take the medicine 1 hour before meals and at bedtime  · Measure the oral liquid medicine using a measuring spoon or medicine cup  · Shake the oral liquid medicine well before using  If a dose is missed:   · Take your medicine as soon as you remember that you missed your dose  · If it is nearly time for your next dose, wait until then to take your medicine and skip the missed dose  · You should not use two doses at one time  How to Store and Dispose of This Medicine:   · Keep the medicine at room temperature, away from heat, light, and moisture  Do not freeze the oral liquid  · Keep all medicine out of the reach of children  Drugs and Foods to Avoid:   Ask your doctor or pharmacist before using any other medicine, including over-the-counter medicines, vitamins, and herbal products  · Antacids may be taken one-half hour before or after taking sucralfate  · You should not take other medicines within 2 hours before or after taking sucralfate  If you need help deciding the best times to take your other medicines, ask your doctor or pharmacist   Warnings While Using This Medicine:   · If you are pregnant or breastfeeding, talk to your doctor before taking this medicine    · Check with your doctor before taking if you have kidney problems or are on dialysis  Possible Side Effects While Using This Medicine:   Call your doctor right away if you notice any of these side effects:  · Rash, hives, or itching  · Swelling of the face or mouth  If you notice these less serious side effects, talk with your doctor:   · Constipation  · Dry mouth  · Mild stomach cramps, diarrhea  · Upset stomach, gas  · Dizziness  If you notice other side effects that you think are caused by this medicine, tell your doctor  Call your doctor for medical advice about side effects  You may report side effects to FDA at 5-099-FDA-5697  © 2017 [address] [employer] Information is for End User's use only and may not be sold, redistributed or otherwise used for commercial purposes  The above information is an  only  It is not intended as medical advice for individual conditions or treatments  Talk to your doctor, nurse or pharmacist before following any medical regimen to see if it is safe and effective for you  Upper Endoscopy   WHAT YOU NEED TO KNOW:   An upper endoscopy is also called an upper gastrointestinal (GI) endoscopy, or an esophagogastroduodenoscopy (EGD)  You may feel bloated, gassy, or have some abdominal discomfort after your procedure  Your throat may be sore for 24 to 36 hours  You may burp or pass gas from air that is still inside your body  DISCHARGE INSTRUCTIONS:   Call 911 for any of the following:   · You have sudden chest pain or trouble breathing  Seek care immediately if:   · You feel dizzy or faint  · You have trouble swallowing  · Your bowel movements are very dark or black  · Your abdomen is hard and firm and you have severe pain  · You vomit blood  Contact your healthcare provider if:   · You feel full or bloated and cannot burp or pass gas  · You have not had a bowel movement for 3 days after your procedure  · You have neck pain      · You have a fever or chills  · You have nausea or are vomiting  · You have a rash or hives  · You have questions or concerns about your endoscopy  Relieve a sore throat:  Suck on throat lozenges or crushed ice  Gargle with a small amount of warm salt water  Mix 1 teaspoon of salt and 1 cup of warm water to make salt water  Relieve gas and discomfort from bloating:  Lie on your right side with a heating pad on your abdomen  Take short walks to help pass gas  Eat small meals until bloating is relieved  Rest after your procedure: You have been given medicine to relax you  Do not  drive or make important decisions until the day after your procedure  Return to your normal activity as directed  You can usually return to work the day after your procedure  Follow up with your healthcare provider as directed:  Write down your questions so you remember to ask them during your visits  © 2017 [address] is for End User's use only and may not be sold, redistributed or otherwise used for commercial purposes  All illustrations and images included in CareNotes® are the copyrighted property of [employer]  or Luis Eduardo Reyes  The above information is an  only  It is not intended as medical advice for individual conditions or treatments  Talk to your doctor, nurse or pharmacist before following any medical regimen to see if it is safe and effective for you  Pantoprazole (By mouth)   Pantoprazole (pan-TOE-pra-zole)  Treats gastroesophageal reflux disease (GERD), a damaged esophagus, and high levels of stomach acid  This medicine is a proton pump inhibitor (PPI)  Brand Name(s): Protonix   There may be other brand names for this medicine  When This Medicine Should Not Be Used: This medicine is not right for everyone  Do not use it if you had an allergic reaction to pantoprazole or similar medicines    How to Use This Medicine:   Packet, Tablet, Delayed Release Tablet, Long Acting Tablet  · Your doctor will tell you how much medicine to use  Do not use more than directed  Take the medicine at least 30 minutes before a meal   · Delayed-release tablet: Swallow the tablet whole  Do not crush, break, or chew it  · Delayed-release packet:   ¨ To prepare with applesauce:   § Mix the packet contents with 1 teaspoon of applesauce  Do not mix with water, or other liquids or food  Do not divide the packet contents to make smaller doses  § Swallow the mixture within 10 minutes after you mix it  Do not chew or crush the granules  § Sip some water after you take the mixture to make sure you swallow all of the medicine  ¨ To prepare with apple juice:   § Mix the packet contents with 1 teaspoon of apple juice in a small cup  Do not divide the packet contents to make smaller doses  § Stir for 5 seconds and drink the mixture immediately  Do not chew or crush the granules  § To make sure you get all of the medicine, add more apple juice to the cup  Drink it immediately  ¨ To prepare for a feeding tube:   § Pour the packet contents in a 2-ounce (60 milliliter [mL]) catheter-tip syringe  § Add 10 mL of apple juice to the syringe  Add the mixture to the tube  Gently tap or shake the barrel of the syringe to help empty it  § Add 10 mL of apple juice to the syringe and put it in the tube  Do this at least 2 times  There should be no granules left in the syringe  · This medicine should come with a Medication Guide  Ask your pharmacist for a copy if you do not have one  · Missed dose: Take a dose as soon as you remember  If it is almost time for your next dose, wait until then and take a regular dose  Do not take extra medicine to make up for a missed dose  · Store the medicine in a closed container at room temperature, away from heat, moisture, and direct light    Drugs and Foods to Avoid:   Ask your doctor or pharmacist before using any other medicine, including over-the-counter medicines, vitamins, and herbal products  · Some foods and medicines can affect how pantoprazole works  Tell your doctor if you are using any of the following:   ¨ Ampicillin, atazanavir, digoxin, erlotinib, ketoconazole, methotrexate, mycophenolate mofetil, nelfinavir  ¨ Blood thinner (including warfarin)  ¨ Diuretic (water pill)  ¨ Iron supplements  Warnings While Using This Medicine:   · Tell your doctor if you are pregnant or breastfeeding, or if you have liver disease, lupus, or osteoporosis  · This medicine may cause the following problems:  ¨ Kidney problems  ¨ Low vitamin B12 or magnesium levels  ¨ Increased risk of broken bones in the hip, wrist, or spine  · This medicine can cause diarrhea  Call your doctor if the diarrhea becomes severe, does not stop, or is bloody  Do not take any medicine to stop diarrhea until you have talked to your doctor  Diarrhea can occur 2 months or more after you stop taking this medicine  · Tell any doctor or dentist who treats you that you are using this medicine  This medicine may affect certain medical test results  · Your doctor will check your progress and the effects of this medicine at regular visits  Keep all appointments  · Keep all medicine out of the reach of children  Never share your medicine with anyone    Possible Side Effects While Using This Medicine:   Call your doctor right away if you notice any of these side effects:  · Allergic reaction: Itching or hives, swelling in your face or hands, swelling or tingling in your mouth or throat, chest tightness, trouble breathing  · Blistering, peeling, red skin rash  · Fever, joint pain, swelling in your body, unusual weight gain, change in how much or how often you urinate  · Joint pain, rash on your cheeks or arms that gets worse in the sun  · Seizures, dizziness, uneven heartbeat, muscle cramps or twitching  · Severe diarrhea, stomach cramps, fever  · Stomach pain, nausea, vomiting, weight loss  If you notice other side effects that you think are caused by this medicine, tell your doctor  Call your doctor for medical advice about side effects  You may report side effects to FDA at 3-535-FDA-4142  © 2017 [address] [employer] Information is for End User's use only and may not be sold, redistributed or otherwise used for commercial purposes  The above information is an  only  It is not intended as medical advice for individual conditions or treatments  Talk to your doctor, nurse or pharmacist before following any medical regimen to see if it is safe and effective for you

## 2020-09-10 NOTE — ANESTHESIA POSTPROCEDURE EVALUATION
Post-Op Assessment Note    CV Status:  Stable    Pain management: adequate     Mental Status:  Alert and awake   Hydration Status:  Euvolemic   PONV Controlled:  Controlled   Airway Patency:  Patent      Post Op Vitals Reviewed: Yes      Staff: Anesthesiologist       Vitals:    09/10/20 1159   BP: 145/64   Pulse: 55   Resp: 12   Temp:    SpO2: 626%     No complications documented

## 2020-09-11 NOTE — TELEPHONE ENCOUNTER
----- Message from Alejandro Aguilar MD sent at 9/10/2020 11:46 AM EDT -----  Please schedule EGD in 3 months  Thank you  Procedure ordered

## 2020-09-17 NOTE — PROGRESS NOTES
Assessment/Plan:  - Patient is in need of a hospital bed because he has history of stroke, history of recent hip fracture s/p surgery, and dysphagia  Patient requires elevation of bed greater than 30 degrees to prevent aspiration and needs frequent repositioning to alleviate pain and discomfort and prevent skin breakdown  Pillows/wedges were considered and ruled out  History of CVA/History of left hip fracture  - Will provide updated referral to physical therapy and occupational therapy  Esophageal dysphagia  - Continue follow-up with gastroenterology as scheduled  - Continue Protonix twice daily and Carafate 1 g, 4 times daily  End-stage renal disease on hemodialysis  - Continue dialysis as scheduled on Monday, Wednesday, Friday  Diagnoses and all orders for this visit:    History of CVA (cerebrovascular accident)  -     Bed  -     Ambulatory referral to Physical Therapy; Future  -     Ambulatory referral to Occupational Therapy; Future    Need for vaccination    Esophageal dysphagia    Closed nondisplaced intertrochanteric fracture of left femur with routine healing, subsequent encounter  -     Bed  -     Ambulatory referral to Physical Therapy; Future  -     Ambulatory referral to Occupational Therapy; Future    Ambulatory dysfunction  -     Ambulatory referral to Physical Therapy; Future  -     Ambulatory referral to Occupational Therapy; Future    Other orders  -     Cancel: Microalbumin / creatinine urine ratio  -     Cancel: influenza vaccine, quadrivalent, recombinant, PF, 0 5 mL, for patients 18 yr+ (FLUBLOK)          All of patients questions were answered  Patient understands and agrees with the above plan  Return in about 3 months (around 12/17/2020) for Next scheduled follow up T2DM - virtual visit with PCP, Dr Hildreth Paradise Bonnee Favre, PA-C  09/17/20  ZANDRA [facility]iko           Subjective:     Patient ID: Elliot Sandoval  is a 64 y o  male with known PMH of GERD, esophageal dysphagia, type 2 diabetes mellitus, hypertension, stage 5 chronic kidney disease on dialysis, history of CVA with residual weakness of left side, anxiety, history of fracture left femur status post insertion nail IM femur antegrade who presents today in office for follow-up of chronic conditions and evaluation for hospital bed  Patient is accompanied today by a family member, [facility]     - Patient is a 64 y o  male who presents today for follow-up of chronic conditions and evaluation for hospital bed  End-stage renal disease on hemodialysis:  Patient tends hemodialysis on Monday, Wednesday, Friday  Patient was previously receiving dialysis through tunneled IJ catheter  Patient then had AV graft placed of left upper extremity  Unfortunately, his graft occluded in the early postoperative period, likely due to small axillary vein  Patient then had right upper extremity AV graft placed, this has now been working well  Esophageal dysphagia:  Follows with Gastroenterology  Patient had recent EGD completed which revealed severe esophagitis  Patient is to be taking PPI twice daily and Carafate  Patient will be scheduled for repeat EGD in 3 months  Patient continues to complain of difficulty swallowing, solids more than liquids  History of CVA with residual left-sided weakness:   Currently, patient is only using the wheelchair for assistance with ambulation  Patient is not able to walk on his own due to weakness, but also due to fear of falling  Patient would like to try physical therapy again  Patient would like to be seen at [facility] near the [facility]     - Patient suffered a fall in April 2020 which resulted in an intertrochanteric fracture of the left hip without significant displacement  Patient is now status post insertion nail IM femur antegrade     - Patient and caregiver are requesting a script for a hospital bed       The following portions of the patient's history were reviewed and updated as appropriate: allergies, current medications, past family history, past medical history, past social history, past surgical history and problem list         Review of Systems   Constitutional: Negative for chills and fever  HENT: Negative for congestion and sore throat  Eyes: Negative for pain  Respiratory: Negative for cough, chest tightness and shortness of breath  Cardiovascular: Negative for chest pain and palpitations  Gastrointestinal: Negative for abdominal pain, constipation, diarrhea, nausea and vomiting  Genitourinary: Negative for difficulty urinating and dysuria  Musculoskeletal: Positive for arthralgias  Neurological: Positive for weakness (residual left sided weakness)  Negative for dizziness and headaches  Psychiatric/Behavioral: The patient is nervous/anxious (nervous to walk due to fear of falling)  Objective:   Vitals:    09/17/20 1403   BP: 102/68   Pulse: 78   Temp: (!) 97 3 °F (36 3 °C)   TempSrc: Temporal   SpO2: 98%         Physical Exam  Vitals signs and nursing note reviewed  Constitutional:       Appearance: He is well-developed  Comments: Patient examined in wheelchair  HENT:      Head: Normocephalic  Right Ear: External ear normal       Left Ear: External ear normal       Nose: Nose normal    Eyes:      Conjunctiva/sclera: Conjunctivae normal    Neck:      Musculoskeletal: Normal range of motion and neck supple  Cardiovascular:      Rate and Rhythm: Normal rate and regular rhythm  Heart sounds: Normal heart sounds  Pulmonary:      Effort: Pulmonary effort is normal       Breath sounds: Normal breath sounds  Musculoskeletal:        Arms:    Skin:     General: Skin is dry  Neurological:      Mental Status: He is alert and oriented to person, place, and time     Psychiatric:         Mood and Affect: Mood normal          Behavior: Behavior normal

## 2020-09-21 NOTE — TELEPHONE ENCOUNTER
PT/OT orders, face sheet and OV notes faxed to [facility] [phone]  Fax confirmation received

## 2020-09-21 NOTE — TELEPHONE ENCOUNTER
----- Message from Bon Gongora PA-C sent at 9/18/2020  5:07 PM EDT -----  Regarding: PT/OT orders  Hi! Patient would like to start physical and occupational therapy at [facility] on [facility] near the [facility] center  Please fax order to them  Thanks!

## 2020-12-31 NOTE — TELEPHONE ENCOUNTER
Pt called stating their VV was but short because they were not able to connect via video  Pt states PCP stated they will call back via regular telephone call       Please advice

## 2021-01-01 ENCOUNTER — HOSPITAL ENCOUNTER (OUTPATIENT)
Dept: GASTROENTEROLOGY | Facility: HOSPITAL | Age: 61
Setting detail: OUTPATIENT SURGERY
Discharge: HOME | End: 2021-02-04
Attending: INTERNAL MEDICINE
Payer: COMMERCIAL

## 2021-01-01 ENCOUNTER — APPOINTMENT (INPATIENT)
Dept: RADIOLOGY | Facility: HOSPITAL | Age: 61
DRG: 871 | End: 2021-01-01
Payer: COMMERCIAL

## 2021-01-01 ENCOUNTER — TELEPHONE (OUTPATIENT)
Dept: OTHER | Facility: OTHER | Age: 61
End: 2021-01-01

## 2021-01-01 ENCOUNTER — APPOINTMENT (INPATIENT)
Dept: CT IMAGING | Facility: HOSPITAL | Age: 61
DRG: 871 | End: 2021-01-01
Payer: COMMERCIAL

## 2021-01-01 ENCOUNTER — APPOINTMENT (EMERGENCY)
Dept: RADIOLOGY | Facility: HOSPITAL | Age: 61
DRG: 871 | End: 2021-01-01
Payer: COMMERCIAL

## 2021-01-01 ENCOUNTER — APPOINTMENT (INPATIENT)
Dept: DIALYSIS | Facility: HOSPITAL | Age: 61
DRG: 871 | End: 2021-01-01
Payer: COMMERCIAL

## 2021-01-01 ENCOUNTER — TELEPHONE (OUTPATIENT)
Dept: FAMILY MEDICINE CLINIC | Facility: CLINIC | Age: 61
End: 2021-01-01

## 2021-01-01 ENCOUNTER — TELEMEDICINE (OUTPATIENT)
Dept: FAMILY MEDICINE CLINIC | Facility: CLINIC | Age: 61
End: 2021-01-01

## 2021-01-01 ENCOUNTER — TRANSCRIBE ORDERS (OUTPATIENT)
Dept: ADMINISTRATIVE | Facility: HOSPITAL | Age: 61
End: 2021-01-01

## 2021-01-01 ENCOUNTER — APPOINTMENT (INPATIENT)
Dept: NEUROLOGY | Facility: HOSPITAL | Age: 61
DRG: 871 | End: 2021-01-01
Payer: COMMERCIAL

## 2021-01-01 ENCOUNTER — LAB (OUTPATIENT)
Dept: LAB | Facility: HOSPITAL | Age: 61
End: 2021-01-01
Payer: COMMERCIAL

## 2021-01-01 ENCOUNTER — HOSPITAL ENCOUNTER (INPATIENT)
Facility: HOSPITAL | Age: 61
LOS: 5 days | DRG: 871 | End: 2021-02-11
Attending: EMERGENCY MEDICINE | Admitting: INTERNAL MEDICINE
Payer: COMMERCIAL

## 2021-01-01 ENCOUNTER — APPOINTMENT (INPATIENT)
Dept: NUCLEAR MEDICINE | Facility: HOSPITAL | Age: 61
DRG: 871 | End: 2021-01-01
Payer: COMMERCIAL

## 2021-01-01 ENCOUNTER — APPOINTMENT (INPATIENT)
Dept: NON INVASIVE DIAGNOSTICS | Facility: HOSPITAL | Age: 61
DRG: 871 | End: 2021-01-01
Payer: COMMERCIAL

## 2021-01-01 VITALS
RESPIRATION RATE: 14 BRPM | OXYGEN SATURATION: 97 % | TEMPERATURE: 98.1 F | SYSTOLIC BLOOD PRESSURE: 109 MMHG | HEART RATE: 71 BPM | DIASTOLIC BLOOD PRESSURE: 57 MMHG

## 2021-01-01 VITALS — HEART RATE: 75 BPM
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
## 2021-01-01 DIAGNOSIS — Z99.2 ESRD ON HEMODIALYSIS (HCC): ICD-10-CM

## 2021-01-01 DIAGNOSIS — R13.12 OROPHARYNGEAL DYSPHAGIA: Primary | ICD-10-CM

## 2021-01-01 DIAGNOSIS — N18.5 TYPE 2 DIABETES MELLITUS WITH STAGE 5 CHRONIC KIDNEY DISEASE NOT ON CHRONIC DIALYSIS, WITH LONG-TERM CURRENT USE OF INSULIN (HCC): ICD-10-CM

## 2021-01-01 DIAGNOSIS — R26.2 AMBULATORY DYSFUNCTION: Primary | ICD-10-CM

## 2021-01-01 DIAGNOSIS — Z86.73 HISTORY OF CVA (CEREBROVASCULAR ACCIDENT): Primary | ICD-10-CM

## 2021-01-01 DIAGNOSIS — Z79.4 TYPE 2 DIABETES MELLITUS WITH STAGE 5 CHRONIC KIDNEY DISEASE NOT ON CHRONIC DIALYSIS, WITH LONG-TERM CURRENT USE OF INSULIN (HCC): Primary | ICD-10-CM

## 2021-01-01 DIAGNOSIS — N18.5 TYPE 2 DIABETES MELLITUS WITH STAGE 5 CHRONIC KIDNEY DISEASE NOT ON CHRONIC DIALYSIS, WITH LONG-TERM CURRENT USE OF INSULIN (HCC): Primary | ICD-10-CM

## 2021-01-01 DIAGNOSIS — R26.9 GAIT DISTURBANCE: ICD-10-CM

## 2021-01-01 DIAGNOSIS — R41.82 ALTERED MENTAL STATUS: ICD-10-CM

## 2021-01-01 DIAGNOSIS — G93.1 ANOXIC BRAIN INJURY (HCC): ICD-10-CM

## 2021-01-01 DIAGNOSIS — Z79.4 TYPE 2 DIABETES MELLITUS WITH STAGE 5 CHRONIC KIDNEY DISEASE NOT ON CHRONIC DIALYSIS, WITH LONG-TERM CURRENT USE OF INSULIN (HCC): ICD-10-CM

## 2021-01-01 DIAGNOSIS — D69.6 THROMBOCYTOPENIA (HCC): ICD-10-CM

## 2021-01-01 DIAGNOSIS — R11.2 NAUSEA AND VOMITING: ICD-10-CM

## 2021-01-01 DIAGNOSIS — E11.22 TYPE 2 DIABETES MELLITUS WITH STAGE 5 CHRONIC KIDNEY DISEASE NOT ON CHRONIC DIALYSIS, WITH LONG-TERM CURRENT USE OF INSULIN (HCC): ICD-10-CM

## 2021-01-01 DIAGNOSIS — R26.2 AMBULATORY DYSFUNCTION: ICD-10-CM

## 2021-01-01 DIAGNOSIS — Z11.59 ENCOUNTER FOR HEPATITIS C SCREENING TEST FOR LOW RISK PATIENT: ICD-10-CM

## 2021-01-01 DIAGNOSIS — J96.00 ACUTE RESPIRATORY FAILURE (HCC): ICD-10-CM

## 2021-01-01 DIAGNOSIS — I95.9 LOW BLOOD PRESSURE: Primary | ICD-10-CM

## 2021-01-01 DIAGNOSIS — N18.6 ESRD ON HEMODIALYSIS (HCC): ICD-10-CM

## 2021-01-01 DIAGNOSIS — D72.829 LEUKOCYTOSIS: ICD-10-CM

## 2021-01-01 DIAGNOSIS — R73.9 HYPERGLYCEMIA: Primary | ICD-10-CM

## 2021-01-01 DIAGNOSIS — A41.9 SEPSIS (HCC): ICD-10-CM

## 2021-01-01 DIAGNOSIS — I63.9 CEREBRAL INFARCTION, UNSPECIFIED MECHANISM (HCC): ICD-10-CM

## 2021-01-01 DIAGNOSIS — Z86.73 HISTORY OF CVA (CEREBROVASCULAR ACCIDENT): ICD-10-CM

## 2021-01-01 DIAGNOSIS — R29.90 STROKE-LIKE SYMPTOMS: ICD-10-CM

## 2021-01-01 DIAGNOSIS — E11.22 TYPE 2 DIABETES MELLITUS WITH STAGE 5 CHRONIC KIDNEY DISEASE NOT ON CHRONIC DIALYSIS, WITH LONG-TERM CURRENT USE OF INSULIN (HCC): Primary | ICD-10-CM

## 2021-01-01 DIAGNOSIS — K59.01 SLOW TRANSIT CONSTIPATION: ICD-10-CM

## 2021-01-01 DIAGNOSIS — K20.90 ESOPHAGITIS: ICD-10-CM

## 2021-01-01 LAB
ABO GROUP BLD: NORMAL
ALBUMIN SERPL BCP-MCNC: 1.5 G/DL (ref 3.5–5)
ALBUMIN SERPL BCP-MCNC: 1.5 G/DL (ref 3.5–5)
ALBUMIN SERPL BCP-MCNC: 1.6 G/DL (ref 3.5–5)
ALBUMIN SERPL BCP-MCNC: 2 G/DL (ref 3.5–5)
ALBUMIN SERPL BCP-MCNC: 2.4 G/DL (ref 3.5–5)
ALP SERPL-CCNC: 123 U/L (ref 46–116)
ALP SERPL-CCNC: 156 U/L (ref 46–116)
ALP SERPL-CCNC: 94 U/L (ref 46–116)
ALP SERPL-CCNC: 94 U/L (ref 46–116)
ALP SERPL-CCNC: 97 U/L (ref 46–116)
ALT SERPL W P-5'-P-CCNC: 11 U/L (ref 12–78)
ALT SERPL W P-5'-P-CCNC: 13 U/L (ref 12–78)
ALT SERPL W P-5'-P-CCNC: 66 U/L (ref 12–78)
ALT SERPL W P-5'-P-CCNC: 69 U/L (ref 12–78)
ALT SERPL W P-5'-P-CCNC: 75 U/L (ref 12–78)
AMYLASE SERPL-CCNC: 23 IU/L (ref 25–115)
AMYLASE SERPL-CCNC: 28 IU/L (ref 25–115)
AMYLASE SERPL-CCNC: 34 IU/L (ref 25–115)
ANION GAP SERPL CALCULATED.3IONS-SCNC: 11 MMOL/L (ref 4–13)
ANION GAP SERPL CALCULATED.3IONS-SCNC: 13 MMOL/L (ref 4–13)
ANION GAP SERPL CALCULATED.3IONS-SCNC: 15 MMOL/L (ref 4–13)
ANION GAP SERPL CALCULATED.3IONS-SCNC: 15 MMOL/L (ref 4–13)
ANION GAP SERPL CALCULATED.3IONS-SCNC: 17 MMOL/L (ref 4–13)
ANION GAP SERPL CALCULATED.3IONS-SCNC: 18 MMOL/L (ref 4–13)
ANION GAP SERPL CALCULATED.3IONS-SCNC: 20 MMOL/L (ref 4–13)
APTT PPP: 55 SECONDS (ref 23–37)
APTT PPP: 64 SECONDS (ref 23–37)
APTT PPP: 70 SECONDS (ref 23–37)
ARTERIAL PATENCY WRIST A: YES
ARTERIAL PATENCY WRIST A: YES
AST SERPL W P-5'-P-CCNC: 10 U/L (ref 5–45)
AST SERPL W P-5'-P-CCNC: 115 U/L (ref 5–45)
AST SERPL W P-5'-P-CCNC: 156 U/L (ref 5–45)
AST SERPL W P-5'-P-CCNC: 22 U/L (ref 5–45)
AST SERPL W P-5'-P-CCNC: 79 U/L (ref 5–45)
ATRIAL RATE: 101 BPM
ATRIAL RATE: 107 BPM
ATRIAL RATE: 95 BPM
BACTERIA BLD CULT: ABNORMAL
BACTERIA BLD CULT: ABNORMAL
BASE EX.OXY STD BLDV CALC-SCNC: 81.6 % (ref 60–80)
BASE EXCESS BLDA CALC-SCNC: -0.2 MMOL/L
BASE EXCESS BLDA CALC-SCNC: -4.1 MMOL/L
BASE EXCESS BLDA CALC-SCNC: 1.1 MMOL/L
BASE EXCESS BLDA CALC-SCNC: 2.1 MMOL/L
BASE EXCESS BLDV CALC-SCNC: 3.7 MMOL/L
BASOPHILS # BLD AUTO: 0.03 THOUSANDS/ΜL (ref 0–0.1)
BASOPHILS # BLD AUTO: 0.04 THOUSANDS/ΜL (ref 0–0.1)
BASOPHILS # BLD MANUAL: 0 THOUSAND/UL (ref 0–0.1)
BASOPHILS # BLD MANUAL: 0 THOUSAND/UL (ref 0–0.1)
BASOPHILS NFR BLD AUTO: 0 % (ref 0–1)
BASOPHILS NFR BLD AUTO: 0 % (ref 0–1)
BASOPHILS NFR MAR MANUAL: 0 % (ref 0–1)
BASOPHILS NFR MAR MANUAL: 0 % (ref 0–1)
BETA-HYDROXYBUTYRATE: 0.4 MMOL/L
BILIRUB DIRECT SERPL-MCNC: 0.45 MG/DL (ref 0–0.2)
BILIRUB DIRECT SERPL-MCNC: 0.59 MG/DL (ref 0–0.2)
BILIRUB SERPL-MCNC: 0.78 MG/DL (ref 0.2–1)
BILIRUB SERPL-MCNC: 0.84 MG/DL (ref 0.2–1)
BILIRUB SERPL-MCNC: 0.9 MG/DL (ref 0.2–1)
BILIRUB SERPL-MCNC: 0.94 MG/DL (ref 0.2–1)
BILIRUB SERPL-MCNC: 0.98 MG/DL (ref 0.2–1)
BLD GP AB SCN SERPL QL: NEGATIVE
BUN SERPL-MCNC: 106 MG/DL (ref 5–25)
BUN SERPL-MCNC: 111 MG/DL (ref 5–25)
BUN SERPL-MCNC: 51 MG/DL (ref 5–25)
BUN SERPL-MCNC: 62 MG/DL (ref 5–25)
BUN SERPL-MCNC: 67 MG/DL (ref 5–25)
BUN SERPL-MCNC: 72 MG/DL (ref 5–25)
BUN SERPL-MCNC: 75 MG/DL (ref 5–25)
BUN SERPL-MCNC: 78 MG/DL (ref 5–25)
BUN SERPL-MCNC: 81 MG/DL (ref 5–25)
CA-I BLD-SCNC: 0.9 MMOL/L (ref 1.12–1.32)
CA-I BLD-SCNC: 1.08 MMOL/L (ref 1.12–1.32)
CA-I BLD-SCNC: 1.11 MMOL/L (ref 1.12–1.32)
CALCIUM ALBUM COR SERPL-MCNC: 10.8 MG/DL (ref 8.3–10.1)
CALCIUM ALBUM COR SERPL-MCNC: 10.8 MG/DL (ref 8.3–10.1)
CALCIUM ALBUM COR SERPL-MCNC: 11 MG/DL (ref 8.3–10.1)
CALCIUM ALBUM COR SERPL-MCNC: 11 MG/DL (ref 8.3–10.1)
CALCIUM SERPL-MCNC: 8.8 MG/DL (ref 8.3–10.1)
CALCIUM SERPL-MCNC: 8.8 MG/DL (ref 8.3–10.1)
CALCIUM SERPL-MCNC: 9.1 MG/DL (ref 8.3–10.1)
CALCIUM SERPL-MCNC: 9.2 MG/DL (ref 8.3–10.1)
CALCIUM SERPL-MCNC: 9.4 MG/DL (ref 8.3–10.1)
CALCIUM SERPL-MCNC: 9.5 MG/DL (ref 8.3–10.1)
CALCIUM SERPL-MCNC: 9.8 MG/DL (ref 8.3–10.1)
CHLORIDE SERPL-SCNC: 100 MMOL/L (ref 100–108)
CHLORIDE SERPL-SCNC: 101 MMOL/L (ref 100–108)
CHLORIDE SERPL-SCNC: 101 MMOL/L (ref 100–108)
CHLORIDE SERPL-SCNC: 102 MMOL/L (ref 100–108)
CHLORIDE SERPL-SCNC: 90 MMOL/L (ref 100–108)
CHLORIDE SERPL-SCNC: 95 MMOL/L (ref 100–108)
CHLORIDE SERPL-SCNC: 96 MMOL/L (ref 100–108)
CHLORIDE SERPL-SCNC: 98 MMOL/L (ref 100–108)
CHLORIDE SERPL-SCNC: 99 MMOL/L (ref 100–108)
CHOLEST SERPL-MCNC: 100 MG/DL (ref 50–200)
CHOLEST SERPL-MCNC: 124 MG/DL (ref 50–200)
CO2 SERPL-SCNC: 22 MMOL/L (ref 21–32)
CO2 SERPL-SCNC: 23 MMOL/L (ref 21–32)
CO2 SERPL-SCNC: 24 MMOL/L (ref 21–32)
CO2 SERPL-SCNC: 24 MMOL/L (ref 21–32)
CO2 SERPL-SCNC: 26 MMOL/L (ref 21–32)
CO2 SERPL-SCNC: 29 MMOL/L (ref 21–32)
CO2 SERPL-SCNC: 29 MMOL/L (ref 21–32)
CREAT SERPL-MCNC: 6.16 MG/DL (ref 0.6–1.3)
CREAT SERPL-MCNC: 6.43 MG/DL (ref 0.6–1.3)
CREAT SERPL-MCNC: 6.46 MG/DL (ref 0.6–1.3)
CREAT SERPL-MCNC: 6.77 MG/DL (ref 0.6–1.3)
CREAT SERPL-MCNC: 6.99 MG/DL (ref 0.6–1.3)
CREAT SERPL-MCNC: 7.41 MG/DL (ref 0.6–1.3)
CREAT SERPL-MCNC: 7.73 MG/DL (ref 0.6–1.3)
CREAT SERPL-MCNC: 9.01 MG/DL (ref 0.6–1.3)
CREAT SERPL-MCNC: 9.35 MG/DL (ref 0.6–1.3)
EOSINOPHIL # BLD AUTO: 0 THOUSAND/ΜL (ref 0–0.61)
EOSINOPHIL # BLD AUTO: 0 THOUSAND/ΜL (ref 0–0.61)
EOSINOPHIL # BLD MANUAL: 0 THOUSAND/UL (ref 0–0.4)
EOSINOPHIL # BLD MANUAL: 0 THOUSAND/UL (ref 0–0.4)
EOSINOPHIL NFR BLD AUTO: 0 % (ref 0–6)
EOSINOPHIL NFR BLD AUTO: 0 % (ref 0–6)
EOSINOPHIL NFR BLD MANUAL: 0 % (ref 0–6)
EOSINOPHIL NFR BLD MANUAL: 0 % (ref 0–6)
ERYTHROCYTE [DISTWIDTH] IN BLOOD BY AUTOMATED COUNT: 13.2 % (ref 11.6–15.1)
ERYTHROCYTE [DISTWIDTH] IN BLOOD BY AUTOMATED COUNT: 13.5 % (ref 11.6–15.1)
ERYTHROCYTE [DISTWIDTH] IN BLOOD BY AUTOMATED COUNT: 13.6 % (ref 11.6–15.1)
ERYTHROCYTE [DISTWIDTH] IN BLOOD BY AUTOMATED COUNT: 13.7 % (ref 11.6–15.1)
ERYTHROCYTE [DISTWIDTH] IN BLOOD BY AUTOMATED COUNT: 14.1 % (ref 11.6–15.1)
ERYTHROCYTE [DISTWIDTH] IN BLOOD BY AUTOMATED COUNT: 14.4 % (ref 11.6–15.1)
EST. AVERAGE GLUCOSE BLD GHB EST-MCNC: 180 MG/DL
EST. AVERAGE GLUCOSE BLD GHB EST-MCNC: 186 MG/DL
FLUAV RNA RESP QL NAA+PROBE: NEGATIVE
FLUBV RNA RESP QL NAA+PROBE: NEGATIVE
GFR SERPL CREATININE-BSD FRML MDRD: 10 ML/MIN/1.73SQ M
GFR SERPL CREATININE-BSD FRML MDRD: 10 ML/MIN/1.73SQ M
GFR SERPL CREATININE-BSD FRML MDRD: 11 ML/MIN/1.73SQ M
GFR SERPL CREATININE-BSD FRML MDRD: 6 ML/MIN/1.73SQ M
GFR SERPL CREATININE-BSD FRML MDRD: 7 ML/MIN/1.73SQ M
GFR SERPL CREATININE-BSD FRML MDRD: 8 ML/MIN/1.73SQ M
GFR SERPL CREATININE-BSD FRML MDRD: 8 ML/MIN/1.73SQ M
GFR SERPL CREATININE-BSD FRML MDRD: 9 ML/MIN/1.73SQ M
GFR SERPL CREATININE-BSD FRML MDRD: 9 ML/MIN/1.73SQ M
GLUCOSE P FAST SERPL-MCNC: 283 MG/DL (ref 65–99)
GLUCOSE SERPL-MCNC: 133 MG/DL (ref 65–140)
GLUCOSE SERPL-MCNC: 169 MG/DL (ref 65–140)
GLUCOSE SERPL-MCNC: 176 MG/DL (ref 65–140)
GLUCOSE SERPL-MCNC: 181 MG/DL (ref 65–140)
GLUCOSE SERPL-MCNC: 184 MG/DL (ref 65–140)
GLUCOSE SERPL-MCNC: 188 MG/DL (ref 65–140)
GLUCOSE SERPL-MCNC: 192 MG/DL (ref 65–140)
GLUCOSE SERPL-MCNC: 211 MG/DL (ref 65–140)
GLUCOSE SERPL-MCNC: 214 MG/DL (ref 65–140)
GLUCOSE SERPL-MCNC: 217 MG/DL (ref 65–140)
GLUCOSE SERPL-MCNC: 231 MG/DL (ref 65–140)
GLUCOSE SERPL-MCNC: 232 MG/DL (ref 65–140)
GLUCOSE SERPL-MCNC: 236 MG/DL (ref 65–140)
GLUCOSE SERPL-MCNC: 238 MG/DL (ref 65–140)
GLUCOSE SERPL-MCNC: 241 MG/DL (ref 65–140)
GLUCOSE SERPL-MCNC: 241 MG/DL (ref 65–140)
GLUCOSE SERPL-MCNC: 242 MG/DL (ref 65–140)
GLUCOSE SERPL-MCNC: 246 MG/DL (ref 65–140)
GLUCOSE SERPL-MCNC: 263 MG/DL (ref 65–140)
GLUCOSE SERPL-MCNC: 263 MG/DL (ref 65–140)
GLUCOSE SERPL-MCNC: 271 MG/DL (ref 65–140)
GLUCOSE SERPL-MCNC: 272 MG/DL (ref 65–140)
GLUCOSE SERPL-MCNC: 276 MG/DL (ref 65–140)
GLUCOSE SERPL-MCNC: 282 MG/DL (ref 65–140)
GLUCOSE SERPL-MCNC: 297 MG/DL (ref 65–140)
GLUCOSE SERPL-MCNC: 309 MG/DL (ref 65–140)
GLUCOSE SERPL-MCNC: 327 MG/DL (ref 65–140)
GLUCOSE SERPL-MCNC: 328 MG/DL (ref 65–140)
GLUCOSE SERPL-MCNC: 35 MG/DL (ref 65–140)
GLUCOSE SERPL-MCNC: 352 MG/DL (ref 65–140)
GLUCOSE SERPL-MCNC: 39 MG/DL (ref 65–140)
GLUCOSE SERPL-MCNC: 404 MG/DL (ref 65–140)
GLUCOSE SERPL-MCNC: 47 MG/DL (ref 65–140)
GLUCOSE SERPL-MCNC: 475 MG/DL (ref 65–140)
GLUCOSE SERPL-MCNC: 480 MG/DL (ref 65–140)
GLUCOSE SERPL-MCNC: 616 MG/DL (ref 65–140)
GLUCOSE SERPL-MCNC: 77 MG/DL (ref 65–140)
GLUCOSE SERPL-MCNC: >500 MG/DL (ref 65–140)
GLUCOSE SERPL-MCNC: >500 MG/DL (ref 65–140)
GRAM STN SPEC: ABNORMAL
GRAM STN SPEC: ABNORMAL
HBA1C MFR BLD: 7.9 %
HBA1C MFR BLD: 8.1 %
HCO3 BLDA-SCNC: 19.1 MMOL/L (ref 22–28)
HCO3 BLDA-SCNC: 20.7 MMOL/L (ref 22–28)
HCO3 BLDA-SCNC: 23 MMOL/L (ref 22–28)
HCO3 BLDA-SCNC: 25.9 MMOL/L (ref 22–28)
HCO3 BLDV-SCNC: 29.2 MMOL/L (ref 24–30)
HCT VFR BLD AUTO: 27.3 % (ref 36.5–49.3)
HCT VFR BLD AUTO: 27.9 % (ref 36.5–49.3)
HCT VFR BLD AUTO: 29.3 % (ref 36.5–49.3)
HCT VFR BLD AUTO: 36.5 % (ref 36.5–49.3)
HCT VFR BLD AUTO: 36.5 % (ref 36.5–49.3)
HCT VFR BLD AUTO: 39.2 % (ref 36.5–49.3)
HDLC SERPL-MCNC: 13 MG/DL
HDLC SERPL-MCNC: 9 MG/DL
HGB BLD-MCNC: 11.9 G/DL (ref 12–17)
HGB BLD-MCNC: 12.5 G/DL (ref 12–17)
HGB BLD-MCNC: 8.8 G/DL (ref 12–17)
HGB BLD-MCNC: 9 G/DL (ref 12–17)
HGB BLD-MCNC: 9 G/DL (ref 12–17)
HGB BLD-MCNC: 9.7 G/DL (ref 12–17)
HOROWITZ INDEX BLDA+IHG-RTO: 100 MM[HG]
IMM GRANULOCYTES # BLD AUTO: 0.23 THOUSAND/UL (ref 0–0.2)
IMM GRANULOCYTES # BLD AUTO: 0.31 THOUSAND/UL (ref 0–0.2)
IMM GRANULOCYTES NFR BLD AUTO: 1 % (ref 0–2)
IMM GRANULOCYTES NFR BLD AUTO: 2 % (ref 0–2)
INR PPP: 1.76 (ref 0.84–1.19)
INR PPP: 2.73 (ref 0.84–1.19)
INR PPP: 2.82 (ref 0.84–1.19)
LACTATE SERPL-SCNC: 1.2 MMOL/L (ref 0.5–2)
LACTATE SERPL-SCNC: 1.7 MMOL/L (ref 0.5–2)
LACTATE SERPL-SCNC: 2.4 MMOL/L (ref 0.5–2)
LDLC SERPL CALC-MCNC: 53 MG/DL (ref 0–100)
LDLC SERPL CALC-MCNC: 55 MG/DL (ref 0–100)
LG PLATELETS BLD QL SMEAR: PRESENT
LG PLATELETS BLD QL SMEAR: PRESENT
LIPASE SERPL-CCNC: 48 U/L (ref 73–393)
LIPASE SERPL-CCNC: 58 U/L (ref 73–393)
LIPASE SERPL-CCNC: 63 U/L (ref 73–393)
LIPASE SERPL-CCNC: 71 U/L (ref 73–393)
LYMPHOCYTES # BLD AUTO: 0.46 THOUSAND/UL (ref 0.6–4.47)
LYMPHOCYTES # BLD AUTO: 1.09 THOUSANDS/ΜL (ref 0.6–4.47)
LYMPHOCYTES # BLD AUTO: 1.26 THOUSAND/UL (ref 0.6–4.47)
LYMPHOCYTES # BLD AUTO: 1.27 THOUSANDS/ΜL (ref 0.6–4.47)
LYMPHOCYTES # BLD AUTO: 2 % (ref 14–44)
LYMPHOCYTES # BLD AUTO: 5 % (ref 14–44)
LYMPHOCYTES NFR BLD AUTO: 6 % (ref 14–44)
LYMPHOCYTES NFR BLD AUTO: 7 % (ref 14–44)
MAGNESIUM SERPL-MCNC: 1.9 MG/DL (ref 1.6–2.6)
MAGNESIUM SERPL-MCNC: 2 MG/DL (ref 1.6–2.6)
MAGNESIUM SERPL-MCNC: 2.3 MG/DL (ref 1.6–2.6)
MAGNESIUM SERPL-MCNC: 2.5 MG/DL (ref 1.6–2.6)
MCH RBC QN AUTO: 29 PG (ref 26.8–34.3)
MCH RBC QN AUTO: 31.5 PG (ref 26.8–34.3)
MCH RBC QN AUTO: 31.6 PG (ref 26.8–34.3)
MCH RBC QN AUTO: 31.6 PG (ref 26.8–34.3)
MCH RBC QN AUTO: 31.9 PG (ref 26.8–34.3)
MCH RBC QN AUTO: 32.1 PG (ref 26.8–34.3)
MCHC RBC AUTO-ENTMCNC: 24.7 G/DL (ref 31.4–37.4)
MCHC RBC AUTO-ENTMCNC: 31.9 G/DL (ref 31.4–37.4)
MCHC RBC AUTO-ENTMCNC: 32.2 G/DL (ref 31.4–37.4)
MCHC RBC AUTO-ENTMCNC: 32.3 G/DL (ref 31.4–37.4)
MCHC RBC AUTO-ENTMCNC: 32.6 G/DL (ref 31.4–37.4)
MCHC RBC AUTO-ENTMCNC: 33.1 G/DL (ref 31.4–37.4)
MCV RBC AUTO: 101 FL (ref 82–98)
MCV RBC AUTO: 118 FL (ref 82–98)
MCV RBC AUTO: 95 FL (ref 82–98)
MCV RBC AUTO: 97 FL (ref 82–98)
MCV RBC AUTO: 98 FL (ref 82–98)
MCV RBC AUTO: 99 FL (ref 82–98)
MONOCYTES # BLD AUTO: 1.16 THOUSAND/ΜL (ref 0.17–1.22)
MONOCYTES # BLD AUTO: 1.4 THOUSAND/ΜL (ref 0.17–1.22)
MONOCYTES # BLD AUTO: 1.51 THOUSAND/UL (ref 0–1.22)
MONOCYTES # BLD AUTO: 1.83 THOUSAND/UL (ref 0–1.22)
MONOCYTES NFR BLD AUTO: 7 % (ref 4–12)
MONOCYTES NFR BLD AUTO: 8 % (ref 4–12)
MONOCYTES NFR BLD: 6 % (ref 4–12)
MONOCYTES NFR BLD: 8 % (ref 4–12)
NEUTROPHILS # BLD AUTO: 14.16 THOUSANDS/ΜL (ref 1.85–7.62)
NEUTROPHILS # BLD AUTO: 14.65 THOUSANDS/ΜL (ref 1.85–7.62)
NEUTROPHILS # BLD MANUAL: 20.55 THOUSAND/UL (ref 1.85–7.62)
NEUTROPHILS # BLD MANUAL: 22.38 THOUSAND/UL (ref 1.85–7.62)
NEUTS BAND NFR BLD MANUAL: 2 % (ref 0–8)
NEUTS BAND NFR BLD MANUAL: 3 % (ref 0–8)
NEUTS SEG NFR BLD AUTO: 84 % (ref 43–75)
NEUTS SEG NFR BLD AUTO: 85 % (ref 43–75)
NEUTS SEG NFR BLD AUTO: 87 % (ref 43–75)
NEUTS SEG NFR BLD AUTO: 87 % (ref 43–75)
NON VENT TYPE- NRB: 100
NONHDLC SERPL-MCNC: 111 MG/DL
NRBC BLD AUTO-RTO: 0 /100 WBCS
O2 CT BLDA-SCNC: 14.7 ML/DL (ref 16–23)
O2 CT BLDA-SCNC: 17.7 ML/DL (ref 16–23)
O2 CT BLDA-SCNC: 18.8 ML/DL (ref 16–23)
O2 CT BLDA-SCNC: 21.6 ML/DL (ref 16–23)
O2 CT BLDV-SCNC: 13 ML/DL
OVALOCYTES BLD QL SMEAR: PRESENT
OVALOCYTES BLD QL SMEAR: PRESENT
OXYHGB MFR BLDA: 98.2 % (ref 94–97)
OXYHGB MFR BLDA: 98.5 % (ref 94–97)
OXYHGB MFR BLDA: 98.6 % (ref 94–97)
OXYHGB MFR BLDA: 98.6 % (ref 94–97)
P AXIS: 69 DEGREES
P AXIS: 7 DEGREES
PCO2 BLDA: 25.2 MM HG (ref 36–44)
PCO2 BLDA: 28.2 MM HG (ref 36–44)
PCO2 BLDA: 28.8 MM HG (ref 36–44)
PCO2 BLDA: 37.6 MM HG (ref 36–44)
PCO2 BLDV: 48.3 MM HG (ref 42–50)
PEEP RESPIRATORY: 6 CM[H2O]
PH BLDA: 7.45 [PH] (ref 7.35–7.45)
PH BLDA: 7.46 [PH] (ref 7.35–7.45)
PH BLDA: 7.52 [PH] (ref 7.35–7.45)
PH BLDA: 7.53 [PH] (ref 7.35–7.45)
PH BLDV: 7.4 [PH] (ref 7.3–7.4)
PHOSPHATE SERPL-MCNC: 2.9 MG/DL (ref 2.7–4.5)
PHOSPHATE SERPL-MCNC: 4.3 MG/DL (ref 2.7–4.5)
PHOSPHATE SERPL-MCNC: 4.9 MG/DL (ref 2.7–4.5)
PHOSPHATE SERPL-MCNC: 5.1 MG/DL (ref 2.7–4.5)
PLATELET # BLD AUTO: 100 THOUSANDS/UL (ref 149–390)
PLATELET # BLD AUTO: 103 THOUSANDS/UL (ref 149–390)
PLATELET # BLD AUTO: 105 THOUSANDS/UL (ref 149–390)
PLATELET # BLD AUTO: 61 THOUSANDS/UL (ref 149–390)
PLATELET # BLD AUTO: 71 THOUSANDS/UL (ref 149–390)
PLATELET # BLD AUTO: 72 THOUSANDS/UL (ref 149–390)
PLATELET BLD QL SMEAR: ABNORMAL
PLATELET BLD QL SMEAR: ABNORMAL
PMV BLD AUTO: 12.3 FL (ref 8.9–12.7)
PMV BLD AUTO: 12.3 FL (ref 8.9–12.7)
PMV BLD AUTO: 12.8 FL (ref 8.9–12.7)
PMV BLD AUTO: 13.2 FL (ref 8.9–12.7)
PMV BLD AUTO: 13.6 FL (ref 8.9–12.7)
PMV BLD AUTO: 13.7 FL (ref 8.9–12.7)
PO2 BLDA: 348.6 MM HG (ref 75–129)
PO2 BLDA: 454.7 MM HG (ref 75–129)
PO2 BLDA: 457.8 MM HG (ref 75–129)
PO2 BLDA: 492.4 MM HG (ref 75–129)
PO2 BLDV: 50.9 MM HG (ref 35–45)
POTASSIUM SERPL-SCNC: 3.5 MMOL/L (ref 3.5–5.3)
POTASSIUM SERPL-SCNC: 3.7 MMOL/L (ref 3.5–5.3)
POTASSIUM SERPL-SCNC: 3.7 MMOL/L (ref 3.5–5.3)
POTASSIUM SERPL-SCNC: 3.8 MMOL/L (ref 3.5–5.3)
POTASSIUM SERPL-SCNC: 3.8 MMOL/L (ref 3.5–5.3)
POTASSIUM SERPL-SCNC: 4 MMOL/L (ref 3.5–5.3)
POTASSIUM SERPL-SCNC: 4.1 MMOL/L (ref 3.5–5.3)
PR INTERVAL: 138 MS
PR INTERVAL: 212 MS
PROCALCITONIN SERPL-MCNC: 173.15 NG/ML
PROCALCITONIN SERPL-MCNC: 181.5 NG/ML
PROCALCITONIN SERPL-MCNC: 244.79 NG/ML
PROCALCITONIN SERPL-MCNC: 334.1 NG/ML
PROT SERPL-MCNC: 5.4 G/DL (ref 6.4–8.2)
PROT SERPL-MCNC: 5.5 G/DL (ref 6.4–8.2)
PROT SERPL-MCNC: 6 G/DL (ref 6.4–8.2)
PROT SERPL-MCNC: 6.8 G/DL (ref 6.4–8.2)
PROT SERPL-MCNC: 6.9 G/DL (ref 6.4–8.2)
PROTHROMBIN TIME: 20.1 SECONDS (ref 11.6–14.5)
PROTHROMBIN TIME: 28.3 SECONDS (ref 11.6–14.5)
PROTHROMBIN TIME: 29.1 SECONDS (ref 11.6–14.5)
QRS AXIS: -82 DEGREES
QRS AXIS: 124 DEGREES
QRS AXIS: 136 DEGREES
QRSD INTERVAL: 100 MS
QRSD INTERVAL: 83 MS
QRSD INTERVAL: 88 MS
QT INTERVAL: 388 MS
QT INTERVAL: 392 MS
QT INTERVAL: 400 MS
QTC INTERVAL: 487 MS
QTC INTERVAL: 519 MS
QTC INTERVAL: 523 MS
RBC # BLD AUTO: 2.76 MILLION/UL (ref 3.88–5.62)
RBC # BLD AUTO: 2.85 MILLION/UL (ref 3.88–5.62)
RBC # BLD AUTO: 3.08 MILLION/UL (ref 3.88–5.62)
RBC # BLD AUTO: 3.1 MILLION/UL (ref 3.88–5.62)
RBC # BLD AUTO: 3.77 MILLION/UL (ref 3.88–5.62)
RBC # BLD AUTO: 3.89 MILLION/UL (ref 3.88–5.62)
RH BLD: POSITIVE
RSV RNA RESP QL NAA+PROBE: NEGATIVE
SARS-COV-2 RNA RESP QL NAA+PROBE: NEGATIVE
SODIUM SERPL-SCNC: 130 MMOL/L (ref 136–145)
SODIUM SERPL-SCNC: 137 MMOL/L (ref 136–145)
SODIUM SERPL-SCNC: 138 MMOL/L (ref 136–145)
SODIUM SERPL-SCNC: 139 MMOL/L (ref 136–145)
SODIUM SERPL-SCNC: 140 MMOL/L (ref 136–145)
SODIUM SERPL-SCNC: 140 MMOL/L (ref 136–145)
SODIUM SERPL-SCNC: 141 MMOL/L (ref 136–145)
SODIUM SERPL-SCNC: 141 MMOL/L (ref 136–145)
SODIUM SERPL-SCNC: 142 MMOL/L (ref 136–145)
SPECIMEN EXPIRATION DATE: NORMAL
SPECIMEN SOURCE: ABNORMAL
T WAVE AXIS: 218 DEGREES
T WAVE AXIS: 243 DEGREES
T WAVE AXIS: 54 DEGREES
TOTAL CELLS COUNTED SPEC: 100
TOTAL CELLS COUNTED SPEC: 100
TRIGL SERPL-MCNC: 190 MG/DL
TRIGL SERPL-MCNC: 281 MG/DL
TROPONIN I SERPL-MCNC: 0.04 NG/ML
TROPONIN I SERPL-MCNC: 0.82 NG/ML
TROPONIN I SERPL-MCNC: 0.99 NG/ML
TROPONIN I SERPL-MCNC: 1.03 NG/ML
TROPONIN I SERPL-MCNC: 1.11 NG/ML
TROPONIN I SERPL-MCNC: 1.16 NG/ML
VENT AC: 16
VENT- AC: AC
VENTRICULAR RATE: 101 BPM
VENTRICULAR RATE: 107 BPM
VENTRICULAR RATE: 95 BPM
VT SETTING VENT: 450 ML
WBC # BLD AUTO: 15.09 THOUSAND/UL (ref 4.31–10.16)
WBC # BLD AUTO: 15.63 THOUSAND/UL (ref 4.31–10.16)
WBC # BLD AUTO: 16.99 THOUSAND/UL (ref 4.31–10.16)
WBC # BLD AUTO: 17.35 THOUSAND/UL (ref 4.31–10.16)
WBC # BLD AUTO: 22.83 THOUSAND/UL (ref 4.31–10.16)
WBC # BLD AUTO: 25.15 THOUSAND/UL (ref 4.31–10.16)

## 2021-01-01 PROCEDURE — 82805 BLOOD GASES W/O2 SATURATION: CPT | Performed by: EMERGENCY MEDICINE

## 2021-01-01 PROCEDURE — 0241U HB NFCT DS VIR RESP RNA 4 TRGT: CPT | Performed by: EMERGENCY MEDICINE

## 2021-01-01 PROCEDURE — 80076 HEPATIC FUNCTION PANEL: CPT | Performed by: EMERGENCY MEDICINE

## 2021-01-01 PROCEDURE — 71045 X-RAY EXAM CHEST 1 VIEW: CPT

## 2021-01-01 PROCEDURE — 86923 COMPATIBILITY TEST ELECTRIC: CPT

## 2021-01-01 PROCEDURE — 83735 ASSAY OF MAGNESIUM: CPT | Performed by: EMERGENCY MEDICINE

## 2021-01-01 PROCEDURE — 86900 BLOOD TYPING SEROLOGIC ABO: CPT | Performed by: NURSE PRACTITIONER

## 2021-01-01 PROCEDURE — 87186 SC STD MICRODIL/AGAR DIL: CPT | Performed by: EMERGENCY MEDICINE

## 2021-01-01 PROCEDURE — 96361 HYDRATE IV INFUSION ADD-ON: CPT

## 2021-01-01 PROCEDURE — 95822 EEG COMA OR SLEEP ONLY: CPT | Performed by: PSYCHIATRY & NEUROLOGY

## 2021-01-01 PROCEDURE — 82010 KETONE BODYS QUAN: CPT | Performed by: EMERGENCY MEDICINE

## 2021-01-01 PROCEDURE — G1004 CDSM NDSC: HCPCS

## 2021-01-01 PROCEDURE — 76937 US GUIDE VASCULAR ACCESS: CPT | Performed by: NURSE PRACTITIONER

## 2021-01-01 PROCEDURE — 82947 ASSAY GLUCOSE BLOOD QUANT: CPT | Performed by: INTERNAL MEDICINE

## 2021-01-01 PROCEDURE — 83605 ASSAY OF LACTIC ACID: CPT | Performed by: PHYSICIAN ASSISTANT

## 2021-01-01 PROCEDURE — 84484 ASSAY OF TROPONIN QUANT: CPT | Performed by: NURSE PRACTITIONER

## 2021-01-01 PROCEDURE — 88305 TISSUE EXAM BY PATHOLOGIST: CPT | Performed by: PATHOLOGY

## 2021-01-01 PROCEDURE — 83036 HEMOGLOBIN GLYCOSYLATED A1C: CPT | Performed by: PHYSICIAN ASSISTANT

## 2021-01-01 PROCEDURE — 3066F NEPHROPATHY DOC TX: CPT | Performed by: PHYSICIAN ASSISTANT

## 2021-01-01 PROCEDURE — 95816 EEG AWAKE AND DROWSY: CPT

## 2021-01-01 PROCEDURE — 99233 SBSQ HOSP IP/OBS HIGH 50: CPT | Performed by: PSYCHIATRY & NEUROLOGY

## 2021-01-01 PROCEDURE — 80048 BASIC METABOLIC PNL TOTAL CA: CPT | Performed by: NURSE PRACTITIONER

## 2021-01-01 PROCEDURE — 83690 ASSAY OF LIPASE: CPT | Performed by: EMERGENCY MEDICINE

## 2021-01-01 PROCEDURE — 80053 COMPREHEN METABOLIC PANEL: CPT | Performed by: PHYSICIAN ASSISTANT

## 2021-01-01 PROCEDURE — 80061 LIPID PANEL: CPT | Performed by: PHYSICIAN ASSISTANT

## 2021-01-01 PROCEDURE — 83690 ASSAY OF LIPASE: CPT | Performed by: NURSE PRACTITIONER

## 2021-01-01 PROCEDURE — 70498 CT ANGIOGRAPHY NECK: CPT

## 2021-01-01 PROCEDURE — 85027 COMPLETE CBC AUTOMATED: CPT | Performed by: NURSE PRACTITIONER

## 2021-01-01 PROCEDURE — 85027 COMPLETE CBC AUTOMATED: CPT | Performed by: EMERGENCY MEDICINE

## 2021-01-01 PROCEDURE — 84145 PROCALCITONIN (PCT): CPT | Performed by: NURSE PRACTITIONER

## 2021-01-01 PROCEDURE — 78606 BRAIN IMAGE W/FLOW 4 + VIEWS: CPT

## 2021-01-01 PROCEDURE — 85730 THROMBOPLASTIN TIME PARTIAL: CPT | Performed by: PHYSICIAN ASSISTANT

## 2021-01-01 PROCEDURE — 83735 ASSAY OF MAGNESIUM: CPT | Performed by: NURSE PRACTITIONER

## 2021-01-01 PROCEDURE — 87070 CULTURE OTHR SPECIMN AEROBIC: CPT | Performed by: NURSE PRACTITIONER

## 2021-01-01 PROCEDURE — 86901 BLOOD TYPING SEROLOGIC RH(D): CPT | Performed by: NURSE PRACTITIONER

## 2021-01-01 PROCEDURE — 83036 HEMOGLOBIN GLYCOSYLATED A1C: CPT

## 2021-01-01 PROCEDURE — 82330 ASSAY OF CALCIUM: CPT | Performed by: HOSPITALIST

## 2021-01-01 PROCEDURE — 31500 INSERT EMERGENCY AIRWAY: CPT | Performed by: NURSE PRACTITIONER

## 2021-01-01 PROCEDURE — 99214 OFFICE O/P EST MOD 30 MIN: CPT | Performed by: FAMILY MEDICINE

## 2021-01-01 PROCEDURE — 5A1935Z RESPIRATORY VENTILATION, LESS THAN 24 CONSECUTIVE HOURS: ICD-10-PCS | Performed by: INTERNAL MEDICINE

## 2021-01-01 PROCEDURE — 87040 BLOOD CULTURE FOR BACTERIA: CPT | Performed by: EMERGENCY MEDICINE

## 2021-01-01 PROCEDURE — 84100 ASSAY OF PHOSPHORUS: CPT | Performed by: EMERGENCY MEDICINE

## 2021-01-01 PROCEDURE — 99232 SBSQ HOSP IP/OBS MODERATE 35: CPT | Performed by: INTERNAL MEDICINE

## 2021-01-01 PROCEDURE — 82805 BLOOD GASES W/O2 SATURATION: CPT | Performed by: NURSE PRACTITIONER

## 2021-01-01 PROCEDURE — 93010 ELECTROCARDIOGRAM REPORT: CPT | Performed by: INTERNAL MEDICINE

## 2021-01-01 PROCEDURE — 82330 ASSAY OF CALCIUM: CPT | Performed by: NURSE PRACTITIONER

## 2021-01-01 PROCEDURE — 85610 PROTHROMBIN TIME: CPT | Performed by: PHYSICIAN ASSISTANT

## 2021-01-01 PROCEDURE — 82948 REAGENT STRIP/BLOOD GLUCOSE: CPT

## 2021-01-01 PROCEDURE — 36415 COLL VENOUS BLD VENIPUNCTURE: CPT

## 2021-01-01 PROCEDURE — 99223 1ST HOSP IP/OBS HIGH 75: CPT | Performed by: INTERNAL MEDICINE

## 2021-01-01 PROCEDURE — 85007 BL SMEAR W/DIFF WBC COUNT: CPT | Performed by: INTERNAL MEDICINE

## 2021-01-01 PROCEDURE — 88313 SPECIAL STAINS GROUP 2: CPT | Performed by: PATHOLOGY

## 2021-01-01 PROCEDURE — 94003 VENT MGMT INPAT SUBQ DAY: CPT

## 2021-01-01 PROCEDURE — 82150 ASSAY OF AMYLASE: CPT | Performed by: NURSE PRACTITIONER

## 2021-01-01 PROCEDURE — 99285 EMERGENCY DEPT VISIT HI MDM: CPT

## 2021-01-01 PROCEDURE — 99292 CRITICAL CARE ADDL 30 MIN: CPT | Performed by: INTERNAL MEDICINE

## 2021-01-01 PROCEDURE — 99292 CRITICAL CARE ADDL 30 MIN: CPT | Performed by: PSYCHIATRY & NEUROLOGY

## 2021-01-01 PROCEDURE — 84100 ASSAY OF PHOSPHORUS: CPT | Performed by: NURSE PRACTITIONER

## 2021-01-01 PROCEDURE — 80061 LIPID PANEL: CPT

## 2021-01-01 PROCEDURE — 96374 THER/PROPH/DIAG INJ IV PUSH: CPT

## 2021-01-01 PROCEDURE — 84145 PROCALCITONIN (PCT): CPT | Performed by: INTERNAL MEDICINE

## 2021-01-01 PROCEDURE — 71250 CT THORAX DX C-: CPT

## 2021-01-01 PROCEDURE — 1036F TOBACCO NON-USER: CPT | Performed by: FAMILY MEDICINE

## 2021-01-01 PROCEDURE — 94762 N-INVAS EAR/PLS OXIMTRY CONT: CPT

## 2021-01-01 PROCEDURE — 70496 CT ANGIOGRAPHY HEAD: CPT

## 2021-01-01 PROCEDURE — 70450 CT HEAD/BRAIN W/O DYE: CPT

## 2021-01-01 PROCEDURE — 3066F NEPHROPATHY DOC TX: CPT | Performed by: FAMILY MEDICINE

## 2021-01-01 PROCEDURE — 99233 SBSQ HOSP IP/OBS HIGH 50: CPT | Performed by: INTERNAL MEDICINE

## 2021-01-01 PROCEDURE — 85610 PROTHROMBIN TIME: CPT | Performed by: NURSE PRACTITIONER

## 2021-01-01 PROCEDURE — 92610 EVALUATE SWALLOWING FUNCTION: CPT

## 2021-01-01 PROCEDURE — 93005 ELECTROCARDIOGRAM TRACING: CPT

## 2021-01-01 PROCEDURE — 94760 N-INVAS EAR/PLS OXIMETRY 1: CPT

## 2021-01-01 PROCEDURE — 85027 COMPLETE CBC AUTOMATED: CPT | Performed by: INTERNAL MEDICINE

## 2021-01-01 PROCEDURE — 99285 EMERGENCY DEPT VISIT HI MDM: CPT | Performed by: EMERGENCY MEDICINE

## 2021-01-01 PROCEDURE — 80053 COMPREHEN METABOLIC PANEL: CPT | Performed by: NURSE PRACTITIONER

## 2021-01-01 PROCEDURE — 36600 WITHDRAWAL OF ARTERIAL BLOOD: CPT

## 2021-01-01 PROCEDURE — 43239 EGD BIOPSY SINGLE/MULTIPLE: CPT | Performed by: INTERNAL MEDICINE

## 2021-01-01 PROCEDURE — 0BH17EZ INSERTION OF ENDOTRACHEAL AIRWAY INTO TRACHEA, VIA NATURAL OR ARTIFICIAL OPENING: ICD-10-PCS | Performed by: INTERNAL MEDICINE

## 2021-01-01 PROCEDURE — NC001 PR NO CHARGE: Performed by: NURSE PRACTITIONER

## 2021-01-01 PROCEDURE — 86850 RBC ANTIBODY SCREEN: CPT | Performed by: NURSE PRACTITIONER

## 2021-01-01 PROCEDURE — 85730 THROMBOPLASTIN TIME PARTIAL: CPT | Performed by: NURSE PRACTITIONER

## 2021-01-01 PROCEDURE — 99291 CRITICAL CARE FIRST HOUR: CPT | Performed by: INTERNAL MEDICINE

## 2021-01-01 PROCEDURE — 85025 COMPLETE CBC W/AUTO DIFF WBC: CPT | Performed by: NURSE PRACTITIONER

## 2021-01-01 PROCEDURE — 87205 SMEAR GRAM STAIN: CPT | Performed by: NURSE PRACTITIONER

## 2021-01-01 PROCEDURE — 82248 BILIRUBIN DIRECT: CPT | Performed by: NURSE PRACTITIONER

## 2021-01-01 PROCEDURE — 02HV33Z INSERTION OF INFUSION DEVICE INTO SUPERIOR VENA CAVA, PERCUTANEOUS APPROACH: ICD-10-PCS | Performed by: INTERNAL MEDICINE

## 2021-01-01 PROCEDURE — 36556 INSERT NON-TUNNEL CV CATH: CPT | Performed by: NURSE PRACTITIONER

## 2021-01-01 PROCEDURE — 93306 TTE W/DOPPLER COMPLETE: CPT

## 2021-01-01 PROCEDURE — 80048 BASIC METABOLIC PNL TOTAL CA: CPT

## 2021-01-01 PROCEDURE — 99291 CRITICAL CARE FIRST HOUR: CPT | Performed by: PHYSICIAN ASSISTANT

## 2021-01-01 PROCEDURE — 36415 COLL VENOUS BLD VENIPUNCTURE: CPT | Performed by: EMERGENCY MEDICINE

## 2021-01-01 PROCEDURE — 94002 VENT MGMT INPAT INIT DAY: CPT

## 2021-01-01 PROCEDURE — 99291 CRITICAL CARE FIRST HOUR: CPT | Performed by: NURSE PRACTITIONER

## 2021-01-01 PROCEDURE — 84484 ASSAY OF TROPONIN QUANT: CPT | Performed by: EMERGENCY MEDICINE

## 2021-01-01 PROCEDURE — 87077 CULTURE AEROBIC IDENTIFY: CPT | Performed by: EMERGENCY MEDICINE

## 2021-01-01 PROCEDURE — 83605 ASSAY OF LACTIC ACID: CPT | Performed by: EMERGENCY MEDICINE

## 2021-01-01 PROCEDURE — 31624 DX BRONCHOSCOPE/LAVAGE: CPT | Performed by: INTERNAL MEDICINE

## 2021-01-01 PROCEDURE — 82805 BLOOD GASES W/O2 SATURATION: CPT | Performed by: PHYSICIAN ASSISTANT

## 2021-01-01 PROCEDURE — A9521 TC99M EXAMETAZIME: HCPCS

## 2021-01-01 PROCEDURE — 80048 BASIC METABOLIC PNL TOTAL CA: CPT | Performed by: EMERGENCY MEDICINE

## 2021-01-01 RX ORDER — AMPHOTERICIN B 50 MG/10ML
50 INJECTION, POWDER, LYOPHILIZED, FOR SOLUTION INTRAVENOUS ONCE
Status: DISCONTINUED | OUTPATIENT
Start: 2021-01-01 | End: 2021-02-10

## 2021-01-01 RX ORDER — ONDANSETRON 4 MG/1
4 TABLET, ORALLY DISINTEGRATING ORAL ONCE
Status: DISCONTINUED | OUTPATIENT
Start: 2021-01-01 | End: 2021-01-01

## 2021-01-01 RX ORDER — LUBIPROSTONE 8 UG/1
8 CAPSULE, GELATIN COATED ORAL 2 TIMES DAILY WITH MEALS
Status: DISCONTINUED | OUTPATIENT
Start: 2021-01-01 | End: 2021-01-01

## 2021-01-01 RX ORDER — SACCHAROMYCES BOULARDII 250 MG
250 CAPSULE ORAL 2 TIMES DAILY
Status: DISCONTINUED | OUTPATIENT
Start: 2021-01-01 | End: 2021-01-01

## 2021-01-01 RX ORDER — PANTOPRAZOLE SODIUM 40 MG/1
40 TABLET, DELAYED RELEASE ORAL
Status: DISCONTINUED | OUTPATIENT
Start: 2021-01-01 | End: 2021-01-01

## 2021-01-01 RX ORDER — SODIUM CHLORIDE, SODIUM LACTATE, POTASSIUM CHLORIDE, CALCIUM CHLORIDE 600; 310; 30; 20 MG/100ML; MG/100ML; MG/100ML; MG/100ML
1000 INJECTION, SOLUTION INTRAVENOUS ONCE
Status: COMPLETED | OUTPATIENT
Start: 2021-01-01 | End: 2021-01-01

## 2021-01-01 RX ORDER — LEVOTHYROXINE SODIUM ANHYDROUS 100 UG/5ML
20 INJECTION, POWDER, LYOPHILIZED, FOR SOLUTION INTRAVENOUS ONCE
Status: COMPLETED | OUTPATIENT
Start: 2021-01-01 | End: 2021-01-01

## 2021-01-01 RX ORDER — INSULIN GLARGINE 100 [IU]/ML
12 INJECTION, SOLUTION SUBCUTANEOUS ONCE
Status: COMPLETED | OUTPATIENT
Start: 2021-01-01 | End: 2021-01-01

## 2021-01-01 RX ORDER — DEXTROSE MONOHYDRATE 25 G/50ML
50 INJECTION, SOLUTION INTRAVENOUS ONCE
Status: DISCONTINUED | OUTPATIENT
Start: 2021-01-01 | End: 2021-01-01

## 2021-01-01 RX ORDER — SUCRALFATE 1 G/1
1 TABLET ORAL
Status: DISCONTINUED | OUTPATIENT
Start: 2021-01-01 | End: 2021-01-01

## 2021-01-01 RX ORDER — TRIAMCINOLONE ACETONIDE 0.25 MG/G
OINTMENT TOPICAL 2 TIMES DAILY
Qty: 30 G | Refills: 0 | Status: SHIPPED | OUTPATIENT
Start: 2021-01-01 | End: 2021-02-11 | Stop reason: HOSPADM

## 2021-01-01 RX ORDER — ALBUMIN, HUMAN INJ 5% 5 %
12.5 SOLUTION INTRAVENOUS ONCE AS NEEDED
Status: DISCONTINUED | OUTPATIENT
Start: 2021-01-01 | End: 2021-02-11 | Stop reason: HOSPADM

## 2021-01-01 RX ORDER — ACETAMINOPHEN 325 MG/1
650 TABLET ORAL EVERY 6 HOURS PRN
Status: DISCONTINUED | OUTPATIENT
Start: 2021-01-01 | End: 2021-02-11 | Stop reason: HOSPADM

## 2021-01-01 RX ORDER — MIDODRINE HYDROCHLORIDE 5 MG/1
10 TABLET ORAL 3 TIMES DAILY
Status: DISCONTINUED | OUTPATIENT
Start: 2021-01-01 | End: 2021-01-01

## 2021-01-01 RX ORDER — DEXTROSE MONOHYDRATE 25 G/50ML
INJECTION, SOLUTION INTRAVENOUS
Status: COMPLETED
Start: 2021-01-01 | End: 2021-01-01

## 2021-01-01 RX ORDER — PROPOFOL 10 MG/ML
INJECTION, EMULSION INTRAVENOUS AS NEEDED
Status: DISCONTINUED | OUTPATIENT
Start: 2021-01-01 | End: 2021-01-01

## 2021-01-01 RX ORDER — DEXTROSE MONOHYDRATE 25 G/50ML
25 INJECTION, SOLUTION INTRAVENOUS ONCE
Status: COMPLETED | OUTPATIENT
Start: 2021-01-01 | End: 2021-01-01

## 2021-01-01 RX ORDER — INSULIN GLARGINE 100 [IU]/ML
10 INJECTION, SOLUTION SUBCUTANEOUS
Status: DISCONTINUED | OUTPATIENT
Start: 2021-01-01 | End: 2021-02-11 | Stop reason: HOSPADM

## 2021-01-01 RX ORDER — SODIUM CHLORIDE 9 MG/ML
125 INJECTION, SOLUTION INTRAVENOUS CONTINUOUS
Status: DISCONTINUED | OUTPATIENT
Start: 2021-01-01 | End: 2021-01-01 | Stop reason: HOSPADM

## 2021-01-01 RX ORDER — ONDANSETRON 2 MG/ML
4 INJECTION INTRAMUSCULAR; INTRAVENOUS ONCE
Status: DISCONTINUED | OUTPATIENT
Start: 2021-01-01 | End: 2021-01-01

## 2021-01-01 RX ORDER — LINACLOTIDE 72 UG/1
1 CAPSULE, GELATIN COATED ORAL DAILY
Qty: 90 CAPSULE | Refills: 1 | Status: SHIPPED | OUTPATIENT
Start: 2021-01-01 | End: 2021-02-11 | Stop reason: HOSPADM

## 2021-01-01 RX ORDER — DOCUSATE SODIUM 100 MG/1
100 CAPSULE, LIQUID FILLED ORAL 2 TIMES DAILY
Status: DISCONTINUED | OUTPATIENT
Start: 2021-01-01 | End: 2021-01-01

## 2021-01-01 RX ORDER — ASPIRIN 81 MG/1
81 TABLET, CHEWABLE ORAL DAILY
Status: DISCONTINUED | OUTPATIENT
Start: 2021-01-01 | End: 2021-01-01

## 2021-01-01 RX ORDER — LIDOCAINE HYDROCHLORIDE 20 MG/ML
INJECTION, SOLUTION EPIDURAL; INFILTRATION; INTRACAUDAL; PERINEURAL AS NEEDED
Status: DISCONTINUED | OUTPATIENT
Start: 2021-01-01 | End: 2021-01-01

## 2021-01-01 RX ORDER — ATORVASTATIN CALCIUM 20 MG/1
20 TABLET, FILM COATED ORAL
Status: DISCONTINUED | OUTPATIENT
Start: 2021-01-01 | End: 2021-01-01

## 2021-01-01 RX ORDER — SODIUM POLYSTYRENE SULFONATE 15 G/60ML
30 SUSPENSION ORAL; RECTAL ONCE
COMMUNITY
End: 2021-02-11 | Stop reason: HOSPADM

## 2021-01-01 RX ORDER — VASOPRESSIN 20 U/ML
1 INJECTION PARENTERAL ONCE
Status: DISCONTINUED | OUTPATIENT
Start: 2021-01-01 | End: 2021-01-01

## 2021-01-01 RX ORDER — CEFAZOLIN SODIUM 1 G/50ML
1000 SOLUTION INTRAVENOUS ONCE
Status: CANCELLED | OUTPATIENT
Start: 2021-02-11 | End: 2021-01-01

## 2021-01-01 RX ORDER — METHYLPREDNISOLONE SODIUM SUCCINATE 1 G/16ML
1000 INJECTION, POWDER, LYOPHILIZED, FOR SOLUTION INTRAMUSCULAR; INTRAVENOUS EVERY 6 HOURS
Status: DISCONTINUED | OUTPATIENT
Start: 2021-01-01 | End: 2021-02-11 | Stop reason: HOSPADM

## 2021-01-01 RX ORDER — ALBUTEROL SULFATE 90 UG/1
4 AEROSOL, METERED RESPIRATORY (INHALATION) EVERY 4 HOURS
Status: DISCONTINUED | OUTPATIENT
Start: 2021-01-01 | End: 2021-01-01

## 2021-01-01 RX ORDER — HEPARIN SODIUM 5000 [USP'U]/ML
5000 INJECTION, SOLUTION INTRAVENOUS; SUBCUTANEOUS EVERY 8 HOURS SCHEDULED
Status: DISCONTINUED | OUTPATIENT
Start: 2021-01-01 | End: 2021-01-01

## 2021-01-01 RX ORDER — INSULIN GLARGINE 100 [IU]/ML
12 INJECTION, SOLUTION SUBCUTANEOUS
Status: DISCONTINUED | OUTPATIENT
Start: 2021-01-01 | End: 2021-01-01

## 2021-01-01 RX ORDER — SODIUM CHLORIDE, SODIUM LACTATE, POTASSIUM CHLORIDE, CALCIUM CHLORIDE 600; 310; 30; 20 MG/100ML; MG/100ML; MG/100ML; MG/100ML
2000 INJECTION, SOLUTION INTRAVENOUS ONCE
Status: DISCONTINUED | OUTPATIENT
Start: 2021-01-01 | End: 2021-01-01

## 2021-01-01 RX ORDER — ONDANSETRON 2 MG/ML
4 INJECTION INTRAMUSCULAR; INTRAVENOUS EVERY 6 HOURS PRN
Status: DISCONTINUED | OUTPATIENT
Start: 2021-01-01 | End: 2021-02-11 | Stop reason: HOSPADM

## 2021-01-01 RX ORDER — ZOLPIDEM TARTRATE 5 MG/1
10 TABLET ORAL
Status: DISCONTINUED | OUTPATIENT
Start: 2021-01-01 | End: 2021-01-01

## 2021-01-01 RX ORDER — MAGNESIUM HYDROXIDE/ALUMINUM HYDROXICE/SIMETHICONE 120; 1200; 1200 MG/30ML; MG/30ML; MG/30ML
30 SUSPENSION ORAL EVERY 6 HOURS PRN
Status: DISCONTINUED | OUTPATIENT
Start: 2021-01-01 | End: 2021-02-11 | Stop reason: HOSPADM

## 2021-01-01 RX ORDER — BUPROPION HYDROCHLORIDE 100 MG/1
100 TABLET ORAL 2 TIMES DAILY
Status: DISCONTINUED | OUTPATIENT
Start: 2021-01-01 | End: 2021-01-01

## 2021-01-01 RX ORDER — CEFAZOLIN SODIUM 1 G/50ML
1000 SOLUTION INTRAVENOUS EVERY 8 HOURS
Status: DISCONTINUED | OUTPATIENT
Start: 2021-01-01 | End: 2021-02-10

## 2021-01-01 RX ORDER — ONDANSETRON 2 MG/ML
4 INJECTION INTRAMUSCULAR; INTRAVENOUS ONCE
Status: COMPLETED | OUTPATIENT
Start: 2021-01-01 | End: 2021-01-01

## 2021-01-01 RX ADMIN — HEPARIN SODIUM 5000 UNITS: 5000 INJECTION INTRAVENOUS; SUBCUTANEOUS at 05:49

## 2021-01-01 RX ADMIN — HEPARIN SODIUM 5000 UNITS: 5000 INJECTION INTRAVENOUS; SUBCUTANEOUS at 23:41

## 2021-01-01 RX ADMIN — HEPARIN SODIUM 5000 UNITS: 5000 INJECTION INTRAVENOUS; SUBCUTANEOUS at 16:17

## 2021-01-01 RX ADMIN — BUPROPION HYDROCHLORIDE 100 MG: 100 TABLET, FILM COATED ORAL at 22:14

## 2021-01-01 RX ADMIN — PIPERACILLIN AND TAZOBACTAM 2.25 G: 2; .25 INJECTION, POWDER, LYOPHILIZED, FOR SOLUTION INTRAVENOUS at 17:54

## 2021-01-01 RX ADMIN — INSULIN GLARGINE 10 UNITS: 100 INJECTION, SOLUTION SUBCUTANEOUS at 22:14

## 2021-01-01 RX ADMIN — SODIUM CHLORIDE, SODIUM LACTATE, POTASSIUM CHLORIDE, AND CALCIUM CHLORIDE 1000 ML: .6; .31; .03; .02 INJECTION, SOLUTION INTRAVENOUS at 17:52

## 2021-01-01 RX ADMIN — DEXTROSE MONOHYDRATE 50 ML: 25 INJECTION, SOLUTION INTRAVENOUS at 20:20

## 2021-01-01 RX ADMIN — NOREPINEPHRINE BITARTRATE 13 MCG/MIN: 1 INJECTION, SOLUTION, CONCENTRATE INTRAVENOUS at 16:44

## 2021-01-01 RX ADMIN — SODIUM CHLORIDE 21 UNITS/HR: 9 INJECTION, SOLUTION INTRAVENOUS at 16:13

## 2021-01-01 RX ADMIN — PROPOFOL 50 MG: 10 INJECTION, EMULSION INTRAVENOUS at 11:54

## 2021-01-01 RX ADMIN — SUCRALFATE 1 G: 1 TABLET ORAL at 10:48

## 2021-01-01 RX ADMIN — DOCUSATE SODIUM 100 MG: 100 CAPSULE, LIQUID FILLED ORAL at 17:12

## 2021-01-01 RX ADMIN — HEPARIN SODIUM 5000 UNITS: 5000 INJECTION INTRAVENOUS; SUBCUTANEOUS at 22:15

## 2021-01-01 RX ADMIN — CEFEPIME HYDROCHLORIDE 2000 MG: 2 INJECTION, POWDER, FOR SOLUTION INTRAVENOUS at 13:53

## 2021-01-01 RX ADMIN — ONDANSETRON 4 MG: 2 INJECTION INTRAMUSCULAR; INTRAVENOUS at 12:57

## 2021-01-01 RX ADMIN — BUPROPION HYDROCHLORIDE 100 MG: 100 TABLET, FILM COATED ORAL at 10:46

## 2021-01-01 RX ADMIN — ZOLPIDEM TARTRATE 10 MG: 5 TABLET, COATED ORAL at 22:15

## 2021-01-01 RX ADMIN — SODIUM CHLORIDE 125 ML/HR: 0.9 INJECTION, SOLUTION INTRAVENOUS at 10:15

## 2021-01-01 RX ADMIN — PROPOFOL 50 MG: 10 INJECTION, EMULSION INTRAVENOUS at 11:49

## 2021-01-01 RX ADMIN — SUCRALFATE 1 G: 1 TABLET ORAL at 05:43

## 2021-01-01 RX ADMIN — NOREPINEPHRINE BITARTRATE 30 MCG/MIN: 1 INJECTION, SOLUTION, CONCENTRATE INTRAVENOUS at 19:57

## 2021-01-01 RX ADMIN — SUCRALFATE 1 G: 1 TABLET ORAL at 17:36

## 2021-01-01 RX ADMIN — BUPROPION HYDROCHLORIDE 100 MG: 100 TABLET, FILM COATED ORAL at 17:38

## 2021-01-01 RX ADMIN — MIDODRINE HYDROCHLORIDE 10 MG: 5 TABLET ORAL at 22:15

## 2021-01-01 RX ADMIN — NOREPINEPHRINE BITARTRATE 30 MCG/MIN: 1 INJECTION, SOLUTION, CONCENTRATE INTRAVENOUS at 17:37

## 2021-01-01 RX ADMIN — HEPARIN SODIUM 5000 UNITS: 5000 INJECTION INTRAVENOUS; SUBCUTANEOUS at 05:59

## 2021-01-01 RX ADMIN — BUPROPION HYDROCHLORIDE 100 MG: 100 TABLET, FILM COATED ORAL at 10:00

## 2021-01-01 RX ADMIN — NOREPINEPHRINE BITARTRATE 25 MCG/MIN: 1 INJECTION, SOLUTION, CONCENTRATE INTRAVENOUS at 22:38

## 2021-01-01 RX ADMIN — PIPERACILLIN AND TAZOBACTAM 2.25 G: 2; .25 INJECTION, POWDER, LYOPHILIZED, FOR SOLUTION INTRAVENOUS at 17:19

## 2021-01-01 RX ADMIN — INSULIN LISPRO 5 UNITS: 100 INJECTION, SOLUTION INTRAVENOUS; SUBCUTANEOUS at 23:45

## 2021-01-01 RX ADMIN — HEPARIN SODIUM 5000 UNITS: 5000 INJECTION INTRAVENOUS; SUBCUTANEOUS at 13:34

## 2021-01-01 RX ADMIN — LEVOTHYROXINE SODIUM ANHYDROUS 40 MCG/HR: 100 INJECTION, POWDER, LYOPHILIZED, FOR SOLUTION INTRAVENOUS at 21:16

## 2021-01-01 RX ADMIN — PROPOFOL 50 MG: 10 INJECTION, EMULSION INTRAVENOUS at 11:52

## 2021-01-01 RX ADMIN — MIDODRINE HYDROCHLORIDE 10 MG: 5 TABLET ORAL at 17:38

## 2021-01-01 RX ADMIN — IOHEXOL 85 ML: 350 INJECTION, SOLUTION INTRAVENOUS at 16:50

## 2021-01-01 RX ADMIN — Medication 250 MG: at 17:39

## 2021-01-01 RX ADMIN — ASPIRIN 81 MG: 81 TABLET, CHEWABLE ORAL at 10:46

## 2021-01-01 RX ADMIN — INSULIN GLARGINE 12 UNITS: 100 INJECTION, SOLUTION SUBCUTANEOUS at 20:39

## 2021-01-01 RX ADMIN — PIPERACILLIN AND TAZOBACTAM 2.25 G: 2; .25 INJECTION, POWDER, LYOPHILIZED, FOR SOLUTION INTRAVENOUS at 00:05

## 2021-01-01 RX ADMIN — CEFTAZIDIME 1000 MG: 1 INJECTION, POWDER, FOR SOLUTION INTRAMUSCULAR; INTRAVENOUS at 22:14

## 2021-01-01 RX ADMIN — Medication 250 MG: at 10:48

## 2021-01-01 RX ADMIN — SODIUM CHLORIDE, SODIUM LACTATE, POTASSIUM CHLORIDE, AND CALCIUM CHLORIDE 1000 ML: .6; .31; .03; .02 INJECTION, SOLUTION INTRAVENOUS at 12:57

## 2021-01-01 RX ADMIN — MIDODRINE HYDROCHLORIDE 10 MG: 5 TABLET ORAL at 10:00

## 2021-01-01 RX ADMIN — MIDODRINE HYDROCHLORIDE 10 MG: 5 TABLET ORAL at 10:48

## 2021-01-01 RX ADMIN — SUCRALFATE 1 G: 1 TABLET ORAL at 16:01

## 2021-01-01 RX ADMIN — METHYLPREDNISOLONE SODIUM SUCCINATE 1000 MG: 1 INJECTION, POWDER, FOR SOLUTION INTRAMUSCULAR; INTRAVENOUS at 21:30

## 2021-01-01 RX ADMIN — NOREPINEPHRINE BITARTRATE 15 MCG/MIN: 1 INJECTION, SOLUTION, CONCENTRATE INTRAVENOUS at 22:07

## 2021-01-01 RX ADMIN — LIDOCAINE HYDROCHLORIDE 100 MG: 20 INJECTION, SOLUTION EPIDURAL; INFILTRATION; INTRACAUDAL; PERINEURAL at 11:49

## 2021-01-01 RX ADMIN — ATORVASTATIN CALCIUM 20 MG: 20 TABLET, FILM COATED ORAL at 16:01

## 2021-01-01 RX ADMIN — PANTOPRAZOLE SODIUM 40 MG: 40 TABLET, DELAYED RELEASE ORAL at 16:01

## 2021-01-01 RX ADMIN — MIDODRINE HYDROCHLORIDE 10 MG: 5 TABLET ORAL at 16:01

## 2021-01-01 RX ADMIN — INSULIN HUMAN 4 UNITS: 100 INJECTION, SOLUTION PARENTERAL at 10:26

## 2021-01-01 RX ADMIN — HEPARIN SODIUM 5000 UNITS: 5000 INJECTION INTRAVENOUS; SUBCUTANEOUS at 05:42

## 2021-01-01 RX ADMIN — PIPERACILLIN AND TAZOBACTAM 2.25 G: 2; .25 INJECTION, POWDER, LYOPHILIZED, FOR SOLUTION INTRAVENOUS at 10:49

## 2021-01-01 RX ADMIN — HEPARIN SODIUM 5000 UNITS: 5000 INJECTION INTRAVENOUS; SUBCUTANEOUS at 17:57

## 2021-01-01 RX ADMIN — ASPIRIN 81 MG: 81 TABLET, CHEWABLE ORAL at 10:00

## 2021-01-01 RX ADMIN — NOREPINEPHRINE BITARTRATE 13 MCG/MIN: 1 INJECTION, SOLUTION, CONCENTRATE INTRAVENOUS at 11:43

## 2021-01-01 RX ADMIN — SUCRALFATE 1 G: 1 TABLET ORAL at 22:14

## 2021-01-01 RX ADMIN — NOREPINEPHRINE BITARTRATE 15 MCG/MIN: 1 INJECTION, SOLUTION, CONCENTRATE INTRAVENOUS at 07:03

## 2021-01-01 RX ADMIN — NOREPINEPHRINE BITARTRATE 15 MCG/MIN: 1 INJECTION, SOLUTION, CONCENTRATE INTRAVENOUS at 02:22

## 2021-01-01 RX ADMIN — IODIXANOL 85 ML: 320 INJECTION, SOLUTION INTRAVASCULAR at 10:24

## 2021-01-01 RX ADMIN — MIDODRINE HYDROCHLORIDE 10 MG: 5 TABLET ORAL at 22:10

## 2021-01-01 RX ADMIN — ONDANSETRON 4 MG: 2 INJECTION INTRAMUSCULAR; INTRAVENOUS at 16:04

## 2021-01-01 RX ADMIN — INSULIN GLARGINE 10 UNITS: 100 INJECTION, SOLUTION SUBCUTANEOUS at 23:40

## 2021-01-01 RX ADMIN — INSULIN HUMAN 6 UNITS: 100 INJECTION, SOLUTION PARENTERAL at 13:51

## 2021-01-01 RX ADMIN — ATORVASTATIN CALCIUM 20 MG: 20 TABLET, FILM COATED ORAL at 17:35

## 2021-01-01 RX ADMIN — LUBIPROSTONE 8 MCG: 8 CAPSULE, GELATIN COATED ORAL at 10:04

## 2021-01-01 RX ADMIN — PIPERACILLIN AND TAZOBACTAM 2.25 G: 2; .25 INJECTION, POWDER, LYOPHILIZED, FOR SOLUTION INTRAVENOUS at 09:30

## 2021-01-01 RX ADMIN — Medication 250 MG: at 10:00

## 2021-01-01 RX ADMIN — LEVOTHYROXINE SODIUM ANHYDROUS 20 MCG: 100 INJECTION, POWDER, LYOPHILIZED, FOR SOLUTION INTRAVENOUS at 21:16

## 2021-01-01 RX ADMIN — PIPERACILLIN AND TAZOBACTAM 2.25 G: 2; .25 INJECTION, POWDER, LYOPHILIZED, FOR SOLUTION INTRAVENOUS at 23:36

## 2021-01-01 RX ADMIN — SUCRALFATE 1 G: 1 TABLET ORAL at 06:21

## 2021-01-01 RX ADMIN — PANTOPRAZOLE SODIUM 40 MG: 40 TABLET, DELAYED RELEASE ORAL at 05:43

## 2021-01-01 RX ADMIN — Medication 250 MG: at 17:12

## 2021-01-01 RX ADMIN — SUCRALFATE 1 G: 1 TABLET ORAL at 22:15

## 2021-01-01 RX ADMIN — NOREPINEPHRINE BITARTRATE 30 MCG/MIN: 1 INJECTION, SOLUTION, CONCENTRATE INTRAVENOUS at 16:30

## 2021-01-06 NOTE — TELEPHONE ENCOUNTER
Tatyana Hernandez called in states the pt needs a new bed the one they gave him is "junk" "bed needs to be cranked up and people cant work like this" Shantel Chand also states he needs a transport chair so that he can be taken back in forth to dialysis properly  Tatyana Hernandez states the " pt has 2 insurances there is no reason why they gave him this junk of a bed"

## 2021-01-06 NOTE — TELEPHONE ENCOUNTER
It is a pity they are upset with the bed the patient received  That is unfortunately out of my control, at the medical supply company is the one that dispenses the bed  If they feel the bed is "junk" they can file a grievance with the insurance company

## 2021-01-07 NOTE — TELEPHONE ENCOUNTER
Popeye Dominion called again stating she has necessary information to get them what they need form medical supply companies, please call her to clarify and proceed  She does not want to leave messages on nurse line stating no one ever gets back to her

## 2021-01-07 NOTE — TELEPHONE ENCOUNTER
Izaiah Rodríguez called in again today she states she spoke to someone regarding his chair ans was told the original script that was sent was the correct chair and would like that script sent in as the chair he has now he keeps falling out of it and isnt good for transport

## 2021-01-08 NOTE — TELEPHONE ENCOUNTER
Letter is written  Please print and send  Thank you very much with your assistance with this patient and his family

## 2021-01-08 NOTE — TELEPHONE ENCOUNTER
I called Ricky samuels  - I faxed the information to [employer] for the bed and to [facility] for the wheelchair  She states she needs another letter from you for bathroom modifications  They have ceramic tile and its dangerous for him and unsanitary according to Ricky samuels  She said they wont offer modifications without a letter from PCP stating it is medically necessary

## 2021-01-13 NOTE — TELEPHONE ENCOUNTER
SIGNATURES NEEDED FOR [employer] [facility]  FORM RECEIVED VIA FAX  WILL BE PLACED IN YOUR BIN AT ASSIGNED DELIVERY TIMES

## 2021-01-14 NOTE — ASSESSMENT & PLAN NOTE
Lab Results   Component Value Date    EGFR 8 04/29/2020    EGFR 11 04/28/2020    EGFR 8 04/27/2020    CREATININE 8 18 (H) 04/29/2020    CREATININE 5 90 (H) 04/28/2020    CREATININE 7 83 (H) 04/27/2020   Follow up with Nephrology as scheduled

## 2021-01-14 NOTE — PROGRESS NOTES
Virtual Regular Visit      Assessment/Plan:    Problem List Items Addressed This Visit        Endocrine    Type 2 diabetes mellitus, with long-term current use of insulin ([facility] [address] )       Lab Results   Component Value Date    HGBA1C 8 0 (H) 07/02/2020   Follow up with Endo  Continue HD            Genitourinary    ESRD on hemodialysis [facility])     Lab Results   Component Value Date    EGFR 8 04/29/2020    EGFR 11 04/28/2020    EGFR 8 04/27/2020    CREATININE 8 18 (H) 04/29/2020    CREATININE 5 90 (H) 04/28/2020    CREATININE 7 83 (H) 04/27/2020   Follow up with Nephrology as scheduled            Other Visit Diagnoses     Bedbug bite, initial encounter    -  Primary             Discussed having an  come out to the house  Try to keep sore clean and dry   I explained to Wendy Jhaveri, at this time we do not have any forms from [employer], she will reach out to them and/or trying enrolling in the [employer] patient portal   Reason for visit is No chief complaint on file  Encounter provider Miguel Angel Cisneros MD    Provider located at [address] [place] [id number] [phone]      Recent Visits  Date Type Provider Dept   01/13/21 Telephone Miguel Angel Cisneros MD  [facility]   Showing recent visits within past 7 days and meeting all other requirements     Today's Visits  Date Type Provider Dept   01/14/21 Telemedicine MD Ashley Can [facility]   Showing today's visits and meeting all other requirements     Future Appointments  No visits were found meeting these conditions  Showing future appointments within next 150 days and meeting all other requirements        The patient was identified by name and date of birth  Prince Felipe was informed that this is a telemedicine visit and that the visit is being conducted through [facility] and patient was informed that this is a secure, HIPAA-compliant platform  He agrees to proceed     My office door was closed  No one else was in the room  He acknowledged consent and understanding of privacy and security of the video platform  The patient has agreed to participate and understands they can discontinue the visit at any time  Patient is aware this is a billable service  Subjective  Bethanie Lopez is a 64 y o  male       64 y o  male with GERD, esophageal dysphagia, type 2 diabetes mellitus, hypertension, ESRD on HD, history of CVA with residual weakness of left side, anxiety, history of fracture left femur status post insertion nail IM femur antegrade currently complaining of sore on his legs  As per his significant other Raf Mercedes, patient was told by podiatry that the sores look like bed bug bites  Raf Mercedes states sores are only on patient's lower extremities, no lesions on trunk, chest, back or upper extremities  Patient states sores do not itchy nor are they painful  Raf Mercedes states she was told by Floyd Barnes that we should have a form in our office to fill out to request his incontinence supplies            Past Medical History:   Diagnosis Date    Anxiety     CAD (coronary artery disease)     last assessed 4/10/13    Chronic kidney disease     On dialysis    Chronic kidney disease, stage IV (severe) ([address] )     Concussion     last assessed 9/3/14    Depression     Diabetes mellitus ([facility] [address] )     type 2    Failure to gain weight in adult     last assessed 2/24/14    Gastroparesis     GERD (gastroesophageal reflux disease)     Hyperkalemia     last assessed 11/9/15    Hyperlipidemia     Hypertension     accelerated essential last assessed 10/7/16    Insomnia     Late effects of cerebrovascular disease     Overflow incontinence     last assessed 7/24/14    Renal disorder     Secondary hyperparathyroidism ([facility] [address] )     Stroke [facility])        Past Surgical History:   Procedure Laterality Date    COLONOSCOPY      HERNIA REPAIR      AK ESOPHAGOGASTRODUODENOSCOPY TRANSORAL DIAGNOSTIC N/A 2/16/2017    Procedure: ESOPHAGOGASTRODUODENOSCOPY (EGD) with biopsy;  Surgeon: Jay Young DO;  Location: [facility]; Service: Gastroenterology    IL ESOPHAGOGASTRODUODENOSCOPY TRANSORAL DIAGNOSTIC N/A 6/2/2017    Procedure: ESOPHAGOGASTRODUODENOSCOPY (EGD) with bx;  Surgeon: Jay Young DO;  [facility]: [facility]; Service: Gastroenterology    IL OPEN RX FEMUR FX+INTRAMED SAM Left 4/26/2020    Procedure: INSERTION NAIL IM FEMUR ANTEGRADE (TROCHANTERIC); Surgeon: Cayden Carlton MD;  Location: [facility];  Service: Orthopedics    TYMPANOSTOMY TUBE PLACEMENT         Current Outpatient Medications   Medication Sig Dispense Refill    ARIPiprazole (ABILIFY) 2 mg tablet Take 2 tablets (4 mg total) by mouth daily 180 tablet 3    aspirin 81 mg chewable tablet Chew 1 tablet (81 mg total) daily 30 tablet 0    atorvastatin (LIPITOR) 20 mg tablet TAKE 1 TABLET BY MOUTH EVERY DAY WITH DINNER      buPROPion (WELLBUTRIN) 100 mg tablet Take 1 tablet (100 mg total) by mouth 2 (two) times a day 180 tablet 3    Fosrenol 1000 MG PACK MIX 1 PACKET WITH SMALL AMOUNT OF APPLESAUCE OR SIMILAR FOOD  EAT IMMEDIATELY 3 TIMES/DAY WITH MEALS      FREESTYLE LITE test strip TWICE A DAY      glucose monitoring kit (FREESTYLE) monitoring kit Use to check blood sugar   E11 21      Heparin Sodium, Porcine, (HEPARIN, PORCINE,) 5,000 units/mL Inject 1 mL (5,000 Units total) under the skin every 8 (eight) hours for 27 days (Patient not taking: Reported on 5/21/2020) 1 mL 0    Insulin Glargine (TOUJEO SOLOSTAR) 300 UNIT/ML SOPN Inject 24 Units under the skin daily at bedtime        insulin glargine (Toujeo SoloStar) 300 units/mL CONCETRATED U-300 injection pen (1-unit dial) Indications: E11 9  10 units 1XD in morning if fasting glucose >180 mg/dl E11 21      Insulin Pen Needle (BD Pen Needle Ursula 2nd Gen) 32G X 4 MM MISC BD Ultra-Fine Ursula Pen Needle 32 gauge x 5/32"      Lancets (FREESTYLE) lancets TWICE A DAY      latanoprost (XALATAN) 0 005 % ophthalmic solution USE 1 DROP INTO BOTH EYES AT BEDTIME      Latanoprost 0 005 % EMUL latanoprost 0 005 % eye drops      lidocaine-prilocaine (EMLA) cream APPLY SMALL AMOUNT TO ACCESS SITE (AVF) 30 MINUTES BEFORE DIALYSIS  COVER WITH OCCLUSIVE DRESSING (SARAN WRAP)      lovastatin (MEVACOR) 20 mg tablet Take 20 mg by mouth daily      lubiprostone (Amitiza) 24 mcg capsule Take 1 capsule (24 mcg total) by mouth 2 (two) times a day 180 capsule 3    midodrine (PROAMATINE) 5 mg tablet Take 5 mg by mouth 3 (three) times a day      ondansetron (ZOFRAN) 4 mg tablet ondansetron HCl 4 mg tablet   PRN      pantoprazole (PROTONIX) 40 mg tablet Take 1 tablet (40 mg total) by mouth 2 (two) times a day before meals 180 tablet 0    psyllium (METAMUCIL SMOOTH TEXTURE) 28 % packet Take 1 packet by mouth 2 (two) times a day (Patient not taking: Reported on 5/21/2020) 60 packet 2    sevelamer carbonate (RENVELA) 800 mg tablet Take 800 mg by mouth 3 (three) times a day with meals      sitaGLIPtin (JANUVIA) 25 mg tablet Take 25 mg by mouth daily      sucralfate (CARAFATE) 1 g/10 mL suspension Take 10 mL (1 g total) by mouth 4 (four) times a day 420 mL 0    traZODone (DESYREL) 100 mg tablet Take 100 mg by mouth daily at bedtime      zolpidem (AMBIEN) 10 mg tablet TAKE 1 TABLET BY MOUTH DAILY AT BEDTIME AS NEEDED FOR SLEEP 30 tablet 0     No current facility-administered medications for this visit  No Known Allergies    Review of Systems   Skin:        sores   All other systems reviewed and are negative  Video Exam    There were no vitals filed for this visit  Physical Exam  Constitutional:       Appearance: He is ill-appearing  HENT:      Head: Normocephalic and atraumatic  Nose: Nose normal    Eyes:      General:         Right eye: No discharge  Left eye: No discharge  Pulmonary:      Effort: Pulmonary effort is normal    Skin:     Findings: Lesion present               Comments: Multiple erythematous based lesions in varying sizes on anterior tibial areas and dorsal aspect of both feet  R posterior heel area has 1 cm sore with crusting    Neurological:      Mental Status: He is alert  I spent 19 minutes directly with the patient during this visit      [facility] acknowledges that he has consented to an online visit or consultation  He understands that the online visit is based solely on information provided by him, and that, in the absence of a face-to-face physical evaluation by the physician, the diagnosis he receives is both limited and provisional in terms of accuracy and completeness  This is not intended to replace a full medical face-to-face evaluation by the physician  Geena Mendez understands and accepts these terms

## 2021-01-15 NOTE — TELEPHONE ENCOUNTER
Wendy Shakila left message on nurse line stating she needs another letter regarding modifications for bathroom  She was told there was not enough information to follow through with request and they are requiring the letter state he is in a wheelchair, is a special needs patient and cannot walk up and down stairs and tiles are not safe for health

## 2021-01-19 NOTE — TELEPHONE ENCOUNTER
Farideh Lopez called stating a cream was supposed to be prescribed and sent to [facility] as discussed at patients last visit  Farideh Lopez also stated that the letter with the restrictions is incorrect and states the patient is able to use the bathroom upstairs but the pt is unable to get up the stairs and would like a new letter with the correct limitations

## 2021-01-20 NOTE — TELEPHONE ENCOUNTER
Raf Mercedes called stating she received a letter  From [employer] stating they need a script stating Home Bathroom Modification on it  If you have any questions to please call Raf Mercedse at [phone]  She also needs a home therapist to evaluate him to see what he can and can't do

## 2021-01-21 NOTE — TELEPHONE ENCOUNTER
Since we do not have a specific script for Bathroom Modifications, I wrote a letter, can you please print the letter from 1/21/2021 and fax  There is already an order for home PT

## 2021-01-22 NOTE — TELEPHONE ENCOUNTER
Rosalee allen/Yanelis John Salisbury is calling regarding RX and Order for PT  She stated she already left a message on nurse line and was never called back, did not want to be connected to line again

## 2021-01-22 NOTE — TELEPHONE ENCOUNTER
I called [facility] phone rings and vm is full,  to ask if she had a fax number to send the rx for the bathroom modifications

## 2021-01-22 NOTE — TELEPHONE ENCOUNTER
I attempted to call cari ayers unfortunately there was no number provided  I did google [facility] connections and called the number listed and with out an extension I could not get to her personal line   I did leave a msg for a call back on a VM

## 2021-01-25 NOTE — TELEPHONE ENCOUNTER
I called and spoke with Muriel Dubois who states there has been a lot of confusion regarding the type of script that is needed  Pt will need a one time visit from a physical therapist for an evaluation and they would have to write the script needed for the bathroom modifications  She states that it can not come from his PCP it would have to be a PT      FAX: [phone]  IF:[phone]

## 2021-01-25 NOTE — TELEPHONE ENCOUNTER
I placed yet another order for PT and specified in the order that there needs to be an evaluation done for needed home modifications  Can you please call patient's significant other Yuliana Silveira and explain the above   Thanks

## 2021-01-29 NOTE — TELEPHONE ENCOUNTER
Lee Palacios called again and asked if we have received fax  I let her know we have not, however, I called [employer] and asked them to refax it to us  Lee Palacios also wanted to know how does the in home physical therapy get set up

## 2021-01-29 NOTE — TELEPHONE ENCOUNTER
Yuliana Silveira care giver for patient came into office in person today stating she has left messages and has spoken with someone RE: incontinence supplies being sent to [facility] as well as PT at home for pt but hasn't made any progress and taking care of pt without supplies is making it more difficult

## 2021-01-31 NOTE — TELEPHONE ENCOUNTER
SIGNATURES NEEDED FOR [employer] Medical form FORM RECEIVED VIA FAX  WILL BE PLACED IN YOUR BIN AT ASSIGNED DELIVERY TIMES

## 2021-02-04 NOTE — ANESTHESIA PREPROCEDURE EVALUATION
Procedure:  EGD    Relevant Problems   CARDIO   (+) Essential hypertension      ENDO   (+) Type 2 diabetes mellitus, with long-term current use of insulin (HCC)      GI/HEPATIC   (+) Esophageal dysphagia   (+) GERD (gastroesophageal reflux disease)   (+) Gastroesophageal reflux disease without esophagitis      /RENAL   (+) Acute on chronic kidney failure (HCC)   (+) ESRD (end stage renal disease) (HCC)   (+) ESRD on hemodialysis (HCC)   (+) Stage 5 chronic kidney disease not on chronic dialysis (HCC)      HEMATOLOGY   (+) Anemia due to chronic kidney disease      MUSCULOSKELETAL   (+) Low back pain      NEURO/PSYCH   (+) Anxiety   (+) History of CVA (cerebrovascular accident)        Physical Exam    Airway    Mallampati score: II  TM Distance: >3 FB  Neck ROM: full     Dental       Cardiovascular  Rhythm: regular, Rate: normal, Cardiovascular exam normal    Pulmonary  Pulmonary exam normal Breath sounds clear to auscultation,     Other Findings        Anesthesia Plan  ASA Score- 4     Anesthesia Type- general and IV sedation with anesthesia with ASA Monitors  Additional Monitors:   Airway Plan:           Plan Factors-Exercise tolerance (METS): <4 METS  Chart reviewed  Existing labs reviewed  Patient is not a current smoker  Obstructive sleep apnea risk education given perioperatively  Induction- intravenous  Postoperative Plan-     Informed Consent- Anesthetic plan and risks discussed with patient

## 2021-02-04 NOTE — DISCHARGE INSTRUCTIONS
Upper Endoscopy   WHAT YOU NEED TO KNOW:   An upper endoscopy is also called an upper gastrointestinal (GI) endoscopy, or an esophagogastroduodenoscopy (EGD)  You may feel bloated, gassy, or have some abdominal discomfort after your procedure  Your throat may be sore for 24 to 36 hours  You may burp or pass gas from air that is still inside your body  DISCHARGE INSTRUCTIONS:   Call 911 for any of the following:   · You have sudden chest pain or trouble breathing  Seek care immediately if:   · You feel dizzy or faint  · You have trouble swallowing  · Your bowel movements are very dark or black  · Your abdomen is hard and firm and you have severe pain  · You vomit blood  Contact your healthcare provider if:   · You feel full or bloated and cannot burp or pass gas  · You have not had a bowel movement for 3 days after your procedure  · You have neck pain  · You have a fever or chills  · You have nausea or are vomiting  · You have a rash or hives  · You have questions or concerns about your endoscopy  Relieve a sore throat:  Suck on throat lozenges or crushed ice  Gargle with a small amount of warm salt water  Mix 1 teaspoon of salt and 1 cup of warm water to make salt water  Relieve gas and discomfort from bloating:  Lie on your right side with a heating pad on your abdomen  Take short walks to help pass gas  Eat small meals until bloating is relieved  Rest after your procedure: You have been given medicine to relax you  Do not  drive or make important decisions until the day after your procedure  Return to your normal activity as directed  You can usually return to work the day after your procedure  Follow up with your healthcare provider as directed:  Write down your questions so you remember to ask them during your visits     © 2017 [address] is for End User's use only and may not be sold, redistributed or otherwise used for commercial purposes  All illustrations and images included in CareNotes® are the copyrighted property of [employer]  or Luis Eduardo Reyes  The above information is an  only  It is not intended as medical advice for individual conditions or treatments  Talk to your doctor, nurse or pharmacist before following any medical regimen to see if it is safe and effective for you

## 2021-02-04 NOTE — H&P
History and Physical - [facility]  Marcia Avila 64 y o  male MRN: [id number]    HPI: Marcia Avila is a 64y o  year old male who presents for EGD  He has had EGD last year in September which showed severe esophagitis  Plan for repeat EGD to assess for healing of esophagitis as well as screen for Christiansen's the setting of GERD  REVIEW OF SYSTEMS: Per the HPI, and otherwise unremarkable  Historical Information   Past Medical History:   Diagnosis Date    Anxiety     CAD (coronary artery disease)     last assessed 4/10/13    Chronic kidney disease     On dialysis    Chronic kidney disease, stage IV (severe) ([place] [address] )     Concussion     last assessed 9/3/14    Depression     Diabetes mellitus ([place] [address] )     type 2    Failure to gain weight in adult     last assessed 2/24/14    Gastroparesis     GERD (gastroesophageal reflux disease)     Hyperkalemia     last assessed 11/9/15    Hyperlipidemia     Hypertension     accelerated essential last assessed 10/7/16    Insomnia     Late effects of cerebrovascular disease     Overflow incontinence     last assessed 7/24/14    Renal disorder     Secondary hyperparathyroidism ([place] [address] )     Stroke ([place] [address] )     left sided weakness     Past Surgical History:   Procedure Laterality Date    COLONOSCOPY      HERNIA REPAIR      [place] ESOPHAGOGASTRODUODENOSCOPY TRANSORAL DIAGNOSTIC N/A 2/16/2017    Procedure: ESOPHAGOGASTRODUODENOSCOPY (EGD) with biopsy;  Surgeon: Maria Luz Moffett DO;  Location: [facility]; Service: Gastroenterology    [place] ESOPHAGOGASTRODUODENOSCOPY TRANSORAL DIAGNOSTIC N/A 6/2/2017    Procedure: ESOPHAGOGASTRODUODENOSCOPY (EGD) with bx;  Surgeon: Maria Luz Moffett DO;  Location: [facility]; Service: Gastroenterology    WI OPEN RX FEMUR FX+INTRAMED SAM Left 4/26/2020    Procedure: INSERTION NAIL IM FEMUR ANTEGRADE (TROCHANTERIC);   Surgeon: Selvin Mann MD;  Location: [facility];  Service: Orthopedics    TYMPANOSTOMY TUBE PLACEMENT Social History   Social History     Substance and Sexual Activity   Alcohol Use Not Currently    Comment: social     Social History     Substance and Sexual Activity   Drug Use Yes    Types: Marijuana    Comment: smokes once keven while for pain 1 xper  week     Social History     Tobacco Use   Smoking Status Never Smoker   Smokeless Tobacco Never Used     Family History   Problem Relation Age of Onset    Cancer Family         pt and friend denies this hx    Hypertension Family         essential    Hyperlipidemia Family     Diabetes Mother     No Known Problems Father        Meds/Allergies     (Not in a hospital admission)      No Known Allergies    Objective     /57   Pulse 89   Temp 98 1 °F (36 7 °C) (Temporal)   Resp 16   SpO2 93%       PHYSICAL EXAM    Gen: NAD  CV: RRR  CHEST: Clear  ABD: soft, NT/ND  EXT: no edema      ASSESSMENT/PLAN:  This is a 64y o  year old male here for EGD, and he is stable and optimized for his procedure

## 2021-02-04 NOTE — ADDENDUM NOTE
Addendum  created 02/04/21 1252 by Elver Wei, DO    Intraprocedure Event edited, Intraprocedure Staff edited

## 2021-02-05 NOTE — TELEPHONE ENCOUNTER
Griselda Ricketts a friend of the pt is calling to report that he is running a fever of 103  She stated he had a procedure yesterday and would like to know what she can give him for the fever  Shakira Chakraborty know to call back the answering service of the provider on call does not call her back in 30/40 minutes

## 2021-02-06 PROBLEM — Z99.2 ESRD ON HEMODIALYSIS (HCC): Status: RESOLVED | Noted: 2020-01-01 | Resolved: 2021-01-01

## 2021-02-06 PROBLEM — R73.9 HYPERGLYCEMIA: Status: ACTIVE | Noted: 2021-01-01

## 2021-02-06 PROBLEM — N18.6 ESRD ON HEMODIALYSIS (HCC): Status: RESOLVED | Noted: 2020-01-01 | Resolved: 2021-01-01

## 2021-02-06 PROBLEM — A41.9 SEPSIS (HCC): Status: ACTIVE | Noted: 2021-01-01

## 2021-02-06 NOTE — ED PROVIDER NOTES
History  Chief Complaint   Patient presents with    Hyperglycemia - Symptomatic     High blood sugar readings x 2 days since having endoscopy  Also had vomiting yesterday, sweating  No urinary sx  BG just reading high  65 yo M h/o ESRD on HD, IDDM, prior CVA presenting for evaluation of N/V and hyperglycemia  Pt had EGD done 2 days ago and sx started around yesterday  Caregiver at bedside who lives with pt and provides history  States numerous episodes of mucous like vomiting, unable to tolerate PO  Reports accuchecks reading "high" at home  Pt typically only on long acting insulin that he gets every morning  Once pt was found to have hypergylcemia, doctor called in short acting insulin for with meals, however caregiver was hesitant to give this because of concern for causing hypoglycemia as that has happened in the past  Pt denies other complaints other than the nausea/vomiting  Caregiver does state that pt had fever to 103 yesterday  Baseline mental status and strength (residual L sided weakness)  Denies CP, SOB, cough  Makes small amount of urine and incontinent  H/o ESRD on HD MWF, last dialyzed yesterday, fistula to RUE  MDM: 65 yo M with N/V/hypergylcemia- DKA rule out/workup/treatment, look for source of infection/sepsis, EKG/troponin, admit          Prior to Admission Medications   Prescriptions Last Dose Informant Patient Reported? Taking? ARIPiprazole (ABILIFY) 2 mg tablet 2/6/2021 at Unknown time  No Yes   Sig: Take 2 tablets (4 mg total) by mouth daily   Patient taking differently: Take 4 mg by mouth 2 (two) times a day    FREESTYLE LITE test strip Not Taking at Unknown time Self Yes No   Sig: TWICE A DAY   Fosrenol 1000 MG PACK 2/6/2021 at Unknown time Self Yes Yes   Sig: MIX 1 PACKET WITH SMALL AMOUNT OF APPLESAUCE OR SIMILAR FOOD   EAT IMMEDIATELY 3 TIMES/DAY WITH MEALS   INSULIN ASPART FLEXPEN SC 2/6/2021 at Unknown time  Yes Yes   Sig: Inject 2 Units under the skin every 6 (six) hours as needed (When blood glucose >250)   Insulin Glargine (TOUJEO SOLOSTAR) 300 UNIT/ML SOPN 2/5/2021 at Unknown time Self Yes Yes   Sig: Inject under the skin daily at bedtime 6 units Monday, Wednesday & Friday if BGL >100; 10 units Tuesday, Thursday, Saturday & Sunday if BGL >100   Insulin Pen Needle (BD Pen Needle Ursula 2nd Gen) 32G X 4 MM MISC Not Taking at Unknown time Self Yes No   Sig: BD Ultra-Fine Ursula Pen Needle 32 gauge x 5/32"   Lancets (FREESTYLE) lancets Not Taking at Unknown time Self Yes No   Sig: TWICE A DAY   aspirin 81 mg chewable tablet 2/6/2021 at Unknown time Self No Yes   Sig: Chew 1 tablet (81 mg total) daily   atorvastatin (LIPITOR) 20 mg tablet 2/5/2021 at Unknown time Self Yes Yes   Sig: TAKE 1 TABLET BY MOUTH EVERY DAY WITH DINNER   buPROPion (WELLBUTRIN) 100 mg tablet 2/6/2021 at Unknown time  No Yes   Sig: Take 1 tablet (100 mg total) by mouth 2 (two) times a day   glucose monitoring kit (FREESTYLE) monitoring kit Not Taking at Unknown time Self Yes No   Sig: Use to check blood sugar  E11 21   lidocaine-prilocaine (EMLA) cream 2/5/2021 at Unknown time Self Yes Yes   Sig: APPLY SMALL AMOUNT TO ACCESS SITE (AVF) 30 MINUTES BEFORE DIALYSIS   COVER WITH OCCLUSIVE DRESSING (SARAN WRAP)   linaCLOtide (Linzess) 72 MCG CAPS 2/6/2021 at Unknown time  No Yes   Sig: Take 1 capsule by mouth daily   midodrine (PROAMATINE) 5 mg tablet 2/6/2021 at Unknown time Self Yes Yes   Sig: Take 10 mg by mouth 3 (three) times a day    pantoprazole (PROTONIX) 40 mg tablet 2/6/2021 at Unknown time  No Yes   Sig: Take 1 tablet (40 mg total) by mouth 2 (two) times a day before meals   sitaGLIPtin (JANUVIA) 25 mg tablet 2/6/2021 at Unknown time Self Yes Yes   Sig: Take 25 mg by mouth daily   sodium polystyrene sulfonate (KAYEXALATE) 15 g/60 mL suspension   Yes Yes   Sig: Take 30 g by mouth once For emergency use - Only to be taken as directed by dialysis staff   sucralfate (CARAFATE) 1 g/10 mL suspension 2/6/2021 at Unknown time  No Yes   Sig: Take 10 mL (1 g total) by mouth 4 (four) times a day   Patient taking differently: Take 1 g by mouth 2 (two) times a day    triamcinolone (KENALOG) 0 025 % ointment 2/6/2021 at Unknown time  No Yes   Sig: Apply topically 2 (two) times a day   zolpidem (AMBIEN) 10 mg tablet 2/5/2021 at Unknown time Self No Yes   Sig: TAKE 1 TABLET BY MOUTH DAILY AT BEDTIME AS NEEDED FOR SLEEP   Patient taking differently: Take 10 mg by mouth daily at bedtime       Facility-Administered Medications: None       Past Medical History:   Diagnosis Date    Anxiety     CAD (coronary artery disease)     last assessed 4/10/13    Chronic kidney disease     On dialysis    [facility], [facility]     Concussion     last assessed 9/3/14    Depression     Diabetes mellitus ([facility] )     type 2    Failure to gain weight in adult     last assessed 2/24/14    Gastroparesis     GERD (gastroesophageal reflux disease)     Hyperkalemia     last assessed 11/9/15    Hyperlipidemia     Hypertension     accelerated essential last assessed 10/7/16    Insomnia     Late effects of cerebrovascular disease     Overflow incontinence     last assessed 7/24/14    Renal disorder     Secondary hyperparathyroidism ([facility] )     Stroke ([place] [address] )     left sided weakness       Past Surgical History:   Procedure Laterality Date    COLONOSCOPY      HERNIA REPAIR      NC ESOPHAGOGASTRODUODENOSCOPY TRANSORAL DIAGNOSTIC N/A 2/16/2017    Procedure: ESOPHAGOGASTRODUODENOSCOPY (EGD) with biopsy;  Surgeon: Maicol Uriarte DO;  Location: [facility]; Service: Gastroenterology    NC ESOPHAGOGASTRODUODENOSCOPY TRANSORAL DIAGNOSTIC N/A 6/2/2017    Procedure: ESOPHAGOGASTRODUODENOSCOPY (EGD) with bx;  Surgeon: Maicol Uriarte DO;  Location: [facility]; Service: Gastroenterology    NC OPEN RX FEMUR FX+INTRAMED SAM Left 4/26/2020    Procedure: INSERTION NAIL IM FEMUR ANTEGRADE (TROCHANTERIC);   Surgeon: Cindy Petersen MD; Location: [facility];  Service: Orthopedics    TYMPANOSTOMY TUBE PLACEMENT         Family History   Problem Relation Age of Onset    Cancer Family         pt and friend denies this hx    Hypertension Family         essential    Hyperlipidemia Family     Diabetes Mother     No Known Problems Father      I have reviewed and agree with the history as documented  E-Cigarette/Vaping    E-Cigarette Use Never User      E-Cigarette/Vaping Substances    Nicotine No     THC No     CBD No     Flavoring No     Other No     Unknown No      Social History     Tobacco Use    Smoking status: Never Smoker    Smokeless tobacco: Never Used   Substance Use Topics    Alcohol use: Not Currently     Comment: social    Drug use: Yes     Types: Marijuana     Comment: smokes once keven while for pain 1 xper  week       Review of Systems   Constitutional: Positive for activity change, appetite change, fatigue and fever  Negative for chills  HENT: Negative for ear pain and sore throat  Eyes: Negative for pain and visual disturbance  Respiratory: Negative for cough and shortness of breath  Cardiovascular: Negative for chest pain and palpitations  Gastrointestinal: Positive for nausea and vomiting  Negative for abdominal pain, constipation and diarrhea  Genitourinary: Negative for dysuria and hematuria  Musculoskeletal: Negative for arthralgias and back pain  Skin: Negative for color change and rash  Neurological: Negative for seizures and syncope  All other systems reviewed and are negative  Physical Exam  Physical Exam  Vitals signs and nursing note reviewed  Constitutional:       Appearance: He is well-developed  Comments: Appears uncomfortable, diaphoretic   HENT:      Head: Normocephalic and atraumatic  Nose: Nose normal    Eyes:      Extraocular Movements: Extraocular movements intact        Conjunctiva/sclera: Conjunctivae normal    Neck:      Musculoskeletal: Normal range of motion and neck supple  Cardiovascular:      Rate and Rhythm: Normal rate and regular rhythm  Heart sounds: Normal heart sounds  Pulmonary:      Breath sounds: Normal breath sounds  No stridor  Comments: tachypneic  Chest:      Chest wall: No tenderness  Abdominal:      General: There is no distension  Palpations: Abdomen is soft  Tenderness: There is no abdominal tenderness  There is no guarding or rebound  Comments: No abdominal ttp, no rebound/guarding   Musculoskeletal:         General: No deformity  Right lower leg: No edema  Left lower leg: No edema  Comments: RUE fistula with palpable thrill   Skin:     General: Skin is warm and dry  Findings: No rash  Neurological:      Mental Status: He is alert  Mental status is at baseline  Motor: No abnormal muscle tone  Comments: LUE/LLE weakness compared to RUE/RLE which is baseline per caregiver  Can lift LUE/LLE up against gravity but not against resistance  Psychiatric:         Thought Content:  Thought content normal          Judgment: Judgment normal          Vital Signs  ED Triage Vitals [02/06/21 1024]   Temperature Pulse Respirations Blood Pressure SpO2   98 3 °F (36 8 °C) 95 (!) 24 143/68 95 %      Temp src Heart Rate Source Patient Position - Orthostatic VS BP Location FiO2 (%)   -- -- -- -- --      Pain Score       No Pain           Vitals:    02/06/21 1024 02/06/21 1258   BP: 143/68 158/72   Pulse: 95 96         Visual Acuity      ED Medications  Medications   cefepime (MAXIPIME) 2 g/50 mL dextrose IVPB (2,000 mg Intravenous New Bag 2/6/21 1353)   lactated ringers infusion 1,000 mL (1,000 mL Intravenous New Bag 2/6/21 1257)   ondansetron (ZOFRAN) injection 4 mg (4 mg Intravenous Given 2/6/21 1257)   insulin regular (HumuLIN R,NovoLIN R) injection 6 Units (6 Units Intravenous Given 2/6/21 1351)       Diagnostic Studies  Results Reviewed     Procedure Component Value Units Date/Time    COVID19, Influenza A/B, RSV PCR, [facility] [[id number]]  (Normal) Collected: 02/06/21 1202    Lab Status: Final result Specimen: Nasopharyngeal Swab Updated: 02/06/21 1318     SARS-CoV-2 Negative     INFLUENZA A PCR Negative     INFLUENZA B PCR Negative     RSV PCR Negative    Narrative: This test has been authorized by FDA under an EUA (Emergency Use Assay) for use by authorized laboratories  Clinical caution and judgement should be used with the interpretation of these results with consideration of the clinical impression and other laboratory testing  Testing reported as "Positive" or "Negative" has been proven to be accurate according to standard laboratory validation requirements  All testing is performed with control materials showing appropriate reactivity at standard intervals  Lactic acid, plasma [[id number]]  (Normal) Collected: 02/06/21 1247    Lab Status: Final result Specimen: Blood from Arm, Left Updated: 02/06/21 1312     LACTIC ACID 1 7 mmol/L     Narrative:      Result may be elevated if tourniquet was used during collection      Troponin I [[id number]]  (Normal) Collected: 02/06/21 1247    Lab Status: Final result Specimen: Blood from Arm, Left Updated: 02/06/21 1312     Troponin I 0 04 ng/mL     Basic metabolic panel [[id number]]  (Abnormal) Collected: 02/06/21 1144    Lab Status: Final result Specimen: Blood from Arm, Left Updated: 02/06/21 1306     Sodium 130 mmol/L      Potassium 3 8 mmol/L      Chloride 90 mmol/L      CO2 29 mmol/L      ANION GAP 11 mmol/L      BUN 62 mg/dL      Creatinine 6 46 mg/dL      Glucose 616 mg/dL      Calcium 9 1 mg/dL      eGFR 10 ml/min/1 73sq m     Narrative:      [place] guidelines for Chronic Kidney Disease (CKD):     Stage 1 with normal or high GFR (GFR > 90 mL/min/1 73 square meters)    Stage 2 Mild CKD (GFR = 60-89 mL/min/1 73 square meters)    Stage 3A Moderate CKD (GFR = 45-59 mL/min/1 73 square meters)    Stage 3B Moderate CKD (GFR = 30-44 mL/min/1 73 square meters)    Stage 4 Severe CKD (GFR = 15-29 mL/min/1 73 square meters)    Stage 5 End Stage CKD (GFR <15 mL/min/1 73 square meters)  Note: GFR calculation is accurate only with a steady state creatinine    Lipase [[id number]]  (Abnormal) Collected: 02/06/21 1144    Lab Status: Final result Specimen: Blood from Arm, Left Updated: 02/06/21 1305     Lipase 48 u/L     Magnesium [[id number]]  (Normal) Collected: 02/06/21 1144    Lab Status: Final result Specimen: Blood from Arm, Left Updated: 02/06/21 1305     Magnesium 2 3 mg/dL     Phosphorus [[id number]]  (Abnormal) Collected: 02/06/21 1144    Lab Status: Final result Specimen: Blood from Arm, Left Updated: 02/06/21 1305     Phosphorus 4 9 mg/dL     Hepatic function panel [[id number]]  (Abnormal) Collected: 02/06/21 1144    Lab Status: Final result Specimen: Blood from Arm, Left Updated: 02/06/21 1305     Total Bilirubin 0 84 mg/dL      Bilirubin, Direct 0 45 mg/dL      Alkaline Phosphatase 156 U/L      AST 22 U/L      ALT 13 U/L      Total Protein 6 9 g/dL      Albumin 2 4 g/dL     Beta Hydroxybutyrate [[id number]]  (Normal) Collected: 02/06/21 1247    Lab Status: Final result Specimen: Blood from Arm, Left Updated: 02/06/21 1304     BETA-HYDROXYBUTYRATE 0 4 mmol/L     CBC [[id number]]  (Abnormal) Collected: 02/06/21 1144    Lab Status: Final result Specimen: Blood from Arm, Left Updated: 02/06/21 1259     WBC 15 63 Thousand/uL      RBC 3 23 Million/uL      Hemoglobin 10 1 g/dL      Hematocrit 33 2 %       fL      MCH 31 3 pg      MCHC 30 4 g/dL      RDW 13 2 %      Platelets 72 Thousands/uL      MPV 13 6 fL     Blood gas, venous [[id number]]  (Abnormal) Collected: 02/06/21 1144    Lab Status: Final result Specimen: Blood from Arm, Left Updated: 02/06/21 1216     pH, Prudencio 7 399     pCO2, Prudencio 48 3 mm Hg      pO2, Prudencio 50 9 mm Hg      HCO3, Prudencio 29 2 mmol/L      Base Excess, Prudencio 3 7 mmol/L      O2 Content, Prudencio 13 0 ml/dL      O2 HGB, VENOUS 81 6 %     Blood culture #1 [[id number]] Collected: 21 1144    Lab Status: In process Specimen: Blood from Arm, Left Updated: 21 1206    Blood culture #2 [[id number]] Collected: 21 1144    Lab Status: In process Specimen: Blood from Arm, Left Updated: 21 1206    UA w Reflex to Microscopic w Reflex to Culture [[id number]]     Lab Status: No result Specimen: Urine     Fingerstick Glucose (POCT) [[id number]]  (Abnormal) Collected: 21 1028    Lab Status: Final result Updated: 21 1046     POC Glucose >500 mg/dl                  XR chest 1 view portable    (Results Pending)          Interpreted by myself: no acute abn    Procedures  Procedures         ED Course  ED Course as of  1400   Sat 2021   1048 POC Glucose(!!): >500   1107 EKst degree AV block, LAD, poor R wave progression anterior leads, nonspecific ST changes      1107 Respirations(!): 24   1209 Pt is difficult stick, majority of bloodwork was able to be collected, working on IV       1219 pH, Purdencio: 7 399   1252 IV established      1300 WBC(!): 15 63   1300 10 6 9 months ago   Hemoglobin(!): 10 1   1300 New, 186 9 months ago   Platelet Count(!): 72   1303 Possibly infection with leukocytosis noted, previous tachycardia/tachypnea felt to be from DKA  Will start abx, unknown source      1307 Glucose, Random(!!): 616   1307 Anion Gap: 11   1307 138 corrected    Sodium(!): 130   1308 baseline   Creatinine(!): 6 46   1329 Discussed admission with pt/caregiver and messaged SLIM                                SBIRT 20yo+      Most Recent Value   SBIRT (23 yo +)   In order to provide better care to our patients, we are screening all of our patients for alcohol and drug use  Would it be okay to ask you these screening questions? Yes Filed at: 2021 1048   Initial Alcohol Screen: US AUDIT-C    1  How often do you have a drink containing alcohol?  0 Filed at: 2021 1048   2   How many drinks containing alcohol do you have on a typical day you are drinking? 0 Filed at: 02/06/2021 1048   3a  Male UNDER 65: How often do you have five or more drinks on one occasion? 0 Filed at: 02/06/2021 1048   3b  FEMALE Any Age, or MALE 65+: How often do you have 4 or more drinks on one occassion? 0 Filed at: 02/06/2021 1048   Audit-C Score  0 Filed at: 02/06/2021 1048   [place]: How many times in the past year have you    Used an illegal drug or used a prescription medication for non-medical reasons? Never Filed at: 02/06/2021 1048                    MDM  Number of Diagnoses or Management Options  ESRD on hemodialysis [facility]): Hyperglycemia:   Leukocytosis:   Nausea and vomiting: Thrombocytopenia [facility]):   Diagnosis management comments: 65 yo M with multiple comorbidities presenting with N/V and hyperglycemia  Pt only on long acting insulin at home and needs better regimen establish, fortunately no DKA  Given IVF and insulin and will admit  WBC 15 unsure if reactive from vomiting or infection, pt did meet SIRS however no obvious source of infection but was covered with Cefepime   Admitted for further management       Amount and/or Complexity of Data Reviewed  Clinical lab tests: ordered and reviewed  Tests in the radiology section of CPT®: ordered and reviewed  Tests in the medicine section of CPT®: ordered and reviewed  Review and summarize past medical records: yes        Disposition  Final diagnoses:   Hyperglycemia   Thrombocytopenia (HCC)   Leukocytosis   Nausea and vomiting   ESRD on hemodialysis ([facility] )     Time reflects when diagnosis was documented in both MDM as applicable and the Disposition within this note     Time User Action Codes Description Comment    2/6/2021  1:41 PM Velinda Do A Add [R73 9] Hyperglycemia     2/6/2021  1:41 PM Velinda Do A Add [D69 6] Thrombocytopenia ([facility] )     2/6/2021  1:41 PM Ej [place] Add [D72 829] Leukocytosis     2/6/2021  1:42 PM Prema Aguilar A Add [R11 2] Nausea and vomiting     2/6/2021  1:42 PM Rich [place] Add [N18 6,  Z99 2] ESRD on hemodialysis [facility])       ED Disposition     ED Disposition Condition Date/Time Comment    Admit Stable Sat Feb 6, 2021  1:41 PM Case was discussed with SHANELL and the patient's admission status was agreed to be Admission Status: observation status to the service of Dr Yulisa Montgomery   Follow-up Information    None         Patient's Medications   Discharge Prescriptions    No medications on file     No discharge procedures on file      PDMP Review       Value Time User    PDMP Reviewed  Yes 4/30/2020 12:04 Fernando Paulson MD          ED Provider  Electronically Signed by           Tyra Limon DO  02/06/21 1400

## 2021-02-06 NOTE — ASSESSMENT & PLAN NOTE
Lab Results   Component Value Date    HGBA1C 7 9 (H) 02/04/2021       Recent Labs     02/04/21  1103 02/04/21  1219 02/06/21  1028 02/06/21  1426   POCGLU 309* 282* >500* >500*       Blood Sugar Average: Last 72 hrs:     Will place on insulin drip  Endocrinology consult   has had diabetes for greater than 10 years    Home regimen of Januvia 25 mg  Insulin 300 on an insulin sliding scale    Once blood sugars are controlled, would start basal insulin and restart on diabetic diet with Accu-Cheks

## 2021-02-06 NOTE — H&P
H&P- Governor Frame 1960, 64 y o  male MRN: [id number]    Unit/Bed#: E4 -01 Encounter: [id number]    Primary Care Provider: Joye Apley, MD   Date and time admitted to hospital: 2/6/2021 10:36 AM        * Hyperglycemia  Assessment & Plan  Patient with history of type 2 diabetes mellitus with uncontrolled hyperglycemia  He is now with multiple blood sugars greater than 500  His power of  was afraid to give him short-acting insulin as she felt this would bottom his blood sugars  He is only on long-acting insulin which has been prescribed by his endocrinologist at [facility]  There is no evidence of beta hydroxybutyrate, he has no gap    Start on insulin drip  No indication for D5/half-normal infusion -would recommend again starting this in the setting of end-stage renal disease  Monitor blood sugars with insulin drip, as patient is end-stage renal disease, will likely not clear insulin as fast  Low threshold to move to lower algorithm    Endocrinology consultation has been placed per the patient's preference, tiger text sent at 2:25 p m  And acknowledged      Sepsis [facility])  Assessment & Plan  Meets sepsis criteria with 2 SIRS criteria, elevated respiratory rate of 24, [place] blood [place] 430[address]  Reported fever yesterday  Consider GI/ source, he recently underwent endoscopy less than 48 hours ago      Family members reported that he had some thick sputum initially which has now cleared  Given cefepime x1  Continue ceftriaxone for now  Low threshold to discontinue  Check procalcitonin    ESRD (end stage renal disease) [facility])  Assessment & Plan  Lab Results   Component Value Date    EGFR 10 02/06/2021    EGFR 9 02/04/2021    EGFR 8 04/29/2020    CREATININE 6 46 (H) 02/06/2021    CREATININE 6 77 (H) 02/04/2021    CREATININE 8 18 (H) 04/29/2020   Nephrology consultation for dialysis while admitted    Essential hypertension  Assessment & Plan  Resume home regimen  Continue midodrine 10 mg 3 times a day    Type 2 diabetes mellitus, with long-term current use of insulin [facility])  Assessment & Plan  Lab Results   Component Value Date    HGBA1C 7 9 (H) 02/04/2021       Recent Labs     02/04/21  1103 02/04/21  1219 02/06/21  1028 02/06/21  1426   POCGLU 309* 282* >500* >500*       Blood Sugar Average: Last 72 hrs: Will place on insulin drip  Endocrinology consult   has had diabetes for greater than 10 years    Home regimen of Januvia 25 mg  Insulin 300 on an insulin sliding scale    Once blood sugars are controlled, would start basal insulin and restart on diabetic diet with Accu-Cheks    History of CVA (cerebrovascular accident)  Assessment & Plan  Previous history of cerebrovascular accident  from 20/10 with residual left-sided weakness- family states he eats regular food  Has baseline for ambulation dysfunction as well as neurologic deficits which have been      ESRD on hemodialysis (HCC)-resolved as of 2/6/2021  Assessment & Plan  Lab Results   Component Value Date    EGFR 10 02/06/2021    EGFR 9 02/04/2021    EGFR 8 04/29/2020    CREATININE 6 46 (H) 02/06/2021    CREATININE 6 77 (H) 02/04/2021    CREATININE 8 18 (H) 04/29/2020     VTE Prophylaxis: Heparin  / sequential compression device   Code Status:  Full code  POLST: There is no POLST form on file for this patient (pre-hospital)  Discussion with family:  Patient's significant other at the bedside    Anticipated Length of Stay:  Patient will be admitted on an Inpatient basis with an anticipated length of stay of  greater than 2 midnights  Justification for Hospital Stay:  Hyperglycemia requiring intravenous infusion of insulin, sepsis of unknown etiology    Total Time for Visit, including Counseling / Coordination of Care: 45 minutes  Greater than 50% of this total time spent on direct patient counseling and coordination of care  Chief Complaint:   Nausea vomiting and hyperglycemia    History of Present Illness:    Norman Hilton is a 64 y o  male who presents with nausea and vomiting at home as well as fever  The patient recently underwent upper endoscopy, he reports biopsies were taken  At home he had a fever to a T-max of a 100 3°, he then had persistent nausea and vomiting and uncontrolled blood sugars  His family members attempted to contact her endocrinologist however they were not confident in administering short-acting insulin is a felt this would bottom out his blood sugars  They subsequently did not given any additional insulin and now is blood sugars are greater than 600  In the emergency room the patient was found to have significant leukocytosis, as well as respiratory elevation  He reports that he had some phlegm in his chest however this went away over the last 12 hours  He denies any nausea at the time my examination, no diarrhea, no urinary frequency or dysuria  Due to stroke and cognitive deficits most of the history was taken from the family member at the bedside       Review of Systems:    A complete and comprehensive 14 point organ system review was performed and all other systems are negative other than stated above in the HPI    Past Medical and Surgical History:     Past Medical History:   Diagnosis Date    Anxiety     CAD (coronary artery disease)     last assessed 4/10/13    Chronic kidney disease     On dialysis    Chronic kidney disease, stage IV (severe) ([address] )     Concussion     last assessed 9/3/14    Depression     Diabetes mellitus ([address] )     type 2    Failure to gain weight in adult     last assessed 2/24/14    Gastroparesis     GERD (gastroesophageal reflux disease)     Hyperkalemia     last assessed 11/9/15    Hyperlipidemia     Hypertension     accelerated essential last assessed 10/7/16    Insomnia     Late effects of cerebrovascular disease     Overflow incontinence     last assessed 7/24/14    Renal disorder     Secondary hyperparathyroidism ([address] )     Stroke ([address] )     left sided weakness Past Surgical History:   Procedure Laterality Date    COLONOSCOPY      HERNIA REPAIR      UT ESOPHAGOGASTRODUODENOSCOPY TRANSORAL DIAGNOSTIC N/A 2/16/2017    Procedure: ESOPHAGOGASTRODUODENOSCOPY (EGD) with biopsy;  Surgeon: Alexei Martinez DO;  Location: [facility]; Service: Gastroenterology    UT ESOPHAGOGASTRODUODENOSCOPY TRANSORAL DIAGNOSTIC N/A 6/2/2017    Procedure: ESOPHAGOGASTRODUODENOSCOPY (EGD) with bx;  Surgeon: Alexei Martinez DO;  Location: [facility]; Service: Gastroenterology    UT OPEN RX FEMUR FX+INTRAMED SAM Left 4/26/2020    Procedure: INSERTION NAIL IM FEMUR ANTEGRADE (TROCHANTERIC); Surgeon: Judge Severo MD;  Location: [facility];  Service: Orthopedics    TYMPANOSTOMY TUBE PLACEMENT         Meds/Allergies:    Prior to Admission medications    Medication Sig Start Date End Date Taking? Authorizing Provider   ARIPiprazole (ABILIFY) 2 mg tablet Take 2 tablets (4 mg total) by mouth daily  Patient taking differently: Take 4 mg by mouth 2 (two) times a day  12/14/20  Yes Roly Lacey MD   aspirin 81 mg chewable tablet Chew 1 tablet (81 mg total) daily 12/24/18  Yes CHERELLE Rodriguez   atorvastatin (LIPITOR) 20 mg tablet TAKE 1 TABLET BY MOUTH EVERY DAY WITH DINNER 11/27/19  Yes Historical Provider, MD   buPROPion (WELLBUTRIN) 100 mg tablet Take 1 tablet (100 mg total) by mouth 2 (two) times a day 12/14/20  Yes Roly Lacey MD   Fosrenol 1000 MG PACK MIX 1 PACKET WITH SMALL AMOUNT OF APPLESAUCE OR SIMILAR FOOD   EAT IMMEDIATELY 3 TIMES/DAY WITH MEALS 11/19/20  Yes Historical Provider, MD   INSULIN ASPART FLEXPEN SC Inject 2 Units under the skin every 6 (six) hours as needed (When blood glucose >250)   Yes Historical Provider, MD   Insulin Glargine (TOUJEO SOLOSTAR) 300 UNIT/ML SOPN Inject under the skin daily at bedtime 6 units Monday, Wednesday & Friday if BGL >100; 10 units Tuesday, Thursday, Saturday & Sunday if BGL >100   Yes Historical Provider, MD lidocaine-prilocaine (EMLA) cream APPLY SMALL AMOUNT TO ACCESS SITE (AVF) 30 MINUTES BEFORE DIALYSIS  COVER WITH OCCLUSIVE DRESSING (SARAN WRAP) 10/12/20  Yes Historical Provider, MD   linaCLOtide (Linzess) 72 MCG CAPS Take 1 capsule by mouth daily 1/19/21  Yes Darinel Laguerre MD   midodrine (PROAMATINE) 5 mg tablet Take 10 mg by mouth 3 (three) times a day    Yes Historical Provider, MD   pantoprazole (PROTONIX) 40 mg tablet Take 1 tablet (40 mg total) by mouth 2 (two) times a day before meals 12/14/20 3/14/21 Yes Darinel Laguerre MD   sitaGLIPtin (JANUVIA) 25 mg tablet Take 25 mg by mouth daily 3/5/20 3/5/21 Yes Historical Provider, MD   sodium polystyrene sulfonate (KAYEXALATE) 15 g/60 mL suspension Take 30 g by mouth once For emergency use - Only to be taken as directed by dialysis staff   Yes Historical Provider, MD   sucralfate (CARAFATE) 1 g/10 mL suspension Take 10 mL (1 g total) by mouth 4 (four) times a day  Patient taking differently: Take 1 g by mouth 2 (two) times a day  9/10/20 2/6/21 Yes Whitney Portillo MD   triamcinolone (KENALOG) 0 025 % ointment Apply topically 2 (two) times a day 1/19/21  Yes Darinel Laguerre MD   zolpidem (AMBIEN) 10 mg tablet TAKE 1 TABLET BY MOUTH DAILY AT BEDTIME AS NEEDED FOR SLEEP  Patient taking differently: Take 10 mg by mouth daily at bedtime  4/30/20  Yes Darinel Laguerre MD   FREESTYLE LITE test strip TWICE A DAY 2/25/20   Historical Provider, MD   glucose monitoring kit (FREESTYLE) monitoring kit Use to check blood sugar   E11 21 9/18/18   Historical Provider, MD   Insulin Pen Needle (BD Pen Needle Ursula 2nd Gen) 32G X 4 MM MISC BD Ultra-Fine Ursula Pen Needle 32 gauge x 5/32"    Historical Provider, MD   Lancets (FREESTYLE) lancets TWICE A DAY 2/25/20   Historical Provider, MD   Heparin Sodium, Porcine, (HEPARIN, PORCINE,) 5,000 units/mL Inject 1 mL (5,000 Units total) under the skin every 8 (eight) hours for 27 days  Patient not taking: Reported on 5/21/2020 4/29/20 2/6/21  CHERELLE Luo   insulin glargine (Toujeo SoloStar) 300 units/mL CONCETRATED U-300 injection pen (1-unit dial) Indications: E11 9  10 units 1XD in morning if fasting glucose >180 mg/dl E11 21 3/5/20 2/6/21  Historical Provider, MD   latanoprost (XALATAN) 0 005 % ophthalmic solution USE 1 DROP INTO BOTH EYES AT BEDTIME 3/10/20 2/6/21  Historical Provider, MD   Latanoprost 0 005 % EMUL latanoprost 0 005 % eye drops  2/6/21  Historical Provider, MD   lovastatin (MEVACOR) 20 mg tablet Take 20 mg by mouth daily  2/6/21  Historical Provider, MD   ondansetron (ZOFRAN) 4 mg tablet ondansetron HCl 4 mg tablet   PRN 5/2/19 2/6/21  Historical Provider, MD   psyllium (METAMUCIL SMOOTH TEXTURE) 28 % packet Take 1 packet by mouth 2 (two) times a day  Patient not taking: Reported on 5/21/2020 9/18/19 2/6/21  Frandy Olson PA-C   sevelamer carbonate (RENVELA) 800 mg tablet Take 800 mg by mouth 3 (three) times a day with meals  2/6/21  Historical Provider, MD   traZODone (DESYREL) 100 mg tablet Take 100 mg by mouth daily at bedtime  2/6/21  Historical Provider, MD     I have reviewed home medications with patient personally      Allergies: No Known Allergies    Social History:     Marital Status: Single   Occupation:  Retired and disabled    Substance Use History:   Social History     Substance and Sexual Activity   Alcohol Use Not Currently    Comment: social     Social History     Tobacco Use   Smoking Status Never Smoker   Smokeless Tobacco Never Used     Social History     Substance and Sexual Activity   Drug Use Yes    Types: Marijuana    Comment: smokes once keven while for pain 1 xper  week       Family History:    Family History   Problem Relation Age of Onset    Cancer Family         pt and friend denies this hx    Hypertension Family         essential    Hyperlipidemia Family     Diabetes Mother     No Known Problems Father        Physical Exam:     Vitals:   Blood Pressure: 158/72 (02/06/21 1258)  Pulse: 96 (02/06/21 1258)  Temperature: 98 3 °F (36 8 °C) (02/06/21 1024)  Respirations: (!) 24 (02/06/21 1258)  SpO2: 95 % (02/06/21 1258)      General: well appearing, no acute distress  HEENT: atraumatic, PERRLA, moist mucosa, normal pharynx, normal tonsils and adenoids, normal tongue, no fluid in sinuses  Neck: Trachea midline, no carotid bruit, no masses  Respiratory: normal chest wall expansion, CTA B, no r/r/w, no rubs  Cardiovascular: RRR, no m/r/g, Normal S1 and S2  Abdomen: Soft, non-tender, non-distended, normal bowel sounds in all quadrants, no hepatosplenomegaly, no tympany  Rectal: deferred  Musculoskeletal: normal ROM in upper and lower extremities  Integumentary: warm, dry, and pink, with no rash, purpura, or petechia  Heme/Lymph: no lymphadenopathy, no bruises  Neurological:  Left-sided weakness  Psychiatric: cooperative with normal mood, affect, and cognition    Additional Data:     Lab Results: I have personally reviewed pertinent reports  Results from last 7 days   Lab Units 02/06/21  1144   WBC Thousand/uL 15 63*   HEMOGLOBIN g/dL 9 0*   HEMATOCRIT % 36 5   PLATELETS Thousands/uL 72*     Results from last 7 days   Lab Units 02/06/21  1144   SODIUM mmol/L 130*   POTASSIUM mmol/L 3 8   CHLORIDE mmol/L 90*   CO2 mmol/L 29   BUN mg/dL 62*   CREATININE mg/dL 6 46*   ANION GAP mmol/L 11   CALCIUM mg/dL 9 1   ALBUMIN g/dL 2 4*   TOTAL BILIRUBIN mg/dL 0 84   ALK PHOS U/L 156*   ALT U/L 13   AST U/L 22   GLUCOSE RANDOM mg/dL 616*         Results from last 7 days   Lab Units 02/06/21  1426 02/06/21  1028 02/04/21  1219 02/04/21  1103 02/04/21  0954   POC GLUCOSE mg/dl >500* >500* 282* 309* 352*     Results from last 7 days   Lab Units 02/04/21  1306   HEMOGLOBIN A1C % 7 9*     Results from last 7 days   Lab Units 02/06/21  1247   LACTIC ACID mmol/L 1 7       Imaging: I have personally reviewed pertinent reports        XR chest 1 view portable    (Results Pending)       EKG, Pathology, and Other Studies Reviewed on Admission:   · Chest x-ray personally reviewed with no evidence of acute pathology    Allscripts / Epic Records Reviewed: Yes     ** Please Note: This note has been constructed using a voice recognition system  **  Total critical care time 60 minutes  Total critical care time documented does not include time spent on separately billed procedures or the services of  nurse practioners or physician assistants  I have personally seen and examined the patient  I have reviewed all diagnostic interpretations and treatment plans as written  I was present for the key portions of any procedures performed and the inclusive time noted in any critical care statement  Critical care time includes patient management by me, time spent at the patients bedside, time to review lab and imaging results, discussing patient care, documentation in the medical record, and time spent with the family or caregiver      Meets Critical care criteria based on  Organ System affected endocrine  Time Spent 60 minutes  Action taken is intravenous insulin due to uncontrolled hyperglycemia, without this the patient has a life threatening condition for which rapid deterioation is imminent and will lead to potential death if untreated

## 2021-02-06 NOTE — ASSESSMENT & PLAN NOTE
Meets sepsis criteria with 2 SIRS criteria, elevated respiratory rate of 24, white blood cell count of 98093  Reported fever yesterday  Consider GI/ source, he recently underwent endoscopy less than 48 hours ago      Family members reported that he had some thick sputum initially which has now cleared  Given cefepime x1  Continue ceftriaxone for now  Low threshold to discontinue  Check procalcitonin

## 2021-02-06 NOTE — ASSESSMENT & PLAN NOTE
Lab Results   Component Value Date    EGFR 10 02/06/2021    EGFR 9 02/04/2021    EGFR 8 04/29/2020    CREATININE 6 46 (H) 02/06/2021    CREATININE 6 77 (H) 02/04/2021    CREATININE 8 18 (H) 04/29/2020

## 2021-02-06 NOTE — ASSESSMENT & PLAN NOTE
Lab Results   Component Value Date    EGFR 10 02/06/2021    EGFR 9 02/04/2021    EGFR 8 04/29/2020    CREATININE 6 46 (H) 02/06/2021    CREATININE 6 77 (H) 02/04/2021    CREATININE 8 18 (H) 04/29/2020   Nephrology consultation for dialysis while admitted

## 2021-02-06 NOTE — PLAN OF CARE
Problem: Potential for Falls  Goal: Patient will remain free of falls  Description: INTERVENTIONS:  - Assess patient frequently for physical needs  -  Identify cognitive and physical deficits and behaviors that affect risk of falls    -  [place] fall precautions as indicated by assessment   - Educate patient/family on patient safety including physical limitations  - Instruct patient to call for assistance with activity based on assessment  - Modify environment to reduce risk of injury  - Consider OT/PT consult to assist with strengthening/mobility  Outcome: Progressing

## 2021-02-06 NOTE — ASSESSMENT & PLAN NOTE
Patient with history of type 2 diabetes mellitus with uncontrolled hyperglycemia  He is now with multiple blood sugars greater than 500  His power of  was afraid to give him short-acting insulin as she felt this would bottom his blood sugars  He is only on long-acting insulin which has been prescribed by his endocrinologist at [facility]  There is no evidence of beta hydroxybutyrate, he has no gap    Start on insulin drip  No indication for D5/half-normal infusion -would recommend again starting this in the setting of end-stage renal disease  Monitor blood sugars with insulin drip, as patient is end-stage renal disease, will likely not clear insulin as fast  Low threshold to move to lower algorithm    Endocrinology consultation has been placed per the patient's preference, tiger text sent at 2:25 p m   And acknowledged

## 2021-02-06 NOTE — LETTER
[facility] [facility] INTENSIVE CARE UNIT  [address] [place] [id number]  Dept: [phone]    February 8, 2021     Patient: Marcia Avila   YOB: 1960   Date of Visit: 2/6/2021       To Whom it May Concern:    Please excuse Ms Dora Salinas from her place of employment for 2/8/2021  Her father, who is currently hospitalized in our ICU, had a medical status change, and it was neccessary for Ms Carolina Mckay to leave work to attend to his needs at the hospital   If you have any questions or concerns, please don't hesitate to call           Sincerely,          IZA Major

## 2021-02-06 NOTE — ASSESSMENT & PLAN NOTE
Previous history of cerebrovascular accident  from 20/10 with residual left-sided weakness- family states he eats regular food  Has baseline for ambulation dysfunction as well as neurologic deficits which have been

## 2021-02-07 PROBLEM — R78.81 BACTEREMIA: Status: ACTIVE | Noted: 2021-01-01

## 2021-02-07 NOTE — SPEECH THERAPY NOTE
Speech Language/Pathology  Speech-Language Pathology Bedside Swallow Evaluation        Patient Name: Blayne HAMMER Date: 2/7/2021     Problem List  Principal Problem:    Hyperglycemia  Active Problems:    History of CVA (cerebrovascular accident)    Type 2 diabetes mellitus, with long-term current use of insulin ([facility] [address] )    Essential hypertension    ESRD (end stage renal disease) ([facility] )    Sepsis ([facility] )         Past Medical History  Past Medical History:   Diagnosis Date    Anxiety     CAD (coronary artery disease)     last assessed 4/10/13    Chronic kidney disease     On dialysis    Chronic kidney disease, stage IV (severe) ([facility] )     Concussion     last assessed 9/3/14    Depression     Diabetes mellitus ([facility] )     type 2    Failure to gain weight in adult     last assessed 2/24/14    Gastroparesis     GERD (gastroesophageal reflux disease)     Hyperkalemia     last assessed 11/9/15    Hyperlipidemia     Hypertension     accelerated essential last assessed 10/7/16    Insomnia     Late effects of cerebrovascular disease     Overflow incontinence     last assessed 7/24/14    Renal disorder     Secondary hyperparathyroidism ([facility] [address] )     [facility]     left sided weakness       Past Surgical History  Past Surgical History:   Procedure Laterality Date    COLONOSCOPY      HERNIA REPAIR      [place] ESOPHAGOGASTRODUODENOSCOPY TRANSORAL DIAGNOSTIC N/A 2/16/2017    Procedure: ESOPHAGOGASTRODUODENOSCOPY (EGD) with biopsy;  Surgeon: Reny Woods DO;  [facility]: [facility]; Service: Gastroenterology    [place] ESOPHAGOGASTRODUODENOSCOPY TRANSORAL DIAGNOSTIC N/A 6/2/2017    Procedure: ESOPHAGOGASTRODUODENOSCOPY (EGD) with bx;  Surgeon: Reny Woods DO;  [facility]: [facility]; Service: Gastroenterology    TN OPEN RX FEMUR FX+INTRAMED SAM Left 4/26/2020    Procedure: INSERTION NAIL IM FEMUR ANTEGRADE (TROCHANTERIC);   Surgeon: Segun Larson MD;  Location: [facility];  Service: Orthopedics    TYMPANOSTOMY TUBE PLACEMENT         Summary    Pt presents with severe-profound oral dysphagia, characterized by minimal to absent bolus manipulation and transfer, with pocketing in the anterior and left lateral sulci, and anterior spill/drooling of liquid from L side of mouth  All PO trials (ice chips, NTL, puree) had to be suctioned from the oral cavity  Unable to assess pharyngeal swallow, as pt unable to transfer bolus to pharynx, although unable to r/o premature spill into the pharynx  One dry pharyngeal swallow was observed, but no other pharyngeal swallows  Pt was noted to cough/hiccup after the dry swallow  In addition, pt has esophageal dysphagia  Pt is at risk for top down and likely bottom up aspiration  He is at high risk for malnutrition/dehydration at this time  He may benefit from short term alternate nutrition/hydration for now  Recommendations:   Diet: NPO   Meds: non-oral if possible   Frequent Oral care: 4x/day  Aspiration precautions and reflux precautions  Other Recommendations/ considerations: ST to see for dysphagia tx, 3-5x per week  Current Medical Status  Copied from admission:  Yonathan Willis is a 64 y o  male who presents with nausea and vomiting at home as well as fever  The patient recently underwent upper endoscopy, he reports biopsies were taken  At home he had a fever to a T-max of a 100 3°, he then had persistent nausea and vomiting and uncontrolled blood sugars  His family members attempted to contact her endocrinologist however they were not confident in administering short-acting insulin is a felt this would bottom out his blood sugars  They subsequently did not given any additional insulin and now is blood sugars are greater than 600  In the emergency room the patient was found to have significant leukocytosis, as well as respiratory elevation  He reports that he had some phlegm in his chest however this went away over the last 12 hours    He denies any nausea at the time my examination, no diarrhea, no urinary frequency or dysuria  Due to stroke and cognitive deficits most of the history was taken from the family member at the bedside  Order received and chart reviewed  Spoke with RN and physician prior to assessment  Also spoke with pt's daughter, whom pt resides with, by phone  RN reports last night pt was pocketing food, and also drooling liquids from L side of mouth  Pt's daughter reports pt is eating regular food and thin liquids at home, but does have frequent coughing while eating/drinking  She has home health speech assessment scheduled  Pt had an EGD on 2/2/21:  FINDINGS:  · C1M3 Christiansen's esophagus observed where the Z-line is 37 cm from the incisors and top of gastric folds are 38 cm from the incisors with 1 tongue; performed 3 targeted cold forceps biopsies  · Mild erythematous mucosa in the body of the stomach  · Performed biopsies in the body of the stomach, incisura and antrum  2 body and 2 antrum to rule out H pylori and gastric intestinal metaplasia  · Mild erythematous mucosa in the duodenal bulb  IMPRESSION:  C1M3 Christiansen's esophagus with 1 tongue in the 9 o'clock position with 3 targeted biopsies taken with no associated esophagitis  Mild gastritis  Body and antral biopsies taken in separate jars to rule out H pylori and gastric intestinal metaplasia  Mild duodenitis    Past medical history:   Please see H&P for details    Special Studies:  CXR-No acute cardiopulmonary disease       See EGD above    Social/Education/Vocational Hx:  Pt lives with family    Swallow Information   Current Risks for Dysphagia & Aspiration: CVA, known history of dysphagia and dependent for feeding  Current Symptoms/Concerns: coughing with po, pocketing food and drooling liquids from L side of mouth  Current Diet: NPO   Baseline Diet: NPO    Baseline Assessment   Behavior/Cognition: alert and trembling  Speech/Language Status: not able to to follow commands and no verbal output noted  Patient Positioning: upright in bed     Swallow Mechanism Exam   Pt unable to follow commands to complete full oral mech exam  L facial/labial droop noted, with reduced ROM, coordination and strength  Pt is edentulous  He has dentures at home but does not always wear when eating  Unable to assess vocal quality or cough strength  Pt is on room air  Consistencies Assessed and Performance   Consistencies Administered: ice chips, nectar thick and puree, provided one ice chip at a time x2, small amounts of NTL and puree by tsp  Oral Stage: Adequate bolus retrieval from spoon, unable to draw liquid from straw, reduced lip seal with L side anterior spill of melted ice chip and NTL  Pt appears to be attempting to manipulate the bolus, but when he opens his mouth the bolus remains on the mid tongue, in the anterior sulcus, and L lateral sulcus  Attempted to place spoon further posteriorly on the tongue, and pressed spoon on tongue to increase tactile cues, but this was not effective, and still had to suction bolus as pt unable to transfer it posteriorly  Pharyngeal Stage: Pt did not transfer the bolus into the pharynx, therefore unable to assess pharyngeal swallow  There may have been some premature spill into the pharynx? One dry swallow was observed, followed by coughing and hiccups  Esophageal Concerns: EGD on 2/4, Christiansen's esophagus, awaiting biopsy results      Results Reviewed with: patient, RN and MD   Dysphagia Goals: 1  Pt will tolerate at least two consistencies of food/liquid with prompt oropharyngeal swallows and no s/s of aspiration  2  Pt will tolerate LRD without s/s of aspiration     Discharge recommendation: SNF and home health    Speech Therapy Prognosis   Prognosis: Guarded    Prognosis Considerations: medical status, prior medical history, cognitive status and therapeutic potential

## 2021-02-07 NOTE — NURSING NOTE
Patient blood sugar found to be less than 39  Insulin gtt stopped and on call notified  Patient was alert and oriented  Received order for D5 and administered  Blood sugar went up to 188  Insulin gtt was discontinued and new orders received  Patient remained alert with no complaint  Report given to nurse taking patient at 2300

## 2021-02-07 NOTE — CONSULTS
Consultation - Nephrology   Keegan Edgar 64 y o  male MRN: [id number]  Unit/Bed#: E4 -01 Encounter: [id number]    ASSESSMENT and PLAN:  ESRD on HD:  - [facility], Friday [facility]  - status post treatment on Friday, plan for treatment tomorrow while inpatient  Access: Right upper extremity AVF with positive bruit and thrill  Electrolytes, acid/base:  - most recent labs completed yesterday revealed hyponatremia with sodium of 130, however with severe hyperglycemia  - will repeat lab studies today  - will continue to UF on dialysis as blood pressure tolerates  - will continue monitor with repeat lab studies  Blood pressure, hypotension:  - blood pressure with fluctuations  - currently on midodrine 10 mg t i d   - optimize hemodynamics; avoid hypotension and fluctuations of blood pressure   - maintain MAP > 65  H/H, anemia of chronic kidney disease:  - most recent hemoglobin 9 grams/deciliter   - goal hemoglobin greater than 8 grams/deciliter  - will need to obtain outpatient records to determine whether patient is on Epogen   - recommend PRBC transfusion for hemoglobin less than 7  [place] bone disease of chronic kidney disease:  - will check magnesium and phosphorus in a m   - will continue to monitor PTH, magnesium, phosphorus as an outpatient  Other medical problems:  - hyperglycemia, endocrine to follow  - sepsis, on antibiotics per primary team  - history of CVA      HISTORY OF PRESENT ILLNESS:  Requesting Physician: Martín Montero DO  Reason for Consult:  ESRD on HD    Keegan Edgar is a 64 y o  male with history of ESRD on HD, CAD, diabetes mellitus type 2, GERD, stroke who was admitted to [facility] after presenting with nausea, vomiting, fever  Patient recently underwent upper endoscopy, reporting biopsies were taken    At home, patient with fever and persistent nausea, vomiting, uncontrolled blood sugars  Upon assessment and evaluation, patient is very confused  Patient response to verbal stimuli  Unable to obtain review of systems secondary to confusion  Sought out primary RN to ensure stability of patient  A renal consultation is requested today for assistance in the management of ESRD on HD  PAST MEDICAL HISTORY:  Past Medical History:   Diagnosis Date    Anxiety     CAD (coronary artery disease)     last assessed 4/10/13    Chronic kidney disease     On dialysis    [facility], [facility]     Concussion     last assessed 9/3/14    Depression     Diabetes mellitus ([place] [address] )     type 2    Failure to gain weight in adult     last assessed 2/24/14    Gastroparesis     GERD (gastroesophageal reflux disease)     Hyperkalemia     last assessed 11/9/15    Hyperlipidemia     Hypertension     accelerated essential last assessed 10/7/16    Insomnia     Late effects of cerebrovascular disease     Overflow incontinence     last assessed 7/24/14    Renal disorder     Secondary hyperparathyroidism ([place] [address] )     Stroke ([place] [address] )     left sided weakness       PAST SURGICAL HISTORY:  Past Surgical History:   Procedure Laterality Date    COLONOSCOPY      HERNIA REPAIR      [place] ESOPHAGOGASTRODUODENOSCOPY TRANSORAL DIAGNOSTIC N/A 2/16/2017    Procedure: ESOPHAGOGASTRODUODENOSCOPY (EGD) with biopsy;  Surgeon: Burgess Pattie DO;  [facility]: [facility]; Service: Gastroenterology    [place] ESOPHAGOGASTRODUODENOSCOPY TRANSORAL DIAGNOSTIC N/A 6/2/2017    Procedure: ESOPHAGOGASTRODUODENOSCOPY (EGD) with bx;  Surgeon: Burgess Pattie DO;  [facility]; Service: Gastroenterology    [place] OPEN RX FEMUR FX+INTRAMED SAM Left 4/26/2020    Procedure: INSERTION NAIL IM FEMUR ANTEGRADE (TROCHANTERIC);   Surgeon: Elizabeth Arizmendi MD;  [facility];  Service: Orthopedics    TYMPANOSTOMY TUBE PLACEMENT         ALLERGIES:  No Known Allergies    SOCIAL HISTORY:  Social History Substance and Sexual Activity   Alcohol Use Not Currently    Comment: social     Social History     Substance and Sexual Activity   Drug Use Yes    Types: Marijuana    Comment: smokes once keven while for pain 1 xper  week     Social History     Tobacco Use   Smoking Status Never Smoker   Smokeless Tobacco Never Used       FAMILY HISTORY:  Family History   Problem Relation Age of Onset    Cancer Family         pt and friend denies this hx    Hypertension Family         essential    Hyperlipidemia Family     Diabetes Mother     No Known Problems Father        MEDICATIONS:    Current Facility-Administered Medications:     acetaminophen (TYLENOL) tablet 650 mg, 650 mg, Oral, Q6H PRN, Beni Escobar DO    aluminum-magnesium hydroxide-simethicone (MYLANTA) oral suspension 30 mL, 30 mL, Oral, Q6H PRN, Beni Escobar DO    aspirin chewable tablet 81 mg, 81 mg, Oral, Daily, Beni Escobar DO    atorvastatin (LIPITOR) tablet 20 mg, 20 mg, Oral, Daily With Dinner, Beni Escobar DO, 20 mg at 02/06/21 1601    buPROPion [facility]) tablet 100 mg, 100 mg, Oral, BID, Beni Escobar DO, 100 mg at 02/06/21 2214    docusate sodium (COLACE) capsule 100 mg, 100 mg, Oral, BID, Beni Escobar DO, 100 mg at 02/06/21 1712    heparin (porcine) subcutaneous injection 5,000 Units, 5,000 Units, Subcutaneous, Q8H [address], Beni Escobar DO, 5,000 Units at 02/07/21 0542    insulin glargine (LANTUS) subcutaneous injection 10 Units 0 1 mL, 10 Units, Subcutaneous, HS, Waunita Fleischer, CRNP    insulin lispro (HumaLOG) 100 units/mL subcutaneous injection 1-5 Units, 1-5 Units, Subcutaneous, 4x Daily (AC & HS) **AND** Fingerstick Glucose (POCT), , , 4x Daily AC and at bedtime, Waunita Fleischer, CRNP    lubiprostone (AMITIZA) capsule 8 mcg, 8 mcg, Oral, BID With Meals, Beni Escobar DO    midodrine (PROAMATINE) tablet 10 mg, 10 mg, Oral, TID, Beni Esocbar DO, 10 mg at 02/06/21 2215    ondansetron (ZOFRAN) injection 4 mg, 4 mg, Intravenous, Q6H PRN, Beni Escobar, DO, 4 mg at 02/06/21 1604    pantoprazole (PROTONIX) EC tablet 40 mg, 40 mg, Oral, BID AC, Beni Escobar, DO, 40 mg at 02/07/21 0543    piperacillin-tazobactam (ZOSYN) 2 25 g in sodium chloride 0 9 % 50 mL IVPB, 2 25 g, Intravenous, Q8H, CHERELLE Waite, Stopped at 02/07/21 8134    saccharomyces boulardii (FLORASTOR) capsule 250 mg, 250 mg, Oral, BID, Beni Escobar, DO, 250 mg at 02/06/21 1712    sucralfate (CARAFATE) tablet 1 g, 1 g, Oral, 4x Daily (AC & HS), Beni Escobar, DO, 1 g at 02/07/21 0543    zolpidem (AMBIEN) tablet 10 mg, 10 mg, Oral, HS, Beni Escobar, DO, 10 mg at 02/06/21 2215    Facility-Administered Medications Ordered in Other Encounters:     sodium chloride 0 9 % infusion, 125 mL/hr, Intravenous, Continuous, Laboni Ruth, DO, Stopped at 02/04/21 1236    REVIEW OF SYSTEMS:  All the systems were reviewed and were negative except as documented on the HPI      PHYSICAL EXAM:  Current Weight: Weight - Scale: 59 2 kg (130 lb 8 2 oz)  First Weight: Weight - Scale: 59 2 kg (130 lb 8 2 oz)  Vitals:    02/06/21 2300 02/07/21 0416 02/07/21 0541 02/07/21 0801   BP: 125/66 139/72  102/61   BP Location: Left arm Left arm  Right arm   Pulse: 92 90  (!) 111   Resp: 20 18  (!) 28   Temp: 98 9 °F (37 2 °C) 98 9 °F (37 2 °C)  (!) 96 °F (35 6 °C)   TempSrc: Temporal Temporal  Temporal   SpO2: 92% 94%  90%   Weight:   59 2 kg (130 lb 8 2 oz)        Intake/Output Summary (Last 24 hours) at 2/7/2021 1037  Last data filed at 2/6/2021 1745  Gross per 24 hour   Intake 50 ml   Output --   Net 50 ml     General:  Resting in bed, confused  Skin: no rash, warm  Eyes: pale conjunctivae, anicteric sclerae  ENT: moist lips and mucous membranes  Neck: supple with trachea midline  Chest:  Diminished breath sounds  CVS: normal rate, regular rhythm  Abdomen: soft, non-tender, non-distended, normoactive bowel sounds  Extremities: no edema of both legs  Neuro: awake to verbal stimuli, confused      Invasive Devices:        Lab Results:   Results from last 7 days   Lab Units 02/06/21  1144 02/04/21  1306   WBC Thousand/uL 15 63*  --    HEMOGLOBIN g/dL 9 0*  --    HEMATOCRIT % 36 5  --    PLATELETS Thousands/uL 72*  --    POTASSIUM mmol/L 3 8 3 7   CHLORIDE mmol/L 90* 95*   CO2 mmol/L 29 29   BUN mg/dL 62* 51*   CREATININE mg/dL 6 46* 6 77*   CALCIUM mg/dL 9 1 9 5   MAGNESIUM mg/dL 2 3  --    PHOSPHORUS mg/dL 4 9*  --    ALK PHOS U/L 156*  --    ALT U/L 13  --    AST U/L 22  --

## 2021-02-07 NOTE — PROGRESS NOTES
Progress Note - Soha Meneses 64 y o  male MRN: [id number]    Unit/Bed#: E4 -01 Encounter: [id number]    Assessment/Plan:    Sepsis    POA and related to bacteremia, both blood cultures growing Gram-negative rods, continue Zosyn and monitor culture    Bacteremia   recent endoscopy possibly etiology, continue Zosyn and monitor culture       End-stage renal disease continue dialysis Monday Wednesday Friday    Diabetes   poor control, continue Lantus with sliding scale while NPO    History of CVA  residual left-sided weakness, request PT OT eval    Dysphagia   speech swallow recommending continuing NPO status and monitor    Hypertension   been on midodrine for pressure support    Dyslipidemia   continue statin for LDL control    GERD    continue PPI and Carafate    Subjective:   Essentially nonverbal      Objective:     Vitals: Blood pressure 112/74, pulse (!) 109, temperature (!) 96 7 °F (35 9 °C), temperature source Temporal, resp  rate 20, weight 59 2 kg (130 lb 8 2 oz), SpO2 95 %  ,Body mass index is 20 44 kg/m²        Results from last 7 days   Lab Units 02/07/21  1300   WBC Thousand/uL 22 83*   HEMOGLOBIN g/dL 11 9*   HEMATOCRIT % 36 5   PLATELETS Thousands/uL 61*     Results from last 7 days   Lab Units 02/07/21  1301 02/06/21  1144   POTASSIUM mmol/L 4 0 3 8   CHLORIDE mmol/L 96* 90*   CO2 mmol/L 24 29   BUN mg/dL 81* 62*   CREATININE mg/dL 7 73* 6 46*   CALCIUM mg/dL 9 8 9 1   ALK PHOS U/L  --  156*   ALT U/L  --  13   AST U/L  --  22       Scheduled Meds:  Current Facility-Administered Medications   Medication Dose Route Frequency Provider Last Rate    acetaminophen  650 mg Oral Q6H PRN Beni Escobar, DO      aluminum-magnesium hydroxide-simethicone  30 mL Oral Q6H PRN Roland England, DO      aspirin  81 mg Oral Daily Beni Escobar, DO      atorvastatin  20 mg Oral Daily With Dinner Beni Escobar, DO      buPROPion  100 mg Oral BID Beni Escobar, DO      docusate sodium  100 mg Oral BID Roland England, DO      heparin (porcine)  5,000 Units Subcutaneous Q8H [facility] Beni Escobar DO      insulin glargine  10 Units Subcutaneous HS CHERELLE Colindres      insulin lispro  1-5 Units Subcutaneous 4x Daily (AC & HS) CHERELLE Colindres      lubiprostone  8 mcg Oral BID With Meals Beni Escobar DO      midodrine  10 mg Oral TID Beni Escobar DO      ondansetron  4 mg Intravenous Q6H PRN Angel Wadsworth, DO      pantoprazole  40 mg Oral BID AC Beni Altman, DO      piperacillin-tazobactam  2 25 g Intravenous Q8H CHERELLE Colindres Stopped (02/07/21 0737)   Leisa Juan saccharomyces boulardii  250 mg Oral BID Angel Wadsworth, DO      sucralfate  1 g Oral 4x Daily (AC & HS) Beni Miller, DO      zolpidem  10 mg Oral HS Beni Escobar DO       Facility-Administered Medications Ordered in Other Encounters   Medication Dose Route Frequency Provider Last Rate    sodium chloride  125 mL/hr Intravenous Continuous Laboni DO Ruth Stopped (02/04/21 1236)       Continuous Infusions:     Physical exam:  General appearance:  Lethargic but awakens and attempts to verbally respond   Head/Eyes:  Nonicteric sluggish  Neck:  Supple  Lungs:  Decreased BS poor effort bilateral no wheezing rhonchi or rales  Heart: normal S1 S2 regular  Abdomen: Soft nontender with bowel sounds  Extremities: no edema left-sided weakness  Skin: no rash    Invasive Devices     Peripheral Intravenous Line            Peripheral IV 02/06/21 Left Hand 1 day    Peripheral IV 02/06/21 Left Hand less than 1 day          Hemodialysis Catheter            HD Permanent Double Catheter -- days                  VTE Pharmacologic Prophylaxis:  Heparin    Counseling / Coordination of Care  Total floor / unit time spent today 30  minutes  Greater than 50% of total time was spent with the patient and / or family counseling and / or coordination of care    A description of the counseling / coordination of care:

## 2021-02-08 PROBLEM — J96.00 ACUTE RESPIRATORY FAILURE (HCC): Status: ACTIVE | Noted: 2021-01-01

## 2021-02-08 PROBLEM — R73.9 HYPERGLYCEMIA: Status: RESOLVED | Noted: 2021-01-01 | Resolved: 2021-01-01

## 2021-02-08 PROBLEM — I95.9 HYPOTENSION: Status: ACTIVE | Noted: 2018-09-17

## 2021-02-08 PROBLEM — G93.40 ENCEPHALOPATHY ACUTE: Status: ACTIVE | Noted: 2021-01-01

## 2021-02-08 PROBLEM — J18.9 PNA (PNEUMONIA): Status: ACTIVE | Noted: 2021-01-01

## 2021-02-08 PROBLEM — R29.90 STROKE-LIKE SYMPTOMS: Status: ACTIVE | Noted: 2021-01-01

## 2021-02-08 NOTE — PROGRESS NOTES
02/08/21 1100   Clinical Encounter Type   Visited With Family; Health care provider   Routine Visit Introduction   Continue Visiting Yes

## 2021-02-08 NOTE — ASSESSMENT & PLAN NOTE
Lab Results   Component Value Date    HGBA1C 8 1 (H) 02/09/2021       Recent Labs     02/08/21  1749 02/08/21  1959 02/08/21  2339 02/09/21  0554   POCGLU 176* 184* 192* 214*       Blood Sugar Average: Last 72 hrs:  (P) 918 [id number]       - Glucose on admission 616  - Initially on insulin drip, but had episode of hypoglycemia (39)

## 2021-02-08 NOTE — ASSESSMENT & PLAN NOTE
Lab Results   Component Value Date    EGFR 7 02/08/2021    EGFR 6 02/08/2021    EGFR 8 02/07/2021    CREATININE 9 01 (H) 02/08/2021    CREATININE 9 35 (H) 02/08/2021    CREATININE 7 73 (H) 02/07/2021     · Patient on Monday, Wednesday, Friday schedule  Nephrology following  Plan for HD today  · Avoid nephrotoxins and hypotension  · Maintain MAP >65, continue to monitor    · Continue midodrine 10 mg TID

## 2021-02-08 NOTE — ASSESSMENT & PLAN NOTE
· Patient became hypoxic earlier this morning with O2 sats in 70s  Placed on nonrebreather with improved O2 sats  Patient currently on 2L NC   · CXR 2/8 concerning for possible PNA  · Monitor O2 sats    · Continue antibiotics

## 2021-02-08 NOTE — ASSESSMENT & PLAN NOTE
· POA with tachycardia and leukocytosis  · 2/2 blood cultures positive for gram neg rods, started on zosyn D2  · 2/6 CXR on admission originally without acute findings  Repeat CXR 2/8 showing right upper lung zone opacity concerning for PNA  · Check procalcitonin  · Continue to trend temps and WBC

## 2021-02-08 NOTE — ASSESSMENT & PLAN NOTE
- Positive blood cultures on 2/6 for gram negative rods  WBC 25  Initial lactic acid yesterday morning 2 4  Procalcitonin elevated     - Currently on Zosyn   - Bilateral pulmonary infiltrates noted on CT chest  - Management per ICU team

## 2021-02-08 NOTE — PROGRESS NOTES
Progress Note - Kalyn Patino 1960, 64 y o  male MRN: [id number]    Unit/Bed#: ICU 02 Encounter: [id number]    Primary Care Provider: Seven Daugherty MD   Date and time admitted to hospital: 2/6/2021 10:36 AM        * Encephalopathy acute  Assessment & Plan  · Patient with right-sided preference earlier this morning around 0300  Patient unable to follow commands  · CT/CTA without acute findings  · Possible seizure like activity vs worsening sepsis? · EEG ordered  See plan as above  · neurology following  · Continue stroke workup  · JULI ordered    Sepsis ([facility] [address] )  Assessment & Plan  · POA with tachycardia and leukocytosis  · 2/2 blood cultures positive for gram neg rods, started on zosyn D2  · 2/6 CXR on admission originally without acute findings  Repeat CXR 2/8 showing right upper lung zone opacity concerning for PNA  · Check procalcitonin  · Continue to trend temps and WBC  Acute respiratory failure ([facility] [address] )  Assessment & Plan  · Patient became hypoxic earlier this morning with O2 sats in 70s  Placed on nonrebreather with improved O2 sats  Patient currently on 2L NC   · CXR 2/8 concerning for possible PNA  · Monitor O2 sats  · Continue antibiotics    Bacteremia  Assessment & Plan  · 2/2 Blood cultures positive with gram neg rods  Continue antibiotics  · See plan as above  ESRD (end stage renal disease) [facility])  Assessment & Plan  Lab Results   Component Value Date    EGFR 7 02/08/2021    EGFR 6 02/08/2021    EGFR 8 02/07/2021    CREATININE 9 01 (H) 02/08/2021    CREATININE 9 35 (H) 02/08/2021    CREATININE 7 73 (H) 02/07/2021     · Patient on Monday, Wednesday, Friday schedule  Nephrology following  Plan for HD today  · Avoid nephrotoxins and hypotension  · Maintain MAP >65, continue to monitor  · Continue midodrine 10 mg TID    History of CVA (cerebrovascular accident)  Assessment & Plan  · Patient with history of CVA with left sided deficits  Unclear of severity of deficits   Per caregiver patient cannot walk or feed himself, but does follow commands  · Approximately earlier this morning around 0300 patient began to favor right side presenting with right gaze preference, pupils not reactive and not following commands  Patient also with hypotension, O2 sats in 70s, and tachycardia at this time  IVF started and patient placed on nonrebreather  · CT head with no acute findings  CTA head/neck with no evidence of large vessel occlusion  · Neurology consulted  Stroke pathway initiated  MRI brain ordered  EEG and JULI ordered  · Continue frequent neuro checks  Hypotension  Assessment & Plan  · Likely chronic due to renal failure  · Continue midodrine  Type 2 diabetes mellitus, with long-term current use of insulin [facility])  Assessment & Plan  Lab Results   Component Value Date    HGBA1C 7 9 (H) 02/04/2021       Recent Labs     02/08/21  0013 02/08/21  0546 02/08/21  0737 02/08/21  1034   POCGLU 404* 327* 271* 241*       Blood Sugar Average: Last 72 hrs:  (P) 231 5625     · Present on admission with BS >500  Patient placed on insulin gtt at this time however d/c'd due to hypoglycemia of 35  · Patient currently on 10 units of lantus and SSI  Will change QID checks to Q6 hours given NPO status  · Continue to monitor for blood sugar goal of 140-180         ----------------------------------------------------------------------------------------  HPI/24hr events: 65 y/o male with history of ESRD on HD and previous CVA with baseline left-sided deficits presents to ED on 2/6 with sepsis 2/2 bacteremia  Blood cultures positive for gram neg rods, patient on IV Zosyn D2  On morning of 2/8 patient with change in mental status from baseline as evidenced by new right gaze preference, right facial droop, right-sided weakness, and unable to follow commands  Pupils fixed and not reactive to light  Patient also noted to be hypoxic with O2 sats in 70s, hypotensive, and tachycardic   Patient placed on nonrebreather and IVF administered with improvement  CT/CTA negative for acute changes  MRI brain ordered, EEG and ECHO pending  CXR  concerning for possible PNA, procal pending  Stroke pathway initiated, neurology following  Encephalopathy due to worsening sepsis vs stroke/seizure? Plan to continue IV abx and await further test results and neurology follow up  Disposition: Transfer to Critical Care   Code Status: Level 1 - Full Code  ---------------------------------------------------------------------------------------  SUBJECTIVE  Unable to assess due to mental status    Review of Systems   Unable to perform ROS: Mental status change     Review of systems was reviewed and negative unless stated above in HPI/24-hour events   ---------------------------------------------------------------------------------------  OBJECTIVE    Vitals   Vitals:    21 0759 21 0800 21 0914 21 1107   BP:  95/63     BP Location:  Left arm     Pulse:  98 99    Resp:       Temp:   (!) 96 9 °F (36 1 °C) (!) 97 2 °F (36 2 °C)   TempSrc:   Temporal Temporal   SpO2: 95%  100%    Weight:   56 6 kg (124 lb 12 5 oz)    Height:   5' 7" (1 702 m)      Temp (24hrs), Av 2 °F (36 2 °C), Min:96 9 °F (36 1 °C), Max:97 8 °F (36 6 °C)  Current: Temperature: (!) 97 2 °F (36 2 °C)          Respiratory:  SpO2: SpO2: 98 %       Invasive/non-invasive ventilation settings   Respiratory    Lab Data (Last 4 hours)    None         O2/Vent Data (Last 4 hours)    None                Physical Exam  Constitutional:       Appearance: He is ill-appearing  HENT:      Head: Normocephalic and atraumatic  Eyes:      Pupils:      Right eye: Pupil is not reactive  Left eye: Pupil is not reactive  Comments: Right gaze preference, pupils fixed and non reactive to light   Cardiovascular:      Rate and Rhythm: Regular rhythm  Tachycardia present  Heart sounds: Normal heart sounds     Pulmonary:      Effort: Pulmonary effort is normal       Breath sounds: Normal breath sounds  Abdominal:      General: Bowel sounds are normal       Palpations: Abdomen is soft  Skin:     General: Skin is warm and dry  Neurological:      GCS: GCS eye subscore is 4  GCS verbal subscore is 1  GCS motor subscore is 4  Cranial Nerves: Facial asymmetry (right facial droop) present  Motor: Weakness (baseline left sided weakness, now with right sided weakness) present           Laboratory and Diagnostics:  Results from last 7 days   Lab Units 02/08/21  0748 02/07/21  1300 02/06/21  1144   WBC Thousand/uL 25 15* 22 83* 15 63*   HEMOGLOBIN g/dL 12 5 11 9* 9 0*   HEMATOCRIT % 39 2 36 5 36 5   PLATELETS Thousands/uL 71* 61* 72*   BANDS PCT % 2 3  --    MONO PCT % 6 8  --      Results from last 7 days   Lab Units 02/08/21  1036 02/08/21  0748 02/07/21  1301 02/06/21  2022 02/06/21  1601 02/06/21  1144 02/04/21  1306   SODIUM mmol/L 142 141 138  --   --  130* 137   POTASSIUM mmol/L 3 5 3 7 4 0  --   --  3 8 3 7   CHLORIDE mmol/L 99* 98* 96*  --   --  90* 95*   CO2 mmol/L 26 23 24  --   --  29 29   ANION GAP mmol/L 17* 20* 18*  --   --  11 13   BUN mg/dL 111* 106* 81*  --   --  62* 51*   CREATININE mg/dL 9 01* 9 35* 7 73*  --   --  6 46* 6 77*   CALCIUM mg/dL 9 2 9 4 9 8  --   --  9 1 9 5   GLUCOSE RANDOM mg/dL 276* 297* 263* 47* 480* 616*  --    ALT U/L  --  11*  --   --   --  13  --    AST U/L  --  10  --   --   --  22  --    ALK PHOS U/L  --  123*  --   --   --  156*  --    ALBUMIN g/dL  --  2 0*  --   --   --  2 4*  --    TOTAL BILIRUBIN mg/dL  --  0 78  --   --   --  0 84  --      Results from last 7 days   Lab Units 02/08/21  0801 02/06/21  1144   MAGNESIUM mg/dL 2 5 2 3   PHOSPHORUS mg/dL 5 1* 4 9*      Results from last 7 days   Lab Units 02/08/21  0748   INR  1 76*   PTT seconds 55*      Results from last 7 days   Lab Units 02/08/21  1036 02/06/21  1247   TROPONIN I ng/mL 1 11* 0 04     Results from last 7 days   Lab Units 02/08/21  1036 02/08/21  0748 02/06/21  1247 LACTIC ACID mmol/L 1 2 2 4* 1 7     ABG:  Results from last 7 days   Lab Units 02/08/21  0728   PH ART  7 456*   PCO2 ART mm Hg 37 6   PO2 ART mm Hg 348 6*   HCO3 ART mmol/L 25 9   BASE EXC ART mmol/L 2 1   ABG SOURCE  Radial, Left     VBG:  Results from last 7 days   Lab Units 02/08/21  0728 02/06/21  1144   PH SURINDER   --  7 399   PCO2 SURINDER mm Hg  --  48 3   PO2 SURINDER mm Hg  --  50 9*   HCO3 SURINDER mmol/L  --  29 2   BASE EXC SURINDER mmol/L  --  3 7   ABG SOURCE  Radial, Left  --      Results from last 7 days   Lab Units 02/07/21  1301 02/06/21  1605   PROCALCITONIN ng/ml 334 10* 181 50*       Micro  Results from last 7 days   Lab Units 02/06/21  1144   GRAM STAIN RESULT  Gram negative rods*  Gram negative rods*       EKG: SR  Imaging: I have personally reviewed pertinent reports  Intake and Output  I/O       02/06 0701 - 02/07 0700 02/07 0701 - 02/08 0700 02/08 0701 - 02/09 0700    P  O  0      I V  (mL/kg)   30 (0 5)    IV Piggyback 50      Total Intake(mL/kg) 50 (0 8)  30 (0 5)    Net +50  +30           Unmeasured Urine Occurrence  1 x           Height and Weights   Height: 5' 7" (170 2 cm)  IBW: 66 1 kg  Body mass index is 19 54 kg/m²  Weight (last 2 days)     Date/Time   Weight    02/08/21 0914   56 6 (124 78)    02/07/21 0541   59 2 (130 51)                Nutrition       Diet Orders   (From admission, onward)             Start     Ordered    02/07/21 0903  Diet NPO  Diet effective now     Comments: NPO until speech sees patient  Provider agrees  Question Answer Comment   Diet Type NPO    RD to adjust diet per protocol?  Yes        02/07/21 0904                  Active Medications  Scheduled Meds:  Current Facility-Administered Medications   Medication Dose Route Frequency Provider Last Rate    acetaminophen  650 mg Oral Q6H PRN Verdia Ninoska, BRETNP      aluminum-magnesium hydroxide-simethicone  30 mL Oral Q6H PRN Verdia CHERELLE Xiao      aspirin  81 mg Oral Daily Verdia CHERELLE Xiao      atorvastatin  20 mg Oral Daily [employer], CHERELLE      buPROPion  100 mg Oral BID Lucendia Nhung, CHERELLE      docusate sodium  100 mg Oral BID Lucendia [place], CHERELLE      heparin (porcine)  5,000 Units Subcutaneous [facility] Lucpayton Hooker, Allen Foot      insulin glargine  10 Units Subcutaneous HS Lucendia [place], CHERELLE      insulin lispro  1-5 Units Subcutaneous Q6H Lucendia Nhung, CHERELLE      lubiprostone  8 mcg Oral BID [employer] Luclornaia Nhung, CHERELLE      midodrine  10 mg Oral TID Lucendia [place], CHERELLE      ondansetron  4 mg Intravenous Q6H PRN Lucendia [place], CHERELLE      pantoprazole  40 mg Oral BID AC CHERELLE Wyman      piperacillin-tazobactam  2 25 g Intravenous Q8H Lucendia [place], BRETNP 2 25 g (02/08/21 1049)    saccharomyces boulardii  250 mg Oral BID Lucendia Nhung, CHERELLE      sucralfate  1 g Oral 4x Daily (AC & HS) [place], CHERELLE      zolpidem  10 mg Oral HS CHERELLE Wyman       Continuous Infusions:     PRN Meds:   acetaminophen, 650 mg, Q6H PRN  aluminum-magnesium hydroxide-simethicone, 30 mL, Q6H PRN  ondansetron, 4 mg, Q6H PRN        Invasive Devices Review  Invasive Devices     Central Venous Catheter Line            CVC Central Lines 02/08/21 Triple 20cm less than 1 day          Hemodialysis Catheter            HD Permanent Double Catheter -- days          Drain            NG/OG/Enteral Tube Nasogastric 18 Fr Left nares less than 1 day                Rationale for remaining devices:   ---------------------------------------------------------------------------------------  Advance Directive and Living Will:      Power of :    POLST:    ---------------------------------------------------------------------------------------  Care Time Delivered:   Upon my evaluation, this patient had a high probability of imminent or life-threatening deterioration due to stroke alert, which required my direct attention, intervention, and personal management    I have personally provided 60 minutes (0730 to 1000) of critical care time, exclusive of procedures, teaching, family meetings, and any prior time recorded by providers other than myself  CHERELLE Chavez      Portions of the record may have been created with voice recognition software  Occasional wrong word or "sound a like" substitutions may have occurred due to the inherent limitations of voice recognition software    Read the chart carefully and recognize, using context, where substitutions have occurred

## 2021-02-08 NOTE — PROCEDURES
Intubation    Date/Time: 2/8/2021 3:38 PM  Performed by: CHERELLE Talley  Authorized by: CHERELLE Talley     Consent:     Consent obtained:  Emergent situation  Universal protocol:     Relevant documents present and verified: yes      Test results available and properly labeled: yes      Radiology Images displayed and confirmed  If images not available, report reviewed: yes      Required blood products, implants, devices, and special equipment available: yes      Site/side marked: yes      Immediately prior to procedure, a time out was called: yes      Patient identity confirmed:  Arm band  Pre-procedure details:     Patient status:  Unresponsive  Indications:     Indications for intubation: respiratory distress    Procedure details:     Preoxygenation:  None    CPR in progress: no      Intubation method:  Oral    Oral intubation technique:  Direct    Laryngoscope blade: Mac 3    Tube size (mm):  8 0    Tube type:  Cuffed    Number of attempts:  1    Tube visualized through cords: yes    Placement assessment:     ETT to lip:  24    Tube secured with:  ETT matamoros    Breath sounds:  Equal    Placement verification: chest rise, condensation, CXR verification, direct visualization, ETCO2 detector and tube exhalation      CXR findings:  ETT in proper place  Post-procedure details:     Patient tolerance of procedure:   Tolerated well, no immediate complications

## 2021-02-08 NOTE — ASSESSMENT & PLAN NOTE
72-year-old male with prior lacunar infarctions, ESRD on HD, and insulin-dependent diabetes mellitus presented as a stroke alert on 2/8 for development of right gaze preference and inability to follow commands  Patient was hypoxic, tachycardic, and hypotensive  NIHSS elevated at 31 as patient did not follow commands  CT/CTA revealing pulmonary infiltrates but no evidence of large vessel occlusion  No TPA was administered as the last known well was unknown  He acutely worsened later in the day and demonstrated fixed, dilated pupils, unresponsiveness to noxious stimuli, and loss of brainstem reflexes  Another STAT CTA was performed with concern for basilar artery occlusion, but was negative for occlusion  It did note loss of gray-white matter differentiation consistent with anoxic injury  After the change in neurologic exam was noted yesterday, the patient began to demonstrate agonal respirations and periods of apnea, requiring intubation  EEG this morning noted severe diffuse cerebral dysfunction without reactivity  - Stroke pathway discontinued at this time  Can proceed with MRI if family prefers     - [facility] contacted regarding patient   - Supportive care per ICU team

## 2021-02-08 NOTE — SPEECH THERAPY NOTE
Speech Language/Pathology  Pt transferred to ICU due to change in status  Noted NGT  in  Will check status tomorrow

## 2021-02-08 NOTE — ASSESSMENT & PLAN NOTE
53-year-old male with prior lacunar infarctions, ESRD on HD, and insulin-dependent diabetes mellitus is seen as a stroke alert for development right gaze preference and inability to follow commands, beginning this morning  Patient was hypoxic, tachycardic, and hypotensive  NIHSS elevated at 31 as patient does not follow commands  CT/CTA revealing pulmonary infiltrates but no evidence of large vessel occlusion  No TPA was administered as the last known well was unknown  Patient will be initiated on the stroke pathway  Given positive blood cultures, will rule out endocarditis with JULI  Subtle, low amplitude RUE jerking was noted intermittent during stroke alert as well as chewing motion with jaw, which subsided later, raising concern for possible seizure activity  Gaze preference and inability to follow commands persisted  - Stroke pathway   MRI brain without contrast    JULI pending    Lipid Panel   Hemoglobin A1c   Aspirin 81 mg daily for now- Give rectal aspirin daily if patient is NPO and unable to receive PO meds   Recommend increasing Atorvastatin to 40 mg daily when able to take PO   Permissive HTN, labetalol if SBP >200  Continue fluid boluses as needed  Per chart review, patient has been chronically hypotensive   Continue telemetry   PT/OT/ST when stable   Frequent neuro checks  Continue to monitor and notify neurology with any changes  - EEG pending   - Seizure precautions   - Medical management and supportive care per ICU team  Correction of any metabolic or infectious disturbances

## 2021-02-08 NOTE — UTILIZATION REVIEW
Notification of Inpatient Admission/Inpatient Authorization Request   This is a Notification of Inpatient Admission for [address]  Be advised that this patient was admitted to our facility under Inpatient Status  Contact Janine Joyce at [phone] for additional admission information  [facility] DEPT  DEDICATED [phone]  Patient Name:   Marcia Avila   YOB: 1960       [address]   [address]:   [address]   Tax ID: [id number]  NPI: [id number] Attending Provider/NPI:  Phone:  Address: Geraldine Cross, [place] [[id number]]  [phone]  Same as the facility   Place of Service Code: 24 Place of Service Name:  [address] [place] [address]   Start Date: 2/6/21 1421 Discharge Date & Time: No discharge date for patient encounter  Type of Admission: Inpatient Status Discharge Disposition   (if discharged): Home with [address] [facility]   Patient Diagnoses: Leukocytosis [D72 829]  Thrombocytopenia ([facility] [address] ) [D69 6]  Nausea and vomiting [R11 2]  Hyperglycemia [R73 9]  ESRD on hemodialysis ([facility] [address] ) [N18 6, Z99 2]     Orders: Admission Orders (From admission, onward)     Ordered        02/06/21 1421  Inpatient Admission  Once         02/06/21 1342  Place in Observation  Once                    Assigned Utilization Review Contact: Tiffanie Lee  Utilization   Network Utilization Review Department  Phone: [phone]; Fax [phone]  Email: [email or web address]   ATTENTION PAYERS: Please call the assigned Utilization  directly with any questions or concerns ALL voicemails in the department are confidential  Send all requests for admission clinical reviews, approved or denied determinations and any other requests to dedicated fax number belonging to the campus where the patient is receiving treatment

## 2021-02-08 NOTE — ASSESSMENT & PLAN NOTE
· Patient with right-sided preference earlier this morning around 0300  Patient unable to follow commands  · CT/CTA without acute findings  · Possible seizure like activity vs worsening sepsis? · EEG ordered  See plan as above    · neurology following  · Continue stroke workup  · JULI ordered

## 2021-02-08 NOTE — CONSULTS
Consultation - Fred Maurer 64 y o  male MRN: [id number]    Unit/Bed#: ICU 02 Encounter: [id number]      Assessment/Plan     Assessment:  61yom with hx of T2DM and ESRD, now critically ill  Plan:  1  T2DM: Since the consult was placed his clinical status has worsened, now intubated  At this time, I will defer management to the critical care team, though he may need a low dose insulin infusion to control blood glucose at this time  History of Present Illness      HPI: Fred Maurer is a 64y o  year old male with T2DM  Consult was placed on 2/6/21 when he was admitted with fever and hyperglycemia  All history per chart  However, today his status has changed and he is now intubated and in the ICU  He has known T2DM on Toujeo and sitagliptan at home   Follows with [facility] endocrine and called them about hyperglycemia for instrucations prior to the presentation at [address] [place] [address] consult to Endocrinology  Consult performed by: Randy Gonzalez MD  Consult ordered by: CHERELLE Hinkle          Review of Systems   Unable to perform ROS: Mental status change       Historical Information   Past Medical History:   Diagnosis Date    Anxiety     CAD (coronary artery disease)     last assessed 4/10/13    Chronic kidney disease     On dialysis    Chronic [facility] disease, [facility] IV (severe) [facility] [address] [facility]     Concussion     last assessed 9/3/14    Depression     Diabetes mellitus ([facility] [address] )     type 2    Failure to gain weight in adult     last assessed 2/24/14    Gastroparesis     GERD (gastroesophageal reflux disease)     Hyperkalemia     last assessed 11/9/15    Hyperlipidemia     Hypertension     accelerated essential last assessed 10/7/16    Insomnia     Late effects of cerebrovascular disease     Overflow incontinence     last assessed 7/24/14    Renal disorder     Secondary hyperparathyroidism ([facility] [address] )     Stroke [facility])     left sided weakness     Past Surgical History:   Procedure Laterality Date  COLONOSCOPY      HERNIA REPAIR      WA ESOPHAGOGASTRODUODENOSCOPY TRANSORAL DIAGNOSTIC N/A 2/16/2017    Procedure: ESOPHAGOGASTRODUODENOSCOPY (EGD) with biopsy;  Surgeon: Britt Mcmahan DO;  Location: [facility]; Service: Gastroenterology    WA ESOPHAGOGASTRODUODENOSCOPY TRANSORAL DIAGNOSTIC N/A 6/2/2017    Procedure: ESOPHAGOGASTRODUODENOSCOPY (EGD) with bx;  Surgeon: Britt Mcmahan DO;  Location: [facility]; Service: Gastroenterology    WA OPEN RX FEMUR FX+INTRAMED SAM Left 4/26/2020    Procedure: INSERTION NAIL IM FEMUR ANTEGRADE (TROCHANTERIC);   Surgeon: Yaquelin Turk MD;  Location: [facility];  Service: Orthopedics    TYMPANOSTOMY TUBE PLACEMENT       Social History   Social History     Substance and Sexual Activity   Alcohol Use Not Currently    Comment: social     Social History     Substance and Sexual Activity   Drug Use Yes    Types: Marijuana    Comment: smokes once keven while for pain 1 xper  week     Social History     Tobacco Use   Smoking Status Never Smoker   Smokeless Tobacco Never Used     E-Cigarette/Vaping    E-Cigarette Use Never User      E-Cigarette/Vaping Substances    Nicotine No     THC No     CBD No     Flavoring No     Other No     Unknown No       Family History:   Family History   Problem Relation Age of Onset    Cancer Family         pt and friend denies this hx    Hypertension Family         essential    Hyperlipidemia Family     Diabetes Mother     No Known Problems Father        Meds/Allergies   current meds:   Current Facility-Administered Medications   Medication Dose Route Frequency    acetaminophen (TYLENOL) tablet 650 mg  650 mg Oral Q6H PRN    aluminum-magnesium hydroxide-simethicone (MYLANTA) oral suspension 30 mL  30 mL Oral Q6H PRN    aspirin chewable tablet 81 mg  81 mg Oral Daily    atorvastatin (LIPITOR) tablet 20 mg  20 mg Oral Daily With Dinner    buPROPion [facility]) tablet 100 mg  100 mg Oral BID    docusate sodium (COLACE) capsule 100 mg  100 mg Oral BID    heparin (porcine) subcutaneous injection 5,000 Units  5,000 Units Subcutaneous Q8H [address]    insulin glargine (LANTUS) subcutaneous injection 10 Units 0 1 mL  10 Units Subcutaneous HS    insulin lispro (HumaLOG) 100 units/mL subcutaneous injection 1-5 Units  1-5 Units Subcutaneous Q6H    lactated ringers bolus 1,000 mL  1,000 mL Intravenous Once    lubiprostone (AMITIZA) capsule 8 mcg  8 mcg Oral BID With Meals    midodrine (PROAMATINE) tablet 10 mg  10 mg Oral TID    NOREPINEPHRINE 4 MG  ML NSS (CMPD ORDER) infusion  1-30 mcg/min Intravenous Titrated    ondansetron (ZOFRAN) injection 4 mg  4 mg Intravenous Q6H PRN    pantoprazole (PROTONIX) EC tablet 40 mg  40 mg Oral BID AC    piperacillin-tazobactam (ZOSYN) 2 25 g in sodium chloride 0 9 % 50 mL IVPB  2 25 g Intravenous Q8H    saccharomyces boulardii (FLORASTOR) capsule 250 mg  250 mg Oral BID    sucralfate (CARAFATE) tablet 1 g  1 g Oral 4x Daily (AC & HS)    vasopressin (PITRESSIN) 20 Units in sodium chloride 0 9 % 100 mL infusion  0 04 Units/min Intravenous Continuous    zolpidem (AMBIEN) tablet 10 mg  10 mg Oral HS     No Known Allergies    Objective   Vitals: Blood pressure 119/66, pulse 100, temperature (!) 97 2 °F (36 2 °C), temperature source Temporal, resp  rate 19, height 5' 7" (1 702 m), weight 56 6 kg (124 lb 12 5 oz), SpO2 100 %      Intake/Output Summary (Last 24 hours) at 2/8/2021 1731  Last data filed at 2/8/2021 1530  Gross per 24 hour   Intake 530 ml   Output 681 ml   Net -151 ml     Invasive Devices     Central Venous Catheter Line            CVC Central Lines 02/08/21 Triple 20cm less than 1 day          Line            Hemodialysis AV Fistula Right -- days          Hemodialysis Catheter            HD Permanent Double Catheter -- days          Drain            NG/OG/Enteral Tube Nasogastric 18 Fr Left nares less than 1 day          Airway            ETT  Cuffed 8 mm less than 1 day Physical Exam     Physical Exam   Gen: non responsive  Head: Normocephalic and atraumatic  Eyes: no stare or proptosis, no periorbital edema  E/N/M nl facies, ET in place  Pulmonary/Chest: rising with vent  Musculoskeletal: thin extremities  Skin: does not appear diaphoretic,  Psychiatric: non responisve      Lab Results: I have personally reviewed pertinent reports      Imaging Studies:   EKG, Pathology, and Other Studies:   VTE Prophylaxis:     Code Status: Level 1 - Full Code  Advance Directive and Living Will:      Power of :    POLST:      Counseling / Coordination of Care

## 2021-02-08 NOTE — UTILIZATION REVIEW
- Na improved 127  - Currently asymptomatic  - Continue NS at 125 ml/hour  - Daily BMP  - Anticipate DC home in AM if Na improves overnight  - May need nephrology consult pending clinical course  - Monitor     Initial Clinical Review    Admission: Date/Time/Statement:   Admission Orders (From admission, onward)     Ordered        02/06/21 1421  Inpatient Admission  Once                   Orders Placed This Encounter   Procedures    Inpatient Admission     Standing Status:   Standing     Number of Occurrences:   1     Order Specific Question:   Level of Care      Med surg; transferred to level 2 stepdown 2/8     Order Specific Question:   Bed Type     Answer:   Terese [4]     Order Specific Question:   Estimated length of stay     Answer:   More than 2 Midnights     Order Specific Question:   Certification     Answer:   I certify that inpatient services are medically necessary for this patient for a duration of greater than two midnights  See H&P and MD Progress Notes for additional information about the patient's course of treatment  ED Arrival Information     Expected Arrival Acuity Means of Arrival Escorted By Service Admission Type    - 2/6/2021 10:14 Urgent Walk-In Family Member Critical Care/ICU Urgent    Arrival Complaint    vomiting        Chief Complaint   Patient presents with    Hyperglycemia - Symptomatic     High blood sugar readings x 2 days since having endoscopy  Also had vomiting yesterday, sweating  No urinary sx  BG just reading high  Assessment/Plan: 63 yo nonverbal male w/hx cva, ckd, cad, dm, gerd, gastroparesis, htn to ED admitted as inpatient due to sepsis and hyperglycemia  Presented with fever, persistent n/v, chest phlegm, and uncontrolled sugars after endoscopy within the prior 48 hrs  Family attempted to contact endocrine and was afraid to give insulin for fear of dropping his sugar  No insulin was given, and sugar is now>600  Workup revealed high wbc, tachypnea  CXR nothing acute  Insulin gtt, IV antbx in progress, endocrine and renal consulted  2/7: lethargic but awakens and attempts to verbally respond, sluggish pupils, decreased breath sounds    blood cultures growing gm neg rods, sugars poorly controlled, keep NPO per speech, continue IV antbx, continue dialysis  per renal: very confused, responds to verbal stimuli  not volume overloaded  Continue with dialysis, next treatment tomorrow  2/8: new R gaze, non responsive, which is a change from yesterday  Pupils fixed but not dilated  Hypotensive, hypoxic to high 70's, low 80's, tachy in 100's  Concern for septic shock vs  Hypoxic brain injury vs  Stroke  Transferred to level 2 stepdown bed and consulted critical care  Central line inserted       ED Triage Vitals   Temperature Pulse Respirations Blood Pressure SpO2   02/06/21 1024 02/06/21 1024 02/06/21 1024 02/06/21 1024 02/06/21 1024   98 3 °F (36 8 °C) 95 (!) 24 143/68 95 %      Temp Source Heart Rate Source Patient Position - Orthostatic VS BP Location FiO2 (%)   02/06/21 1633 02/08/21 0914 02/06/21 1633 02/06/21 1633 --   Temporal Monitor Lying Left arm       Pain Score       02/06/21 1024       No Pain          Wt Readings from Last 1 Encounters:   02/08/21 56 6 kg (124 lb 12 5 oz)     Additional Vital Signs:   Date/Time  Temp  Pulse  Resp  BP  MAP (mmHg)  SpO2  O2 Device  Patient Position - Orthostatic VS   02/08/21 1107  97 2 °F (36 2 °C)Abnormal   --  --  --  --  --  --  --   02/08/21 0914  96 9 °F (36 1 °C)Abnormal   99  --  --  --  100 %  Non-rebreather mask  Lying   02/08/21 0800  --  98  --  95/63  68  --  --  Lying   02/08/21 0759  --  --  --  --  --  95 %  None (Room air)  --   02/08/21 0750  --  100  --  93/61  66  --  --  Lying   02/08/21 0740  --  100  --  96/57  67  --  --  Lying   02/08/21 0730  --  101  --  100/59  65  --  --  Lying   02/08/21 0720  --  103  --  94/59  64  --  --  Lying   02/08/21 0710  --  102  --  93/55  62  --  --  Lying   02/08/21 0700  --  105  --  104/60  73  --  --  Lying   02/08/21 0650  --  103  --  97/59  68  --  --  Lying   02/08/21 0621  --  108Abnormal   --  87/59Abnormal   64  77 %Abnormal   None (Room air)  Lying   02/07/21 2300 97 8 °F (36 6 °C)  106Abnormal   22  110/53  77  95 %  None (Room air)  Lying   02/07/21 1619  97 °F (36 1 °C)Abnormal   111Abnormal   24Abnormal   122/58  --  96 %  --  Lying   02/07/21 1204  96 7 °F (35 9 °C)Abnormal   109Abnormal   20  112/74  --  95 %  None (Room air)  Lying   02/07/21 0930  --  --  --  --  --  --  None (Room air)  --   02/07/21 0801  96 °F (35 6 °C)Abnormal   111Abnormal   28Abnormal   102/61  --  90 %  None (Room air)  Sitting   02/07/21 0416  98 9 °F (37 2 °C)  90  18  139/72  --  94 %  None (Room air)  Lying   02/06/21 2300  98 9 °F (37 2 °C)  92  20  125/66  --  92 %  None (Room air)  Lying   02/06/21 1945  --  --  --  --  --  --  None (Room air)  --   02/06/21 1932  97 5 °F (36 4 °C)  92  20  153/70  --  96 %  None (Room air)  Lying   02/06/21 1633  97 5 °F (36 4 °C)  80  22  139/66  --  94 %  None (Room air)  Lying   02/06/21 1258  --  96  24Abnormal   158/72  --  95 %  None (Room air)  --       Pertinent Labs/Diagnostic Test Results:   2/6 PCXR: nothing acute  2/6 EKG: Sinus rhythm with 1st degree A-V block  Left anterior fascicular block  Prolonged QT    2/8 CT head: nothing acute  2/8 PCXR: On the final radiograph, the tip of the left internal jugular central venous catheter projects at the superior cavoatrial junction  No evidence of a pneumothorax    On the final radiograph, the tip of the enteric tube projects at the stomach    Peripheral right upper lung zone opacity, new since 2/6/2021; nonspecific, concerning for pneumonia in the appropriate clinical setting  2/8 CT brain: No acute intracranial abnormality  Markedly precocious volume loss chronic small vessel disease  2/8 CTA head/neck: No large vessel occlusive disease despite diffuse atherosclerotic changes    Bilateral, right greater than left lung infiltrates    Small right pleural effusion    Results from last 7 days   Lab Units 02/06/21  1202   SARS-COV-2  Negative     Results from last 7 days   Lab Units 02/08/21  0748 02/07/21  1300 02/06/21  1144   WBC Thousand/uL 25 15* 22 83* 15 63*   HEMOGLOBIN g/dL 12 5 11 9* 9 0*   HEMATOCRIT % 39 2 36 5 36 5   PLATELETS Thousands/uL 71* 61* 72*   BANDS PCT % 2 3  --          Results from last 7 days   Lab Units 02/08/21  1036 02/08/21  0801 02/08/21  0748 02/07/21  1301 02/06/21  1144 02/04/21  1306   SODIUM mmol/L 142  --  141 138 130* 137   POTASSIUM mmol/L 3 5  --  3 7 4 0 3 8 3 7   CHLORIDE mmol/L 99*  --  98* 96* 90* 95*   CO2 mmol/L 26  --  23 24 29 29   ANION GAP mmol/L 17*  --  20* 18* 11 13   BUN mg/dL 111*  --  106* 81* 62* 51*   CREATININE mg/dL 9 01*  --  9 35* 7 73* 6 46* 6 77*   EGFR ml/min/1 73sq m 7  --  6 8 10 9   CALCIUM mg/dL 9 2  --  9 4 9 8 9 1 9 5   CALCIUM, IONIZED mmol/L  --   --  0 90*  --   --   --    MAGNESIUM mg/dL  --  2 5  --   --  2 3  --    PHOSPHORUS mg/dL  --  5 1*  --   --  4 9*  --      Results from last 7 days   Lab Units 02/08/21  0748 02/06/21  1144   AST U/L 10 22   ALT U/L 11* 13   ALK PHOS U/L 123* 156*   TOTAL PROTEIN g/dL 6 8 6 9   ALBUMIN g/dL 2 0* 2 4*   TOTAL BILIRUBIN mg/dL 0 78 0 84   BILIRUBIN DIRECT mg/dL  --  0 45*     Results from last 7 days   Lab Units 02/08/21  1034 02/08/21  0737 02/08/21  0546 02/08/21  0013 02/07/21  2038 02/07/21  1617 02/07/21  1209 02/07/21  0912 02/07/21  0716 02/07/21  0016 02/06/21  2135 02/06/21 2028   POC GLUCOSE mg/dl 241* 271* 327* 404* 328* 263* 236* 231* 211* 133 77 188*     Results from last 7 days   Lab Units 02/08/21  1036 02/08/21  0748 02/07/21  1301 02/06/21 2022 02/06/21  1601 02/06/21  1144   GLUCOSE RANDOM mg/dL 276* 297* 263* 47* 480* 616*         Results from last 7 days   Lab Units 02/04/21  1306   HEMOGLOBIN A1C % 7 9*   EAG mg/dl 180     BETA-HYDROXYBUTYRATE   Date Value Ref Range Status   02/06/2021 0 4 <0 6 mmol/L Final      Results from last 7 days   Lab Units 02/08/21  0728   PH ART  7 456*   PCO2 ART mm Hg 37 6   PO2 ART mm Hg 348 6*   HCO3 ART mmol/L 25 9   BASE EXC ART mmol/L 2 1   O2 CONTENT ART mL/dL 17 7   O2 HGB, ARTERIAL % 98 5*   ABG SOURCE  Radial, Left     Results from last 7 days   Lab Units 02/06/21  1144   PH SURINDER  7 399   PCO2 SURINDER mm Hg 48 3   PO2 SURINDER mm Hg 50 9*   HCO3 SURINDER mmol/L 29 2   BASE EXC SURINDER mmol/L 3 7   O2 CONTENT SURINDER ml/dL 13 0   O2 HGB, VENOUS % 81 6*       Results from last 7 days   Lab Units 02/06/21  1247   TROPONIN I ng/mL 0 04         Results from last 7 days   Lab Units 02/08/21  0748   PROTIME seconds 20 1*   INR  1 76*   PTT seconds 55*         Results from last 7 days   Lab Units 02/07/21  1301 02/06/21  1605   PROCALCITONIN ng/ml 334 10* 181 50*     Results from last 7 days   Lab Units 02/08/21  1036 02/08/21  0748 02/06/21  1247   LACTIC ACID mmol/L 1 2 2 4* 1 7       Results from last 7 days   Lab Units 02/06/21  1144   LIPASE u/L 48*       Results from last 7 days   Lab Units 02/06/21  1202   INFLUENZA A PCR  Negative   INFLUENZA B PCR  Negative   RSV PCR  Negative       Results from last 7 days   Lab Units 02/06/21  1144   GRAM STAIN RESULT  Gram negative rods*  Gram negative rods*     Results from last 7 days   Lab Units 02/08/21  0748 02/07/21  1300   TOTAL COUNTED  100 100           ED Treatment:   Medication Administration from 02/06/2021 1014 to 02/06/2021 1459       Date/Time Order Dose Route Action     02/06/2021 1257 lactated ringers infusion 1,000 mL 1,000 mL Intravenous New Bag     02/06/2021 1257 ondansetron (ZOFRAN) injection 4 mg 4 mg Intravenous Given     02/06/2021 1353 cefepime (MAXIPIME) 2 g/50 mL dextrose IVPB 2,000 mg Intravenous New Bag     02/06/2021 1351 insulin regular (HumuLIN R,NovoLIN R) injection 6 Units 6 Units Intravenous Given        Past Medical History:   Diagnosis Date    Anxiety     CAD (coronary artery disease)     last assessed 4/10/13    Chronic kidney disease     On dialysis    [facility], [facility] IV [facility]) [facility] [address] [facility]     Concussion     last assessed 9/3/14    Depression     Diabetes mellitus ([place] [address] )     type 2    Failure to gain weight in adult     last assessed 2/24/14    Gastroparesis     GERD (gastroesophageal reflux disease)     Hyperkalemia     last assessed 11/9/15    Hyperlipidemia     Hypertension     accelerated essential last assessed 10/7/16    Insomnia     Late effects of cerebrovascular disease     Overflow incontinence     last assessed 7/24/14    Renal disorder     Secondary hyperparathyroidism ([place] [address] )     Stroke ([place] [address] )     left sided weakness     Present on Admission:   Essential hypertension   ESRD (end stage renal disease) ([place] [address] )   Bacteremia      Admitting Diagnosis: Leukocytosis [D72 829]  Thrombocytopenia (HCC) [D69 6]  Nausea and vomiting [R11 2]  Hyperglycemia [R73 9]  ESRD on hemodialysis ([place] [address] ) [N18 6, Z99 2]  Age/Sex: 64 y o  male  Admission Orders:  Scheduled Medications:  aspirin, 81 mg, Oral, Daily  atorvastatin, 20 mg, Oral, Daily With Dinner  buPROPion, 100 mg, Oral, BID  docusate sodium, 100 mg, Oral, BID  heparin (porcine), 5,000 Units, Subcutaneous, Q8H [address]  insulin glargine, 10 Units, Subcutaneous, HS  insulin lispro, 1-5 Units, Subcutaneous, Q6H  lubiprostone, 8 mcg, Oral, BID With Meals  midodrine, 10 mg, Oral, TID  pantoprazole, 40 mg, Oral, BID AC  piperacillin-tazobactam, 2 25 g, Intravenous, Q8H  saccharomyces boulardii, 250 mg, Oral, BID  sucralfate, 1 g, Oral, 4x Daily (AC & HS)  zolpidem, 10 mg, Oral, HS    IV D50 x 1 2/6 @2020    Continuous IV Infusions:insulin regular (HumuLIN R,NovoLIN R) 1 Units/mL in sodium chloride 0 9 % 100 mL infusion   Rate: 0 3-21 mL/hr Dose: 0 3-21 Units/hr  Freq: Titrated Route: IV  Last Dose: Stopped (02/06/21 2000)  Start: 02/06/21 1430 End: 02/06/21 2023    PRN Meds:  acetaminophen, 650 mg, Oral, Q6H PRN  aluminum-magnesium hydroxide-simethicone, 30 mL, Oral, Q6H PRN  ondansetron, 4 mg, Intravenous, Q6H PRN x 1 2/6 @1604    neuro checks Every 1 hour x 4 hours, then every 2 hours x 8 hours, then every 4 hours x 72 hours  Tele  NPO    IP CONSULT TO ENDOCRINOLOGY  IP CONSULT TO NEPHROLOGY  IP CONSULT TO NEUROLOGY  IP CONSULT TO PHYSICAL MEDICINE REHAB    Network Utilization Review Department  ATTENTION: Please call with any questions or concerns to [phone] and carefully listen to the prompts so that you are directed to the right person  All voicemails are confidential   Kyra Honeycutt all requests for admission clinical reviews, approved or denied determinations and any other requests to dedicated fax number below belonging to the campus where the patient is receiving treatment   List of dedicated fax numbers for the Facilities:  [address] DENIALS (Administrative/Medical Necessity) [phone]   [address] (Maternity/NICU/Pediatrics) [phone]   [address] [facility] [address] Michael Mccloud 9731 (Bernardino Bradford "Fanny" 103) [address] [place] [address] [address] [place] [address] 951 [phone]

## 2021-02-08 NOTE — PLAN OF CARE
Post-Dialysis RN Treatment Note     Blood Pressure:  Pre 94/58 mm/Hg  Post 76/46 mmHg   EDW  58 kg    Weight:  Pre 56 6 kg   Post 56 6 kg   Mode of weight measurement: Bed Scale   Volume Removed  56 6 ml      Treatment duration 150 minutes    NS given  N/A    Treatment shortened? Yes, describe: Patient's Levo titrated to the max and patient's SBP in the 70'a   Medications given duri ng Rx None Reported   Estimated Kt/V  None Reported   Access type: AV fistula   Access Status: Yes, describe: R upper arm AVF, BFR maintained at 400 throughout treatment     Report called to primary nurse   Yes Stanton Nielsen RN     Patient was noted to have abnormal breathing during treatment  Critical care team notified  Patient was intubated while receivig dialysis treatment  Dr Cassi Andrade notified of treatment ending earlier  Patient receiving dialysis treatment today as ordered  Attempting 0 5 liters fluid removal maintaining MAP >65  4K dialysate being used, K+ 3 5 as of 02/08/2021      Problem: METABOLIC, FLUID AND ELECTROLYTES - ADULT  Goal: Electrolytes maintained within normal limits  Description: INTERVENTIONS:  - Monitor labs and assess patient for signs and symptoms of electrolyte imbalances  - Administer electrolyte replacement as ordered  - Monitor response to electrolyte replacements, including repeat lab results as appropriate  - Instruct patient on fluid and nutrition as appropriate  Outcome: Progressing  Goal: Fluid balance maintained  Description: INTERVENTIONS:  - Monitor labs   - Monitor I/O and WT  - Instruct patient on fluid and nutrition as appropriate  - Assess for signs & symptoms of volume excess or deficit  Outcome: Progressing

## 2021-02-08 NOTE — PROCEDURES
Central Line Insertion    Date/Time: 2/8/2021 10:25 AM  Performed by: CHERELLE Samayoa  Authorized by: CHERELLE Samayoa     Patient location:  ICU  Consent:     Consent obtained:  Emergent situation  Universal protocol:     Relevant documents present and verified: yes      Test results available and properly labeled: yes      Radiology Images displayed and confirmed  If images not available, report reviewed: yes      Required blood products, implants, devices, and special equipment available: yes      Site/side marked: yes      Immediately prior to procedure, a time out was called: yes      Patient identity confirmed:  Arm band  Pre-procedure details:     Hand hygiene: Hand hygiene performed prior to insertion      Sterile barrier technique: All elements of maximal sterile technique followed      Skin preparation:  ChloraPrep    Skin preparation agent: Skin preparation agent completely dried prior to procedure    Indications:     Central line indications: no peripheral vascular access    Anesthesia (see MAR for exact dosages): Anesthesia method:  Local infiltration    Local anesthetic:  Lidocaine 1% w/o epi  Procedure details:     Location:  Left internal jugular    Vessel type: vein      Laterality:  Left    Approach: percutaneous technique used      Patient position:  Reverse Trendelenburg    Catheter type:  Triple lumen 20cm    Landmarks identified: yes      Ultrasound guidance: yes      Sterile ultrasound techniques: Sterile gel and sterile probe covers were used      Manometry confirmation: no      Number of attempts:  1    Successful placement: yes    Post-procedure details:     Post-procedure:  Dressing applied and line sutured    Assessment:  Blood return through all ports, no pneumothorax on x-ray, free fluid flow and placement verified by x-ray    Patient tolerance of procedure:   Tolerated well, no immediate complications    Observer: Yes      Observer name:  Ish Nguyen and Caryl John

## 2021-02-08 NOTE — PLAN OF CARE
Problem: PAIN - ADULT  Goal: Verbalizes/displays adequate comfort level or baseline comfort level  Description: Interventions:  - Encourage patient to monitor pain and request assistance  - Assess pain using appropriate pain scale  - Administer analgesics based on type and severity of pain and evaluate response  - Implement non-pharmacological measures as appropriate and evaluate response  - Consider cultural and social influences on pain and pain management  - Notify physician/advanced practitioner if interventions unsuccessful or patient reports new pain  2/8/2021 0344 by Adry Marcial RN  Outcome: Progressing  2/8/2021 0343 by Adry Marcial RN  Outcome: Progressing     Problem: INFECTION - ADULT  Goal: Absence or prevention of progression during hospitalization  Description: INTERVENTIONS:  - Assess and monitor for signs and symptoms of infection  - Monitor lab/diagnostic results  - Monitor all insertion sites, i e  indwelling lines, tubes, and drains  - Monitor endotracheal if appropriate and nasal secretions for changes in amount and color  - [place] appropriate cooling/warming therapies per order  - Administer medications as ordered  - Instruct and encourage patient and family to use good hand hygiene technique  - Identify and instruct in appropriate isolation precautions for identified infection/condition  2/8/2021 0344 by Adry Marcial RN  Outcome: Progressing  2/8/2021 0343 by Adry Marcial RN  Outcome: Progressing  Goal: Absence of fever/infection during neutropenic period  Description: INTERVENTIONS:  - Monitor WBC    Outcome: Progressing     Problem: SAFETY ADULT  Goal: Maintain or return mobility status to optimal level  Description: INTERVENTIONS:  - Assess patient's baseline mobility status (ambulation, transfers, stairs, etc )    - Identify cognitive and physical deficits and behaviors that affect mobility  - Identify mobility aids required to assist with transfers and/or ambulation (gait belt, sit-to-stand, lift, walker, cane, etc )  - [place] fall precautions as indicated by assessment  - Record patient progress and toleration of activity level on Mobility SBAR; progress patient to next Phase/Stage  - Instruct patient to call for assistance with activity based on assessment  - Consider rehabilitation consult to assist with strengthening/weightbearing, etc   Outcome: Progressing     Problem: DISCHARGE PLANNING  Goal: Discharge to home or other facility with appropriate resources  Description: INTERVENTIONS:  - Identify barriers to discharge w/patient and caregiver  - Arrange for needed discharge resources and transportation as appropriate  - Identify discharge learning needs (meds, wound care, etc )  - Arrange for interpretive services to assist at discharge as needed  - Refer to Case Management Department for coordinating discharge planning if the patient needs post-hospital services based on physician/advanced practitioner order or complex needs related to functional status, cognitive ability, or social support system  Outcome: Progressing     Problem: Prexisting or High Potential for Compromised Skin Integrity  Goal: Skin integrity is maintained or improved  Description: INTERVENTIONS:  - Identify patients at risk for skin breakdown  - Assess and monitor skin integrity  - Assess and monitor nutrition and hydration status  - Monitor labs   - Assess for incontinence   - Turn and reposition patient  - Assist with mobility/ambulation  - Relieve pressure over bony prominences  - Avoid friction and shearing  - Provide appropriate hygiene as needed including keeping skin clean and dry  - Evaluate need for skin moisturizer/barrier cream  - Collaborate with interdisciplinary team   - Patient/family teaching  - Consider wound care consult   2/8/2021 0344 by Az Mariano RN  Outcome: Progressing  2/8/2021 0343 by Az Mariano, RN  Outcome: Progressing     Problem: Potential for Falls  Goal: Patient will remain free of falls  Description: INTERVENTIONS:  - Assess patient frequently for physical needs  -  Identify cognitive and physical deficits and behaviors that affect risk of falls  -  [place] fall precautions as indicated by assessment   - Educate patient/family on patient safety including physical limitations  - Instruct patient to call for assistance with activity based on assessment  - Modify environment to reduce risk of injury  - Consider OT/PT consult to assist with strengthening/mobility  2/8/2021 0344 by Kishore Brewster RN  Outcome: Not Progressing  2/8/2021 0343 by Kishore Brewster RN  Outcome: Progressing     Problem: SAFETY ADULT  Goal: Patient will remain free of falls  Description: INTERVENTIONS:  - Assess patient frequently for physical needs  -  Identify cognitive and physical deficits and behaviors that affect risk of falls  -  [place] fall precautions as indicated by assessment   - Educate patient/family on patient safety including physical limitations  - Instruct patient to call for assistance with activity based on assessment  - Modify environment to reduce risk of injury  - Consider OT/PT consult to assist with strengthening/mobility  2/8/2021 0344 by Kishore Brewster RN  Outcome: Not Progressing  2/8/2021 0343 by Kishore Brewster RN  Outcome: Progressing     Problem: Knowledge Deficit  Goal: Patient/family/caregiver demonstrates understanding of disease process, treatment plan, medications, and discharge instructions  Description: Complete learning assessment and assess knowledge base    Interventions:  - Provide teaching at level of understanding  - Provide teaching via preferred learning methods  2/8/2021 0344 by Kishore Brewster RN  Outcome: Not Progressing  2/8/2021 0343 by Kishore Brewster RN  Outcome: Progressing

## 2021-02-08 NOTE — SOCIAL WORK
CM met with pts family at bedside as they were currently here to visit  Pt is LOS day 1, he is not a bundle, he is not a re-admission, he was transferred from the floor to the ICU tdoay  The patient lives at home with his caregiver, [facility] [phone] and daughter, Rupinder Arenas [phone]  He is totally dependent on their care, he does not ambulate, is an assist w/transfers from bed to [facility], they report he can occasionally hold a cup in his right hand  He is incontinent of bowel and wears depends  He has a hospital bed, WC and scooter  He is an assist for feeds, dressing and bathing  He has waiver hours, 80hrs per week  He is transported to his [facility] appts via [facility] or [employer] up transports  PCP is Dameon Velez  He uses [facility] on [address] for rx needs and they deliver  NO D&A use  No MH history  No VNA  Alejo explained role to family, advised they can call me to set up virtual visits while in the ICU and that CRNP tends to call family for updates in the afternoon  Emails:   [email or web address]  [email or web address]    [email or web address]  [email or web address]    [email or web address]  [email or web address]

## 2021-02-08 NOTE — SOCIAL WORK
Called pts daughter, Mar Velasquez [phone] to do a general SW assessment, however, she is at work right now and requested I call her mother/pts caregiver, Jeb Hidalgo [phone], CM called and left VM

## 2021-02-08 NOTE — ASSESSMENT & PLAN NOTE
- Glucose on admission 616  - Initially on insulin drip, but had episode of hypoglycemia (39)  - Most recent glucose level 276

## 2021-02-08 NOTE — ASSESSMENT & PLAN NOTE
· Patient with history of CVA with left sided deficits  Unclear of severity of deficits  Per caregiver patient cannot walk or feed himself, but does follow commands  · Approximately earlier this morning around 0300 patient began to favor right side presenting with right gaze preference, pupils not reactive and not following commands  Patient also with hypotension, O2 sats in 70s, and tachycardia at this time  IVF started and patient placed on nonrebreather  · CT head with no acute findings  CTA head/neck with no evidence of large vessel occlusion  · Neurology consulted  Stroke pathway initiated  MRI brain ordered  EEG and JULI ordered  · Continue frequent neuro checks

## 2021-02-08 NOTE — PROGRESS NOTES
Follow up Consultation    Nephrology   Lizeth Harper 64 y o  male MRN: [id number]  Unit/Bed#: ICU 02 Encounter: [id number]      Physician Requesting Consult: Freddy Hashimoto, MD        ASSESSMENT/PLAN:  42-year-old male history of ESRD (MWF) , CAD, diabetes and GERD initially admitted with concerns for nausea vomiting and fevers  Nephrology following for dialysis management   ESRD:   - gets usual dialysis Monday Wednesday Friday at [facility]  - access:  Right arm AV fistula  - estimated dry weight:  58 kilos  - last dialysis treatment on 02/08/2021  - next dialysis treatment on 02/10/2021  - check BMP, phosphorus  - place on renal diet when allowed diet order  - avoid nephrotoxins, adjust meds to appropriate GFR     H&H/anemia:  - most recent hemoglobin at 12 5 grams/deciliter  - maintain hemoglobin 10-12 grams/deciliter  - Currently on:    None  - Recommendations:  Hold NOLA due to concern for CVA     Acid-base electrolytes:  o Hyperphosphatemia:  Most recent phosphorus at 5 1  o Sodium, phosphorus, potassium, acidosis to be corrected with dialysis     Blood pressure:   o Current medications:  Midodrine 10 mg p o  T i d   o Recommendations:  No changes for now     Bone mineral disease/secondary hyperparathyroidism of renal origin:   o Currently on:  No meds  o Recommendations:  No changes for now  o Follow-up intact PTH as an outpatient     Other medical problems:  o Diabetes:  Management primary team, on insulin  o Concern for new CVA:  Management per primary team, follow-up with neurology  For CTA today  o Sepsis:  Blood cultures 2/6 Gram-negative rods  Management primary team   CT suggestive of bilateral right greater than left lung infiltrates, on Zosyn        Thanks for the consult  Will continue to follow  Please call with questions    Above-mentioned orders and Plan in terms of dialysis was discussed with the team in [facility]iel Benson [facility], [facility], 2/8/2021, 1:10 PM        Objective :  Patient seen and examined the ICU earlier this a m  Was noted to have lateralization of days concern for stroke  Stroke protocol activated  Patient for CTA later today  Patient not able to participate in the review of systems    PHYSICAL EXAM  BP (!) 86/56   Pulse 90   Temp (!) 97 2 °F (36 2 °C) (Temporal)   Resp (!) 27   Ht 5' 7" (1 702 m)   Wt 56 6 kg (124 lb 12 5 oz)   SpO2 98%   BMI 19 54 kg/m²   Temp (24hrs), Av 2 °F (36 2 °C), Min:96 9 °F (36 1 °C), Max:97 8 °F (36 6 °C)        Intake/Output Summary (Last 24 hours) at 2021 1310  Last data filed at 2021 1001  Gross per 24 hour   Intake 30 ml   Output --   Net 30 ml       I/O last 24 hours: In: 30 [I V :30]  Out: -       Current Weight: Weight - Scale: 56 6 kg (124 lb 12 5 oz)  First Weight: Weight - Scale: 59 2 kg (130 lb 8 2 oz)  Physical Exam  Vitals signs and nursing note reviewed  Constitutional:       General: He is not in acute distress  Appearance: Normal appearance  He is ill-appearing  He is not toxic-appearing or diaphoretic  HENT:      Head: Normocephalic and atraumatic  Mouth/Throat:      Pharynx: No oropharyngeal exudate  Eyes:      General: No scleral icterus  Conjunctiva/sclera: Conjunctivae normal    Neck:      Musculoskeletal: Neck supple  Cardiovascular:      Rate and Rhythm: Normal rate  Heart sounds: No friction rub  Pulmonary:      Effort: Pulmonary effort is normal  No respiratory distress  Breath sounds: Normal breath sounds  No wheezing  Abdominal:      Palpations: Abdomen is soft  Tenderness: There is no abdominal tenderness  Musculoskeletal:         General: No swelling  Comments: Right arm AV fistula with thrill and bruit   Skin:     General: Skin is warm  Coloration: Skin is not jaundiced  Neurological:      Mental Status: He is alert  He is disoriented               Review of Systems   Unable to perform ROS: Mental status change       Scheduled Meds:  Current Facility-Administered Medications   Medication Dose Route Frequency Provider Last Rate    acetaminophen  650 mg Oral Q6H PRN CHERELLE Galeano      aluminum-magnesium hydroxide-simethicone  30 mL Oral Q6H PRN CHERELLE aGleano      aspirin  81 mg Oral Daily Dolores Mendez, CHERELLE      atorvastatin  20 mg Oral Daily With [employer], CHERELLE      buPROPion  100 mg Oral BID Dolores Mendez, CHERELLE      docusate sodium  100 mg Oral BID CHERELLE Galeano      heparin (porcine)  5,000 Units Subcutaneous [facility] Dolores Mendez [place]      insulin glargine  10 Units Subcutaneous HS CHERELLE Galeano      insulin lispro  1-5 Units Subcutaneous Q6H CHERELLE Galeano      lubiprostone  8 mcg Oral BID With Meals CHERELLE Galeano      midodrine  10 mg Oral TID CHERELLE Galeano      ondansetron  4 mg Intravenous Q6H PRN CHERELLE Galeano      pantoprazole  40 mg Oral BID AC Kimberlyn King, CHERELLE      piperacillin-tazobactam  2 25 g Intravenous Q8H CHERELLE Wyman 2 25 g (02/08/21 1049)    saccharomyces boulardii  250 mg Oral BID CHERELLE Galeano      sucralfate  1 g Oral 4x Daily (AC & HS) CHERELLE Galeano      zolpidem  10 mg Oral HS CHERELLE Galeano         PRN Meds:   acetaminophen    aluminum-magnesium hydroxide-simethicone    ondansetron    Continuous Infusions:       Invasive Devices:    Invasive Devices     Central Venous Catheter Line            CVC Central Lines 02/08/21 Triple 20cm less than 1 day          Hemodialysis Catheter            HD Permanent Double Catheter -- days          Drain            NG/OG/Enteral Tube Nasogastric 18 Fr Left nares less than 1 day                  LABORATORY:    Results from last 7 days   Lab Units 02/08/21  1036 02/08/21  0801 02/08/21  0748 02/07/21  1301 02/07/21  1300 02/06/21  1144 02/04/21  1306   WBC Thousand/uL  --   --  25 15*  --  22 83* 15 63*  --    HEMOGLOBIN g/dL  --   --  12 5  --  11 9* 9 0*  --    HEMATOCRIT % --   --  39 2  --  36 5 36 5  --    PLATELETS Thousands/uL  --   --  71*  --  61* 72*  --    POTASSIUM mmol/L 3 5  --  3 7 4 0  --  3 8 3 7   CHLORIDE mmol/L 99*  --  98* 96*  --  90* 95*   CO2 mmol/L 26  --  23 24  --  29 29   BUN mg/dL 111*  --  106* 81*  --  62* 51*   CREATININE mg/dL 9 01*  --  9 35* 7 73*  --  6 46* 6 77*   CALCIUM mg/dL 9 2  --  9 4 9 8  --  9 1 9 5   MAGNESIUM mg/dL  --  2 5  --   --   --  2 3  --    PHOSPHORUS mg/dL  --  5 1*  --   --   --  4 9*  --       rest all reviewed    RADIOLOGY:  CTA stroke alert (head/neck)   Final Result by Lebron Antoine MD (02/08 1049)      No large vessel occlusive disease despite diffuse atherosclerotic changes  Bilateral, right greater than left lung infiltrates  Small right pleural effusion            Findings were directly discussed with ORAL MCGOWAN on 2/8/2021 10:44AM                      Workstation performed: [id number]         CT stroke alert brain   Final Result by Lebron Antoine MD (02/08 1050)      No acute intracranial abnormality  Markedly precocious volume loss chronic small vessel disease  Findings were directly discussed with Carolann Alvarez on 2/8/2021 10:44AM       Workstation performed: [id number]         XR chest portable   Final Result by Carlitos Macias MD (02/08 0055)      On the final radiograph, the tip of the left internal jugular central venous catheter projects at the superior cavoatrial junction  No evidence of a pneumothorax  On the final radiograph, the tip of the enteric tube projects at the stomach  Peripheral right upper lung zone opacity, new since 2/6/2021; nonspecific, concerning for pneumonia in the appropriate clinical setting  Workstation performed: [id number]         XR chest portable   Final Result by Carlitos Macias MD (02/08 1038)      On the final radiograph, the tip of the left internal jugular central venous catheter projects at the superior cavoatrial junction    No evidence of a pneumothorax  On the final radiograph, the tip of the enteric tube projects at the stomach  Peripheral right upper lung zone opacity, new since 2/6/2021; nonspecific, concerning for pneumonia in the appropriate clinical setting  Workstation performed: [id number]         CT head wo contrast   Final Result by Mouna Braun MD (02/08 0725)      No acute intracranial abnormality  Microangiopathic changes  Workstation performed: [id number]         XR chest 1 view portable   Final Result by Hanna Salazar DO (02/06 2052)      No acute cardiopulmonary disease  Workstation performed: [id number]         MRI Inpatient Order    (Results Pending)     Rest all reviewed    Portions of the record may have been created with voice recognition software  Occasional wrong word or "sound a like" substitutions may have occurred due to the inherent limitations of voice recognition software  Read the chart carefully and recognize, using context, where substitutions have occurred  If you have any questions, please contact the dictating provider

## 2021-02-08 NOTE — ASSESSMENT & PLAN NOTE
Lab Results   Component Value Date    HGBA1C 7 9 (H) 02/04/2021       Recent Labs     02/08/21  0013 02/08/21  0546 02/08/21  0737 02/08/21  1034   POCGLU 404* 327* 271* 241*       Blood Sugar Average: Last 72 hrs:  (P) 231 5625     · Present on admission with BS >500  Patient placed on insulin gtt at this time however d/c'd due to hypoglycemia of 35  · Patient currently on 10 units of lantus and SSI  Will change QID checks to Q6 hours given NPO status  · Continue to monitor for blood sugar goal of 140-180

## 2021-02-08 NOTE — ASSESSMENT & PLAN NOTE
- Chronic lacunar infarctions noted with residual left sided weakness  Per ED note, patient able to maintain antigravity on admission, but not against resistance     - Uses wheelchair at baseline

## 2021-02-08 NOTE — PROGRESS NOTES
Critical Care Services- Interval Progress Note   Pierre Gray 64 y o  male MRN: [id number]  Unit/Bed#: ICU 02 Encounter: [id number]  Assessment and Plan  Evaluated patient at bedside due to change in breathing  Patient was unresponsive with agonal respirations with adequate oxygenation  It was determined to intubate patient for airway protection  After intubation patient was hypotensive and became bradycardic  Epinephrine x2 was given  There was no loss of pulse during the event          Diagnosis: Acute metabolic encephalopathy with agonal respirations  o Plan: follow with neurology  o Await MRI     Diagnosis: Acute respiratory failure  o Plan: continue mechanical ventilation     Diagnosis: CKD stage 3  o Plan: currently on dialysis which will be stopped due to hypotension   Hypotension  o Start levophed infusion and vasopressin            Laboratory   Results from last 7 days   Lab Units 02/08/21  0748 02/07/21  1300 02/06/21  1144   WBC Thousand/uL 25 15* 22 83* 15 63*   HEMOGLOBIN g/dL 12 5 11 9* 9 0*   HEMATOCRIT % 39 2 36 5 36 5   PLATELETS Thousands/uL 71* 61* 72*   BANDS PCT % 2 3  --    MONO PCT % 6 8  --      Results from last 7 days   Lab Units 02/08/21  1036 02/08/21  0748 02/07/21  1301 02/06/21  2022 02/06/21  1601 02/06/21  1144 02/04/21  1306   SODIUM mmol/L 142 141 138  --   --  130* 137   POTASSIUM mmol/L 3 5 3 7 4 0  --   --  3 8 3 7   CHLORIDE mmol/L 99* 98* 96*  --   --  90* 95*   CO2 mmol/L 26 23 24  --   --  29 29   ANION GAP mmol/L 17* 20* 18*  --   --  11 13   BUN mg/dL 111* 106* 81*  --   --  62* 51*   CREATININE mg/dL 9 01* 9 35* 7 73*  --   --  6 46* 6 77*   CALCIUM mg/dL 9 2 9 4 9 8  --   --  9 1 9 5   GLUCOSE RANDOM mg/dL 276* 297* 263* 47* 480* 616*  --    ALT U/L  --  11*  --   --   --  13  --    AST U/L  --  10  --   --   --  22  --    ALK PHOS U/L  --  123*  --   --   --  156*  --    ALBUMIN g/dL  --  2 0*  --   --   --  2 4*  --    TOTAL BILIRUBIN mg/dL  --  0 78  --   --   -- 0 84  --      Results from last 7 days   Lab Units 02/08/21  0801 02/06/21  1144   MAGNESIUM mg/dL 2 5 2 3   PHOSPHORUS mg/dL 5 1* 4 9*      Results from last 7 days   Lab Units 02/08/21  0748   INR  1 76*   PTT seconds 55*      Results from last 7 days   Lab Units 02/08/21  1334 02/08/21  1036 02/06/21  1247   TROPONIN I ng/mL 1 16* 1 11* 0 04     Results from last 7 days   Lab Units 02/08/21  1036 02/08/21  0748 02/06/21  1247   LACTIC ACID mmol/L 1 2 2 4* 1 7     ABG:  Results from last 7 days   Lab Units 02/08/21  0728   PH ART  7 456*   PCO2 ART mm Hg 37 6   PO2 ART mm Hg 348 6*   HCO3 ART mmol/L 25 9   BASE EXC ART mmol/L 2 1   ABG SOURCE  Radial, Left     VBG:  Results from last 7 days   Lab Units 02/08/21  0728 02/06/21  1144   PH SURINDER   --  7 399   PCO2 SURINDER mm Hg  --  48 3   PO2 SURINDER mm Hg  --  50 9*   HCO3 SURINDER mmol/L  --  29 2   BASE EXC SURINDER mmol/L  --  3 7   ABG SOURCE  Radial, Left  --      Results from last 7 days   Lab Units 02/07/21  1301 02/06/21  1605   PROCALCITONIN ng/ml 334 10* 181 50*       Micro  Results from last 7 days   Lab Units 02/06/21  1144   GRAM STAIN RESULT  Gram negative rods*  Gram negative rods*       Diagnostic Imaging / Data: I have personally reviewed pertinent reports        Medications:  Current Facility-Administered Medications   Medication Dose Route Frequency    acetaminophen (TYLENOL) tablet 650 mg  650 mg Oral Q6H PRN    aluminum-magnesium hydroxide-simethicone (MYLANTA) oral suspension 30 mL  30 mL Oral Q6H PRN    aspirin chewable tablet 81 mg  81 mg Oral Daily    atorvastatin (LIPITOR) tablet 20 mg  20 mg Oral Daily With [employer]    buPROPion [facility]) tablet 100 mg  100 mg Oral BID    docusate sodium (COLACE) capsule 100 mg  100 mg Oral BID    heparin (porcine) subcutaneous injection 5,000 Units  5,000 Units Subcutaneous Q8H [facility]    insulin glargine (LANTUS) subcutaneous injection 10 Units 0 1 mL  10 Units Subcutaneous HS    insulin lispro (HumaLOG) 100 units/mL subcutaneous injection 1-5 Units  1-5 Units Subcutaneous Q6H    lactated ringers bolus 1,000 mL  1,000 mL Intravenous Once    lubiprostone (AMITIZA) capsule 8 mcg  8 mcg Oral BID With Meals    midodrine (PROAMATINE) tablet 10 mg  10 mg Oral TID    ondansetron (ZOFRAN) injection 4 mg  4 mg Intravenous Q6H PRN    pantoprazole (PROTONIX) EC tablet 40 mg  40 mg Oral BID AC    piperacillin-tazobactam (ZOSYN) 2 25 g in sodium chloride 0 9 % 50 mL IVPB  2 25 g Intravenous Q8H    saccharomyces boulardii (FLORASTOR) capsule 250 mg  250 mg Oral BID    sucralfate (CARAFATE) tablet 1 g  1 g Oral 4x Daily (AC & HS)    vasopressin (PITRESSIN) 20 Units in sodium chloride 0 9 % 100 mL infusion  0 04 Units/min Intravenous Continuous    zolpidem (AMBIEN) tablet 10 mg  10 mg Oral HS       SIGNATURE: CHERELLE Robledo    Portions of the record may have been created with voice recognition software  Occasional wrong word or "sound a like" substitutions may have occurred due to the inherent limitations of voice recognition software    Read the chart carefully and recognize, using context, where substitutions have occurred

## 2021-02-08 NOTE — QUICK NOTE
Re-evaluated patient around 1545 pm with attending neurologist for afternoon rounds  He was noted to have a change in mental status, first noted around 1445 pm per nursing  Prior to her evaluation at this time, patient appeared similar to neurologic exam this morning with fixed rightward gaze and response to noxious stimuli (more vigorously on R than L)  Nursing noted that the patient had a fixed midline gaze with blown pupils and loss of reflexes  He was intubated around that time due to agonal respirations/periods of apnea  After intubation, patient became hypotensive and bradycardic, requiring epinephrine  He was started on pressors  He was actively receiving dialysis at the time, which was stopped due to instability  At time of neurologic re-evaluation during rounds, patient was unresponsive  Patient's eyes were open  Pupils were dilated and he had a fixed, conjugate midline gaze  There was no blink to threat  He was unresponsive to noxious stimuli in all extremities  There was no cough, gag, or corneal reflex present  (Exam as per Dr Jimmie Hopper)    Discussed with ICU team that a stat CT/CTA head and neck should be obtained to rule out basilar artery occlusion, given the change in neurologic exam this afternoon

## 2021-02-08 NOTE — NURSING NOTE
Pt found this morning at 0600 in a change of baseline  Pt had a new on set of a gaze preference to the right  Notified [employer] at [phone]  Provider at bedside shortly after  Vitals collected  Pt O2 stating at 77%  Pt placed on nonrebreather  BP soft, bolus started  See new orders  Will continue to monitor

## 2021-02-08 NOTE — PROGRESS NOTES
02/08/21 1100   Spiritual Beliefs/Perceptions   Support Systems Family members;Home care staff   Plan of Care   Comments Patient's caregiver came to see patient, other staff presence, staff talked to Farideh Lopez about patient  Patient was not awake , will continue to follow patient and support family

## 2021-02-08 NOTE — QUICK NOTE
Was notified by RN that patient has a new rightward gaze and is non-responsive which is a change in mental status from yesterday  Patient has residual left-sided weakness from prior stroke, but now entirely prefers right side, does not respond to his name or follow any commands  On exam pupils were fixed but not dilated  Vital signs include blood pressure 80/60, hypoxia down high 70s and low 80s tachycardic in the 100s  Concern for septic shock versus stroke versus hypoxic brain injury  Ordered stat CT head, ABG, CMP, CBC, 1 L IVF bolus, made patient level two step-down  Consulted Critical Care for evaluation

## 2021-02-08 NOTE — CONSULTS
Consultation - Stroke   Arnie Winston 64 y o  male MRN: [id number]  Unit/Bed#: ICU 02 Encounter: [id number]      Assessment/Plan     Stroke-like symptoms  Assessment & Plan  53-year-old male with prior lacunar infarctions, ESRD on HD, and insulin-dependent diabetes mellitus is seen as a stroke alert for development right gaze preference and inability to follow commands, beginning this morning  Patient was hypoxic, tachycardic, and hypotensive  NIHSS elevated at 31 as patient does not follow commands  CT/CTA revealing pulmonary infiltrates but no evidence of large vessel occlusion  No TPA was administered as the last known well was unknown  Patient will be initiated on the stroke pathway  Given positive blood cultures, will rule out endocarditis with JULI  Subtle, low amplitude RUE jerking was noted intermittent during stroke alert as well as chewing motion with jaw, which subsided later, raising concern for possible seizure activity  Gaze preference and inability to follow commands persisted  - Stroke pathway   MRI brain without contrast    JULI pending    Lipid Panel   Aspirin 81 mg daily for now- Give rectal aspirin daily if patient is NPO and unable to receive PO meds   Recommend increasing Atorvastatin to 40 mg daily when able to take PO   Permissive HTN, labetalol if SBP >200  Continue fluid boluses as needed  Per chart review, patient has been chronically hypotensive   Continue telemetry   PT/OT/ST when stable   Frequent neuro checks  Continue to monitor and notify neurology with any changes  - EEG pending   - Seizure precautions   - Will need dialysis today given CTA contrast administered  Scheduled for 1300  - Medical management and supportive care per ICU team  Correction of any metabolic or infectious disturbances  Bacteremia  Assessment & Plan  - Positive blood cultures on 2/6 for gram negative rods  WBC 25  Initial lactic acid this morning 2 4  Procalcitonin elevated     - Currently on Zosyn   - Bilateral pulmonary infiltrates noted on CT chest  - Management per ICU team    Type 2 diabetes mellitus, with long-term current use of insulin [facility])  Assessment & Plan  Lab Results   Component Value Date    HGBA1C 7 9 (H) 02/04/2021       Recent Labs     02/08/21  0013 02/08/21  0546 02/08/21  0737 02/08/21  1034   POCGLU 404* 327* 271* 241*       Blood Sugar Average: Last 72 hrs:  (P) 231 5625       - Glucose on admission 616  - Initially on insulin drip, but had episode of hypoglycemia (39)    History of CVA (cerebrovascular accident)  Assessment & Plan  - Chronic lacunar infarctions noted with residual left sided weakness  Per ED note, patient able to maintain antigravity on admission, but not against resistance  - Uses wheelchair at baseline      TPA Decision: Patient not a TPA candidate  Unclear time of onset outside appropriate time window  Recommendations for outpatient neurological follow up have yet to be determined  History of Present Illness     Reason for Consult / Principal Problem: Stroke alert  Hx and PE limited by: Nonverbal  Hx obtained from nursing and chart review  Patient last known well: Unknown   Stroke alert called: 0380 Pepe Arriaga  Neurology time of arrival: 0822 (Arrived prior to formal activation of stroke alert - was notified of patient by critical care)    HPI: Geena Mendez is a 64 y o  male with prior lacunar infarctions with residual left sided weakness (unclear what degree of severity),  ESRD on HD MWF, and insulin dependent diabetes mellitus, who originally presented to the ED on 2/6 for evaluation of nausea/vomiting and hyperglycemia  Glucose was 616 on admission  The patient was recently prescribed short-acting insulin, but caregiver was afraid to administer this with concern for hypoglycemia  He was only receiving his long-acting insulin at home  The patient had an EGD 2 days prior to admission, and started with symptoms the following day   He had several episodes of vomiting and was unable to tolerate PO  He developed a fever of 103 at home prior to admission  Vitals on arrival were stable  Neurologic exam was significant for left sided weakness (able to lift anti-gravity, but not against resistance)  He appeared uncomfortable and diaphoretic  Mental status was described as "at baseline "   He was hyponatremic with a sodium of 130  Creatinine elevated at 6 46 and BUN 62  WBC 15 63  He was admitted for sepsis workup and management of hyperglycemia  He was started on insulin drip  He continued to have fluctuations in his blood sugar throughout his admission, with 1 episode on 02/07 of a blood sugar of 39  Blood sugar improved to 188 after D5  He was alert and oriented throughout the episode  He met sepsis criteria on admission  He was given a dose of cefepime and continued on ceftriaxone and then Zosyn  Procalcitonin was elevated  Blood cultures were positive  On 02/07, per review of nephrology note, the patient was noted to be very confused, but responsive to verbal stimuli  He was evaluated by speech a who recommended NPO and meds to be non-oral if possible due to severe dysphagia  When evaluated by primary team yesterday, he was noted to be lethargic but would awaken an attempt to verbally respond to examiner  After discussion with patient's previous day shift nurse and patient's night shift nurse, the patient was alert and able to follow some commands, but minimally verbal yesterday  He was noted to be lethargic yesterday  Overnight, the patient was able to track the nurse around the room but did not follow any commands  He was nonverbal overnight  Early this morning, around 625 am, primary team was notified by the patient's nurse that the patient developed a new rightward gaze  Nursing went in to check blood sugar at the time when this was noted  The patient's eyes were open but he was nonresponsive to verbal stimuli    Pupils were fixed  Blood pressure was 80/60 and he was hypoxic in to the 70s and low 80s  He was tachycardic  A fluid bolus was given  He was placed on a non-rebreather  A stat CT head was performed and was unremarkable  Critical care was notified by primary team this morning and the patient was made a level 2 stepdown  Critical care evaluated the patient and notified Neurology of the patient's presentation around 8:20 am and discussed with attending neurologist that a stroke alert was going to be activated  Neurology attending was not present on campus, but I (neurology AP) was able to evaluate the patient immediately  NIH was attempted at this time, but given that patient is unable to cooperate with testing, NIH is significantly elevated (see details below)  The patient was noted to desaturate in the room to high 70s and improved after nonrebreather to 93%  He was still hypotensive at this time in the 90s but per chart review has been chronically hypotensive  Right gaze preference was still noted and there were no spontaneous movements any other extremity, except for intermittent twitching of right upper extremity and jaw  He did not track  Rotary nystagmus like movements was noted when attempting VOR  Patient would groan to noxious stimuli in all extremities and intermittently attempt to localize with right upper extremity  Minimal withdrawal with LUE but loud groaning noted  Dorsiflexion of bilateral feet was noted with noxious stimuli in lower extremities bilaterally  He remained nonverbal   See NIHSS below  Nursing attempted to obtain access for CTA but was unsuccessful due to patient's bilateral fistulas  PICC nurse was contacted and attempted to find IV access with ultrasound but was unsuccessful  By that time, the patient was able to be transferred to the ICU for higher level of care   Critical care team was notified that patient will need a central line immediately upon patient's arrival to ICU around 915 to be able to obtain CTA  Upon transfer to the ICU, patient's oxygen was stable on nonrebreather  Symptoms remained the same in the ICU as they were on the floor  Neurology AP present at bedside in ICU initially  I was later notified by the ICU that central line was successfully placed at 953 am  CXR ordered to ensure adequate placement prior to CTA  Patient was transported to CT/CTA at 1010 am  CTA results were immediately reviewed with attending neurologist and then discussed with Dr Paul Hilario via phone  Per review of patient's family medicine notes, the patient is unable to ambulate on his own  He uses a wheelchair at baseline  There is no clear documentation of the patient's baseline     Per nurse's discussion with the patient's caregiver yesterday, he does converse at baseline but is unclear to what degree  On admission, it is reported that the patient reported phlegm in his chest to the admitting team  Will attempt to reach out to patient's caregiver later today         Inpatient consult to Neurology  Consult performed by: Angela Gutierrez PA-C  Consult ordered by: Sybil Mondragon PA-C          Review of Systems   Unable to perform ROS: Mental status change       Historical Information   Past Medical History:   Diagnosis Date    Anxiety     CAD (coronary artery disease)     last assessed 4/10/13    Chronic kidney disease     On dialysis    Chronic [facility], [facility] IV ([facility]) [facility] [address] [facility]     Concussion     last assessed 9/3/14    Depression     Diabetes mellitus ([facility] [address] )     type 2    Failure to gain weight in adult     last assessed 2/24/14    Gastroparesis     GERD (gastroesophageal reflux disease)     Hyperkalemia     last assessed 11/9/15    Hyperlipidemia     Hypertension     accelerated essential last assessed 10/7/16    Insomnia     Late effects of cerebrovascular disease     Overflow incontinence     last assessed 7/24/14    Renal disorder     Secondary hyperparathyroidism ([facility] [address] )     Stroke [facility])     left sided weakness     Past Surgical History:   Procedure Laterality Date    COLONOSCOPY      HERNIA REPAIR      MS ESOPHAGOGASTRODUODENOSCOPY TRANSORAL DIAGNOSTIC N/A 2/16/2017    Procedure: ESOPHAGOGASTRODUODENOSCOPY (EGD) with biopsy;  Surgeon: Sharan Freeman DO;  Location: [facility]; Service: Gastroenterology    MS ESOPHAGOGASTRODUODENOSCOPY TRANSORAL DIAGNOSTIC N/A 6/2/2017    Procedure: ESOPHAGOGASTRODUODENOSCOPY (EGD) with bx;  Surgeon: Sharan Freeman DO;  Location: [facility]; Service: Gastroenterology    MS OPEN RX FEMUR FX+INTRAMED SAM Left 4/26/2020    Procedure: INSERTION NAIL IM FEMUR ANTEGRADE (TROCHANTERIC); Surgeon: Jabier Cogan, MD;  Location: [facility];  Service: Orthopedics    TYMPANOSTOMY TUBE PLACEMENT       Social History   Social History     Substance and Sexual Activity   Alcohol Use Not Currently    Comment: social     Social History     Substance and Sexual Activity   Drug Use Yes    Types: Marijuana    Comment: smokes once keven while for pain 1 xper  week     E-Cigarette/Vaping    E-Cigarette Use Never User      E-Cigarette/Vaping Substances    Nicotine No     THC No     CBD No     Flavoring No     Other No     Unknown No      Social History     Tobacco Use   Smoking Status Never Smoker   Smokeless Tobacco Never Used     Family History:   Family History   Problem Relation Age of Onset    Cancer Family         pt and friend denies this hx    Hypertension Family         essential    Hyperlipidemia Family     Diabetes Mother     No Known Problems Father        Review of previous medical records was completed       Meds/Allergies   all current active meds have been reviewed, current meds:   Current Facility-Administered Medications   Medication Dose Route Frequency    acetaminophen (TYLENOL) tablet 650 mg  650 mg Oral Q6H PRN    aluminum-magnesium hydroxide-simethicone (MYLANTA) oral suspension 30 mL  30 mL Oral Q6H PRN    aspirin chewable tablet 81 mg  81 mg Oral Daily    atorvastatin (LIPITOR) tablet 20 mg  20 mg Oral Daily With Dinner    buPROPion [facility]) tablet 100 mg  100 mg Oral BID    docusate sodium (COLACE) capsule 100 mg  100 mg Oral BID    heparin (porcine) subcutaneous injection 5,000 Units  5,000 Units Subcutaneous Q8H [facility]    insulin glargine (LANTUS) subcutaneous injection 10 Units 0 1 mL  10 Units Subcutaneous HS    insulin lispro (HumaLOG) 100 units/mL subcutaneous injection 1-5 Units  1-5 Units Subcutaneous Q6H    lubiprostone (AMITIZA) capsule 8 mcg  8 mcg Oral BID With Meals    midodrine (PROAMATINE) tablet 10 mg  10 mg Oral TID    ondansetron (ZOFRAN) injection 4 mg  4 mg Intravenous Q6H PRN    pantoprazole (PROTONIX) EC tablet 40 mg  40 mg Oral BID AC    piperacillin-tazobactam (ZOSYN) 2 25 g in sodium chloride 0 9 % 50 mL IVPB  2 25 g Intravenous Q8H    saccharomyces boulardii (FLORASTOR) capsule 250 mg  250 mg Oral BID    sucralfate (CARAFATE) tablet 1 g  1 g Oral 4x Daily (AC & HS)    zolpidem (AMBIEN) tablet 10 mg  10 mg Oral HS    and PTA meds:   Prior to Admission Medications   Prescriptions Last Dose Informant Patient Reported? Taking? ARIPiprazole (ABILIFY) 2 mg tablet 2/6/2021 at Unknown time  No Yes   Sig: Take 2 tablets (4 mg total) by mouth daily   Patient taking differently: Take 4 mg by mouth 2 (two) times a day    FREESTYLE LITE test strip Not Taking at Unknown time Self Yes No   Sig: TWICE A DAY   Fosrenol 1000 MG PACK 2/6/2021 at Unknown time Self Yes Yes   Sig: MIX 1 PACKET WITH SMALL AMOUNT OF APPLESAUCE OR SIMILAR FOOD   EAT IMMEDIATELY 3 TIMES/DAY WITH MEALS   INSULIN ASPART FLEXPEN SC 2/6/2021 at Unknown time  Yes Yes   Sig: Inject 2 Units under the skin every 6 (six) hours as needed (When blood glucose >250)   Insulin Glargine (TOUJEO SOLOSTAR) 300 UNIT/ML SOPN 2/5/2021 at Unknown time Self Yes Yes   Sig: Inject under the skin daily at bedtime 6 units Monday, Wednesday & Friday if BGL >100; 10 units Tuesday, Thursday, Saturday & Sunday if BGL >100   Insulin Pen Needle (BD Pen Needle Ursula 2nd Gen) 32G X 4 MM MISC Not Taking at Unknown time Self Yes No   Sig: BD Ultra-Fine Ursula Pen Needle 32 gauge x 5/32"   Lancets (FREESTYLE) lancets Not Taking at Unknown time Self Yes No   Sig: TWICE A DAY   aspirin 81 mg chewable tablet 2/6/2021 at Unknown time Self No Yes   Sig: Chew 1 tablet (81 mg total) daily   atorvastatin (LIPITOR) 20 mg tablet 2/5/2021 at Unknown time Self Yes Yes   Sig: TAKE 1 TABLET BY MOUTH EVERY DAY WITH DINNER   buPROPion (WELLBUTRIN) 100 mg tablet 2/6/2021 at Unknown time  No Yes   Sig: Take 1 tablet (100 mg total) by mouth 2 (two) times a day   glucose monitoring kit (FREESTYLE) monitoring kit Not Taking at Unknown time Self Yes No   Sig: Use to check blood sugar  E11 21   lidocaine-prilocaine (EMLA) cream 2/5/2021 at Unknown time Self Yes Yes   Sig: APPLY SMALL AMOUNT TO ACCESS SITE (AVF) 30 MINUTES BEFORE DIALYSIS   COVER WITH OCCLUSIVE DRESSING (SARAN WRAP)   linaCLOtide (Linzess) 72 MCG CAPS 2/6/2021 at Unknown time  No Yes   Sig: Take 1 capsule by mouth daily   midodrine (PROAMATINE) 5 mg tablet 2/6/2021 at Unknown time Self Yes Yes   Sig: Take 10 mg by mouth 3 (three) times a day    pantoprazole (PROTONIX) 40 mg tablet 2/6/2021 at Unknown time  No Yes   Sig: Take 1 tablet (40 mg total) by mouth 2 (two) times a day before meals   sitaGLIPtin (JANUVIA) 25 mg tablet 2/6/2021 at Unknown time Self Yes Yes   Sig: Take 25 mg by mouth daily   sodium polystyrene sulfonate (KAYEXALATE) 15 g/60 mL suspension   Yes Yes   Sig: Take 30 g by mouth once For emergency use - Only to be taken as directed by dialysis staff   sucralfate (CARAFATE) 1 g/10 mL suspension 2/6/2021 at Unknown time  No Yes   Sig: Take 10 mL (1 g total) by mouth 4 (four) times a day   Patient taking differently: Take 1 g by mouth 2 (two) times a day    triamcinolone (KENALOG) 0 025 % ointment 2/6/2021 at Unknown time  No Yes   Sig: Apply topically 2 (two) times a day   zolpidem (AMBIEN) 10 mg tablet 2/5/2021 at Unknown time Self No Yes   Sig: TAKE 1 TABLET BY MOUTH DAILY AT BEDTIME AS NEEDED FOR SLEEP   Patient taking differently: Take 10 mg by mouth daily at bedtime       Facility-Administered Medications: None       No Known Allergies    Objective   Vitals:Blood pressure 95/63, pulse 99, temperature (!) 97 2 °F (36 2 °C), temperature source Temporal, resp  rate 22, height 5' 7" (1 702 m), weight 56 6 kg (124 lb 12 5 oz), SpO2 100 %  ,Body mass index is 19 54 kg/m²  Intake/Output Summary (Last 24 hours) at 2/8/2021 1132  Last data filed at 2/8/2021 1001  Gross per 24 hour   Intake 30 ml   Output --   Net 30 ml       Invasive Devices: Invasive Devices     Central Venous Catheter Line            CVC Central Lines 02/08/21 Triple 20cm less than 1 day          Hemodialysis Catheter            HD Permanent Double Catheter -- days          Drain            NG/OG/Enteral Tube Nasogastric 18 Fr Left nares less than 1 day                Physical Exam  Vitals signs and nursing note reviewed  Constitutional:       Appearance: He is ill-appearing  He is not diaphoretic  Comments: Eyes open, but unresponsive to verbal stimuli  Groans to noxious stimuli  HENT:      Head: Normocephalic and atraumatic  Right Ear: External ear normal       Left Ear: External ear normal       Nose: Nose normal       Mouth/Throat:      Mouth: Mucous membranes are dry  Eyes:      General: No scleral icterus  Right eye: No discharge  Left eye: No discharge  Conjunctiva/sclera: Conjunctivae normal       Comments: Fixed rightward gaze  Cardiovascular:      Rate and Rhythm: Tachycardia present  Pulmonary:      Effort: Respiratory distress present  Comments: Oxygen desaturated to 77% on initial evaluation in [place] [address] room  Improved to 93% after nonrebreather applied     Musculoskeletal:         General: No tenderness or deformity  Right lower leg: No edema  Left lower leg: No edema  Comments: Cannot assess ROM  Increased tone in B/L UE  Skin:     General: Skin is cool and dry  Coloration: Skin is not jaundiced or pale  Findings: No rash  Neurological:      Cranial Nerves: Cranial nerve deficit present  Coordination: Finger-nose-finger test: Unable to assess  Comments: Neurologic exam is detailed below  Psychiatric:      Comments: Cannot assess due to mental status change  Neurologic Exam     Mental Status   Eyes open on exam  Does not follow commands or answer orientation questions  No verbal output  Does groan and grimace to noxious stimuli, but continues with fixed right gaze  Cranial Nerves     CN II   Visual acuity: (Inconsistent blink to threat on R, does not blink to threat on L )    CN III, IV, VI   Right pupil: Size: 4 mm  Reactivity: sluggish  Left pupil: Size: 4 mm  Reactivity: sluggish  Conjugate gaze absent: Conjugate but fixed to the R     CN VII   Left facial weakness: Left facial asymmetry  When VOR is attempted, slight rotary nystagmus is noted in bilateral eyes  Remainder of CN testing unable to be performed  Motor Exam Localizes with RUE  Attempts to withdrawal with LUE    Dorsiflexion noted with noxious stimuli applied to bilateral feet, symmetric  Increased tone of bilateral upper extremities  No spontaneous movements of LUE or B/L lower extremities  See below regarding abnormal movements involving RUE  Sensory Exam   Groans to noxious stimuli x4, but only truly withdrawal with RUE  See more details above  Gait, Coordination, and Reflexes     Gait  Gait: (Deferred)    Coordination   Finger-nose-finger test: Unable to assess  Bilateral toes mute  Other reflexes deferred at time of stroke alert       On initial evaluation when neurology first alerted about patient, subtle jaw movements were noted- similar to chewing motion  Occurred frequently  No longer noted when patient was transferred to ICU, but unclear when this movement ceased  Also noted low amplitude jerking movements of RUE  More frequent prior to ICU transfer  Infrequent when evaluated in ICU prior to CT, but still intermittently noted after CT  Increases with stimulus  NIHSS:  1a Level of Consciousness: 2 = Not alert, requires repeated stimulation to attend   1b  LOC Questions: 2 = Answers neither correctly   1c  LOC Commands: 2 = Obeys neither correctly   2  Best Gaze: 2 = Forced Deviation   3  Visual: 3 = Bilateral hemianopia   4  Facial Palsy: 2=Partial paralysis (total or near total paralysis of the lower face)   5a  Motor Right Arm: 3=No effort against gravity, limb falls   5b  Motor Left Arm: 3=No effort against gravity, limb falls   6a  Motor Right Leg: 3=No effort against gravity, limb falls   6b  Motor Left Leg: 3=No effort against gravity, limb falls   7  Limb Ataxia:  UN = Untestable (amputation, fused joint)   8  Sensory: 1=Mild to moderate sensory loss; patient feels pinprick is less sharp or is dull on the affected side; there is a loss of superficial pain with pinprick but patient is aware He is being touched   9  Best Language:  3=Mute, global aphasia; no usable speech or auditory comprehension   10  Dysarthria: 2=Severe; patient speech is so slurred as to be unintelligible in the absence of or our of proportion to any dysphagia, or is mute/anarthric   11  Extinction and Inattention (formerly Neglect): 0=No abnormality   Total Score: 31     Time NIHSS was completed: 0844    Modified Charles Score:  4 (Moderately severe disability; unable to walk and attend to bodily needs without assistance) (Score suspected per review of notes  Unable to discuss with patient at this time due to change in mental status)    Lab Results: I have personally reviewed pertinent reports      Imaging Studies: I have personally reviewed pertinent reports   , I have personally reviewed pertinent films in PACS and I have personally reviewed pertinent films in PACS with a Radiologist  (CT, CTA, CT Chest)  EKG, Pathology, and Other Studies: I have personally reviewed pertinent reports  VTE Prophylaxis: Sequential compression device (Venodyne)  and Heparin    Code Status: Level 1 - Full Code    Counseling / Coordination of Care  Total Critical Care time spent 80 minutes excluding procedures, teaching and family updates

## 2021-02-09 PROBLEM — G93.1 ANOXIC BRAIN INJURY (HCC): Status: ACTIVE | Noted: 2021-01-01

## 2021-02-09 NOTE — ASSESSMENT & PLAN NOTE
· Patient with history of CVA with left sided deficits  Unclear of severity of deficits  Per caregiver patient cannot walk or feed himself, but does follow commands    · Imaging reveals anoxic injury  · Will continue to monitor his neuro status closely  · Overall prognosis is poor given his co-morbid conditions and this most recent insult  · Need to have a discussion with his family regarding goals of gaol moving forward

## 2021-02-09 NOTE — ASSESSMENT & PLAN NOTE
· POA with tachycardia and leukocytosis  · 2/2 blood cultures positive for gram neg rods, started on zosyn D3  · 2/6 CXR on admission originally without acute findings  Repeat CXR 2/8 showing right upper lung zone opacity concerning for PNA  · Procalcitonin remains elevated > 244  · Continue to trend temps and WBC

## 2021-02-09 NOTE — ASSESSMENT & PLAN NOTE
· Patient became hypoxic on 2/8 with O2 sats in 70s  Placed on nonrebreather with improved O2 sats  Patient currently on 2L NC   · CXR 2/8 concerning for possible PNA  · Monitor O2 sats    · Continue antibiotics

## 2021-02-09 NOTE — PLAN OF CARE
Problem: Potential for Falls  Goal: Patient will remain free of falls  Description: INTERVENTIONS:  - Assess patient frequently for physical needs  -  Identify cognitive and physical deficits and behaviors that affect risk of falls    -  [place] fall precautions as indicated by assessment   - Educate patient/family on patient safety including physical limitations  - Instruct patient to call for assistance with activity based on assessment  - Modify environment to reduce risk of injury  - Consider OT/PT consult to assist with strengthening/mobility  Outcome: Progressing     Problem: PAIN - ADULT  Goal: Verbalizes/displays adequate comfort level or baseline comfort level  Description: Interventions:  - Encourage patient to monitor pain and request assistance  - Assess pain using appropriate pain scale  - Administer analgesics based on type and severity of pain and evaluate response  - Implement non-pharmacological measures as appropriate and evaluate response  - Consider cultural and social influences on pain and pain management  - Notify physician/advanced practitioner if interventions unsuccessful or patient reports new pain  Outcome: Progressing     Problem: INFECTION - ADULT  Goal: Absence or prevention of progression during hospitalization  Description: INTERVENTIONS:  - Assess and monitor for signs and symptoms of infection  - Monitor lab/diagnostic results  - Monitor all insertion sites, i e  indwelling lines, tubes, and drains  - Monitor endotracheal if appropriate and nasal secretions for changes in amount and color  - [place] appropriate cooling/warming therapies per order  - Administer medications as ordered  - Instruct and encourage patient and family to use good hand hygiene technique  - Identify and instruct in appropriate isolation precautions for identified infection/condition  Outcome: Progressing  Goal: Absence of fever/infection during neutropenic period  Description: INTERVENTIONS:  - Monitor WBC    Outcome: Progressing     Problem: SAFETY ADULT  Goal: Patient will remain free of falls  Description: INTERVENTIONS:  - Assess patient frequently for physical needs  -  Identify cognitive and physical deficits and behaviors that affect risk of falls    -  [place] fall precautions as indicated by assessment   - Educate patient/family on patient safety including physical limitations  - Instruct patient to call for assistance with activity based on assessment  - Modify environment to reduce risk of injury  - Consider OT/PT consult to assist with strengthening/mobility  Outcome: Progressing  Goal: Maintain or return to baseline ADL function  Description: INTERVENTIONS:  -  Assess patient's ability to carry out ADLs; assess patient's baseline for ADL function and identify physical deficits which impact ability to perform ADLs (bathing, care of mouth/teeth, toileting, grooming, dressing, etc )  - Assess/evaluate cause of self-care deficits   - Assess range of motion  - Assess patient's mobility; develop plan if impaired  - Assess patient's need for assistive devices and provide as appropriate  - Encourage maximum independence but intervene and supervise when necessary  - Involve family in performance of ADLs  - Assess for home care needs following discharge   - Consider OT consult to assist with ADL evaluation and planning for discharge  - Provide patient education as appropriate  Outcome: Progressing  Goal: Maintain or return mobility status to optimal level  Description: INTERVENTIONS:  - Assess patient's baseline mobility status (ambulation, transfers, stairs, etc )    - Identify cognitive and physical deficits and behaviors that affect mobility  - Identify mobility aids required to assist with transfers and/or ambulation (gait belt, sit-to-stand, lift, walker, cane, etc )  - [place] fall precautions as indicated by assessment  - Record patient progress and toleration of activity level on Mobility SBAR; progress patient to next Phase/Stage  - Instruct patient to call for assistance with activity based on assessment  - Consider rehabilitation consult to assist with strengthening/weightbearing, etc   Outcome: Progressing     Problem: DISCHARGE PLANNING  Goal: Discharge to home or other facility with appropriate resources  Description: INTERVENTIONS:  - Identify barriers to discharge w/patient and caregiver  - Arrange for needed discharge resources and transportation as appropriate  - Identify discharge learning needs (meds, wound care, etc )  - Arrange for interpretive services to assist at discharge as needed  - Refer to Case Management Department for coordinating discharge planning if the patient needs post-hospital services based on physician/advanced practitioner order or complex needs related to functional status, cognitive ability, or social support system  Outcome: Progressing     Problem: Knowledge Deficit  Goal: Patient/family/caregiver demonstrates understanding of disease process, treatment plan, medications, and discharge instructions  Description: Complete learning assessment and assess knowledge base    Interventions:  - Provide teaching at level of understanding  - Provide teaching via preferred learning methods  Outcome: Progressing     Problem: Prexisting or High Potential for Compromised Skin Integrity  Goal: Skin integrity is maintained or improved  Description: INTERVENTIONS:  - Identify patients at risk for skin breakdown  - Assess and monitor skin integrity  - Assess and monitor nutrition and hydration status  - Monitor labs   - Assess for incontinence   - Turn and reposition patient  - Assist with mobility/ambulation  - Relieve pressure over bony prominences  - Avoid friction and shearing  - Provide appropriate hygiene as needed including keeping skin clean and dry  - Evaluate need for skin moisturizer/barrier cream  - Collaborate with interdisciplinary team   - Patient/family teaching  - Consider wound care consult   Outcome: Progressing     Problem: METABOLIC, FLUID AND ELECTROLYTES - ADULT  Goal: Electrolytes maintained within normal limits  Description: INTERVENTIONS:  - Monitor labs and assess patient for signs and symptoms of electrolyte imbalances  - Administer electrolyte replacement as ordered  - Monitor response to electrolyte replacements, including repeat lab results as appropriate  - Instruct patient on fluid and nutrition as appropriate  Outcome: Progressing  Goal: Fluid balance maintained  Description: INTERVENTIONS:  - Monitor labs   - Monitor I/O and WT  - Instruct patient on fluid and nutrition as appropriate  - Assess for signs & symptoms of volume excess or deficit  Outcome: Progressing     Problem: Neurological Deficit  Goal: Neurological status is stable or improving  Description: Interventions:  - Monitor and assess patient's level of consciousness, motor function, sensory function, and level of assistance needed for ADLs  - Monitor and report changes from baseline  Collaborate with interdisciplinary team to initiate plan and implement interventions as ordered  - Provide and maintain a safe environment  - Consider seizure precautions  - Consider fall precautions  - Consider aspiration precautions  - Consider bleeding precautions  Outcome: Progressing     Problem: Activity Intolerance/Impaired Mobility  Goal: Mobility/activity is maintained at optimum level for patient  Description: Interventions:  - Assess and monitor patient  barriers to mobility and need for assistive/adaptive devices  - Assess patient's emotional response to limitations  - Collaborate with interdisciplinary team and initiate plans and interventions as ordered  - Encourage independent activity per ability   - Maintain proper body alignment  - Perform active/passive rom as tolerated/ordered    - Plan activities to conserve energy   - Turn patient as appropriate  Outcome: Progressing     Problem: Communication Impairment  Goal: Ability to express needs and understand communication  Description: Assess patient's communication skills and ability to understand information  Patient will demonstrate use of effective communication techniques, alternative methods of communication and understanding even if not able to speak  - Encourage communication and provide alternate methods of communication as needed  - Collaborate with case management/ for discharge needs  - Include patient/family/caregiver in decisions related to communication  Outcome: Progressing     Problem: Potential for Aspiration  Goal: Non-ventilated patient's risk of aspiration is minimized  Description: Assess and monitor vital signs, respiratory status, and labs (WBC)  Monitor for signs of aspiration (tachypnea, cough, rales, wheezing, cyanosis, fever)  - Assess and monitor patient's ability to swallow  - Place patient up in chair to eat if possible  - HOB up at 90 degrees to eat if unable to get patient up into chair   - Supervise patient during oral intake  - Instruct patient/ family to take small bites  - Instruct patient/ family to take small single sips when taking liquids  - Follow patient-specific strategies generated by speech pathologist   Outcome: Progressing  Goal: Ventilated patient's risk of aspiration is minimized  Description: Assess and monitor vital signs, respiratory status, airway cuff pressure, and labs (WBC)  Monitor for signs of aspiration (tachypnea, cough, rales, wheezing, cyanosis, fever)  - Elevate head of bed 30 degrees if patient has tube feeding   - Monitor tube feeding  Outcome: Progressing     Problem: Nutrition  Goal: Nutrition/Hydration status is improving  Description: Monitor and assess patient's nutrition/hydration status for malnutrition (ex- brittle hair, bruises, dry skin, pale skin and conjunctiva, muscle wasting, smooth red tongue, and disorientation)   Collaborate with interdisciplinary team and initiate plan and interventions as ordered  Monitor patient's weight and dietary intake as ordered or per policy  Utilize nutrition screening tool and intervene per policy  Determine patient's food preferences and provide high-protein, high-caloric foods as appropriate  - Assist patient with eating   - Allow adequate time for meals   - Encourage patient to take dietary supplement as ordered  - Collaborate with clinical nutritionist   - Include patient/family/caregiver in decisions related to nutrition    Outcome: Progressing

## 2021-02-09 NOTE — OCCUPATIONAL THERAPY NOTE
Occupational Therapy Cancellation Note        Patient Name: Ke Ziegler  YWZYR'F Date: 2/9/2021      OT consult received  Pt is unresponsive and intubated and not appropriate for OT services at this time  Will continue to follow as appropriate      Kisha Mims MS, OTR/L

## 2021-02-09 NOTE — PHYSICAL THERAPY NOTE
Physical Therapy Cancellation Note- consult received, pt then transferred to ICU and is unresponsive and intubated, not appropriate for skilled PT  Pt noted to have poor prognosis , will follow for now    Jackelin Vo, PT

## 2021-02-09 NOTE — PROGRESS NOTES
Follow up Consultation    Nephrology   rFed Maurer 64 y o  male MRN: [id number]  Unit/Bed#: ICU 02 Encounter: [id number]      Physician Requesting Consult: Daryle Mose, MD        ASSESSMENT/PLAN:  72-year-old male history of ESRD (MWF) , CAD, diabetes and GERD initially admitted with concerns for nausea vomiting and fevers  Nephrology following for dialysis management   ESRD:   - gets usual dialysis Monday Wednesday Friday at [facility]  - access:  Right arm AV fistula  - estimated dry weight:  58 kilos  - last dialysis treatment on 02/08/2021  - next dialysis treatment on 02/10/2021 if family wishes to continue care, overall poor prognosis at this time  - check BMP, phosphorus  - place on renal diet when allowed diet order  - avoid nephrotoxins, adjust meds to appropriate GFR     H&H/anemia:  - most recent hemoglobin at 9 7 grams/deciliter  - maintain hemoglobin 10-12 grams/deciliter  - Currently on:    None  - Recommendations:  Hold NOLA due to concern for CVA     Acid-base electrolytes:  o Hyperphosphatemia:  Most recent phosphorus at 5 1  o Sodium, phosphorus, potassium, acidosis to be corrected with dialysis     Blood pressure:   o Current medications:  Midodrine 10 mg p o  T i d  Levophed  o Recommendations:  No changes for now     Bone mineral disease/secondary hyperparathyroidism of renal origin:   o Currently on:  No meds  o Recommendations:  No changes for now  o Follow-up intact PTH as an outpatient     Other medical problems:  o Diabetes:  Management primary team, on insulin  o Concern for new CVA:  Management per primary team, follow-up with neurology  Status post CTA yesterday suggestive of mild sulcal effacement concerning for diffuse anoxic injury  Recommend goal clarification with primary team  o Sepsis:  Blood cultures 2/6 Gram-negative rods    Management primary team   CT suggestive of bilateral right greater than left lung infiltrates, on Zosyn        Thanks for the consult  Will continue to follow  Please call with questions  Above-mentioned orders and Plan in terms of dialysis was discussed with the team in [address] MD, FASN, 2021, 10:33 AM        Objective :  Patient seen and examined in the ICU earlier this a m  Status post hemodialysis yesterday read even remains intubated not responding to painful or verbal stimuli not on any sedation  PHYSICAL EXAM  BP (!) 102/49   Pulse 90   Temp 98 2 °F (36 8 °C) (Temporal)   Resp 16   Ht 5' 7" (1 702 m)   Wt 55 7 kg (122 lb 12 7 oz)   SpO2 100%   BMI 19 23 kg/m²   Temp (24hrs), Av 8 °F (36 6 °C), Min:97 2 °F (36 2 °C), Max:98 2 °F (36 8 °C)        Intake/Output Summary (Last 24 hours) at 2021 1033  Last data filed at 2021 1001  Gross per 24 hour   Intake 2425 96 ml   Output 681 ml   Net 1744 96 ml       I/O last 24 hours: In: 0755 [I V :1256; NG/GT:200; IV Piggyback:1000]  Out: 681 [Other:681]      Current Weight: Weight - Scale: 55 7 kg (122 lb 12 7 oz)  First Weight: Weight - Scale: 59 2 kg (130 lb 8 2 oz)  Physical Exam  Vitals signs and nursing note reviewed  Constitutional:       General: He is not in acute distress  Appearance: Normal appearance  He is ill-appearing  He is not toxic-appearing or diaphoretic  HENT:      Head: Normocephalic and atraumatic  Mouth/Throat:      Comments: Intubated  Neck:      Musculoskeletal: Neck supple  Cardiovascular:      Rate and Rhythm: Normal rate  Heart sounds: Normal heart sounds  Pulmonary:      Effort: No respiratory distress  Breath sounds: Normal breath sounds  No wheezing  Abdominal:      Palpations: There is no mass  Musculoskeletal:         General: No swelling  Comments: Right arm AV fistula with a bruit   Skin:     General: Skin is warm               Review of Systems   Unable to perform ROS: Intubated       Scheduled Meds:  Current Facility-Administered Medications   Medication Dose Route Frequency Provider Last Rate    acetaminophen  650 mg Oral Q6H PRN Radha Host, CRNP      aluminum-magnesium hydroxide-simethicone  30 mL Oral Q6H PRN Radha Host, CRNP      aspirin  81 mg Oral Daily Radha Host, CRNP      atorvastatin  20 mg Oral Daily [employer], CRNP      buPROPion  100 mg Oral BID Radha Host, CRNP      heparin (porcine)  5,000 Units Subcutaneous Q8H [facility] Radha Host, [address]      insulin glargine  10 Units Subcutaneous HS Radha Host, CRNP      insulin lispro  1-5 Units Subcutaneous Q6H Radha Host, CRNP      lubiprostone  8 mcg Oral BID With Meals Radha Host, CRCARROLL      midodrine  10 mg Oral TID Radha Matos, CHERELLE      norepinephrine  1-30 mcg/min Intravenous Titrated Radha Host, BRETNP 14 mcg/min (02/09/21 0953)    ondansetron  4 mg Intravenous Q6H PRN Radha Matos, CRNP      pantoprazole  40 mg Oral BID [facility], CHERELLE      piperacillin-tazobactam  2 25 g Intravenous Q8H Radha Host, CRNP 2 25 g (02/09/21 0930)    saccharomyces boulardii  250 mg Oral BID Radha Host, CHERELLE      sucralfate  1 g Oral 4x Daily (AC & HS) Radha Matos, CHERELLE      vasopressin (PITRESSIN) in 0 9 % sodium chloride 100 mL  0 04 Units/min Intravenous Continuous Radha Matos, CHERELLE Stopped (02/08/21 1743)    zolpidem  10 mg Oral HS Radha Matos, CHERELLE         PRN Meds:   acetaminophen    aluminum-magnesium hydroxide-simethicone    ondansetron    Continuous Infusions:norepinephrine, 1-30 mcg/min, Last Rate: 14 mcg/min (02/09/21 0953)  vasopressin (PITRESSIN) in 0 9 % sodium chloride 100 mL, 0 04 Units/min, Last Rate: Stopped (02/08/21 1743)          Invasive Devices:    Invasive Devices     Central Venous Catheter Line            CVC Central Lines 02/08/21 Triple 20cm 1 day          Line            Hemodialysis AV Fistula Right -- days          Hemodialysis Catheter            HD Permanent Double Catheter -- days          Drain            NG/OG/Enteral Tube Nasogastric 18 Fr Left nares 1 day          Airway            ETT  Cuffed 8 mm less than 1 day                  LABORATORY:    Results from last 7 days   Lab Units 02/09/21  0430 02/08/21  1036 02/08/21  0801 02/08/21  0748 02/07/21  1301 02/07/21  1300 02/06/21  1144 02/04/21  1306   WBC Thousand/uL 15 09*  --   --  25 15*  --  22 83* 15 63*  --    HEMOGLOBIN g/dL 9 7*  --   --  12 5  --  11 9* 9 0*  --    HEMATOCRIT % 29 3*  --   --  39 2  --  36 5 36 5  --    PLATELETS Thousands/uL 100*  --   --  71*  --  61* 72*  --    POTASSIUM mmol/L 4 1 3 5  --  3 7 4 0  --  3 8 3 7   CHLORIDE mmol/L 100 99*  --  98* 96*  --  90* 95*   CO2 mmol/L 23 26  --  23 24  --  29 29   BUN mg/dL 67* 111*  --  106* 81*  --  62* 51*   CREATININE mg/dL 6 16* 9 01*  --  9 35* 7 73*  --  6 46* 6 77*   CALCIUM mg/dL 9 1 9 2  --  9 4 9 8  --  9 1 9 5   MAGNESIUM mg/dL 1 9  --  2 5  --   --   --  2 3  --    PHOSPHORUS mg/dL 2 9  --  5 1*  --   --   --  4 9*  --       rest all reviewed    RADIOLOGY:  XR chest portable   Final Result by Randy Steinberg MD (02/08 1957)      1  Endotracheal tube directed down left mainstem bronchus  This was communicated to [facility] on 2/8/2021 at 5:40 PM by Dr Kandis Schroeder  2   No significant change in multifocal airspace opacities  Workstation performed: [id number]         CTA head and neck w wo contrast   Final Result by Kwame Santos MD (02/08 1751)   Diffusely diminished gray-white differentiation with mild sulcal effacement concerning for diffuse anoxic injury  No focal stenosis or large vessel occlusion  ET tube in the left mainstem bronchus         I personally discussed this study with [facility] on 2/8/2021 at 5:40 PM                      Workstation performed: [id number]         XR chest portable ICU   Final Result by Gabino Kaiser MD (02/08 1558)   Interval placement of typical endotracheal tube and right IJ CVP line without complication   Stable appearance nasogastric tube   Persistent diffuse bilateral pulmonary infiltrates               Workstation performed: [id number]         CTA stroke alert (head/neck)   Final Result by Kathi Moore MD (02/08 1049)      No large vessel occlusive disease despite diffuse atherosclerotic changes  Bilateral, right greater than left lung infiltrates  Small right pleural effusion            Findings were directly discussed with ORAL MCGOWAN on 2/8/2021 10:44AM                      Workstation performed: [id number]         CT stroke alert brain   Final Result by Kathi Moore MD (02/08 1050)      No acute intracranial abnormality  Markedly precocious volume loss chronic small vessel disease  Findings were directly discussed with Sandro Sage on 2/8/2021 10:44AM       Workstation performed: [id number]         XR chest portable   Final Result by Alex Ca MD (02/08 1038)      On the final radiograph, the tip of the left internal jugular central venous catheter projects at the superior cavoatrial junction  No evidence of a pneumothorax  On the final radiograph, the tip of the enteric tube projects at the stomach  Peripheral right upper lung zone opacity, new since 2/6/2021; nonspecific, concerning for pneumonia in the appropriate clinical setting  Workstation performed: [id number]         XR chest portable   Final Result by Alex Ca MD (02/08 1038)      On the final radiograph, the tip of the left internal jugular central venous catheter projects at the superior cavoatrial junction  No evidence of a pneumothorax  On the final radiograph, the tip of the enteric tube projects at the stomach  Peripheral right upper lung zone opacity, new since 2/6/2021; nonspecific, concerning for pneumonia in the appropriate clinical setting        Workstation performed: [id number]         CT head wo contrast   Final Result by Kisha Del Rosario MD (02/08 0725)      No acute intracranial abnormality  Microangiopathic changes  Workstation performed: [id number]         XR chest 1 view portable   Final Result by Kandi Reilly DO (02/06 2052)      No acute cardiopulmonary disease  Workstation performed: [id number]           Rest all reviewed    Portions of the record may have been created with voice recognition software  Occasional wrong word or "sound a like" substitutions may have occurred due to the inherent limitations of voice recognition software  Read the chart carefully and recognize, using context, where substitutions have occurred  If you have any questions, please contact the dictating provider

## 2021-02-09 NOTE — TELEPHONE ENCOUNTER
SIGNATURES NEEDED FOR [employer] FORM RECEIVED VIA FAX  WILL BE PLACED IN YOUR BIN AT ASSIGNED DELIVERY TIMES        CR [id number]  [facility] Supplies

## 2021-02-09 NOTE — PLAN OF CARE
Problem: Potential for Falls  Goal: Patient will remain free of falls  Description: INTERVENTIONS:  - Assess patient frequently for physical needs  -  Identify cognitive and physical deficits and behaviors that affect risk of falls    -  [place] fall precautions as indicated by assessment   - Educate patient/family on patient safety including physical limitations  - Instruct patient to call for assistance with activity based on assessment  - Modify environment to reduce risk of injury  - Consider OT/PT consult to assist with strengthening/mobility  Outcome: Progressing     Problem: PAIN - ADULT  Goal: Verbalizes/displays adequate comfort level or baseline comfort level  Description: Interventions:  - Encourage patient to monitor pain and request assistance  - Assess pain using appropriate pain scale  - Administer analgesics based on type and severity of pain and evaluate response  - Implement non-pharmacological measures as appropriate and evaluate response  - Consider cultural and social influences on pain and pain management  - Notify physician/advanced practitioner if interventions unsuccessful or patient reports new pain  Outcome: Progressing     Problem: INFECTION - ADULT  Goal: Absence or prevention of progression during hospitalization  Description: INTERVENTIONS:  - Assess and monitor for signs and symptoms of infection  - Monitor lab/diagnostic results  - Monitor all insertion sites, i e  indwelling lines, tubes, and drains  - Monitor endotracheal if appropriate and nasal secretions for changes in amount and color  - [place] appropriate cooling/warming therapies per order  - Administer medications as ordered  - Instruct and encourage patient and family to use good hand hygiene technique  - Identify and instruct in appropriate isolation precautions for identified infection/condition  Outcome: Progressing  Goal: Absence of fever/infection during neutropenic period  Description: INTERVENTIONS:  - Monitor WBC    Outcome: Progressing     Problem: SAFETY ADULT  Goal: Patient will remain free of falls  Description: INTERVENTIONS:  - Assess patient frequently for physical needs  -  Identify cognitive and physical deficits and behaviors that affect risk of falls    -  [place] fall precautions as indicated by assessment   - Educate patient/family on patient safety including physical limitations  - Instruct patient to call for assistance with activity based on assessment  - Modify environment to reduce risk of injury  - Consider OT/PT consult to assist with strengthening/mobility  Outcome: Progressing  Goal: Maintain or return to baseline ADL function  Description: INTERVENTIONS:  -  Assess patient's ability to carry out ADLs; assess patient's baseline for ADL function and identify physical deficits which impact ability to perform ADLs (bathing, care of mouth/teeth, toileting, grooming, dressing, etc )  - Assess/evaluate cause of self-care deficits   - Assess range of motion  - Assess patient's mobility; develop plan if impaired  - Assess patient's need for assistive devices and provide as appropriate  - Encourage maximum independence but intervene and supervise when necessary  - Involve family in performance of ADLs  - Assess for home care needs following discharge   - Consider OT consult to assist with ADL evaluation and planning for discharge  - Provide patient education as appropriate  Outcome: Progressing  Goal: Maintain or return mobility status to optimal level  Description: INTERVENTIONS:  - Assess patient's baseline mobility status (ambulation, transfers, stairs, etc )    - Identify cognitive and physical deficits and behaviors that affect mobility  - Identify mobility aids required to assist with transfers and/or ambulation (gait belt, sit-to-stand, lift, walker, cane, etc )  - [place] fall precautions as indicated by assessment  - Record patient progress and toleration of activity level on Mobility SBAR; progress patient to next Phase/Stage  - Instruct patient to call for assistance with activity based on assessment  - Consider rehabilitation consult to assist with strengthening/weightbearing, etc   Outcome: Progressing     Problem: DISCHARGE PLANNING  Goal: Discharge to home or other facility with appropriate resources  Description: INTERVENTIONS:  - Identify barriers to discharge w/patient and caregiver  - Arrange for needed discharge resources and transportation as appropriate  - Identify discharge learning needs (meds, wound care, etc )  - Arrange for interpretive services to assist at discharge as needed  - Refer to Case Management Department for coordinating discharge planning if the patient needs post-hospital services based on physician/advanced practitioner order or complex needs related to functional status, cognitive ability, or social support system  Outcome: Progressing     Problem: Knowledge Deficit  Goal: Patient/family/caregiver demonstrates understanding of disease process, treatment plan, medications, and discharge instructions  Description: Complete learning assessment and assess knowledge base    Interventions:  - Provide teaching at level of understanding  - Provide teaching via preferred learning methods  Outcome: Progressing     Problem: Prexisting or High Potential for Compromised Skin Integrity  Goal: Skin integrity is maintained or improved  Description: INTERVENTIONS:  - Identify patients at risk for skin breakdown  - Assess and monitor skin integrity  - Assess and monitor nutrition and hydration status  - Monitor labs   - Assess for incontinence   - Turn and reposition patient  - Assist with mobility/ambulation  - Relieve pressure over bony prominences  - Avoid friction and shearing  - Provide appropriate hygiene as needed including keeping skin clean and dry  - Evaluate need for skin moisturizer/barrier cream  - Collaborate with interdisciplinary team   - Patient/family teaching  - Consider wound care consult   Outcome: Progressing     Problem: METABOLIC, FLUID AND ELECTROLYTES - ADULT  Goal: Electrolytes maintained within normal limits  Description: INTERVENTIONS:  - Monitor labs and assess patient for signs and symptoms of electrolyte imbalances  - Administer electrolyte replacement as ordered  - Monitor response to electrolyte replacements, including repeat lab results as appropriate  - Instruct patient on fluid and nutrition as appropriate  Outcome: Progressing  Goal: Fluid balance maintained  Description: INTERVENTIONS:  - Monitor labs   - Monitor I/O and WT  - Instruct patient on fluid and nutrition as appropriate  - Assess for signs & symptoms of volume excess or deficit  Outcome: Progressing     Problem: Neurological Deficit  Goal: Neurological status is stable or improving  Description: Interventions:  - Monitor and assess patient's level of consciousness, motor function, sensory function, and level of assistance needed for ADLs  - Monitor and report changes from baseline  Collaborate with interdisciplinary team to initiate plan and implement interventions as ordered  - Provide and maintain a safe environment  - Consider seizure precautions  - Consider fall precautions  - Consider aspiration precautions  - Consider bleeding precautions  Outcome: Progressing     Problem: Activity Intolerance/Impaired Mobility  Goal: Mobility/activity is maintained at optimum level for patient  Description: Interventions:  - Assess and monitor patient  barriers to mobility and need for assistive/adaptive devices  - Assess patient's emotional response to limitations  - Collaborate with interdisciplinary team and initiate plans and interventions as ordered  - Encourage independent activity per ability   - Maintain proper body alignment  - Perform active/passive rom as tolerated/ordered    - Plan activities to conserve energy   - Turn patient as appropriate  Outcome: Progressing     Problem: Communication Impairment  Goal: Ability to express needs and understand communication  Description: Assess patient's communication skills and ability to understand information  Patient will demonstrate use of effective communication techniques, alternative methods of communication and understanding even if not able to speak  - Encourage communication and provide alternate methods of communication as needed  - Collaborate with case management/ for discharge needs  - Include patient/family/caregiver in decisions related to communication  Outcome: Progressing     Problem: Potential for Aspiration  Goal: Non-ventilated patient's risk of aspiration is minimized  Description: Assess and monitor vital signs, respiratory status, and labs (WBC)  Monitor for signs of aspiration (tachypnea, cough, rales, wheezing, cyanosis, fever)  - Assess and monitor patient's ability to swallow  - Place patient up in chair to eat if possible  - HOB up at 90 degrees to eat if unable to get patient up into chair   - Supervise patient during oral intake  - Instruct patient/ family to take small bites  - Instruct patient/ family to take small single sips when taking liquids  - Follow patient-specific strategies generated by speech pathologist   Outcome: Progressing  Goal: Ventilated patient's risk of aspiration is minimized  Description: Assess and monitor vital signs, respiratory status, airway cuff pressure, and labs (WBC)  Monitor for signs of aspiration (tachypnea, cough, rales, wheezing, cyanosis, fever)  - Elevate head of bed 30 degrees if patient has tube feeding   - Monitor tube feeding  Outcome: Progressing     Problem: Nutrition  Goal: Nutrition/Hydration status is improving  Description: Monitor and assess patient's nutrition/hydration status for malnutrition (ex- brittle hair, bruises, dry skin, pale skin and conjunctiva, muscle wasting, smooth red tongue, and disorientation)   Collaborate with interdisciplinary team and initiate plan and interventions as ordered  Monitor patient's weight and dietary intake as ordered or per policy  Utilize nutrition screening tool and intervene per policy  Determine patient's food preferences and provide high-protein, high-caloric foods as appropriate  - Assist patient with eating   - Allow adequate time for meals   - Encourage patient to take dietary supplement as ordered  - Collaborate with clinical nutritionist   - Include patient/family/caregiver in decisions related to nutrition    Outcome: Progressing

## 2021-02-09 NOTE — ASSESSMENT & PLAN NOTE
· Patient with right-sided preference on 2/8 around 0300  Patient unable to follow commands     · Imaging revealed anoxic insult on repeat study  · Will continue neuro checks  · Overall prognosis is poor

## 2021-02-09 NOTE — DEATH NOTE
Patient pronounced brain dead at 46 by Dr Karen Blanco  See attestation of Ayan Pavon note for further details

## 2021-02-09 NOTE — DISCHARGE SUMMARY
Discharge- Prince Wang 1960, 64 y o  male MRN: [id number]    Unit/Bed#: ICU 02 Encounter: [id number]    Primary Care Provider: Miguel Angel Cisneros MD   Date and time admitted to hospital: 2/6/2021 10:36 AM        * Anoxic brain injury [facility])  Assessment & Plan  · Patient with right-sided preference on 2/8 around 0300  Patient unable to follow commands  · Imaging revealed anoxic insult on repeat study  · Will continue neuro checks  · Overall prognosis is poor  · Patient pronounced brain dead at 48 - no cerebral blood flow on cerebral perfusion scan  · Patient now under the care of [facility] for organ donation    Sepsis [facility])  Assessment & Plan  · POA with tachycardia and leukocytosis  · 2/2 blood cultures positive for gram neg rods, started on zosyn D3  · 2/6 CXR on admission originally without acute findings  Repeat CXR 2/8 showing right upper lung zone opacity concerning for PNA  · Procalcitonin remains elevated > 244  · Continue to trend temps and WBC  Acute respiratory failure ([address] )  Assessment & Plan  · CXR 2/8 concerning for possible PNA  · Monitor O2 sats  · Continue antibiotics    Bacteremia  Assessment & Plan  · 2/2 Blood cultures positive with gram neg rods  Continue antibiotics  · See plan as above  ESRD (end stage renal disease) [facility])  Assessment & Plan  Lab Results   Component Value Date    EGFR 10 02/09/2021    EGFR 11 02/09/2021    EGFR 7 02/08/2021    CREATININE 6 43 (H) 02/09/2021    CREATININE 6 16 (H) 02/09/2021    CREATININE 9 01 (H) 02/08/2021     · Patient on Monday, Wednesday, Friday schedule  · Management per nephrology  · Avoid nephrotoxins and hypotension  · Maintain MAP >65, continue to monitor  · Continue midodrine 10 mg TID    History of CVA (cerebrovascular accident)  Assessment & Plan  · Patient with history of CVA with left sided deficits  Unclear of severity of deficits   Per caregiver patient cannot walk or feed himself, but does follow commands  · Imaging reveals anoxic injury  · Will continue to monitor his neuro status closely  · Overall prognosis is poor given his co-morbid conditions and this most recent insult  · Need to have a discussion with his family regarding goals of gaol moving forward    Hypotension  Assessment & Plan  · Likely chronic due to renal failure  · Continue midodrine  Type 2 diabetes mellitus, with long-term current use of insulin [facility])  Assessment & Plan  Lab Results   Component Value Date    HGBA1C 8 1 (H) 02/09/2021       Recent Labs     02/08/21  1959 02/08/21  2339 02/09/21  0554 02/09/21  1219   POCGLU 184* 192* 214* 232*       Blood Sugar Average: Last 72 hrs:  (P) 220 625     · Present on admission with BS >500  Patient placed on insulin gtt at this time however d/c'd due to hypoglycemia of 35  · Patient currently on 10 units of lantus and SSI  Will change QID checks to Q6 hours given NPO status  · Continue to monitor for blood sugar goal of 140-180  Resolved Problems  Date Reviewed: 2/8/2021          Resolved    ESRD on hemodialysis ([facility] [address] ) 2/6/2021     Resolved by  Terri Dunn DO    Hyperglycemia 2/8/2021     Resolved by  CHERELLE Robledo          Admission Date:   Admission Orders (From admission, onward)     Ordered        02/06/21 1421  Inpatient Admission  Once         02/06/21 1342  Place in Observation  Once                     Admitting Diagnosis: Leukocytosis [D72 829]  Thrombocytopenia (HCC) [D69 6]  Nausea and vomiting [R11 2]  Hyperglycemia [R73 9]  ESRD on hemodialysis ([facility] [address] ) [N18 6, Z99 2]    HPI: Per Dr Palma Emmy is a 64 y o  male who presents with nausea and vomiting at home as well as fever  The patient recently underwent upper endoscopy, he reports biopsies were taken  At home he had a fever to a T-max of a 100 3°, he then had persistent nausea and vomiting and uncontrolled blood sugars    His family members attempted to contact her endocrinologist however they were not confident in administering short-acting insulin is a felt this would bottom out his blood sugars  They subsequently did not given any additional insulin and now is blood sugars are greater than 600  In the emergency room the patient was found to have significant leukocytosis, as well as respiratory elevation  He reports that he had some phlegm in his chest however this went away over the last 12 hours  He denies any nausea at the time my examination, no diarrhea, no urinary frequency or dysuria        Procedures Performed:   Orders Placed This Encounter   Procedures   [address]    Intubation       Summary of Hospital Course: On morning of 2/8 patient with change in mental status from baseline as evidenced by new right gaze preference, right facial droop, right-sided weakness, and unable to follow commands  Pupils fixed and not reactive to light  Patient also noted to be hypoxic with O2 sats in 70s, hypotensive, and tachycardic  Patient placed on nonrebreather and IVF administered with improvement  CT/CTA negative for acute changes  MRI brain ordered, EEG and ECHO pending  CXR 2/8 concerning for possible PNA, procal pending  Stroke pathway initiated, neurology following  Encephalopathy due to worsening sepsis vs stroke/seizure? Plan to continue IV abx and await further test results and neurology follow up  Throughout the day the patient became more encephalopathic and with agonal respirations  He was emergently intubated  A repeat CTH showed anoxic brain injury  On 2/9/2021 the patient did not have any neurological function, with loss of cough, gag, pupillary response  A ct perfusion scan was performed and did not show blood flow to his brain  Clinical exam was consistent with brain death  The patient was pronounced brain dead at 48  The family elected for organ donation  At that time all care was transferred to [facility]       Significant Findings, Care, Treatment and Services Provided:   2/8 CTH: no intracranial abnormality   CTA: no large vessel occulusion  1740 CTA: loss of gray white matter concerning for anoxic brain injury   cerebral perfusion scan: no evidence blood brian to the brain      Complications:     Condition at Time of Death:     Final Diagnosis: brain death secondary to anoxic brain injury as evidenced by loss of blood flow to the brain        PCP: Prema Lay MD    Disposition:

## 2021-02-09 NOTE — PROGRESS NOTES
02/09/21 1300   Clinical Encounter Type   Visited With Family; Health care provider   Routine Visit Follow-up   Continue Visiting Yes

## 2021-02-09 NOTE — PROGRESS NOTES
02/09/21 1200   Clinical Encounter Type   Visited With Health care provider   Routine Visit Follow-up   Continue Visiting Yes

## 2021-02-09 NOTE — ASSESSMENT & PLAN NOTE
Lab Results   Component Value Date    HGBA1C 7 9 (H) 02/04/2021       Recent Labs     02/08/21  1434 02/08/21  1649 02/08/21  1749 02/08/21 1959   POCGLU 181* 169* 176* 184*       Blood Sugar Average: Last 72 hrs:  (P) 221 [id number]     · Present on admission with BS >500  Patient placed on insulin gtt at this time however d/c'd due to hypoglycemia of 35  · Patient currently on 10 units of lantus and SSI  Will change QID checks to Q6 hours given NPO status  · Continue to monitor for blood sugar goal of 140-180

## 2021-02-09 NOTE — PROGRESS NOTES
02/09/21 1200   Spiritual Beliefs/Perceptions   Support Systems Spouse/significant other;Family members   Plan of Care   Comments Talked to nursing staff about patient will continue to follow

## 2021-02-09 NOTE — QUICK NOTE
Apnea test performed at bedside  Patient pre-oxyeganted with 100% oxygen and a PEEP of 6 for 10 minutes  And ABG was drawn and is pending  The patient was removed from ventilator and placed on continuous oxygen  The patient became bradycardic to 34 and hemodynamically unstable  The patient was immediately placed back on the ventilator and an ABG was drawn

## 2021-02-09 NOTE — PROGRESS NOTES
Progress Note - [place] 1960, 64 y o  male MRN: [id number]    Unit/Bed#: ICU 02 Encounter: [id number]    Primary Care Provider: Prema Lay MD   Date and time admitted to hospital: 2/6/2021 10:36 AM        * Encephalopathy acute  Assessment & Plan  · Patient with right-sided preference on 2/8 around 0300  Patient unable to follow commands  · Imaging revealed anoxic insult on repeat study  · Will continue neuro checks  · Overall prognosis is poor    Sepsis ([facility] [address] )  Assessment & Plan  · POA with tachycardia and leukocytosis  · 2/2 blood cultures positive for gram neg rods, started on zosyn D3  · 2/6 CXR on admission originally without acute findings  Repeat CXR 2/8 showing right upper lung zone opacity concerning for PNA  · Procalcitonin remains elevated > 244  · Continue to trend temps and WBC  Acute respiratory failure ([facility] [address] )  Assessment & Plan  · Patient became hypoxic on 2/8 with O2 sats in 70s  Placed on nonrebreather with improved O2 sats  Patient currently on 2L NC   · CXR 2/8 concerning for possible PNA  · Monitor O2 sats  · Continue antibiotics    Bacteremia  Assessment & Plan  · 2/2 Blood cultures positive with gram neg rods  Continue antibiotics  · See plan as above  ESRD (end stage renal disease) [facility])  Assessment & Plan  Lab Results   Component Value Date    EGFR 7 02/08/2021    EGFR 6 02/08/2021    EGFR 8 02/07/2021    CREATININE 9 01 (H) 02/08/2021    CREATININE 9 35 (H) 02/08/2021    CREATININE 7 73 (H) 02/07/2021     · Patient on Monday, Wednesday, Friday schedule  · Management per nephrology  · Avoid nephrotoxins and hypotension  · Maintain MAP >65, continue to monitor  · Continue midodrine 10 mg TID    History of CVA (cerebrovascular accident)  Assessment & Plan  · Patient with history of CVA with left sided deficits  Unclear of severity of deficits  Per caregiver patient cannot walk or feed himself, but does follow commands    · Imaging reveals anoxic injury  · Will continue to monitor his neuro status closely  · Overall prognosis is poor given his co-morbid conditions and this most recent insult  · Need to have a discussion with his family regarding goals of gaol moving forward    Hypotension  Assessment & Plan  · Likely chronic due to renal failure  · Continue midodrine  Type 2 diabetes mellitus, with long-term current use of insulin [facility])  Assessment & Plan  Lab Results   Component Value Date    HGBA1C 7 9 (H) 2021       Recent Labs     21  1434 21  1649 21  1749 21   POCGLU 181* 169* 176* 184*       Blood Sugar Average: Last 72 hrs:  (P) 221 [id number]     · Present on admission with BS >500  Patient placed on insulin gtt at this time however d/c'd due to hypoglycemia of 35  · Patient currently on 10 units of lantus and SSI  Will change QID checks to Q6 hours given NPO status    · Continue to monitor for blood sugar goal of 140-180     ----------------------------------------------------------------------------------------  HPI/24hr events: No events overnight, unresponsive    Disposition: Continue Critical Care   Code Status: Level 1 - Full Code  ---------------------------------------------------------------------------------------  SUBJECTIVE  Unresponsive, not overbreathing the vent    Review of Systems  Review of systems was unable to be performed secondary to mental status  ---------------------------------------------------------------------------------------  OBJECTIVE    Vitals   Vitals:    21 0310 21 0350 21 0400 21 0500   BP:   115/59 (!) 111/49   Pulse:   92 94   Resp:   16 15   Temp: 97 6 °F (36 4 °C)      TempSrc: Temporal      SpO2:  100% 100% 100%   Weight:    55 7 kg (122 lb 12 7 oz)   Height:         Temp (24hrs), Av 5 °F (36 4 °C), Min:96 9 °F (36 1 °C), Max:98 2 °F (36 8 °C)  Current: Temperature: 97 6 °F (36 4 °C)          Respiratory:  SpO2 Device: O2 Device: Ventilator       Invasive/non-invasive ventilation settings   Respiratory    Lab Data (Last 4 hours)    None         O2/Vent Data (Last 4 hours)      02/09 0350           Vent Mode AC/VC       Resp Rate (BPM) (BPM) 16       Vt (mL) (mL) 450       FIO2 (%) (%) 100       PEEP (cmH2O) (cmH2O) 6       MV 7 79                   Physical Exam  Constitutional:       Appearance: He is ill-appearing  HENT:      Head: Normocephalic  Nose: Nose normal       Mouth/Throat:      Mouth: Mucous membranes are moist    Eyes:      Comments: Pupils are fixed and dilated   Neck:      Musculoskeletal: Neck supple  Cardiovascular:      Rate and Rhythm: Normal rate  Pulmonary:      Breath sounds: Rales present  Abdominal:      General: There is distension  Tenderness: There is no abdominal tenderness  Musculoskeletal:         General: No swelling  Lymphadenopathy:      Cervical: No cervical adenopathy  Skin:     General: Skin is warm and dry     Neurological:      Comments: Unresponsive to even painful stimuli, no gag, no corneal's, does not initiate breaths on the ventilator         Laboratory and Diagnostics:  Results from last 7 days   Lab Units 02/09/21  0430 02/08/21  0748 02/07/21  1300 02/06/21  1144   WBC Thousand/uL 15 09* 25 15* 22 83* 15 63*   HEMOGLOBIN g/dL 9 7* 12 5 11 9* 9 0*   HEMATOCRIT % 29 3* 39 2 36 5 36 5   PLATELETS Thousands/uL 100* 71* 61* 72*   BANDS PCT %  --  2 3  --    MONO PCT %  --  6 8  --      Results from last 7 days   Lab Units 02/08/21  1036 02/08/21  0748 02/07/21  1301 02/06/21  2022 02/06/21  1601 02/06/21  1144 02/04/21  1306   SODIUM mmol/L 142 141 138  --   --  130* 137   POTASSIUM mmol/L 3 5 3 7 4 0  --   --  3 8 3 7   CHLORIDE mmol/L 99* 98* 96*  --   --  90* 95*   CO2 mmol/L 26 23 24  --   --  29 29   ANION GAP mmol/L 17* 20* 18*  --   --  11 13   BUN mg/dL 111* 106* 81*  --   --  62* 51*   CREATININE mg/dL 9 01* 9 35* 7 73*  --   --  6 46* 6 77*   CALCIUM mg/dL 9 2 9 4 9 8  --   --  9 1 9 5   GLUCOSE RANDOM mg/dL 276* 297* 263* 47* 480* 616*  --    ALT U/L  --  11*  --   --   --  13  --    AST U/L  --  10  --   --   --  22  --    ALK PHOS U/L  --  123*  --   --   --  156*  --    ALBUMIN g/dL  --  2 0*  --   --   --  2 4*  --    TOTAL BILIRUBIN mg/dL  --  0 78  --   --   --  0 84  --      Results from last 7 days   Lab Units 02/08/21  0801 02/06/21  1144   MAGNESIUM mg/dL 2 5 2 3   PHOSPHORUS mg/dL 5 1* 4 9*      Results from last 7 days   Lab Units 02/08/21  0748   INR  1 76*   PTT seconds 55*      Results from last 7 days   Lab Units 02/08/21  1818 02/08/21  1334 02/08/21  1036 02/06/21  1247   TROPONIN I ng/mL 1 03* 1 16* 1 11* 0 04     Results from last 7 days   Lab Units 02/08/21  1036 02/08/21  0748 02/06/21  1247   LACTIC ACID mmol/L 1 2 2 4* 1 7     ABG:  Results from last 7 days   Lab Units 02/08/21  1542   PH ART  7 521*   PCO2 ART mm Hg 28 8*   PO2 ART mm Hg 492 4*   HCO3 ART mmol/L 23 0   BASE EXC ART mmol/L 1 1   ABG SOURCE  Radial, Left     VBG:  Results from last 7 days   Lab Units 02/08/21  1542  02/06/21  1144   PH SURINDER   --   --  7 399   PCO2 SURINDER mm Hg  --   --  48 3   PO2 SURINDER mm Hg  --   --  50 9*   HCO3 SURINDER mmol/L  --   --  29 2   BASE EXC SURINDER mmol/L  --   --  3 7   ABG SOURCE  Radial, Left   < >  --     < > = values in this interval not displayed  Results from last 7 days   Lab Units 02/08/21  1334 02/07/21  1301 02/06/21  1605   PROCALCITONIN ng/ml 244 79* 334 10* 181 50*       Micro  Results from last 7 days   Lab Units 02/06/21  1144   GRAM STAIN RESULT  Gram negative rods*  Gram negative rods*       EKG:   Imaging: I have personally reviewed pertinent reports  and I have personally reviewed pertinent films in PACS    Intake and Output  I/O       02/07 0701 - 02/08 0700 02/08 0701 - 02/09 0700    P  O       I V  (mL/kg)  755 (13 3)    IV Piggyback  1000    Total Intake(mL/kg)  1755 (31)    Other  681    Total Output  681    Net  +1074 Unmeasured Urine Occurrence 1 x           Height and Weights   Height: 5' 7" (170 2 cm)  IBW: 66 1 kg  Body mass index is 19 23 kg/m²  Weight (last 2 days)     Date/Time   Weight    02/09/21 0500   55 7 (122 8)    02/08/21 0914   56 6 (124 78)    02/07/21 0541   59 2 (130 51)                Nutrition       Diet Orders   (From admission, onward)             Start     Ordered    02/07/21 0903  Diet NPO  Diet effective now     Comments: NPO until speech sees patient  Provider agrees  Question Answer Comment   Diet Type NPO    RD to adjust diet per protocol?  Yes        02/07/21 0904                  Active Medications  Scheduled Meds:  Current Facility-Administered Medications   Medication Dose Route Frequency Provider Last Rate    acetaminophen  650 mg Oral Q6H PRN My Rewey, CRNP      aluminum-magnesium hydroxide-simethicone  30 mL Oral Q6H PRN My Rewey, CRNP      aspirin  81 mg Oral Daily My Mel, CRNP      atorvastatin  20 mg Oral Daily With [employer], CRNP      buPROPion  100 mg Oral BID My Rewey, CRNP      docusate sodium  100 mg Oral BID My Rewey, CRNP      heparin (porcine)  5,000 Units Subcutaneous Q8H [address]  [place]      insulin glargine  10 Units Subcutaneous HS My Rewey, BRETNP      insulin lispro  1-5 Units Subcutaneous Q6H My Rewey, CRNP      lubiprostone  8 mcg Oral BID With Meals My Mel, CHERELLE      midodrine  10 mg Oral TID My CHERELLE Leal      norepinephrine  1-30 mcg/min Intravenous Titrated My Rewey CRNP 15 mcg/min (02/09/21 0222)    ondansetron  4 mg Intravenous Q6H PRN My Rewey, CRNP      pantoprazole  40 mg Oral BID AC Kimberlyn JuanBRETNP      piperacillin-tazobactam  2 25 g Intravenous Q8H Kimberlyn JuanBRETNP 2 25 g (02/09/21 0005)    saccharomyces boulardii  250 mg Oral BID My Rewey, CRNP      sucralfate  1 g Oral 4x Daily (AC & HS) My Rewey CRNP      vasopressin (PITRESSIN) in 0 9 % sodium chloride 100 mL  0 04 Units/min Intravenous Continuous CHERELLE Sherwood Stopped (02/08/21 1743)    zolpidem  10 mg Oral HS Kimberlyn CHERELLE Martinez       Continuous Infusions:  norepinephrine, 1-30 mcg/min, Last Rate: 15 mcg/min (02/09/21 0222)  vasopressin (PITRESSIN) in 0 9 % sodium chloride 100 mL, 0 04 Units/min, Last Rate: Stopped (02/08/21 1743)      PRN Meds:   acetaminophen, 650 mg, Q6H PRN  aluminum-magnesium hydroxide-simethicone, 30 mL, Q6H PRN  ondansetron, 4 mg, Q6H PRN        Invasive Devices Review  Invasive Devices     Central Venous Catheter Line            CVC Central Lines 02/08/21 Triple 20cm less than 1 day          Line            Hemodialysis AV Fistula Right -- days          Hemodialysis Catheter            HD Permanent Double Catheter -- days          Drain            NG/OG/Enteral Tube Nasogastric 18 Fr Left nares less than 1 day          Airway            ETT  Cuffed 8 mm less than 1 day                Rationale for remaining devices:   ---------------------------------------------------------------------------------------  Advance Directive and Living Will:      Power of :    POLST:    ---------------------------------------------------------------------------------------  Care Time Delivered:         CHERELLE Parada      Portions of the record may have been created with voice recognition software  Occasional wrong word or "sound a like" substitutions may have occurred due to the inherent limitations of voice recognition software    Read the chart carefully and recognize, using context, where substitutions have occurred

## 2021-02-09 NOTE — ASSESSMENT & PLAN NOTE
Lab Results   Component Value Date    HGBA1C 8 1 (H) 02/09/2021       Recent Labs     02/08/21  1959 02/08/21  2339 02/09/21  0554 02/09/21  1219   POCGLU 184* 192* 214* 232*       Blood Sugar Average: Last 72 hrs:  (P) 220 625     · Present on admission with BS >500  Patient placed on insulin gtt at this time however d/c'd due to hypoglycemia of 35  · Patient currently on 10 units of lantus and SSI  Will change QID checks to Q6 hours given NPO status  · Continue to monitor for blood sugar goal of 140-180

## 2021-02-09 NOTE — ASSESSMENT & PLAN NOTE
· Patient with right-sided preference on 2/8 around 0300  Patient unable to follow commands     · Imaging revealed anoxic insult on repeat study  · Will continue neuro checks  · Overall prognosis is poor  · Patient pronounced brain dead at 48 - no cerebral blood flow on cerebral perfusion scan  · Patient now under the care of [facility] for organ donation

## 2021-02-09 NOTE — PROGRESS NOTES
Progress Note - Neurology   Geena Mendez 64 y o  male [id number]  Unit/Bed#: ICU 02/ICU 02    Assessment/Plan:    * Anoxic brain injury [facility])  Assessment & Plan  56-year-old male with prior lacunar infarctions, ESRD on HD, and insulin-dependent diabetes mellitus presented as a stroke alert on 2/8 for development of right gaze preference and inability to follow commands  Patient was hypoxic, tachycardic, and hypotensive  NIHSS elevated at 31 as patient did not follow commands  CT/CTA revealing pulmonary infiltrates but no evidence of large vessel occlusion  No TPA was administered as the last known well was unknown  He acutely worsened later in the day and demonstrated fixed, dilated pupils, unresponsiveness to noxious stimuli, and loss of brainstem reflexes  Another STAT CTA was performed with concern for basilar artery occlusion, but was negative for occlusion  It did note loss of gray-white matter differentiation consistent with anoxic injury  After the change in neurologic exam was noted yesterday, the patient began to demonstrate agonal respirations and periods of apnea, requiring intubation  EEG this morning noted severe diffuse cerebral dysfunction without reactivity  - Stroke pathway discontinued at this time  Can proceed with MRI if family prefers  - [facility] contacted regarding patient   - Supportive care per ICU team     Bacteremia  Assessment & Plan  - Positive blood cultures on 2/6 for gram negative rods  WBC 25  Initial lactic acid yesterday morning 2 4  Procalcitonin elevated     - Currently on Zosyn   - Bilateral pulmonary infiltrates noted on CT chest  - Management per ICU team    Type 2 diabetes mellitus, with long-term current use of insulin [facility])  Assessment & Plan  Lab Results   Component Value Date    HGBA1C 8 1 (H) 02/09/2021       Recent Labs     02/08/21  1749 02/08/21  1959 02/08/21  2339 02/09/21  0554   POCGLU 176* 184* 192* 214*       Blood Sugar Average: Last 72 hrs:  (P) 735 [id number]       - Glucose on admission 616  - Initially on insulin drip, but had episode of hypoglycemia (39)    History of CVA (cerebrovascular accident)  Assessment & Plan  - Chronic lacunar infarctions noted with residual left sided weakness  Per ED note, patient able to maintain antigravity on admission, but not against resistance  - Uses wheelchair at baseline        Subjective:   As identified in quick note yesterday, on re-evaluation of the patient with attending neurologist in the afternoon, the patient had a change in mental status and worsening hypoxia since the morning stroke alert  The patient required intubation due to agonal respirations  Prior to intubation, the patient demonstrated a fixed midline gaze without response to noxious stimuli  Exam was consistent at time of neurological evaluation after intubation, and no paralytics or sedation was given  A stat CTA was performed with concern for basilar artery occlusion, but no occlusion identified  CT did reveal loss of gray-white matter differentiation suggestive of anoxic brain injury  At time of neurologic evaluation this morning, the patient's neurologic status was unchanged  [facility] was present in the room to assess the patient with nursing  Patient remains unresponsive, off any form of sedation  A brain death exam was performed by the ICU team and was consistent with brain death  Nuclear medicine brain scan for vascular flow is pending to officially verify patient's current state          Past Medical History:   Diagnosis Date    Anxiety     CAD (coronary artery disease)     last assessed 4/10/13    Chronic kidney disease     On dialysis    Chronic kidney disease, stage IV (severe) ([facility] [address] )     Concussion     last assessed 9/3/14    Depression     [facility]     type 2    Failure to gain weight in adult     last assessed 2/24/14    Gastroparesis     GERD (gastroesophageal reflux disease)  Hyperkalemia     last assessed 11/9/15    Hyperlipidemia     Hypertension     accelerated essential last assessed 10/7/16    Insomnia     Late effects of cerebrovascular disease     Overflow incontinence     last assessed 7/24/14    Renal disorder     Secondary hyperparathyroidism ([facility] [address] )     Stroke [facility])     left sided weakness     Past Surgical History:   Procedure Laterality Date    COLONOSCOPY      HERNIA REPAIR      CT ESOPHAGOGASTRODUODENOSCOPY TRANSORAL DIAGNOSTIC N/A 2/16/2017    Procedure: ESOPHAGOGASTRODUODENOSCOPY (EGD) with biopsy;  Surgeon: Anni Drake DO;  Location: [facility]; Service: Gastroenterology    CT ESOPHAGOGASTRODUODENOSCOPY TRANSORAL DIAGNOSTIC N/A 6/2/2017    Procedure: ESOPHAGOGASTRODUODENOSCOPY (EGD) with bx;  Surgeon: Anni Drake DO;  Location: [facility]; Service: Gastroenterology    CT OPEN RX FEMUR FX+INTRAMED SAM Left 4/26/2020    Procedure: INSERTION NAIL IM FEMUR ANTEGRADE (TROCHANTERIC);   Surgeon: Omar John MD;  Location: [facility];  Service: Orthopedics    TYMPANOSTOMY TUBE PLACEMENT       Family History   Problem Relation Age of Onset    Cancer Family         pt and friend denies this hx    Hypertension Family         essential    Hyperlipidemia Family     Diabetes Mother     No Known Problems Father      Social History     Socioeconomic History    Marital status: Single     Spouse name: None    Number of children: None    Years of education: None    Highest education level: None   Occupational History    None   Social Needs    Financial resource strain: None    Food insecurity     Worry: None     Inability: None    Transportation needs     Medical: None     Non-medical: None   Tobacco Use    Smoking status: Never Smoker    Smokeless tobacco: Never Used   Substance and Sexual Activity    Alcohol use: Not Currently     Comment: social    Drug use: Yes     Types: Marijuana     Comment: smokes once keven while for pain 1 xper  week  Sexual activity: None   Lifestyle    Physical activity     Days per week: None     Minutes per session: None    Stress: None   Relationships    Social connections     Talks on phone: None     Gets together: None     Attends Rastafarian service: None     Active member of club or organization: None     Attends meetings of clubs or organizations: None     Relationship status: None    Intimate partner violence     Fear of current or ex partner: None     Emotionally abused: None     Physically abused: None     Forced sexual activity: None   Other Topics Concern    None   Social History Narrative    Advance directive declined by pt    Caffeine use    No preference or Rastafarian beliefs    Social hx reviewed, unchanged         Medications:   All current active meds have been reviewed and current meds:  Scheduled Meds:  Current Facility-Administered Medications   Medication Dose Route Frequency Provider Last Rate    acetaminophen  650 mg Oral Q6H PRN CHERELLE Galeano      aluminum-magnesium hydroxide-simethicone  30 mL Oral Q6H PRN CHERELLE Galeano      aspirin  81 mg Oral Daily CHERELLE Galeano      atorvastatin  20 mg Oral Daily With [employer], CHERELLE      buPROPion  100 mg Oral BID CHERELLE Galeano      heparin (porcine)  5,000 Units Subcutaneous [facility] Dolores Mendez, [address]      insulin glargine  10 Units Subcutaneous HS CHERELLE Galeano      insulin lispro  1-5 Units Subcutaneous Q6H CHERELLE Galeano      lubiprostone  8 mcg Oral BID With Meals CHERELLE Galeano      midodrine  10 mg Oral TID CHERELLE Galeano      norepinephrine  1-30 mcg/min Intravenous Titrated CHERELLE Galeano 13 mcg/min (02/09/21 1143)    ondansetron  4 mg Intravenous Q6H PRN CHERELLE Galeano      pantoprazole  40 mg Oral BID AC CHERELLE Wyman      piperacillin-tazobactam  2 25 g Intravenous Q8H CHERELLE Galeano 100 mL/hr at 02/09/21 1000    saccharomyces boulardii  250 mg Oral BID CHERELLE Brar      sucralfate  1 g Oral 4x Daily (AC & HS) CHERELLE Brar      vasopressin (PITRESSIN) in 0 9 % sodium chloride 100 mL  0 04 Units/min Intravenous Continuous CHERELLE Barr Stopped (02/08/21 1743)    zolpidem  10 mg Oral HS CHERELLE Wyman       Continuous Infusions:norepinephrine, 1-30 mcg/min, Last Rate: 13 mcg/min (02/09/21 1143)  vasopressin (PITRESSIN) in 0 9 % sodium chloride 100 mL, 0 04 Units/min, Last Rate: Stopped (02/08/21 1743)      PRN Meds:   acetaminophen    aluminum-magnesium hydroxide-simethicone    ondansetron       ROS:   Review of Systems   Unable to perform ROS: Patient unresponsive         Vitals:   /55 (BP Location: Left arm)   Pulse 96   Temp 98 2 °F (36 8 °C) (Temporal)   Resp 15   Ht 5' 7" (1 702 m)   Wt 55 7 kg (122 lb 12 7 oz)   SpO2 100%   BMI 19 23 kg/m²       Physical Exam:   Constitutional: Patient is ill appearing and unresponsive  HENT: Normocephalic, atraumatic  Right and left external ear normal  Intubated  Nose normal    Eyes: Pupils dilated, symmetric  Nonreactive  No VOR  Eyes fixed midline  No scleral icterus or injection  No discharge  Cardiovascular: Regular rate  Pulmonary: Intubated  Does not overbreathe vent  Musculoskeletal: Cannot assess ROM  No edema  Neurological: As detailed below  Unresponsive  Skin: No rashes  Psychiatric: Cannot assess- patient is unresponsive  Neurologic Exam:  Mental Status: Patient is unresponsive to verbal or noxious stimuli  Cranial Nerves:   - Pupils dilated, symmetric, nonreactive  - Fixed midline gaze   - No corneal reflex, no cough/gag reflex  - No VOR  Motor: No spontaneous movements  Does not withdrawal to noxious stimuli  Sensation: No grimace or withdrawal to noxious stimuli  Coordination: Cannot assess  Gait: Cannot assess   Movement: No tremors noted  Labs: I have personally reviewed pertinent reports     Recent Results (from the past 24 hour(s)) Blood gas, arterial    Collection Time: 02/08/21  3:42 PM   Result Value Ref Range    pH, Arterial 7 521 (H) 7 350 - 7 450    pCO2, Arterial 28 8 (LL) 36 0 - 44 0 mm Hg    pO2, Arterial 492 4 (H) 75 0 - 129 0 mm Hg    HCO3, Arterial 23 0 22 0 - 28 0 mmol/L    Base Excess, Arterial 1 1 mmol/L    O2 Content, Arterial 18 8 16 0 - 23 0 mL/dL    O2 HGB,Arterial  98 6 (H) 94 0 - 97 0 %    SOURCE Radial, Left     KALIE TEST Yes     Vent Type- AC AC     AC Rate 16     Tidal Volume 450 ml    Inspired Air (FIO2) 100     PEEP 6    Fingerstick Glucose (POCT)    Collection Time: 02/08/21  4:49 PM   Result Value Ref Range    POC Glucose 169 (H) 65 - 140 mg/dl   Fingerstick Glucose (POCT)    Collection Time: 02/08/21  5:49 PM   Result Value Ref Range    POC Glucose 176 (H) 65 - 140 mg/dl   Troponin I    Collection Time: 02/08/21  6:18 PM   Result Value Ref Range    Troponin I 1 03 (H) <=0 04 ng/mL   Fingerstick Glucose (POCT)    Collection Time: 02/08/21  7:59 PM   Result Value Ref Range    POC Glucose 184 (H) 65 - 140 mg/dl   Fingerstick Glucose (POCT)    Collection Time: 02/08/21 11:39 PM   Result Value Ref Range    POC Glucose 192 (H) 65 - 140 mg/dl   Procalcitonin Reflex    Collection Time: 02/09/21  4:30 AM   Result Value Ref Range    Procalcitonin 173 15 (H) <=0 25 ng/ml   Lipid Panel with Direct LDL reflex    Collection Time: 02/09/21  4:30 AM   Result Value Ref Range    Cholesterol 100 50 - 200 mg/dL    Triglycerides 190 (H) <=150 mg/dL    HDL, Direct 9 (L) >=40 mg/dL    LDL Calculated 53 0 - 100 mg/dL   Hemoglobin A1c w/EAG Estimation    Collection Time: 02/09/21  4:30 AM   Result Value Ref Range    Hemoglobin A1C 8 1 (H) Normal 3 8-5 6%; PreDiabetic 5 7-6 4%;  Diabetic >=6 5%; Glycemic control for adults with diabetes <7 0% %     mg/dl   CBC    Collection Time: 02/09/21  4:30 AM   Result Value Ref Range    WBC 15 09 (H) 4 31 - 10 16 Thousand/uL    RBC 3 08 (L) 3 88 - 5 62 Million/uL    Hemoglobin 9 7 (L) 12 0 - 17 0 g/dL    Hematocrit 29 3 (L) 36 5 - 49 3 %    MCV 95 82 - 98 fL    MCH 31 5 26 8 - 34 3 pg    MCHC 33 1 31 4 - 37 4 g/dL    RDW 13 7 11 6 - 15 1 %    Platelets 550 (L) 831 - 390 Thousands/uL    MPV 12 8 (H) 8 9 - 12 7 fL   Basic metabolic panel    Collection Time: 02/09/21  4:30 AM   Result Value Ref Range    Sodium 140 136 - 145 mmol/L    Potassium 4 1 3 5 - 5 3 mmol/L    Chloride 100 100 - 108 mmol/L    CO2 23 21 - 32 mmol/L    ANION GAP 17 (H) 4 - 13 mmol/L    BUN 67 (H) 5 - 25 mg/dL    Creatinine 6 16 (H) 0 60 - 1 30 mg/dL    Glucose 217 (H) 65 - 140 mg/dL    Calcium 9 1 8 3 - 10 1 mg/dL    eGFR 11 ml/min/1 73sq m   Magnesium    Collection Time: 02/09/21  4:30 AM   Result Value Ref Range    Magnesium 1 9 1 6 - 2 6 mg/dL   Phosphorus    Collection Time: 02/09/21  4:30 AM   Result Value Ref Range    Phosphorus 2 9 2 7 - 4 5 mg/dL   Calcium, ionized    Collection Time: 02/09/21  4:30 AM   Result Value Ref Range    Calcium, Ionized 1 08 (L) 1 12 - 1 32 mmol/L   Fingerstick Glucose (POCT)    Collection Time: 02/09/21  5:54 AM   Result Value Ref Range    POC Glucose 214 (H) 65 - 140 mg/dl   Type and screen    Collection Time: 02/09/21  9:42 AM   Result Value Ref Range    ABO Grouping O     Rh Factor Positive     Antibody Screen Negative     Specimen Expiration Date [id number]    Amylase    Collection Time: 02/09/21  9:42 AM   Result Value Ref Range    Amylase 34 25 - 115 IU/L   Lipase    Collection Time: 02/09/21  9:42 AM   Result Value Ref Range    Lipase 71 (L) 73 - 393 u/L   Comprehensive metabolic panel    Collection Time: 02/09/21  9:42 AM   Result Value Ref Range    Sodium 140 136 - 145 mmol/L    Potassium 4 1 3 5 - 5 3 mmol/L    Chloride 101 100 - 108 mmol/L    CO2 22 21 - 32 mmol/L    ANION GAP 17 (H) 4 - 13 mmol/L    BUN 72 (H) 5 - 25 mg/dL    Creatinine 6 43 (H) 0 60 - 1 30 mg/dL    Glucose 238 (H) 65 - 140 mg/dL    Calcium 9 1 8 3 - 10 1 mg/dL    Corrected Calcium 11 0 (H) 8 3 - 10 1 mg/dL     (H) 5 - 45 U/L    ALT 69 12 - 78 U/L    Alkaline Phosphatase 97 46 - 116 U/L    Total Protein 6 0 (L) 6 4 - 8 2 g/dL    Albumin 1 6 (L) 3 5 - 5 0 g/dL    Total Bilirubin 0 90 0 20 - 1 00 mg/dL    eGFR 10 ml/min/1 73sq m   Blood gas, arterial    Collection Time: 02/09/21  9:58 AM   Result Value Ref Range    pH, Arterial 7 532 (H) 7 350 - 7 450    pCO2, Arterial 25 2 (LL) 36 0 - 44 0 mm Hg    pO2, Arterial 454 7 (H) 75 0 - 129 0 mm Hg    HCO3, Arterial 20 7 (L) 22 0 - 28 0 mmol/L    Base Excess, Arterial -0 2 mmol/L    O2 Content, Arterial 21 6 16 0 - 23 0 mL/dL    O2 HGB,Arterial  98 2 (H) 94 0 - 97 0 %    SOURCE Brachial, Right     Vent Type- AC AC     AC Rate 16     Tidal Volume 450 ml    Inspired Air (FIO2) 100     PEEP 6    Blood gas, arterial    Collection Time: 02/09/21 12:12 PM   Result Value Ref Range    pH, Arterial 7 448 7 350 - 7 450    pCO2, Arterial 28 2 (LL) 36 0 - 44 0 mm Hg    pO2, Arterial 457 8 (H) 75 0 - 129 0 mm Hg    HCO3, Arterial 19 1 (L) 22 0 - 28 0 mmol/L    Base Excess, Arterial -4 1 mmol/L    O2 Content, Arterial 14 7 (L) 16 0 - 23 0 mL/dL    O2 HGB,Arterial  98 6 (H) 94 0 - 97 0 %    SOURCE Brachial, Right     Vent Type- AC AC     AC Rate 16     Tidal Volume 450 ml    Inspired Air (FIO2) 100     PEEP 6        Imaging: I have personally reviewed pertinent imaging in PACS, including CTA x2,  and I have personally reviewed PACS reports  EKG, Pathology, and Other Studies: I have personally reviewed pertinent reports         VTE Prophylaxis: Heparin

## 2021-02-09 NOTE — RESPIRATORY THERAPY NOTE
Pt transported from ICU to nuclear med and Dirk Phoenix on transport vent without any incident  Sat 100%

## 2021-02-09 NOTE — ASSESSMENT & PLAN NOTE
Lab Results   Component Value Date    EGFR 10 02/09/2021    EGFR 11 02/09/2021    EGFR 7 02/08/2021    CREATININE 6 43 (H) 02/09/2021    CREATININE 6 16 (H) 02/09/2021    CREATININE 9 01 (H) 02/08/2021     · Patient on Monday, Wednesday, Friday schedule  · Management per nephrology  · Avoid nephrotoxins and hypotension  · Maintain MAP >65, continue to monitor    · Continue midodrine 10 mg TID

## 2021-02-09 NOTE — ASSESSMENT & PLAN NOTE
Lab Results   Component Value Date    EGFR 7 02/08/2021    EGFR 6 02/08/2021    EGFR 8 02/07/2021    CREATININE 9 01 (H) 02/08/2021    CREATININE 9 35 (H) 02/08/2021    CREATININE 7 73 (H) 02/07/2021     · Patient on Monday, Wednesday, Friday schedule  · Management per nephrology  · Avoid nephrotoxins and hypotension  · Maintain MAP >65, continue to monitor    · Continue midodrine 10 mg TID

## 2021-02-09 NOTE — PROGRESS NOTES
02/09/21 1300   Spiritual Beliefs/Perceptions   Support Systems Spouse/significant other;Children   Plan of Care   Comments Offer pastoral presence to family , offer prayer support  for family at this time

## 2021-02-10 ENCOUNTER — APPOINTMENT (INPATIENT)
Dept: DIALYSIS | Facility: HOSPITAL | Age: 61
DRG: 871 | End: 2021-02-10
Payer: COMMERCIAL

## 2021-02-10 ENCOUNTER — ANESTHESIA EVENT (INPATIENT)
Dept: PERIOP | Facility: HOSPITAL | Age: 61
DRG: 871 | End: 2021-02-10
Payer: COMMERCIAL

## 2021-02-10 ENCOUNTER — APPOINTMENT (INPATIENT)
Dept: RADIOLOGY | Facility: HOSPITAL | Age: 61
DRG: 871 | End: 2021-02-10
Payer: COMMERCIAL

## 2021-02-10 ENCOUNTER — APPOINTMENT (INPATIENT)
Dept: CT IMAGING | Facility: HOSPITAL | Age: 61
DRG: 871 | End: 2021-02-10
Payer: COMMERCIAL

## 2021-02-10 ENCOUNTER — ANESTHESIA (INPATIENT)
Dept: PERIOP | Facility: HOSPITAL | Age: 61
DRG: 871 | End: 2021-02-10
Payer: COMMERCIAL

## 2021-02-10 LAB
ALBUMIN SERPL BCP-MCNC: 1.4 G/DL (ref 3.5–5)
ALBUMIN SERPL BCP-MCNC: 1.4 G/DL (ref 3.5–5)
ALBUMIN SERPL BCP-MCNC: 1.5 G/DL (ref 3.5–5)
ALBUMIN SERPL BCP-MCNC: 1.6 G/DL (ref 3.5–5)
ALBUMIN SERPL BCP-MCNC: 1.7 G/DL (ref 3.5–5)
ALP SERPL-CCNC: 105 U/L (ref 46–116)
ALP SERPL-CCNC: 90 U/L (ref 46–116)
ALP SERPL-CCNC: 95 U/L (ref 46–116)
ALP SERPL-CCNC: 99 U/L (ref 46–116)
ALP SERPL-CCNC: 99 U/L (ref 46–116)
ALT SERPL W P-5'-P-CCNC: 46 U/L (ref 12–78)
ALT SERPL W P-5'-P-CCNC: 52 U/L (ref 12–78)
ALT SERPL W P-5'-P-CCNC: 54 U/L (ref 12–78)
ALT SERPL W P-5'-P-CCNC: 59 U/L (ref 12–78)
ALT SERPL W P-5'-P-CCNC: 67 U/L (ref 12–78)
AMYLASE SERPL-CCNC: 16 IU/L (ref 25–115)
AMYLASE SERPL-CCNC: 16 IU/L (ref 25–115)
AMYLASE SERPL-CCNC: 18 IU/L (ref 25–115)
AMYLASE SERPL-CCNC: 19 IU/L (ref 25–115)
ANION GAP SERPL CALCULATED.3IONS-SCNC: 10 MMOL/L (ref 4–13)
ANION GAP SERPL CALCULATED.3IONS-SCNC: 15 MMOL/L (ref 4–13)
ANION GAP SERPL CALCULATED.3IONS-SCNC: 16 MMOL/L (ref 4–13)
ANION GAP SERPL CALCULATED.3IONS-SCNC: 16 MMOL/L (ref 4–13)
ANION GAP SERPL CALCULATED.3IONS-SCNC: 17 MMOL/L (ref 4–13)
APTT PPP: 44 SECONDS (ref 23–37)
APTT PPP: 51 SECONDS (ref 23–37)
APTT PPP: 63 SECONDS (ref 23–37)
APTT PPP: 66 SECONDS (ref 23–37)
APTT PPP: 80 SECONDS (ref 23–37)
AST SERPL W P-5'-P-CCNC: 65 U/L (ref 5–45)
AST SERPL W P-5'-P-CCNC: 69 U/L (ref 5–45)
AST SERPL W P-5'-P-CCNC: 71 U/L (ref 5–45)
AST SERPL W P-5'-P-CCNC: 77 U/L (ref 5–45)
AST SERPL W P-5'-P-CCNC: 94 U/L (ref 5–45)
BACTERIA UR QL AUTO: ABNORMAL /HPF
BASE EXCESS BLDA CALC-SCNC: -3.8 MMOL/L
BASE EXCESS BLDA CALC-SCNC: -4.3 MMOL/L
BASE EXCESS BLDA CALC-SCNC: -6 MMOL/L
BASE EXCESS BLDA CALC-SCNC: -6.7 MMOL/L
BASE EXCESS BLDA CALC-SCNC: -8.9 MMOL/L
BASE EXCESS BLDA CALC-SCNC: 1.4 MMOL/L
BASOPHILS # BLD AUTO: 0.03 THOUSANDS/ΜL (ref 0–0.1)
BASOPHILS # BLD AUTO: 0.05 THOUSANDS/ΜL (ref 0–0.1)
BASOPHILS NFR BLD AUTO: 0 % (ref 0–1)
BASOPHILS NFR BLD AUTO: 0 % (ref 0–1)
BILIRUB DIRECT SERPL-MCNC: 0.88 MG/DL (ref 0–0.2)
BILIRUB DIRECT SERPL-MCNC: 0.95 MG/DL (ref 0–0.2)
BILIRUB DIRECT SERPL-MCNC: 0.98 MG/DL (ref 0–0.2)
BILIRUB SERPL-MCNC: 1.17 MG/DL (ref 0.2–1)
BILIRUB SERPL-MCNC: 1.22 MG/DL (ref 0.2–1)
BILIRUB SERPL-MCNC: 1.23 MG/DL (ref 0.2–1)
BILIRUB SERPL-MCNC: 1.32 MG/DL (ref 0.2–1)
BILIRUB SERPL-MCNC: 1.69 MG/DL (ref 0.2–1)
BILIRUB UR QL STRIP: NEGATIVE
BODY TEMPERATURE: 98.2 DEGREES FEHRENHEIT
BODY TEMPERATURE: 99.2 DEGREES FEHRENHEIT
BUN SERPL-MCNC: 41 MG/DL (ref 5–25)
BUN SERPL-MCNC: 79 MG/DL (ref 5–25)
BUN SERPL-MCNC: 84 MG/DL (ref 5–25)
BUN SERPL-MCNC: 86 MG/DL (ref 5–25)
BUN SERPL-MCNC: 89 MG/DL (ref 5–25)
CALCIUM ALBUM COR SERPL-MCNC: 10.5 MG/DL (ref 8.3–10.1)
CALCIUM ALBUM COR SERPL-MCNC: 10.6 MG/DL (ref 8.3–10.1)
CALCIUM ALBUM COR SERPL-MCNC: 10.9 MG/DL (ref 8.3–10.1)
CALCIUM ALBUM COR SERPL-MCNC: 11 MG/DL (ref 8.3–10.1)
CALCIUM ALBUM COR SERPL-MCNC: 11.1 MG/DL (ref 8.3–10.1)
CALCIUM SERPL-MCNC: 8.7 MG/DL (ref 8.3–10.1)
CALCIUM SERPL-MCNC: 8.7 MG/DL (ref 8.3–10.1)
CALCIUM SERPL-MCNC: 8.8 MG/DL (ref 8.3–10.1)
CALCIUM SERPL-MCNC: 8.9 MG/DL (ref 8.3–10.1)
CALCIUM SERPL-MCNC: 9.1 MG/DL (ref 8.3–10.1)
CHLORIDE SERPL-SCNC: 101 MMOL/L (ref 100–108)
CHLORIDE SERPL-SCNC: 102 MMOL/L (ref 100–108)
CHLORIDE SERPL-SCNC: 103 MMOL/L (ref 100–108)
CLARITY UR: ABNORMAL
CO2 SERPL-SCNC: 20 MMOL/L (ref 21–32)
CO2 SERPL-SCNC: 20 MMOL/L (ref 21–32)
CO2 SERPL-SCNC: 21 MMOL/L (ref 21–32)
CO2 SERPL-SCNC: 23 MMOL/L (ref 21–32)
CO2 SERPL-SCNC: 28 MMOL/L (ref 21–32)
COLOR UR: YELLOW
CREAT SERPL-MCNC: 4.02 MG/DL (ref 0.6–1.3)
CREAT SERPL-MCNC: 7.5 MG/DL (ref 0.6–1.3)
CREAT SERPL-MCNC: 7.79 MG/DL (ref 0.6–1.3)
CREAT SERPL-MCNC: 7.83 MG/DL (ref 0.6–1.3)
CREAT SERPL-MCNC: 8 MG/DL (ref 0.6–1.3)
EOSINOPHIL # BLD AUTO: 0 THOUSAND/ΜL (ref 0–0.61)
EOSINOPHIL # BLD AUTO: 0 THOUSAND/ΜL (ref 0–0.61)
EOSINOPHIL NFR BLD AUTO: 0 % (ref 0–6)
EOSINOPHIL NFR BLD AUTO: 0 % (ref 0–6)
ERYTHROCYTE [DISTWIDTH] IN BLOOD BY AUTOMATED COUNT: 14.5 % (ref 11.6–15.1)
ERYTHROCYTE [DISTWIDTH] IN BLOOD BY AUTOMATED COUNT: 14.5 % (ref 11.6–15.1)
ERYTHROCYTE [DISTWIDTH] IN BLOOD BY AUTOMATED COUNT: 14.6 % (ref 11.6–15.1)
ERYTHROCYTE [DISTWIDTH] IN BLOOD BY AUTOMATED COUNT: 14.6 % (ref 11.6–15.1)
ERYTHROCYTE [DISTWIDTH] IN BLOOD BY AUTOMATED COUNT: 17.1 % (ref 11.6–15.1)
GFR SERPL CREATININE-BSD FRML MDRD: 18 ML/MIN/1.73SQ M
GFR SERPL CREATININE-BSD FRML MDRD: 8 ML/MIN/1.73SQ M
GLUCOSE SERPL-MCNC: 167 MG/DL (ref 65–140)
GLUCOSE SERPL-MCNC: 216 MG/DL (ref 65–140)
GLUCOSE SERPL-MCNC: 228 MG/DL (ref 65–140)
GLUCOSE SERPL-MCNC: 310 MG/DL (ref 65–140)
GLUCOSE SERPL-MCNC: 334 MG/DL (ref 65–140)
GLUCOSE SERPL-MCNC: 365 MG/DL (ref 65–140)
GLUCOSE SERPL-MCNC: 367 MG/DL (ref 65–140)
GLUCOSE UR STRIP-MCNC: ABNORMAL MG/DL
HCO3 BLDA-SCNC: 16.7 MMOL/L (ref 22–28)
HCO3 BLDA-SCNC: 18.4 MMOL/L (ref 22–28)
HCO3 BLDA-SCNC: 19.6 MMOL/L (ref 22–28)
HCO3 BLDA-SCNC: 20 MMOL/L (ref 22–28)
HCO3 BLDA-SCNC: 21.2 MMOL/L (ref 22–28)
HCO3 BLDA-SCNC: 25.2 MMOL/L (ref 22–28)
HCT VFR BLD AUTO: 23.5 % (ref 36.5–49.3)
HCT VFR BLD AUTO: 26.5 % (ref 36.5–49.3)
HCT VFR BLD AUTO: 26.9 % (ref 36.5–49.3)
HCT VFR BLD AUTO: 27.6 % (ref 36.5–49.3)
HCT VFR BLD AUTO: 30 % (ref 36.5–49.3)
HGB BLD-MCNC: 7.3 G/DL (ref 12–17)
HGB BLD-MCNC: 8.2 G/DL (ref 12–17)
HGB BLD-MCNC: 8.3 G/DL (ref 12–17)
HGB BLD-MCNC: 8.5 G/DL (ref 12–17)
HGB BLD-MCNC: 9.8 G/DL (ref 12–17)
HGB UR QL STRIP.AUTO: ABNORMAL
HOROWITZ INDEX BLDA+IHG-RTO: 100 MM[HG]
HOROWITZ INDEX BLDA+IHG-RTO: 50 MM[HG]
IMM GRANULOCYTES # BLD AUTO: 0.23 THOUSAND/UL (ref 0–0.2)
IMM GRANULOCYTES # BLD AUTO: 0.37 THOUSAND/UL (ref 0–0.2)
IMM GRANULOCYTES NFR BLD AUTO: 1 % (ref 0–2)
IMM GRANULOCYTES NFR BLD AUTO: 2 % (ref 0–2)
INR PPP: 1.71 (ref 0.84–1.19)
INR PPP: 2.13 (ref 0.84–1.19)
INR PPP: 2.69 (ref 0.84–1.19)
INR PPP: 2.91 (ref 0.84–1.19)
INR PPP: 3.04 (ref 0.84–1.19)
KETONES UR STRIP-MCNC: NEGATIVE MG/DL
LEUKOCYTE ESTERASE UR QL STRIP: ABNORMAL
LIPASE SERPL-CCNC: 41 U/L (ref 73–393)
LIPASE SERPL-CCNC: 45 U/L (ref 73–393)
LIPASE SERPL-CCNC: 49 U/L (ref 73–393)
LIPASE SERPL-CCNC: 51 U/L (ref 73–393)
LYMPHOCYTES # BLD AUTO: 0.23 THOUSANDS/ΜL (ref 0.6–4.47)
LYMPHOCYTES # BLD AUTO: 0.28 THOUSANDS/ΜL (ref 0.6–4.47)
LYMPHOCYTES NFR BLD AUTO: 1 % (ref 14–44)
LYMPHOCYTES NFR BLD AUTO: 2 % (ref 14–44)
MAGNESIUM SERPL-MCNC: 1.9 MG/DL (ref 1.6–2.6)
MAGNESIUM SERPL-MCNC: 1.9 MG/DL (ref 1.6–2.6)
MAGNESIUM SERPL-MCNC: 2.1 MG/DL (ref 1.6–2.6)
MAGNESIUM SERPL-MCNC: 2.2 MG/DL (ref 1.6–2.6)
MCH RBC QN AUTO: 30.2 PG (ref 26.8–34.3)
MCH RBC QN AUTO: 31.1 PG (ref 26.8–34.3)
MCH RBC QN AUTO: 31.5 PG (ref 26.8–34.3)
MCH RBC QN AUTO: 31.5 PG (ref 26.8–34.3)
MCH RBC QN AUTO: 31.7 PG (ref 26.8–34.3)
MCHC RBC AUTO-ENTMCNC: 30.8 G/DL (ref 31.4–37.4)
MCHC RBC AUTO-ENTMCNC: 30.9 G/DL (ref 31.4–37.4)
MCHC RBC AUTO-ENTMCNC: 30.9 G/DL (ref 31.4–37.4)
MCHC RBC AUTO-ENTMCNC: 31.1 G/DL (ref 31.4–37.4)
MCHC RBC AUTO-ENTMCNC: 32.7 G/DL (ref 31.4–37.4)
MCV RBC AUTO: 101 FL (ref 82–98)
MCV RBC AUTO: 101 FL (ref 82–98)
MCV RBC AUTO: 102 FL (ref 82–98)
MCV RBC AUTO: 103 FL (ref 82–98)
MCV RBC AUTO: 93 FL (ref 82–98)
MONOCYTES # BLD AUTO: 0.25 THOUSAND/ΜL (ref 0.17–1.22)
MONOCYTES # BLD AUTO: 0.36 THOUSAND/ΜL (ref 0.17–1.22)
MONOCYTES NFR BLD AUTO: 1 % (ref 4–12)
MONOCYTES NFR BLD AUTO: 2 % (ref 4–12)
NEUTROPHILS # BLD AUTO: 17.51 THOUSANDS/ΜL (ref 1.85–7.62)
NEUTROPHILS # BLD AUTO: 21.62 THOUSANDS/ΜL (ref 1.85–7.62)
NEUTS SEG NFR BLD AUTO: 95 % (ref 43–75)
NEUTS SEG NFR BLD AUTO: 96 % (ref 43–75)
NITRITE UR QL STRIP: NEGATIVE
NON-SQ EPI CELLS URNS QL MICRO: ABNORMAL /HPF
NRBC BLD AUTO-RTO: 0 /100 WBCS
O2 CT BLDA-SCNC: 12.3 ML/DL (ref 16–23)
O2 CT BLDA-SCNC: 13.2 ML/DL (ref 16–23)
O2 CT BLDA-SCNC: 14.2 ML/DL (ref 16–23)
O2 CT BLDA-SCNC: 14.3 ML/DL (ref 16–23)
O2 CT BLDA-SCNC: 15 ML/DL (ref 16–23)
O2 CT BLDA-SCNC: 17.3 ML/DL (ref 16–23)
OXYHGB MFR BLDA: 91.3 % (ref 94–97)
OXYHGB MFR BLDA: 96.5 % (ref 94–97)
OXYHGB MFR BLDA: 98.7 % (ref 94–97)
OXYHGB MFR BLDA: 98.8 % (ref 94–97)
OXYHGB MFR BLDA: 99 % (ref 94–97)
OXYHGB MFR BLDA: 99.1 % (ref 94–97)
PCO2 BLDA: 31.1 MM HG (ref 36–44)
PCO2 BLDA: 34.9 MM HG (ref 36–44)
PCO2 BLDA: 35.6 MM HG (ref 36–44)
PCO2 BLDA: 36.7 MM HG (ref 36–44)
PCO2 BLDA: 38.9 MM HG (ref 36–44)
PCO2 BLDA: 40.7 MM HG (ref 36–44)
PCO2 TEMP ADJ BLDA: 38.6 MM HG (ref 36–44)
PCO2 TEMP ADJ BLDA: 41.2 MM HG (ref 36–44)
PEEP RESPIRATORY: 5 CM[H2O]
PH BLD: 7.32 [PH] (ref 7.35–7.45)
PH BLD: 7.33 [PH] (ref 7.35–7.45)
PH BLDA: 7.3 [PH] (ref 7.35–7.45)
PH BLDA: 7.32 [PH] (ref 7.35–7.45)
PH BLDA: 7.33 [PH] (ref 7.35–7.45)
PH BLDA: 7.33 [PH] (ref 7.35–7.45)
PH BLDA: 7.43 [PH] (ref 7.35–7.45)
PH BLDA: 7.45 [PH] (ref 7.35–7.45)
PH UR STRIP.AUTO: 5.5 [PH]
PHOSPHATE SERPL-MCNC: 4 MG/DL (ref 2.7–4.5)
PHOSPHATE SERPL-MCNC: 4.8 MG/DL (ref 2.7–4.5)
PHOSPHATE SERPL-MCNC: 5.6 MG/DL (ref 2.7–4.5)
PHOSPHATE SERPL-MCNC: 6 MG/DL (ref 2.7–4.5)
PLATELET # BLD AUTO: 101 THOUSANDS/UL (ref 149–390)
PLATELET # BLD AUTO: 105 THOUSANDS/UL (ref 149–390)
PLATELET # BLD AUTO: 115 THOUSANDS/UL (ref 149–390)
PLATELET # BLD AUTO: 90 THOUSANDS/UL (ref 149–390)
PLATELET # BLD AUTO: 90 THOUSANDS/UL (ref 149–390)
PMV BLD AUTO: 12.1 FL (ref 8.9–12.7)
PMV BLD AUTO: 12.6 FL (ref 8.9–12.7)
PMV BLD AUTO: 12.6 FL (ref 8.9–12.7)
PMV BLD AUTO: 12.7 FL (ref 8.9–12.7)
PMV BLD AUTO: 12.7 FL (ref 8.9–12.7)
PO2 BLD: 111.7 MM HG (ref 75–129)
PO2 BLD: 372.3 MM HG (ref 75–129)
PO2 BLDA: 112.9 MM HG (ref 75–129)
PO2 BLDA: 271.4 MM HG (ref 75–129)
PO2 BLDA: 353.1 MM HG (ref 75–129)
PO2 BLDA: 370.7 MM HG (ref 75–129)
PO2 BLDA: 405.2 MM HG (ref 75–129)
PO2 BLDA: 63.5 MM HG (ref 75–129)
POTASSIUM SERPL-SCNC: 3.5 MMOL/L (ref 3.5–5.3)
POTASSIUM SERPL-SCNC: 4.8 MMOL/L (ref 3.5–5.3)
POTASSIUM SERPL-SCNC: 5.1 MMOL/L (ref 3.5–5.3)
POTASSIUM SERPL-SCNC: 5.2 MMOL/L (ref 3.5–5.3)
POTASSIUM SERPL-SCNC: 5.7 MMOL/L (ref 3.5–5.3)
PROT SERPL-MCNC: 5.5 G/DL (ref 6.4–8.2)
PROT SERPL-MCNC: 5.6 G/DL (ref 6.4–8.2)
PROT SERPL-MCNC: 5.6 G/DL (ref 6.4–8.2)
PROT SERPL-MCNC: 5.7 G/DL (ref 6.4–8.2)
PROT SERPL-MCNC: 6 G/DL (ref 6.4–8.2)
PROT UR STRIP-MCNC: ABNORMAL MG/DL
PROTHROMBIN TIME: 19.7 SECONDS (ref 11.6–14.5)
PROTHROMBIN TIME: 23.3 SECONDS (ref 11.6–14.5)
PROTHROMBIN TIME: 28 SECONDS (ref 11.6–14.5)
PROTHROMBIN TIME: 29.8 SECONDS (ref 11.6–14.5)
PROTHROMBIN TIME: 30.8 SECONDS (ref 11.6–14.5)
RBC # BLD AUTO: 2.32 MILLION/UL (ref 3.88–5.62)
RBC # BLD AUTO: 2.6 MILLION/UL (ref 3.88–5.62)
RBC # BLD AUTO: 2.62 MILLION/UL (ref 3.88–5.62)
RBC # BLD AUTO: 2.73 MILLION/UL (ref 3.88–5.62)
RBC # BLD AUTO: 3.24 MILLION/UL (ref 3.88–5.62)
RBC #/AREA URNS AUTO: ABNORMAL /HPF
SODIUM SERPL-SCNC: 139 MMOL/L (ref 136–145)
SODIUM SERPL-SCNC: 142 MMOL/L (ref 136–145)
SP GR UR STRIP.AUTO: 1.01 (ref 1–1.03)
SPECIMEN SOURCE: ABNORMAL
TROPONIN I SERPL-MCNC: 0.64 NG/ML
UROBILINOGEN UR QL STRIP.AUTO: 0.2 E.U./DL
VENT AC: 14
VENT- AC: AC
VT SETTING VENT: 400 ML
WBC # BLD AUTO: 16.74 THOUSAND/UL (ref 4.31–10.16)
WBC # BLD AUTO: 18.3 THOUSAND/UL (ref 4.31–10.16)
WBC # BLD AUTO: 22.63 THOUSAND/UL (ref 4.31–10.16)
WBC # BLD AUTO: 22.81 THOUSAND/UL (ref 4.31–10.16)
WBC # BLD AUTO: 30.03 THOUSAND/UL (ref 4.31–10.16)
WBC #/AREA URNS AUTO: ABNORMAL /HPF

## 2021-02-10 PROCEDURE — 74176 CT ABD & PELVIS W/O CONTRAST: CPT

## 2021-02-10 PROCEDURE — 81001 URINALYSIS AUTO W/SCOPE: CPT | Performed by: INTERNAL MEDICINE

## 2021-02-10 PROCEDURE — 82150 ASSAY OF AMYLASE: CPT | Performed by: NURSE PRACTITIONER

## 2021-02-10 PROCEDURE — 83735 ASSAY OF MAGNESIUM: CPT | Performed by: NURSE PRACTITIONER

## 2021-02-10 PROCEDURE — 84100 ASSAY OF PHOSPHORUS: CPT | Performed by: PHYSICIAN ASSISTANT

## 2021-02-10 PROCEDURE — 85610 PROTHROMBIN TIME: CPT | Performed by: NURSE PRACTITIONER

## 2021-02-10 PROCEDURE — 94003 VENT MGMT INPAT SUBQ DAY: CPT

## 2021-02-10 PROCEDURE — 82805 BLOOD GASES W/O2 SATURATION: CPT | Performed by: NURSE PRACTITIONER

## 2021-02-10 PROCEDURE — 85730 THROMBOPLASTIN TIME PARTIAL: CPT | Performed by: NURSE PRACTITIONER

## 2021-02-10 PROCEDURE — 84484 ASSAY OF TROPONIN QUANT: CPT | Performed by: NURSE PRACTITIONER

## 2021-02-10 PROCEDURE — 83690 ASSAY OF LIPASE: CPT | Performed by: NURSE PRACTITIONER

## 2021-02-10 PROCEDURE — 82805 BLOOD GASES W/O2 SATURATION: CPT | Performed by: PHYSICIAN ASSISTANT

## 2021-02-10 PROCEDURE — 84100 ASSAY OF PHOSPHORUS: CPT | Performed by: NURSE PRACTITIONER

## 2021-02-10 PROCEDURE — 80053 COMPREHEN METABOLIC PANEL: CPT | Performed by: NURSE PRACTITIONER

## 2021-02-10 PROCEDURE — 85027 COMPLETE CBC AUTOMATED: CPT | Performed by: NURSE PRACTITIONER

## 2021-02-10 PROCEDURE — 83735 ASSAY OF MAGNESIUM: CPT | Performed by: PHYSICIAN ASSISTANT

## 2021-02-10 PROCEDURE — 82248 BILIRUBIN DIRECT: CPT | Performed by: PHYSICIAN ASSISTANT

## 2021-02-10 PROCEDURE — NC001 PR NO CHARGE: Performed by: INTERNAL MEDICINE

## 2021-02-10 PROCEDURE — P9017 PLASMA 1 DONOR FRZ W/IN 8 HR: HCPCS

## 2021-02-10 PROCEDURE — 99233 SBSQ HOSP IP/OBS HIGH 50: CPT | Performed by: INTERNAL MEDICINE

## 2021-02-10 PROCEDURE — P9016 RBC LEUKOCYTES REDUCED: HCPCS

## 2021-02-10 PROCEDURE — 82948 REAGENT STRIP/BLOOD GLUCOSE: CPT

## 2021-02-10 PROCEDURE — 71045 X-RAY EXAM CHEST 1 VIEW: CPT

## 2021-02-10 RX ADMIN — CEFEPIME HYDROCHLORIDE 1000 MG: 1 INJECTION, POWDER, FOR SOLUTION INTRAMUSCULAR; INTRAVENOUS at 20:40

## 2021-02-10 RX ADMIN — METHYLPREDNISOLONE SODIUM SUCCINATE 1000 MG: 1 INJECTION, POWDER, FOR SOLUTION INTRAMUSCULAR; INTRAVENOUS at 13:00

## 2021-02-10 RX ADMIN — LEVOTHYROXINE SODIUM ANHYDROUS 40 MCG/HR: 100 INJECTION, POWDER, LYOPHILIZED, FOR SOLUTION INTRAVENOUS at 18:21

## 2021-02-10 RX ADMIN — Medication 100 MEQ: at 14:12

## 2021-02-10 RX ADMIN — SODIUM BICARBONATE 100 MEQ: 84 INJECTION INTRAVENOUS at 14:12

## 2021-02-10 RX ADMIN — PHYTONADIONE 10 MG: 10 INJECTION, EMULSION INTRAMUSCULAR; INTRAVENOUS; SUBCUTANEOUS at 14:15

## 2021-02-10 RX ADMIN — NOREPINEPHRINE BITARTRATE 9 MCG/MIN: 1 INJECTION, SOLUTION, CONCENTRATE INTRAVENOUS at 02:29

## 2021-02-10 RX ADMIN — LEVOTHYROXINE SODIUM ANHYDROUS 40 MCG/HR: 100 INJECTION, POWDER, LYOPHILIZED, FOR SOLUTION INTRAVENOUS at 02:29

## 2021-02-10 RX ADMIN — INSULIN GLARGINE 10 UNITS: 100 INJECTION, SOLUTION SUBCUTANEOUS at 21:59

## 2021-02-10 RX ADMIN — CEFAZOLIN SODIUM 1000 MG: 1 SOLUTION INTRAVENOUS at 08:01

## 2021-02-10 RX ADMIN — METHYLPREDNISOLONE SODIUM SUCCINATE 1000 MG: 1 INJECTION, POWDER, FOR SOLUTION INTRAMUSCULAR; INTRAVENOUS at 17:46

## 2021-02-10 RX ADMIN — NOREPINEPHRINE BITARTRATE 10 MCG/MIN: 1 INJECTION, SOLUTION, CONCENTRATE INTRAVENOUS at 10:50

## 2021-02-10 RX ADMIN — NOREPINEPHRINE BITARTRATE 8 MCG/MIN: 1 INJECTION, SOLUTION, CONCENTRATE INTRAVENOUS at 17:04

## 2021-02-10 RX ADMIN — CEFAZOLIN SODIUM 1000 MG: 1 SOLUTION INTRAVENOUS at 16:25

## 2021-02-10 RX ADMIN — CEFAZOLIN SODIUM 1000 MG: 1 SOLUTION INTRAVENOUS at 23:37

## 2021-02-10 RX ADMIN — LEVOTHYROXINE SODIUM ANHYDROUS 40 MCG/HR: 100 INJECTION, POWDER, LYOPHILIZED, FOR SOLUTION INTRAVENOUS at 13:08

## 2021-02-10 RX ADMIN — CEFTAZIDIME 1000 MG: 1 INJECTION, POWDER, FOR SOLUTION INTRAMUSCULAR; INTRAVENOUS at 09:04

## 2021-02-10 RX ADMIN — CEFTAZIDIME 1000 MG: 1 INJECTION, POWDER, FOR SOLUTION INTRAMUSCULAR; INTRAVENOUS at 01:39

## 2021-02-10 RX ADMIN — INSULIN GLARGINE 10 UNITS: 100 INJECTION, SOLUTION SUBCUTANEOUS at 00:11

## 2021-02-10 RX ADMIN — METHYLPREDNISOLONE SODIUM SUCCINATE 1000 MG: 1 INJECTION, POWDER, FOR SOLUTION INTRAMUSCULAR; INTRAVENOUS at 07:52

## 2021-02-10 RX ADMIN — CEFAZOLIN SODIUM 1000 MG: 1 SOLUTION INTRAVENOUS at 00:20

## 2021-02-10 RX ADMIN — SODIUM CHLORIDE 1000 MG: 0.9 INJECTION, SOLUTION INTRAVENOUS at 07:48

## 2021-02-10 RX ADMIN — LEVOTHYROXINE SODIUM ANHYDROUS 40 MCG/HR: 100 INJECTION, POWDER, LYOPHILIZED, FOR SOLUTION INTRAVENOUS at 07:38

## 2021-02-10 RX ADMIN — METHYLPREDNISOLONE SODIUM SUCCINATE 1000 MG: 1 INJECTION, POWDER, FOR SOLUTION INTRAMUSCULAR; INTRAVENOUS at 23:31

## 2021-02-10 NOTE — PROCEDURES
Arterial Line Insertion    Date/Time: 2/9/2021 10:24 PM  Performed by: CHERELLE Nuñez  Authorized by: CHERELLE Nuñez     Consent:     Consent obtained:  Written  Universal protocol:     Immediately prior to procedure a time out was called: yes      Patient identity confirmed:  Arm band  Indications:     Indications: hemodynamic monitoring    Anesthesia (see MAR for exact dosages): Anesthesia method:  None  Procedure details:     Location / Tip of Catheter:  Femoral    Laterality:  Right    Needle gauge:  18 G    Placement technique:  Percutaneous    Number of attempts:  1    Successful placement: yes      Transducer: waveform confirmed    Post-procedure details:     Post-procedure:  Biopatch applied and sutured    Patient tolerance of procedure:   Tolerated well, no immediate complications

## 2021-02-10 NOTE — PLAN OF CARE
Pt for 2hr HD treatment today  Left upper arm fistula cannulated with #15g needles x2 without difficulty  Able to achieve ordered BFR  Running even  Using 2K+ bath for treatment        Problem: METABOLIC, FLUID AND ELECTROLYTES - ADULT  Goal: Electrolytes maintained within normal limits  Description: INTERVENTIONS:  - Monitor labs and assess patient for signs and symptoms of electrolyte imbalances  - Administer electrolyte replacement as ordered  - Monitor response to electrolyte replacements, including repeat lab results as appropriate  - Instruct patient on fluid and nutrition as appropriate  Outcome: Progressing

## 2021-02-10 NOTE — SOCIAL WORK
Pt passed yesterday, is being maintained under the care of KEYON matamoros with the pts daughter, Durga Chi, she reports no needs at this time - plans to use [employer]  Encouraged her to reach out with anyneeds

## 2021-02-10 NOTE — PROGRESS NOTES
NEPHROLOGY PROGRESS NOTE   Governor Frame 64 y o  male MRN: [id number]  Unit/Bed#: ICU 02 Encounter: [id number]      ASSESSMENT/PLAN:  ESRD on HD:  - [facility]  - status post treatment on Monday, 2/8   - plan for scheduled treatment today, 2/10   - estimated dry weight: 58 kg      Access: Right upper extremity AVF with positive bruit and thrill       Electrolytes, acid/base:  - mild hyperkalemia with most recent potassium 5 2   - most recent bicarbonate 20 with anion gap of 17    - will continue to correct while on dialysis  - will continue monitor with repeat lab studies      Blood pressure, hypotension:  - currently on norepinephrine infusion    - optimize hemodynamics; avoid hypotension and fluctuations of blood pressure   - maintain MAP > 65       H/H, anemia of chronic kidney disease:  - most recent hemoglobin 8 2 grams/deciliter   - goal hemoglobin greater than 8 grams/deciliter  - not on NOLA secondary to concern for CVA      Mineral bone disease of chronic kidney disease:  - will continue to correct phosphorus on dialysis      Other medical problems:  - diabetes, on insulin per primary team   - history of CVA, anoxic brain injury as below  - anoxic brain injury, undergoing therapy of life for donation   - bacteremia, continued on antibiotics  SUBJECTIVE:  Patient seen and examined in the ICU  Patient remains intubated and nonresponsive    24 hour events reviewed with ICU team     OBJECTIVE:  Current Weight: Weight - Scale: 55 7 kg (122 lb 12 7 oz)  Vitals:    02/10/21 0800   BP:    Pulse: 78   Resp: 14   Temp: 99 8 °F (37 7 °C)   SpO2: 100%       Intake/Output Summary (Last 24 hours) at 2/10/2021 1003  Last data filed at 2/10/2021 3708  Gross per 24 hour   Intake 4227 48 ml   Output 45 ml   Net 4182 48 ml       General:  Ill-appearing, intubated  Skin:  Warm, no rash  ENT:  ET tube in place  Neck:  Supple  Chest: Intubated   CVS:  Regular rate regular rhythm  Abdomen:  Soft, nondistended  Extremities:  No overt edema   Neuro:  Intubated, unresponsive        Medications:  Scheduled Meds:  Current Facility-Administered Medications   Medication Dose Route Frequency Provider Last Rate    acetaminophen  650 mg Oral Q6H PRN Juliette Rebel, CRNP      albumin human  12 5 g Intravenous Once PRN Juliette Rebel, CRNP      aluminum-magnesium hydroxide-simethicone  30 mL Oral Q6H PRN Juliette Rebel, CRNP      amphotericin B  50 mg Intravenous Once Juliette Rebel, CRNP      cefazolin  1,000 mg Intravenous Q8H Juliette Rebel, CRNP Stopped (02/10/21 0826)    cefTAZidime  1,000 mg Intravenous Q8H Juliette Rebel, CRNP 1,000 mg (02/10/21 0904)    insulin glargine  10 Units Subcutaneous HS Juliette Rebel, CRNP      insulin lispro  1-5 Units Subcutaneous Q6H Juliette Rebel, CRNP      IV syringe builder   Intravenous Once Juliette Rebel, CRNP      levothyroxine 200 mcg in 0 9% sodium chloride 500 mL infusion  40 mcg/hr Intravenous Titrated Juliette Rebel, CRNP 40 mcg/hr (02/10/21 9497)    methylPREDNISolone sodium succinate  1,000 mg Intravenous Q6H Juliette Rebel, CRNP      norepinephrine  1-30 mcg/min Intravenous Titrated Juliette Rebel, CRNP 10 mcg/min (02/10/21 0834)    ondansetron  4 mg Intravenous Q6H PRN Juliette Rebel, CRNP      vasopressin (PITRESSIN) in 0 9 % sodium chloride 100 mL  0 04 Units/min Intravenous Continuous Juliette Rebel, CRNP Stopped (02/08/21 1743)       PRN Meds:   acetaminophen    albumin human    aluminum-magnesium hydroxide-simethicone    ondansetron    Continuous Infusions:levothyroxine 200 mcg in 0 9% sodium chloride 500 mL infusion, 40 mcg/hr, Last Rate: 40 mcg/hr (02/10/21 0738)  norepinephrine, 1-30 mcg/min, Last Rate: 10 mcg/min (02/10/21 0834)  vasopressin (PITRESSIN) in 0 9 % sodium chloride 100 mL, 0 04 Units/min, Last Rate: Stopped (02/08/21 1743)        Laboratory Results:  Results from last 7 days Lab Units 02/10/21  0816 02/10/21  0311 02/09/21  2153 02/09/21  1747 02/09/21  0942 02/09/21  0430 02/08/21  1036 02/08/21  0801 02/08/21  0748  02/07/21  1300 02/06/21  1144   WBC Thousand/uL 18 30* 16 74* 17 35* 16 99*  --  15 09*  --   --  25 15*  --  22 83* 15 63*   HEMOGLOBIN g/dL 8 2* 8 5* 8 8* 9 0*  --  9 7*  --   --  12 5  --  11 9* 9 0*   HEMATOCRIT % 26 5* 27 6* 27 3* 27 9*  --  29 3*  --   --  39 2  --  36 5 36 5   PLATELETS Thousands/uL 105* 101* 105* 103*  --  100*  --   --  71*  --  61* 72*   SODIUM mmol/L 139 139 139 141 140 140 142  --  141   < >  --  130*   POTASSIUM mmol/L 5 2 4 8 4 1 3 8 4 1 4 1 3 5  --  3 7   < >  --  3 8   CHLORIDE mmol/L 102 103 101 102 101 100 99*  --  98*   < >  --  90*   CO2 mmol/L 20* 21 23 24 22 23 26  --  23   < >  --  29   BUN mg/dL 84* 79* 78* 75* 72* 67* 111*  --  106*   < >  --  62*   CREATININE mg/dL 7 79* 7 50* 7 41* 6 99* 6 43* 6 16* 9 01*  --  9 35*   < >  --  6 46*   CALCIUM mg/dL 8 9 9 1 8 8 8 8 9 1 9 1 9 2  --  9 4   < >  --  9 1   MAGNESIUM mg/dL 2 1 1 9 2 0  --   --  1 9  --  2 5  --   --   --  2 3   PHOSPHORUS mg/dL 5 6* 4 8* 4 3  --   --  2 9  --  5 1*  --   --   --  4 9*    < > = values in this interval not displayed

## 2021-02-10 NOTE — PROCEDURES
Brief procedure Note - [address]ario 64 y o  male MRN: [id number]  Unit/Bed#: ICU 02 Encounter: [id number]    Bronchoscopy performed after the determination of brain death for purposes of [employer] donation  Procedure performed at the request    Bilateral airway inspection performed demonstrating normal anatomy   Endotracheal tube repositioned and retracted 2 cm during the testing due to slightly deep endotracheal tube   The john was sharp  The airways demonstrated normal anatomy bilaterally  Mucosa appeared pink without any friability or mucosal irregularity  Right-sided airways had essentially no secretions  Lavage performed for Gram stain  Return was pink/serosanguineous  No purulent secretions identified  Left-sided airways inspected  There was a small mucus plug which was whitish and opaque  Non purulent  Suction clear of the airways  The remaining airways after suctioning this material were clear  A lavage was performed on the left side in the lingula area with return that was predominantly clear with slight whitish mucus tinge      Specimen submitted for [employer] life    Destiney Saha MD

## 2021-02-10 NOTE — PROGRESS NOTES
Interval Progress Note - [address] Rosario 64 y o  male MRN: [id number]  Unit/Bed#: ICU 02 Encounter: [id number]    Patient under the care of [facility] [employer]  I did not see or examine the patient but I am assisting with any orders are necessary medical interventions requested  Urinalysis & 2units FFP for coagulopathy ordered    Anticipate harvest later today    To have HD treatment prior    Silver Arnold MD

## 2021-02-10 NOTE — PROGRESS NOTES
02/10/21 1200   Spiritual Beliefs/Perceptions   Support Systems Spouse/significant other;Family members   Plan of Care   Comments Offer [employer]  for patient and family  [facility]

## 2021-02-10 NOTE — HEMODIALYSIS
Post-Dialysis RN Treatment Note    Blood Pressure:  Pre 116/48mm/Hg  Post 124/54 mmHg   EDW not provided   Weight:  Pre 55 7 kg   Post 55 8 kg   Mode of weight measurement: Bed Scale   Volume Removed  0 ml    Treatment duration 120 minutes    NS given  No    Treatment shortened? No   Medications given during Rx 1 unit PRBC   Estimated Kt/V  Not Applicable  HD machine unable to calculate KT/V   Access type: AV fistula   Access Status: Right upper arm fistula cannulated with #15g needles x2 without difficulty    Able to achieve and maintain ordered BFR   +bruit/thrill    Verbal report given to Sumeet Haynes RN  Pt tolerated HD well

## 2021-02-10 NOTE — PROGRESS NOTES
02/10/21 1200   Clinical Encounter Type   Visited With Family; Health care provider   Routine Visit Follow-up   Crisis Visit Critical Care   [employer] Encounters   [employer] Needs Prayer

## 2021-02-11 VITALS
WEIGHT: 122.8 LBS | DIASTOLIC BLOOD PRESSURE: 41 MMHG | OXYGEN SATURATION: 91 % | SYSTOLIC BLOOD PRESSURE: 101 MMHG | RESPIRATION RATE: 12 BRPM | HEART RATE: 62 BPM | TEMPERATURE: 98.7 F | HEIGHT: 67 IN | BODY MASS INDEX: 19.27 KG/M2

## 2021-02-11 VITALS — HEART RATE: 75 BPM

## 2021-02-11 LAB
ABO GROUP BLD BPU: NORMAL
ALBUMIN SERPL BCP-MCNC: 1.8 G/DL (ref 3.5–5)
ALP SERPL-CCNC: 92 U/L (ref 46–116)
ALT SERPL W P-5'-P-CCNC: 59 U/L (ref 12–78)
ANION GAP SERPL CALCULATED.3IONS-SCNC: 12 MMOL/L (ref 4–13)
APTT PPP: 43 SECONDS (ref 23–37)
AST SERPL W P-5'-P-CCNC: 158 U/L (ref 5–45)
BACTERIA SPT RESP CULT: NO GROWTH
BASE EXCESS BLDA CALC-SCNC: 2 MMOL/L
BILIRUB SERPL-MCNC: 1.81 MG/DL (ref 0.2–1)
BODY TEMPERATURE: 98.3 DEGREES FEHRENHEIT
BPU ID: NORMAL
BUN SERPL-MCNC: 50 MG/DL (ref 5–25)
CALCIUM ALBUM COR SERPL-MCNC: 10.4 MG/DL (ref 8.3–10.1)
CALCIUM SERPL-MCNC: 8.6 MG/DL (ref 8.3–10.1)
CHLORIDE SERPL-SCNC: 101 MMOL/L (ref 100–108)
CO2 SERPL-SCNC: 26 MMOL/L (ref 21–32)
CREAT SERPL-MCNC: 4.77 MG/DL (ref 0.6–1.3)
CROSSMATCH: NORMAL
ERYTHROCYTE [DISTWIDTH] IN BLOOD BY AUTOMATED COUNT: 17.6 % (ref 11.6–15.1)
GFR SERPL CREATININE-BSD FRML MDRD: 14 ML/MIN/1.73SQ M
GLUCOSE SERPL-MCNC: 206 MG/DL (ref 65–140)
GLUCOSE SERPL-MCNC: 278 MG/DL (ref 65–140)
GRAM STN SPEC: NORMAL
HCO3 BLDA-SCNC: 26.2 MMOL/L (ref 22–28)
HCT VFR BLD AUTO: 26.5 % (ref 36.5–49.3)
HGB BLD-MCNC: 8.5 G/DL (ref 12–17)
HOROWITZ INDEX BLDA+IHG-RTO: 50 MM[HG]
INR PPP: 1.6 (ref 0.84–1.19)
MAGNESIUM SERPL-MCNC: 1.9 MG/DL (ref 1.6–2.6)
MCH RBC QN AUTO: 29.9 PG (ref 26.8–34.3)
MCHC RBC AUTO-ENTMCNC: 32.1 G/DL (ref 31.4–37.4)
MCV RBC AUTO: 93 FL (ref 82–98)
NRBC BLD AUTO-RTO: 0 /100 WBCS
O2 CT BLDA-SCNC: 12.6 ML/DL (ref 16–23)
OXYHGB MFR BLDA: 92.9 % (ref 94–97)
PCO2 BLDA: 39.2 MM HG (ref 36–44)
PCO2 TEMP ADJ BLDA: 38.9 MM HG (ref 36–44)
PEEP RESPIRATORY: 5 CM[H2O]
PH BLD: 7.45 [PH] (ref 7.35–7.45)
PH BLDA: 7.44 [PH] (ref 7.35–7.45)
PHOSPHATE SERPL-MCNC: 5.4 MG/DL (ref 2.7–4.5)
PLATELET # BLD AUTO: 66 THOUSANDS/UL (ref 149–390)
PMV BLD AUTO: 12.5 FL (ref 8.9–12.7)
PO2 BLD: 67.9 MM HG (ref 75–129)
PO2 BLDA: 68.8 MM HG (ref 75–129)
POTASSIUM SERPL-SCNC: 3.9 MMOL/L (ref 3.5–5.3)
PROT SERPL-MCNC: 5.7 G/DL (ref 6.4–8.2)
PROTHROMBIN TIME: 18.7 SECONDS (ref 11.6–14.5)
RBC # BLD AUTO: 2.84 MILLION/UL (ref 3.88–5.62)
SODIUM SERPL-SCNC: 139 MMOL/L (ref 136–145)
SPECIMEN SOURCE: ABNORMAL
UNIT DISPENSE STATUS: NORMAL
UNIT PRODUCT CODE: NORMAL
UNIT RH: NORMAL
VENT AC: 12
VENT- AC: AC
VT SETTING VENT: 450 ML
WBC # BLD AUTO: 22 THOUSAND/UL (ref 4.31–10.16)

## 2021-02-11 PROCEDURE — 94003 VENT MGMT INPAT SUBQ DAY: CPT

## 2021-02-11 PROCEDURE — 82805 BLOOD GASES W/O2 SATURATION: CPT | Performed by: PHYSICIAN ASSISTANT

## 2021-02-11 PROCEDURE — 88300 SURGICAL PATH GROSS: CPT | Performed by: PATHOLOGY

## 2021-02-11 PROCEDURE — 85610 PROTHROMBIN TIME: CPT | Performed by: NURSE PRACTITIONER

## 2021-02-11 PROCEDURE — 85027 COMPLETE CBC AUTOMATED: CPT | Performed by: NURSE PRACTITIONER

## 2021-02-11 PROCEDURE — 84100 ASSAY OF PHOSPHORUS: CPT | Performed by: PHYSICIAN ASSISTANT

## 2021-02-11 PROCEDURE — 83735 ASSAY OF MAGNESIUM: CPT | Performed by: PHYSICIAN ASSISTANT

## 2021-02-11 PROCEDURE — 82948 REAGENT STRIP/BLOOD GLUCOSE: CPT

## 2021-02-11 PROCEDURE — 85730 THROMBOPLASTIN TIME PARTIAL: CPT | Performed by: NURSE PRACTITIONER

## 2021-02-11 PROCEDURE — 80053 COMPREHEN METABOLIC PANEL: CPT | Performed by: NURSE PRACTITIONER

## 2021-02-11 PROCEDURE — P9017 PLASMA 1 DONOR FRZ W/IN 8 HR: HCPCS

## 2021-02-11 RX ORDER — HEPARIN SODIUM 1000 [USP'U]/ML
INJECTION, SOLUTION INTRAVENOUS; SUBCUTANEOUS AS NEEDED
Status: DISCONTINUED | OUTPATIENT
Start: 2021-02-11 | End: 2021-02-11

## 2021-02-11 RX ORDER — EPHEDRINE SULFATE 50 MG/ML
INJECTION INTRAVENOUS AS NEEDED
Status: DISCONTINUED | OUTPATIENT
Start: 2021-02-11 | End: 2021-02-11

## 2021-02-11 RX ORDER — MAGNESIUM HYDROXIDE 1200 MG/15ML
LIQUID ORAL AS NEEDED
Status: DISCONTINUED | OUTPATIENT
Start: 2021-02-11 | End: 2021-02-11 | Stop reason: HOSPADM

## 2021-02-11 RX ORDER — VECURONIUM BROMIDE 1 MG/ML
INJECTION, POWDER, LYOPHILIZED, FOR SOLUTION INTRAVENOUS AS NEEDED
Status: DISCONTINUED | OUTPATIENT
Start: 2021-02-11 | End: 2021-02-11

## 2021-02-11 RX ORDER — SODIUM CHLORIDE 9 MG/ML
INJECTION, SOLUTION INTRAVENOUS CONTINUOUS PRN
Status: DISCONTINUED | OUTPATIENT
Start: 2021-02-11 | End: 2021-02-11

## 2021-02-11 RX ADMIN — SODIUM CHLORIDE: 0.9 INJECTION, SOLUTION INTRAVENOUS at 05:06

## 2021-02-11 RX ADMIN — VECURONIUM BROMIDE 10 MG: 1 INJECTION, POWDER, LYOPHILIZED, FOR SOLUTION INTRAVENOUS at 05:13

## 2021-02-11 RX ADMIN — EPHEDRINE SULFATE 7.5 MG: 50 INJECTION, SOLUTION INTRAVENOUS at 05:35

## 2021-02-11 RX ADMIN — LEVOTHYROXINE SODIUM ANHYDROUS 40 MCG/HR: 100 INJECTION, POWDER, LYOPHILIZED, FOR SOLUTION INTRAVENOUS at 00:18

## 2021-02-11 RX ADMIN — HEPARIN SODIUM 17000 UNITS: 1000 INJECTION, SOLUTION INTRAVENOUS; SUBCUTANEOUS at 06:25

## 2021-02-11 RX ADMIN — SODIUM CHLORIDE 1000 MG: 0.9 INJECTION, SOLUTION INTRAVENOUS at 03:08

## 2021-02-11 RX ADMIN — VECURONIUM BROMIDE 10 MG: 1 INJECTION, POWDER, LYOPHILIZED, FOR SOLUTION INTRAVENOUS at 04:59

## 2021-02-11 RX ADMIN — CEFEPIME HYDROCHLORIDE 1000 MG: 1 INJECTION, POWDER, FOR SOLUTION INTRAMUSCULAR; INTRAVENOUS at 02:19

## 2021-02-11 NOTE — ANESTHESIA PREPROCEDURE EVALUATION
Procedure:  RECOVERY ORGAN (N/A Abdomen)    Relevant Problems   ENDO   (+) Type 2 diabetes mellitus, with long-term current use of insulin (HCC)      GI/HEPATIC   (+) Esophageal dysphagia   (+) GERD (gastroesophageal reflux disease)   (+) Gastroesophageal reflux disease without esophagitis      /RENAL   (+) Acute on chronic kidney failure (HCC)   (+) ESRD (end stage renal disease) (HCC)   (+) Stage 5 chronic kidney disease not on chronic dialysis (HCC)      HEMATOLOGY   (+) Anemia due to chronic kidney disease      MUSCULOSKELETAL   (+) Low back pain      NEURO/PSYCH   (+) Anxiety   (+) History of CVA (cerebrovascular accident)      PULMONARY   (+) Acute respiratory failure ([address] )        Physical Exam    Airway  Comment: intubated           Dental       Cardiovascular  Rhythm: regular, Rate: normal,     Pulmonary  Comment: Coarse breath sounds by auscultation, Breath sounds clear to auscultation,     Other Findings        Anesthesia Plan  ASA Score- 6     Anesthesia Type-         Additional Monitors:   Airway Plan:     Comment: Organ Edwards  Patient declared brain dead on 02/09/2021 at 1611  Plan Factors-    Chart reviewed  EKG reviewed  Existing labs reviewed  Patient summary reviewed  Induction- intravenous and inhalational     Postoperative Plan-     Informed Consent-   I personally reviewed this patient with the CRNA  Discussed and agreed on the Anesthesia Plan with the CRNA  Lakisha Zepeda

## 2021-02-11 NOTE — ANESTHESIA POSTPROCEDURE EVALUATION
Post-Op Assessment Note             Comments: Organ Duff        No complications documented      BP     Temp      Pulse     Resp      SpO2

## 2021-02-12 LAB
ABO GROUP BLD BPU: NORMAL
BPU ID: NORMAL
CROSSMATCH: NORMAL
UNIT DISPENSE STATUS: NORMAL
UNIT PRODUCT CODE: NORMAL
UNIT RH: NORMAL

## 2025-07-22 NOTE — SOCIAL WORK
Cm informed by medical team, that patient is anticipated for d/c today  CM informed by medical team that they will send prescriptions to [facility] and that the family was asking for a shower chair  Cm advised medical team, that shower chairs are not a covered benefit, but Cm will send to [facility] spoke with Kamaljit Sinha, [facility], who confirmed that shower chair is not covered under any insurance  Per Kamaljit Sinha, a backless shower chair will cost about $40 and a shower chair with back would be about $50  Cm explained this to patient who took script  and stated that he will take to another pharmacy  Cm informed that patient's no longer in the hospital, but that they will return by 12:30pm or 1pm  Cm stated that she will need to re-obtain prescriptions since pharmacy closes at 1pm   Cm retrieved scripts, placed in patient's chart and informed RN  CM reviewed d/c planning process including the following: identifying help at home, patient preference for d/c planning needs, Discharge Lounge, [facility], availability of treatment team to discuss questions or concerns patient and/or family may have regarding understanding medications and recognizing signs and symptoms once discharged  CM also encouraged patient to follow up with all recommended appointments after discharge  Patient advised of importance for patient and family to participate in managing patients medical well being  Calm

## (undated) DEVICE — BOWL: 16OZ PEELPOUCH 75/CS 16/PLT: Brand: MEDEGEN MEDICAL PRODUCTS, LLC

## (undated) DEVICE — GLOVE SRG BIOGEL 8

## (undated) DEVICE — 3.2MM GUIDE WIRE 400MM

## (undated) DEVICE — GLOVE INDICATOR PI UNDERGLOVE SZ 8 BLUE

## (undated) DEVICE — CHLORAPREP HI-LITE 26ML ORANGE

## (undated) DEVICE — 3M™ IOBAN™ 2 ANTIMICROBIAL INCISE DRAPE 6650EZ: Brand: IOBAN™ 2

## (undated) DEVICE — SINGLE-USE BIOPSY FORCEPS: Brand: RADIAL JAW 4

## (undated) DEVICE — SUT ETHILON 2-0 LR 20 IN 470G

## (undated) DEVICE — TUBING SUCTION 5MM X 12 FT

## (undated) DEVICE — BRUSH EZ SCRUB PCMX W/NAIL CLEANER

## (undated) DEVICE — GLOVE SRG BIOGEL 8.5

## (undated) DEVICE — MEDI-VAC YANKAUER SUCTION HANDLE W/BULBOUS AND CONTROL VENT: Brand: CARDINAL HEALTH

## (undated) DEVICE — BASIC SINGLE BASIN-LF: Brand: MEDLINE INDUSTRIES, INC.

## (undated) DEVICE — TOWEL SURG XR DETECT GREEN STRL RFD

## (undated) DEVICE — 6617 IOBAN II PATIENT ISOLATION DRAPE 5/BX,4BX/CS: Brand: STERI-DRAPE™ IOBAN™ 2

## (undated) DEVICE — SUT VICRYL 2-0 CT-1 27 IN J259H

## (undated) DEVICE — SUT SILK 2-0 30 IN A305H

## (undated) DEVICE — PAD GROUNDING ADULT

## (undated) DEVICE — SINGLE PORT MANIFOLD: Brand: NEPTUNE 2

## (undated) DEVICE — TELFA NON-ADHERENT ABSORBENT DRESSING: Brand: TELFA

## (undated) DEVICE — SUT SILK 0 30 IN A306H

## (undated) DEVICE — 4.0MM THREE-FLUTED DRILL BIT QC/260MM/65MM CALIBRATION

## (undated) DEVICE — OCCLUSIVE GAUZE STRIP,3% BISMUTH TRIBROMOPHENATE IN PETROLATUM BLEND: Brand: XEROFORM

## (undated) DEVICE — INTENDED FOR TISSUE SEPARATION, AND OTHER PROCEDURES THAT REQUIRE A SHARP SURGICAL BLADE TO PUNCTURE OR CUT.: Brand: BARD-PARKER SAFETY BLADES SIZE 11, STERILE

## (undated) DEVICE — HEAVY DUTY TABLE COVER: Brand: CONVERTORS

## (undated) DEVICE — GLOVE SRG BIOGEL 7.5

## (undated) DEVICE — COBAN 6 IN STERILE

## (undated) DEVICE — MAYO STAND COVER: Brand: CONVERTORS

## (undated) DEVICE — TIBURON LAPAROSCOPIC ABDOMINAL DRAPE: Brand: CONVERTORS

## (undated) DEVICE — PROXIMATE SKIN STAPLERS (35 WIDE) CONTAINS 35 STAINLESS STEEL STAPLES (FIXED HEAD): Brand: PROXIMATE

## (undated) DEVICE — GLOVE INDICATOR PI UNDERGLOVE SZ 8.5 BLUE

## (undated) DEVICE — SPONGE LAP 18 X 18 IN STRL RFD

## (undated) DEVICE — MAX-CORE® DISPOSABLE CORE BIOPSY INSTRUMENT, 18G X 20CM: Brand: MAX-CORE

## (undated) DEVICE — UMBILICAL TAPE: Brand: DEROYAL

## (undated) DEVICE — TRU-CUT NEEDLE BIOPSY SOFT TISSUE 14G X 15CM: Brand: TRU-CUT

## (undated) DEVICE — GLOVE SRG BIOGEL ECLIPSE 6.5

## (undated) DEVICE — DRAPE C-ARMOUR

## (undated) DEVICE — BULB SYRINGE,IRRIGATION WITH PROTECTIVE CAP: Brand: DOVER

## (undated) DEVICE — INTENDED FOR TISSUE SEPARATION, AND OTHER PROCEDURES THAT REQUIRE A SHARP SURGICAL BLADE TO PUNCTURE OR CUT.: Brand: BARD-PARKER SAFETY BLADES SIZE 10, STERILE

## (undated) DEVICE — SUT SILK 3-0 30 IN A304H

## (undated) DEVICE — SAW BLADE STERNUM EXTENDED 531

## (undated) DEVICE — SUT VICRYL 2-0 CT-1 36 IN J945H

## (undated) DEVICE — BETHLEHEM UNIVERSAL LAPAROTOMY: Brand: CARDINAL HEALTH

## (undated) DEVICE — DRAPE C-ARM X-RAY

## (undated) DEVICE — SHROUD KIT ADULT DISP

## (undated) DEVICE — VESSEL LOOPS X-RAY DETECTABLE: Brand: DEROYAL

## (undated) DEVICE — 4-PORT MANIFOLD: Brand: NEPTUNE 2

## (undated) DEVICE — 3M™ IOBAN™ 2 ANTIMICROBIAL INCISE DRAPE 6648EZ: Brand: IOBAN™ 2

## (undated) DEVICE — SUT VICRYL 0 CT-1 36 IN J946H

## (undated) DEVICE — GLOVE INDICATOR PI UNDERGLOVE SZ 7 BLUE

## (undated) DEVICE — INTENDED FOR TISSUE SEPARATION, AND OTHER PROCEDURES THAT REQUIRE A SHARP SURGICAL BLADE TO PUNCTURE OR CUT.: Brand: BARD-PARKER SAFETY BLADES SIZE 15, STERILE

## (undated) DEVICE — ASTOUND IMPERVIOUS SURGICAL GOWN: Brand: CONVERTORS

## (undated) DEVICE — PENCIL ELECTROSURG E-Z CLEAN -0035H

## (undated) DEVICE — NEEDLE COUNTER LG W/RULER

## (undated) DEVICE — ARTHROSCOPY FLOOR MAT

## (undated) DEVICE — SPECIMEN CONTAINER STERILE PEEL PACK

## (undated) DEVICE — 2.5MM REAMING ROD WITH BALL TIP/950MM-STERILE

## (undated) DEVICE — POOLE SUCTION HANDLE: Brand: CARDINAL HEALTH

## (undated) DEVICE — SURGICAL GOWN, XL SMARTSLEEVE: Brand: CONVERTORS